# Patient Record
Sex: MALE | Race: WHITE | NOT HISPANIC OR LATINO | Employment: PART TIME | URBAN - NONMETROPOLITAN AREA
[De-identification: names, ages, dates, MRNs, and addresses within clinical notes are randomized per-mention and may not be internally consistent; named-entity substitution may affect disease eponyms.]

---

## 2017-02-06 DIAGNOSIS — E78.5 HYPERLIPIDEMIA LDL GOAL <100: Primary | ICD-10-CM

## 2017-02-06 NOTE — TELEPHONE ENCOUNTER
SIMVASTATIN     Last Written Prescription Date: 10/12/16  Last Fill Quantity: 90, # refills: 0  Last Office Visit with [facility], [facility] or [facility] prescribing provider: 10/12/16       CHOL      199   10/12/2016  HDL       40   10/12/2016  LDL      119   10/12/2016  TRIG      202   10/12/2016  CHOLHDLRATIO      4.8   10/16/2014

## 2017-02-07 RX ORDER — SIMVASTATIN 20 MG
20 TABLET ORAL AT BEDTIME
Qty: 90 TABLET | Refills: 2 | Status: SHIPPED | OUTPATIENT
Start: 2017-02-07 | End: 2017-06-18 | Stop reason: ALTCHOICE

## 2017-02-20 DIAGNOSIS — E11.65 TYPE 2 DIABETES MELLITUS WITH HYPERGLYCEMIA (H): ICD-10-CM

## 2017-02-20 NOTE — TELEPHONE ENCOUNTER
Metformin         Last Written Prescription Date: 7/14/2016  Last Fill Quantity: 90, # refills: 3  Last Office Visit with [facility], [facility] or [facility] prescribing provider:  10/12/2016        BP Readings from Last 3 Encounters:   10/12/16 144/80   09/15/16 136/84   07/28/16 156/82     Lab Results   Component Value Date    MICROL 11 07/14/2016     No results found for: MICROALBUMIN  Creatinine   Date Value Ref Range Status   07/14/2016 0.90 0.66 - 1.25 mg/dL Final   ]  GFR Estimate   Date Value Ref Range Status   07/14/2016 85 >60 mL/min/1.7m2 Final     Comment:     Non  GFR Calc   04/22/2015 40 (L) >60 mL/min/1.7m2 Final     Comment:     Non  GFR Calc   03/17/2014 77 >60 mL/min/1.7m2 Final     GFR Estimate If Black   Date Value Ref Range Status   07/14/2016 >90   GFR Calc   >60 mL/min/1.7m2 Final   04/22/2015 48 (L) >60 mL/min/1.7m2 Final     Comment:      GFR Calc   03/17/2014 >90 >60 mL/min/1.7m2 Final     Lab Results   Component Value Date    CHOL 199 10/12/2016     Lab Results   Component Value Date    HDL 40 10/12/2016     Lab Results   Component Value Date     10/12/2016     Lab Results   Component Value Date    TRIG 202 10/12/2016     Lab Results   Component Value Date    CHOLHDLRATIO 4.8 10/16/2014     Lab Results   Component Value Date    AST 42 07/14/2016     Lab Results   Component Value Date    ALT 66 07/14/2016     Lab Results   Component Value Date    A1C 6.5 10/12/2016    A1C 7.5 07/14/2016    A1C 7.3 11/04/2015    A1C 7.3 04/22/2015    A1C 6.6 07/01/2014     Potassium   Date Value Ref Range Status   07/14/2016 3.9 3.4 - 5.3 mmol/L Final

## 2017-03-20 ENCOUNTER — RADIANT APPOINTMENT (OUTPATIENT)
Dept: ULTRASOUND IMAGING | Facility: CLINIC | Age: 62
End: 2017-03-20
Attending: NURSE PRACTITIONER
Payer: COMMERCIAL

## 2017-03-20 DIAGNOSIS — I10 BENIGN ESSENTIAL HYPERTENSION: Chronic | ICD-10-CM

## 2017-03-20 DIAGNOSIS — Z87.891 PERSONAL HISTORY OF TOBACCO USE, PRESENTING HAZARDS TO HEALTH: ICD-10-CM

## 2017-03-20 DIAGNOSIS — E78.5 HYPERLIPIDEMIA LDL GOAL <100: ICD-10-CM

## 2017-03-20 PROCEDURE — 93880 EXTRACRANIAL BILAT STUDY: CPT

## 2017-03-20 NOTE — LETTER
[facility]  [address] [facility] [address] [address]  Phone: [phone]    March 21, 2017    Js ESTHER Jones  [address] [facility] [address]              Dear Mr. Jones,      Your carotid ultrasounds shows less than 50% stenosis (narrowing.) No follow-up is necessary. Continue taking a baby Aspirin daily.        Sincerely,      Kathya Iglesias, CNP/ ebp

## 2017-03-21 NOTE — PROGRESS NOTES
Carotid ultrasounds show  Less than 50% stenosis. No follow-up necessary. Continue taking a baby Aspirin daily.  Please notify him of these results and send a hard copy if not on myChart.   Thanks. ALESSANDRO Maharaj

## 2017-04-15 DIAGNOSIS — E11.65 TYPE 2 DIABETES MELLITUS WITH HYPERGLYCEMIA (H): ICD-10-CM

## 2017-04-17 NOTE — TELEPHONE ENCOUNTER
metFORMIN (GLUCOPHAGE) 1000 MG tablet         Last Written Prescription Date: 2/20/17  Last Fill Quantity: 90, # refills: 0  Last Office Visit with [facility], [facility] or [facility] prescribing provider:  10/12/17        BP Readings from Last 3 Encounters:   10/12/16 144/80   09/15/16 136/84   07/28/16 156/82     Lab Results   Component Value Date    MICROL 11 07/14/2016     Lab Results   Component Value Date    UMALCR 7.93 07/14/2016     Creatinine   Date Value Ref Range Status   07/14/2016 0.90 0.66 - 1.25 mg/dL Final   ]  GFR Estimate   Date Value Ref Range Status   07/14/2016 85 >60 mL/min/1.7m2 Final     Comment:     Non  GFR Calc   04/22/2015 40 (L) >60 mL/min/1.7m2 Final     Comment:     Non  GFR Calc   03/17/2014 77 >60 mL/min/1.7m2 Final     GFR Estimate If Black   Date Value Ref Range Status   07/14/2016 >90   GFR Calc   >60 mL/min/1.7m2 Final   04/22/2015 48 (L) >60 mL/min/1.7m2 Final     Comment:      GFR Calc   03/17/2014 >90 >60 mL/min/1.7m2 Final     Lab Results   Component Value Date    CHOL 199 10/12/2016     Lab Results   Component Value Date    HDL 40 10/12/2016     Lab Results   Component Value Date     10/12/2016     Lab Results   Component Value Date    TRIG 202 10/12/2016     Lab Results   Component Value Date    CHOLHDLRATIO 4.8 10/16/2014     Lab Results   Component Value Date    AST 42 07/14/2016     Lab Results   Component Value Date    ALT 66 07/14/2016     Lab Results   Component Value Date    A1C 6.5 10/12/2016    A1C 7.5 07/14/2016    A1C 7.3 11/04/2015    A1C 7.3 04/22/2015    A1C 6.6 07/01/2014     Potassium   Date Value Ref Range Status   07/14/2016 3.9 3.4 - 5.3 mmol/L Final

## 2017-05-09 ENCOUNTER — TELEPHONE (OUTPATIENT)
Dept: FAMILY MEDICINE | Facility: CLINIC | Age: 62
End: 2017-05-09

## 2017-05-09 NOTE — TELEPHONE ENCOUNTER
Due for follow-up OV for DM, Cholesterol, and hypertension. Please help him schedule. Come fasting.  ALESSANDRO Maharaj

## 2017-05-09 NOTE — TELEPHONE ENCOUNTER
Pt informed/ advised. States he will need to call back to schedule appt due to work schedule.  NORMAN Rivas RN

## 2017-06-16 ENCOUNTER — OFFICE VISIT (OUTPATIENT)
Dept: FAMILY MEDICINE | Facility: CLINIC | Age: 62
End: 2017-06-16
Payer: COMMERCIAL

## 2017-06-16 VITALS
WEIGHT: 152 LBS | RESPIRATION RATE: 18 BRPM | DIASTOLIC BLOOD PRESSURE: 82 MMHG | BODY MASS INDEX: 26.09 KG/M2 | OXYGEN SATURATION: 95 % | TEMPERATURE: 98.6 F | HEART RATE: 70 BPM | SYSTOLIC BLOOD PRESSURE: 142 MMHG

## 2017-06-16 DIAGNOSIS — I10 BENIGN ESSENTIAL HYPERTENSION: Chronic | ICD-10-CM

## 2017-06-16 DIAGNOSIS — E78.5 HYPERLIPIDEMIA LDL GOAL <100: Chronic | ICD-10-CM

## 2017-06-16 DIAGNOSIS — K21.9 GASTROESOPHAGEAL REFLUX DISEASE WITHOUT ESOPHAGITIS: ICD-10-CM

## 2017-06-16 DIAGNOSIS — Z11.59 NEED FOR HEPATITIS C SCREENING TEST: ICD-10-CM

## 2017-06-16 DIAGNOSIS — E11.9 TYPE 2 DIABETES MELLITUS WITHOUT COMPLICATION, WITHOUT LONG-TERM CURRENT USE OF INSULIN (H): Primary | Chronic | ICD-10-CM

## 2017-06-16 LAB
ANION GAP SERPL CALCULATED.3IONS-SCNC: 6 MMOL/L (ref 3–14)
BUN SERPL-MCNC: 19 MG/DL (ref 7–30)
CALCIUM SERPL-MCNC: 9.2 MG/DL (ref 8.5–10.1)
CHLORIDE SERPL-SCNC: 106 MMOL/L (ref 94–109)
CHOLEST SERPL-MCNC: 223 MG/DL
CO2 SERPL-SCNC: 26 MMOL/L (ref 20–32)
CREAT SERPL-MCNC: 0.89 MG/DL (ref 0.66–1.25)
CREAT UR-MCNC: 160 MG/DL
GFR SERPL CREATININE-BSD FRML MDRD: 86 ML/MIN/1.7M2
GLUCOSE SERPL-MCNC: 140 MG/DL (ref 70–99)
HBA1C MFR BLD: 7.2 % (ref 4.3–6)
HCV AB SERPL QL IA: NORMAL
HDLC SERPL-MCNC: 37 MG/DL
LDLC SERPL CALC-MCNC: 139 MG/DL
MICROALBUMIN UR-MCNC: 30 MG/L
MICROALBUMIN/CREAT UR: 18.44 MG/G CR (ref 0–17)
NONHDLC SERPL-MCNC: 186 MG/DL
POTASSIUM SERPL-SCNC: 3.9 MMOL/L (ref 3.4–5.3)
SODIUM SERPL-SCNC: 138 MMOL/L (ref 133–144)
TRIGL SERPL-MCNC: 233 MG/DL

## 2017-06-16 PROCEDURE — 82043 UR ALBUMIN QUANTITATIVE: CPT | Performed by: NURSE PRACTITIONER

## 2017-06-16 PROCEDURE — 36415 COLL VENOUS BLD VENIPUNCTURE: CPT | Performed by: NURSE PRACTITIONER

## 2017-06-16 PROCEDURE — 80061 LIPID PANEL: CPT | Performed by: NURSE PRACTITIONER

## 2017-06-16 PROCEDURE — 80048 BASIC METABOLIC PNL TOTAL CA: CPT | Performed by: NURSE PRACTITIONER

## 2017-06-16 PROCEDURE — 99214 OFFICE O/P EST MOD 30 MIN: CPT | Performed by: NURSE PRACTITIONER

## 2017-06-16 PROCEDURE — 86803 HEPATITIS C AB TEST: CPT | Performed by: NURSE PRACTITIONER

## 2017-06-16 PROCEDURE — 83036 HEMOGLOBIN GLYCOSYLATED A1C: CPT | Performed by: NURSE PRACTITIONER

## 2017-06-16 RX ORDER — GLIPIZIDE 5 MG/1
5 TABLET, FILM COATED, EXTENDED RELEASE ORAL DAILY
Qty: 90 TABLET | Refills: 1 | Status: SHIPPED | OUTPATIENT
Start: 2017-06-16 | End: 2017-06-18

## 2017-06-16 RX ORDER — PIOGLITAZONEHYDROCHLORIDE 15 MG/1
15 TABLET ORAL DAILY
Qty: 90 TABLET | Refills: 1 | Status: SHIPPED | OUTPATIENT
Start: 2017-06-16 | End: 2017-06-18

## 2017-06-16 NOTE — PROGRESS NOTES
SUBJECTIVE:                                                    Js Jones is a 62 year old male who presents to clinic today for the following health issues:    Due: Adv dir, hep c, eye, foot  Diabetes Follow-up      Patient is checking blood sugars: rarely.  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 Thinks that his blood pressure medication needs to be increase slightly.  HPI:   PCP:  Kathya Iglesias, [phone]    HPI: Patient has been doing well. Taking his medications as prescribed, although he has been out of his Metformin and Actos for a week he says. He didn't take his hypertension medication today as he came in fasting- reviewed that with him for future. He agrees to Hep C screen. He will work on Adv Directive and bring in to see [place] Rivas next week when he gets his BP  rechecked.    CURRENT PROBLEM LIST:  Patient Active Problem List   Diagnosis     Type 2 diabetes mellitus without complication, without long-term current use of insulin (H)     Benign essential hypertension, goal <140/90     Hyperlipidemia LDL goal <100     Gastroesophageal reflux disease without esophagitis       PAST MEDICAL HISTORY:  History reviewed. No pertinent past medical history.    CURRENT MEDICATIONS:  Current Outpatient Prescriptions   Medication Sig Dispense Refill     metFORMIN (GLUCOPHAGE) 1000 MG tablet 1000 mg with breakfast, 500 mg with lunch, 1000 mg with dinner. (Needs follow-up appointment for this medication) 90 tablet 0     simvastatin (ZOCOR) 20 MG tablet Take 1 tablet (20 mg) by mouth At Bedtime 90 tablet 2     Melatonin 10 MG TABS Take 10 mg by mouth nightly as needed for sleep       pioglitazone (ACTOS) 15 MG tablet Take 1 tablet (15 mg) by mouth daily Needs HgbA1c checked before refill 90 tablet 0     glipiZIDE (GLIPIZIDE XL) 5 MG 24 hr tablet Take 1 tablet (5 mg) by mouth daily Needs HgbA1c checked before refill 90 tablet 0     amLODIPine (NORVASC) 10 MG tablet Take 1 tablet (10 mg) by mouth daily 90 tablet 3     blood glucose monitoring (NO BRAND SPECIFIED) test strip Use to test blood sugar 3 times daily or as directed. Box of 100 each. 3 Box 3     omeprazole (PRILOSEC OTC) 20 MG tablet Take 1 tablet (20 mg) by mouth daily 90 tablet 1     Omega-3 Fatty Acids (OMEGA-3 FISH OIL PO) Take 1,200 mg by mouth 2 times daily (with meals)       Blood Glucose Monitoring Suppl W/DEVICE KIT 1 each daily 1 kit 0     ONE TOUCH LANCETS MISC 1 lancet 3 times daily 200 each 5     MULTIPLE VITAMIN PO Take 1 tablet by mouth       aspirin 81 MG tablet Take 1 tablet (81 mg) by mouth daily 90 tablet 3     ACE NOT PRESCRIBED, INTENTIONAL, ACE Inhibitor not prescribed due to Allergy 0 each 0       ALLERGIES:  Allergies   Allergen Reactions     Lisinopril Cough       FAMILY HISTORY:  Family History   Problem  Relation Age of Onset     DIABETES Mother      Hypertension Mother      Cardiovascular Father 81     CHF       SOCIAL HISTORY:  Social History     Social History     Marital status:      Spouse name: N/A     Number of children: N/A     Years of education: N/A     Social History Main Topics     Smoking status: Former Smoker     Smokeless tobacco: Never Used     Alcohol use No     Drug use: No     Sexual activity: Yes     Partners: Female     Other Topics Concern     Parent/Sibling W/ Cabg, Mi Or Angioplasty Before 65f 55m? No     Social History Narrative            ROS:  Constitutional, HEENT, cardiovascular, pulmonary, gi and gu systems are negative, except as otherwise noted.  CV: NEGATIVE for chest pain, palpitations or peripheral edema and Hx HTN  GI: NEGATIVE for nausea, abdominal pain, heartburn, or change in bowel habits and Hx GERD  ENDOCRINE: NEGATIVE for temperature intolerance, skin/hair changes and Hx diabetes    PHYSICAL EXAM:   /82 (BP Location: Right arm, Patient Position: Chair, Cuff Size: Adult Regular)  Pulse 70  Temp 98.6  F (37  C) (Tympanic)  Resp 18  Wt 152 lb (68.9 kg)  SpO2 95%  BMI 26.09 kg/m2  Body mass index is 26.09 kg/(m^2).  GENERAL APPEARANCE: healthy, alert and no distress  RESP: lungs clear to auscultation - no rales, rhonchi or wheezes  CV: regular rates and rhythm, normal S1 S2, no S3 or S4 and no murmur, click or rub  ABDOMEN: soft, nontender, without hepatosplenomegaly or masses and bowel sounds normal  MS: extremities normal- no gross deformities noted  SKIN: no suspicious lesions or rashes  DIABETIC FOOT EXAM: normal DP and PT pulses, no trophic changes or ulcerative lesions, normal sensory exam and normal monofilament exam  PSYCH: mentation appears normal and affect normal/bright,       ASSESSMENT & PLAN:     (E11.9) Type 2 diabetes mellitus without complication, without long-term current use of insulin (H)  (primary encounter diagnosis)  Comment: chronic,  stable  Plan: Hemoglobin A1c, Albumin Random Urine         Quantitative, glipiZIDE (GLIPIZIDE XL) 5 MG 24 hr tablet,         metFORMIN (GLUCOPHAGE) 1000 MG tablet,         pioglitazone (ACTOS) 15 MG tablet        Refill    (I10) Benign essential hypertension, goal <140/90  Comment: chronic, elevated today (he did not take his medications this morning)  Plan: Basic metabolic panel        Recheck BP in 1 week    (E78.5) Hyperlipidemia LDL goal <100  Comment: chronic, stable  Plan: Lipid panel reflex to direct LDL        We might need to switch from Simvastatin to Atorvastatin for better LDL control    (K21.9) Gastroesophageal reflux disease without esophagitis  Comment: chronic, stable   Plan: he uses Omeprazole over-the-counter as needed    (Z11.59) Need for hepatitis C screening test  Comment: screen  Plan: Hepatitis C antibody            Patient Instructions   Diabetes  Refilled medications for 6 months  Recheck HgbA1c every 6 months with a lab only appointment  Get an eye exam and have them fax us the result    Cholesterol  We'll check see how your LDL level looks, we might need to change from Simvastatin to Atorvastatin    Hypertension  Come in in 1 week to see Jamaica Rivas nurse to check BP and complete Health Care Directive     Risks, benefits, side effects and rationale for treatment plan fully discussed with the patient and understanding expressed.    Kathya Iglesias, [employer]

## 2017-06-16 NOTE — NURSING NOTE
"Chief Complaint   Patient presents with     Diabetes     Hypertension       Initial /82 (BP Location: Right arm, Patient Position: Chair, Cuff Size: Adult Regular)  Pulse 70  Temp 98.6  F (37  C) (Tympanic)  Resp 18  Wt 152 lb (68.9 kg)  SpO2 95%  BMI 26.09 kg/m2 Estimated body mass index is 26.09 kg/(m^2) as calculated from the following:    Height as of 10/12/16: 5' 4\" (1.626 m).    Weight as of this encounter: 152 lb (68.9 kg).  Medication Reconciliation: complete    Health Maintenance that is potentially due pending provider review:  Is fasting today and willing to complete labs today.     n/a    "

## 2017-06-16 NOTE — PATIENT INSTRUCTIONS
Diabetes  Refilled medications for 6 months  Recheck HgbA1c every 6 months with a lab only appointment  Get an eye exam and have them fax us the result    Cholesterol  We'll check see how your LDL level looks, we might need to change from Simvastatin to Atorvastatin    Hypertension  Come in in 1 week to see Jamaica Rivas nurse to check BP and complete Health Care Directive

## 2017-06-16 NOTE — MR AVS SNAPSHOT
After Visit Summary   6/16/2017    Js Jones    MRN: [id number]           Patient Information     Date Of Birth          1955        Visit Information        Provider Department      6/16/2017 8:40 AM Kathya Iglesias CNP [facility]        Today's Diagnoses     Type 2 diabetes mellitus without complication, without long-term current use of insulin (H)    -  1    Benign essential hypertension, goal <140/90        Hyperlipidemia LDL goal <100        Gastroesophageal reflux disease without esophagitis        Need for hepatitis C screening test        Type 2 diabetes mellitus with hyperglycemia, without long-term current use of insulin (H)          Care Instructions    Diabetes  Refilled medications for 6 months  Recheck HgbA1c every 6 months with a lab only appointment  Get an eye exam and have them fax us the result    Cholesterol  We'll check see how your LDL level looks, we might need to change from Simvastatin to Atorvastatin    Hypertension  Come in in 1 week to see Jamaica Rivas nurse to check BP and complete Health Care Directive           Follow-ups after your visit        Who to contact     If you have questions or need follow up information about today's clinic visit or your schedule please contact [facility] directly at [phone].  Normal or non-critical lab and imaging results will be communicated to you by MyChart, letter or phone within 4 business days after the clinic has received the results. If you do not hear from us within 7 days, please contact the clinic through Inporiahart or phone. If you have a critical or abnormal lab result, we will notify you by phone as soon as possible.  Submit refill requests through Schedule Savvy or call your pharmacy and they will forward the refill request to us. Please allow 3 business days for your refill to be completed.          Additional Information About Your Visit        MyChart Information     Schedule Savvy lets  "you send messages to your doctor, view your test results, renew your prescriptions, schedule appointments and more. To sign up, go to [email or web address] . Click on \"Log in\" on the left side of the screen, which will take you to the Welcome page. Then click on \"Sign up Now\" on the right side of the page.     You will be asked to enter the access code listed below, as well as some personal information. Please follow the directions to create your username and password.     Your access code is: [email or web address]  Expires: 2017  9:24 AM     Your access code will  in 90 days. If you need help or a new code, please call your [place] clinic or [phone].        Care EveryWhere ID     This is your [facility] EveryWhere ID. This could be used by other organizations to access your [place] medical records  [id number]        Your Vitals Were     Pulse Temperature Respirations Pulse Oximetry BMI (Body Mass Index)       70 98.6  F (37  C) (Tympanic) 18 95% 26.09 kg/m2        Blood Pressure from Last 3 Encounters:   17 142/82   10/12/16 144/80   09/15/16 136/84    Weight from Last 3 Encounters:   17 152 lb (68.9 kg)   10/12/16 155 lb (70.3 kg)   16 157 lb (71.2 kg)              We Performed the Following     Albumin Random Urine Quantitative     Basic metabolic panel     Hemoglobin A1c     Hepatitis C antibody     Lipid panel reflex to direct LDL          Where to get your medicines      These medications were sent to [facility] [address] [place] [address] [address]  [address] [address]     Phone:  [phone]     glipiZIDE 5 MG 24 hr tablet    metFORMIN 1000 MG tablet    pioglitazone 15 MG tablet          Primary Care Provider Office Phone # Fax #    Kathya Iglesias [phone]       [facility] [address] [address]        Thank you!     Thank you for choosing [facility]  for your care. Our goal is " always to provide you with excellent care. Hearing back from our patients is one way we can continue to improve our services. Please take a few minutes to complete the written survey that you may receive in the mail after your visit with us. Thank you!             Your Updated Medication List - Protect others around you: Learn how to safely use, store and throw away your medicines at www.disposemymeds.org.          This list is accurate as of: 6/16/17  9:24 AM.  Always use your most recent med list.                   Brand Name Dispense Instructions for use    ACE NOT PRESCRIBED (INTENTIONAL)     0 each    ACE Inhibitor not prescribed due to Allergy       amLODIPine 10 MG tablet    NORVASC    90 tablet    Take 1 tablet (10 mg) by mouth daily       aspirin 81 MG tablet     90 tablet    Take 1 tablet (81 mg) by mouth daily       Blood Glucose Monitoring Suppl W/DEVICE Kit     1 kit    1 each daily       blood glucose monitoring test strip    no brand specified    3 Box    Use to test blood sugar 3 times daily or as directed. Box of 100 each.       glipiZIDE 5 MG 24 hr tablet    glipiZIDE XL    90 tablet    Take 1 tablet (5 mg) by mouth daily Needs HgbA1c checked before refill       Melatonin 10 MG Tabs tablet      Take 10 mg by mouth nightly as needed for sleep       metFORMIN 1000 MG tablet    GLUCOPHAGE    90 tablet    1000 mg with breakfast, 500 mg with lunch, 1000 mg with dinner. (Needs follow-up appointment for this medication)       MULTIPLE VITAMIN PO      Take 1 tablet by mouth       OMEGA-3 FISH OIL PO      Take 1,200 mg by mouth 2 times daily (with meals)       omeprazole 20 MG tablet    priLOSEC OTC    90 tablet    Take 1 tablet (20 mg) by mouth daily       ONE TOUCH LANCETS Misc     200 each    1 lancet 3 times daily       pioglitazone 15 MG tablet    ACTOS    90 tablet    Take 1 tablet (15 mg) by mouth daily Needs HgbA1c checked before refill       simvastatin 20 MG tablet    ZOCOR    90 tablet    Take 1  tablet (20 mg) by mouth At Bedtime

## 2017-06-16 NOTE — LETTER
[facility]  [address] [place] [address]  Phone: [phone]  Fax: [phone]    June 19, 2017    Js ESTEHR Jones  [address] [facility] [address]          Dear Mr. Jones,    The results of your recent lab tests were: Urine Albumin has come up a little.  Hemoglobin A1c has come up a little. Increase Glipizide from 5 mg to 10 mg. Stop Actos.   Repeat HgbA1c in 3 months (before your are out of your diabetic medications). Schedule an appointment with Joana Blanton, I'd like to review other medications that might be helpful.   Schedule LAB ONLY APPOINTMENT in 3 months.  Lipids- LDL has gone up. Not at goal (<100). Stop Simvastatin. Start Atorvastatin 20 mg daily. Repeat fasting cholesterol in 3 months. Schedule LAB ONLY APPOINTMENT in 3 months-fasting.  Hepatitis C negative  BMP normal  Enclosed is a copy of these results.  If you have any further questions or problems, please contact our office.    Sincerely,      Kathya Iglesias CNP/ [facility]son

## 2017-06-18 DIAGNOSIS — E78.5 HYPERLIPIDEMIA LDL GOAL <100: Primary | Chronic | ICD-10-CM

## 2017-06-18 DIAGNOSIS — E11.9 TYPE 2 DIABETES MELLITUS WITHOUT COMPLICATION, WITHOUT LONG-TERM CURRENT USE OF INSULIN (H): Chronic | ICD-10-CM

## 2017-06-18 RX ORDER — GLIPIZIDE 10 MG/1
10 TABLET, FILM COATED, EXTENDED RELEASE ORAL DAILY
Qty: 90 TABLET | Refills: 0 | Status: SHIPPED | OUTPATIENT
Start: 2017-06-18 | End: 2017-10-31

## 2017-06-18 RX ORDER — ATORVASTATIN CALCIUM 20 MG/1
20 TABLET, FILM COATED ORAL DAILY
Qty: 90 TABLET | Refills: 3 | Status: SHIPPED | OUTPATIENT
Start: 2017-06-18 | End: 2017-08-25

## 2017-06-18 NOTE — PROGRESS NOTES
Urin Albumin has come up a little.  Hemoglobin A1c has come up a little. Increase Glipizide from 5 mg to 10 mg. Stop Actos. Repeat HgbA1c in 3 months (before your are out of your diabetic medications). Schedule an appointment with Joana Blanton, I'd like to review other medications that might be helpful. Schedule LAB ONLY APPOINTMENT in 3 months.  Lipids- LDL has gone up. Not at goal (<100). Stop Simvastatin. Start Atorvastatin 20 mg daily. Repeat fasting cholesterol in 3 months. Schedule LAB ONLY APPOINTMENT in 3 months-fasting.  Hepatitis C negative  BMP normal    Please notify him of these results and send a hard copy if not on FanSnaphart.   Thanks. ALESSANDRO Maharaj

## 2017-07-17 ENCOUNTER — TELEPHONE (OUTPATIENT)
Dept: FAMILY MEDICINE | Facility: CLINIC | Age: 62
End: 2017-07-17

## 2017-07-17 NOTE — TELEPHONE ENCOUNTER
Pt called back, scheduled nurse only BP visit 07-20-17.  Has refill remaining on amlodipine, pharm will fill.  NORMAN Rivas RN

## 2017-07-17 NOTE — TELEPHONE ENCOUNTER
Per 06-16-17 OV-       (I10) Benign essential hypertension, goal <140/90  Comment: chronic, elevated today (he did not take his medications this morning)  Plan: Basic metabolic panel        Recheck BP in 1 week    Advised pt needs nurse only BP visit.  States will call back to schedule appt this week.  NORMAN Rivas RN

## 2017-07-17 NOTE — TELEPHONE ENCOUNTER
Patient left a vm stating he said they were going to tweek his blood pressure medication Amlodipine. Please advise.    Maite Dav[place] [place]

## 2017-07-20 ENCOUNTER — OFFICE VISIT (OUTPATIENT)
Dept: FAMILY MEDICINE | Facility: CLINIC | Age: 62
End: 2017-07-20
Payer: COMMERCIAL

## 2017-07-20 VITALS
SYSTOLIC BLOOD PRESSURE: 155 MMHG | WEIGHT: 153 LBS | RESPIRATION RATE: 18 BRPM | DIASTOLIC BLOOD PRESSURE: 90 MMHG | HEART RATE: 64 BPM | BODY MASS INDEX: 26.12 KG/M2 | HEIGHT: 64 IN | TEMPERATURE: 96.8 F

## 2017-07-20 DIAGNOSIS — E11.9 TYPE 2 DIABETES MELLITUS WITHOUT COMPLICATION, WITHOUT LONG-TERM CURRENT USE OF INSULIN (H): Chronic | ICD-10-CM

## 2017-07-20 DIAGNOSIS — Z12.11 COLON CANCER SCREENING: ICD-10-CM

## 2017-07-20 DIAGNOSIS — I10 BENIGN ESSENTIAL HYPERTENSION: Chronic | ICD-10-CM

## 2017-07-20 DIAGNOSIS — Z71.89 COUNSELING REGARDING ADVANCED DIRECTIVES: ICD-10-CM

## 2017-07-20 DIAGNOSIS — H11.31 SUBCONJUNCTIVAL BLEED, RIGHT: Primary | ICD-10-CM

## 2017-07-20 PROCEDURE — 99214 OFFICE O/P EST MOD 30 MIN: CPT | Performed by: FAMILY MEDICINE

## 2017-07-20 RX ORDER — LOSARTAN POTASSIUM 25 MG/1
25 TABLET ORAL DAILY
Qty: 90 TABLET | Refills: 3 | Status: SHIPPED | OUTPATIENT
Start: 2017-07-20 | End: 2018-03-11

## 2017-07-20 RX ORDER — LISINOPRIL 10 MG/1
10 TABLET ORAL DAILY
Qty: 90 TABLET | Refills: 1 | Status: SHIPPED | OUTPATIENT
Start: 2017-07-20 | End: 2017-07-20

## 2017-07-20 NOTE — PROGRESS NOTES
SUBJECTIVE:                                                    Js Jones is a 62 year old male who presents to clinic today for the following health issues:      Eye(s) Problem      Duration: couple days     Description:  Location: right  Pain: no  Redness: Yes- will pop blood vessels in his sleep- will wake up in the morning and look in the mirror and his eye will be all blood shot       Accompanying signs and symptoms: Patient is diabetic     History (Trauma, foreign body exposure,): None    Precipitating or alleviating factors (contact use): no contacts but does wear reading glassed once in awhile     Therapies tried and outcome: None- usually goes away after a week or two         Problem list and histories reviewed & adjusted, as indicated.  Additional history: as documented    Patient Active Problem List   Diagnosis     Type 2 diabetes mellitus without complication, without long-term current use of insulin (H)     Benign essential hypertension, goal <140/90     Hyperlipidemia LDL goal <100     Gastroesophageal reflux disease without esophagitis     No past surgical history on file.    Social History   Substance Use Topics     Smoking status: Former Smoker     Smokeless tobacco: Never Used     Alcohol use No     Family History   Problem Relation Age of Onset     DIABETES Mother      Hypertension Mother      Cardiovascular Father 81     CHF         Current Outpatient Prescriptions   Medication Sig Dispense Refill     losartan (COZAAR) 25 MG tablet Take 1 tablet (25 mg) by mouth daily 90 tablet 3     glipiZIDE (GLUCOTROL XL) 10 MG 24 hr tablet Take 1 tablet (10 mg) by mouth daily Needs HgbA1c checked before refill 90 tablet 0     atorvastatin (LIPITOR) 20 MG tablet Take 1 tablet (20 mg) by mouth daily 90 tablet 3     metFORMIN (GLUCOPHAGE) 1000 MG tablet 1000 mg with breakfast, 500 mg with lunch, 1000 mg with dinner. (Needs follow-up appointment for this medication) 90 tablet 1     Melatonin 10 MG TABS Take  10 mg by mouth nightly as needed for sleep       amLODIPine (NORVASC) 10 MG tablet Take 1 tablet (10 mg) by mouth daily 90 tablet 3     blood glucose monitoring (NO BRAND SPECIFIED) test strip Use to test blood sugar 3 times daily or as directed. Box of 100 each. 3 Box 3     ACE NOT PRESCRIBED, INTENTIONAL, ACE Inhibitor not prescribed due to Allergy 0 each 0     omeprazole (PRILOSEC OTC) 20 MG tablet Take 1 tablet (20 mg) by mouth daily 90 tablet 1     Omega-3 Fatty Acids (OMEGA-3 FISH OIL PO) Take 1,200 mg by mouth 2 times daily (with meals)       Blood Glucose Monitoring Suppl W/DEVICE KIT 1 each daily 1 kit 0     ONE TOUCH LANCETS MISC 1 lancet 3 times daily 200 each 5     MULTIPLE VITAMIN PO Take 1 tablet by mouth       aspirin 81 MG tablet Take 1 tablet (81 mg) by mouth daily 90 tablet 3     Allergies   Allergen Reactions     Lisinopril Cough     Recent Labs   Lab Test  06/16/17   0920  10/12/16   0808  07/14/16   0900  11/04/15   1630  04/22/15   0845   09/05/13   1312  09/03/13   1450   A1C  7.2*  6.5*  7.5*  7.3*  7.3*   < >   --   12.2*   LDL  139*  119*  101*   --    --    < >   --    --    HDL  37*  40  33*   --    --    < >   --    --    TRIG  233*  202*  125   --    --    < >   --    --    ALT   --    --   66   --   40   --   39  37   CR  0.89   --   0.90   --   1.74*   < >  0.74  0.93   GFRESTIMATED  86   --   85   --   40*   < >  >90  83   GFRESTBLACK  >90   GFR Calc     --   >90   GFR Calc     --   48*   < >  >90  >90   POTASSIUM  3.9   --   3.9   --   4.0   < >  3.9  4.4   TSH   --    --    --   1.47   --    --    --   1.38    < > = values in this interval not displayed.      BP Readings from Last 3 Encounters:   07/20/17 155/90   06/16/17 142/82   10/12/16 144/80    Wt Readings from Last 3 Encounters:   07/20/17 153 lb (69.4 kg)   06/16/17 152 lb (68.9 kg)   10/12/16 155 lb (70.3 kg)                  Labs reviewed in EPIC          Reviewed and updated as needed this  "visit by clinical staff     Reviewed and updated as needed this visit by Provider         ROS:  Constitutional, HEENT, cardiovascular, pulmonary, gi and gu systems are negative, except as otherwise noted.      OBJECTIVE:   /90  Pulse 64  Temp 96.8  F (36  C) (Tympanic)  Resp 18  Ht 5' 4\" (1.626 m)  Wt 153 lb (69.4 kg)  BMI 26.26 kg/m2  Body mass index is 26.26 kg/(m^2).  GENERAL: healthy, alert and no distress  EYES: PERRL, EOMI and conjunctiva/corneas- right sided subconjunctival hemorrhage involving medial half  NECK: no adenopathy, no asymmetry, masses, or scars and thyroid normal to palpation  RESP: lungs clear to auscultation - no rales, rhonchi or wheezes  CV: regular rate and rhythm, normal S1 S2, no S3 or S4, no murmur, click or rub, no peripheral edema and peripheral pulses strong  ABDOMEN: soft, nontender, no hepatosplenomegaly, no masses and bowel sounds normal  MS: no gross musculoskeletal defects noted, no edema  NEURO: Normal strength and tone, mentation intact and speech normal      ASSESSMENT/PLAN:         ICD-10-CM    1. Subconjunctival bleed, right H11.31    2. Type 2 diabetes mellitus without complication, without long-term current use of insulin (H) E11.9 Basic metabolic panel  (Ca, Cl, CO2, Creat, Gluc, K, Na, BUN)   3. Benign essential hypertension, goal <140/90 I10 losartan (COZAAR) 25 MG tablet     DISCONTINUED: lisinopril (PRINIVIL/ZESTRIL) 10 MG tablet   4. Counseling regarding advanced directives Z71.89    5. Colon cancer screening Z12.11      Blood pressure noted to be elevated, history of microalbuminuria. Cozaar 25 mg prescribed and suggested to continue amlodipine 10 mg daily. Recommended to follow up with ophthalmology as well for routine exam. Follow-up in 2 weeks with RN for the blood pressure and electrolytes checked. Follow-up with PCP in 2 months or earlier if needed. Other medications reviewed and no changes made. FIT kit provided for colon cancer screening. Patient " understood and in agreement with the above plan. All questions answered.    MEDICATIONS:   Orders Placed This Encounter   Medications     DISCONTD: lisinopril (PRINIVIL/ZESTRIL) 10 MG tablet     Sig: Take 1 tablet (10 mg) by mouth daily     Dispense:  90 tablet     Refill:  1     losartan (COZAAR) 25 MG tablet     Sig: Take 1 tablet (25 mg) by mouth daily     Dispense:  90 tablet     Refill:  3          - Continue other medications without change  Patient Instructions     Schedule your visit with eye doctor.  Your blood pressure is high, take losartan as prescribed.  Follow up in 2 weeks with nurse for blood pressure check   Follow up with your doctor in 2 months for repeat exam      Subconjunctival Hemorrhage    A subconjunctival hemorrhage is a result of a broken blood vessel in the white portion of the eye. It is usually painless and may be caused by coughing, sneezing, or vomiting. An injury to the eye can cause this. It can also be a sign of hypertension (high blood pressure) or a bleeding disorder.  Although it can look frightening, the presence of the blood is not serious. The blood will be reabsorbed without treatment within 2 to 3 weeks.  Home care  You may continue your usual activities.  Follow-up care  Follow up with your healthcare provider, or as advised.  When to seek medical advice  Contact your healthcare provider right away if any of these occur:    Pain in the eye    Change in vision    The blood does not disappear within three weeks    Increasing redness or swelling of the eye    Severe headache or dizziness    Signs of bruising or bleeding from other parts of your body  Date Last Reviewed: 6/14/2015 2000-2017 [employer]. [address] [place] [address], [place] [address]. All rights reserved. This information is not intended as a substitute for professional medical care. Always follow your healthcare professional's instructions.        Losartan Potassium Oral tablet  What is this  medicine?  LOSARTAN (hilda AMY tan) is used to treat high blood pressure and to reduce the risk of stroke in certain patients. This drug also slows the progression of kidney disease in patients with diabetes.  This medicine may be used for other purposes; ask your health care provider or pharmacist if you have questions.  What should I tell my health care provider before I take this medicine?  They need to know if you have any of these conditions:    heart failure    kidney or liver disease    an unusual or allergic reaction to losartan, other medicines, foods, dyes, or preservatives    pregnant or trying to get pregnant    breast-feeding  How should I use this medicine?  Take this medicine by mouth with a glass of water. Follow the directions on the prescription label. This medicine can be taken with or without food. Take your doses at regular intervals. Do not take your medicine more often than directed.  Talk to your pediatrician regarding the use of this medicine in children. Special care may be needed.  Overdosage: If you think you have taken too much of this medicine contact a poison control center or emergency room at once.  NOTE: This medicine is only for you. Do not share this medicine with others.  What if I miss a dose?  If you miss a dose, take it as soon as you can. If it is almost time for your next dose, take only that dose. Do not take double or extra doses.  What may interact with this medicine?    blood pressure medicines    diuretics, especially triamterene, spironolactone, or amiloride    fluconazole    NSAIDs, medicines for pain and inflammation, like ibuprofen or naproxen    potassium salts or potassium supplements    rifampin  This list may not describe all possible interactions. Give your health care provider a list of all the medicines, herbs, non-prescription drugs, or dietary supplements you use. Also tell them if you smoke, drink alcohol, or use illegal drugs. Some items may interact with  your medicine.  What should I watch for while using this medicine?  Visit your doctor or health care professional for regular checks on your progress. Check your blood pressure as directed. Ask your doctor or health care professional what your blood pressure should be and when you should contact him or her. Call your doctor or health care professional if you notice an irregular or fast heart beat.  Women should inform their doctor if they wish to become pregnant or think they might be pregnant. There is a potential for serious side effects to an unborn child, particularly in the second or third trimester. Talk to your health care professional or pharmacist for more information.  You may get drowsy or dizzy. Do not drive, use machinery, or do anything that needs mental alertness until you know how this drug affects you. Do not stand or sit up quickly, especially if you are an older patient. This reduces the risk of dizzy or fainting spells. Alcohol can make you more drowsy and dizzy. Avoid alcoholic drinks.  Avoid salt substitutes unless you are told otherwise by your doctor or health care professional.  Do not treat yourself for coughs, colds, or pain while you are taking this medicine without asking your doctor or health care professional for advice. Some ingredients may increase your blood pressure.  What side effects may I notice from receiving this medicine?  Side effects that you should report to your doctor or health care professional as soon as possible:    confusion, dizziness, light headedness or fainting spells    decreased amount of urine passed    difficulty breathing or swallowing, hoarseness, or tightening of the throat    fast or irregular heart beat, palpitations, or chest pain    skin rash, itching    swelling of your face, lips, tongue, hands, or feet  Side effects that usually do not require medical attention (report to your doctor or health care professional if they continue or are  bothersome):    cough    decreased sexual function or desire    headache    nasal congestion or stuffiness    nausea or stomach pain    sore or cramping muscles  This list may not describe all possible side effects. Call your doctor for medical advice about side effects. You may report side effects to FDA at 0-623-WOF-7661.  Where should I keep my medicine?  Keep out of the reach of children.  Store at room temperature between 15 and 30 degrees C (59 and 86 degrees F). Protect from light. Keep container tightly closed. Throw away any unused medicine after the expiration date.  NOTE:This sheet is a summary. It may not cover all possible information. If you have questions about this medicine, talk to your doctor, pharmacist, or health care provider. Copyright  2016 [employer]        Understanding Advance Care Planning  Advance care planning is the process of deciding one s own future medical care. It helps to make sure that if you can t speak for yourself, your wishes can still be carried out. The plan is a series of legal documents that note a person s wishes. The documents vary by state. Advance care planning should be discussed at a regular office visit with your primary care provider before an acute illness. Advance care planning is encouraged when a person has a serious illness that is expected to get worse. It may also be done before major surgery. And it can help you and your family be prepared in case of a major illness or injury. Advance care planning helps with making decisions at these times.    A health care proxy is a person who acts as the voice of a patient when the patient can t speak for himself or herself. The name of this role varies by state. It may be called a Durable Medical Power of  or Durable Power of  for Healthcare. It may be called an agent, surrogate, or advocate. Or it may be called a representative or decision maker. It is an official duty that is identified by a legal  document. The document also varies by state.   Why is advance care planning important?  If a person communicates his or her health care wishes:    He or she will be given medical care that matches his or her values and goals.    Family members will not be forced to make decisions in a crisis with no guidance.  Creating a plan  Making an advance care plan is often done in 3 steps:    Thinking about one s wishes. To create an advance care plan, you should think about what kind of medical treatment you would want if you lose the ability to communicate. Are there any situations in which you would refuse or stop treatment? Are there therapies you would want or not want? And whom do you want to make decisions for you? There are many places to learn more about how to plan for your care. Ask your health care provider or  for resources.    Picking a health care proxy. This means choosing a trusted person to speak for you only when you can t speak for yourself. When you cannot make medical decisions, your proxy makes sure the instructions in your advance care plan are followed. A proxy does not make decisions based on his or her own opinions. They must put aside those opinions and values if needed, and carry out your wishes.    Filling out the legal documents. There are several kinds of legal documents for advance care planning. Each 1 tells health care providers your wishes. The documents may vary by state. They must be signed and may need to be witnessed or notarized. You can cancel or change them whenever you wish. Depending on your state, the documents may include a Healthcare Proxy form, Living Will, Durable Medical Power of , Advance Directive, or others.  The family s role  The best help a family can give is to support their loved one s wishes. Open and honest communication is vital. Family should express any concerns they have about the patient s choices while the patient can still make decisions  in the event that his or her illness prevents him or her from communicating those wishes at a later time.   Date Last Reviewed: 5/5/2015 2000-2017 [employer]. [address] [place] [address], [place] [address]. All rights reserved. This information is not intended as a substitute for professional medical care. Always follow your healthcare professional's instructions.            Fortino Hyman MD  [facility]

## 2017-07-20 NOTE — NURSING NOTE
"Chief Complaint   Patient presents with     Eye Problem       Initial /88 (Cuff Size: Adult Regular)  Pulse 64  Temp 96.8  F (36  C) (Tympanic)  Resp 18  Ht 5' 4\" (1.626 m)  Wt 153 lb (69.4 kg)  BMI 26.26 kg/m2 Estimated body mass index is 26.26 kg/(m^2) as calculated from the following:    Height as of this encounter: 5' 4\" (1.626 m).    Weight as of this encounter: 153 lb (69.4 kg).  Medication Reconciliation: complete    Health Maintenance that is potentially due pending provider review:  Colonoscopy/FIT    Gave pt phone number/pended order to schedule mammo and/or colonoscopy(or FIT)    "

## 2017-07-20 NOTE — MR AVS SNAPSHOT
After Visit Summary   7/20/2017    Js Jones    MRN: [id number]           Patient Information     Date Of Birth          1955        Visit Information        Provider Department      7/20/2017 9:00 AM Fortino Hyman MD [facility]        Today's Diagnoses     Subconjunctival bleed, right    -  1    Type 2 diabetes mellitus without complication, without long-term current use of insulin (H)        Benign essential hypertension, goal <140/90        Counseling regarding advanced directives          Care Instructions    Schedule your visit with eye doctor.  Your blood pressure is high, take losartan as prescribed.  Follow up in 2 weeks with nurse for blood pressure check   Follow up with your doctor in 2 months for repeat exam      Subconjunctival Hemorrhage    A subconjunctival hemorrhage is a result of a broken blood vessel in the white portion of the eye. It is usually painless and may be caused by coughing, sneezing, or vomiting. An injury to the eye can cause this. It can also be a sign of hypertension (high blood pressure) or a bleeding disorder.  Although it can look frightening, the presence of the blood is not serious. The blood will be reabsorbed without treatment within 2 to 3 weeks.  Home care  You may continue your usual activities.  Follow-up care  Follow up with your healthcare provider, or as advised.  When to seek medical advice  Contact your healthcare provider right away if any of these occur:    Pain in the eye    Change in vision    The blood does not disappear within three weeks    Increasing redness or swelling of the eye    Severe headache or dizziness    Signs of bruising or bleeding from other parts of your body  Date Last Reviewed: 6/14/2015 2000-2017 [employer]. [address] [place] [address], [place] [address]. All rights reserved. This information is not intended as a substitute for professional medical care. Always follow your healthcare  professional's instructions.        Losartan Potassium Oral tablet  What is this medicine?  LOSARTAN (hilda AMY tan) is used to treat high blood pressure and to reduce the risk of stroke in certain patients. This drug also slows the progression of kidney disease in patients with diabetes.  This medicine may be used for other purposes; ask your health care provider or pharmacist if you have questions.  What should I tell my health care provider before I take this medicine?  They need to know if you have any of these conditions:    heart failure    kidney or liver disease    an unusual or allergic reaction to losartan, other medicines, foods, dyes, or preservatives    pregnant or trying to get pregnant    breast-feeding  How should I use this medicine?  Take this medicine by mouth with a glass of water. Follow the directions on the prescription label. This medicine can be taken with or without food. Take your doses at regular intervals. Do not take your medicine more often than directed.  Talk to your pediatrician regarding the use of this medicine in children. Special care may be needed.  Overdosage: If you think you have taken too much of this medicine contact a poison control center or emergency room at once.  NOTE: This medicine is only for you. Do not share this medicine with others.  What if I miss a dose?  If you miss a dose, take it as soon as you can. If it is almost time for your next dose, take only that dose. Do not take double or extra doses.  What may interact with this medicine?    blood pressure medicines    diuretics, especially triamterene, spironolactone, or amiloride    fluconazole    NSAIDs, medicines for pain and inflammation, like ibuprofen or naproxen    potassium salts or potassium supplements    rifampin  This list may not describe all possible interactions. Give your health care provider a list of all the medicines, herbs, non-prescription drugs, or dietary supplements you use. Also tell them  if you smoke, drink alcohol, or use illegal drugs. Some items may interact with your medicine.  What should I watch for while using this medicine?  Visit your doctor or health care professional for regular checks on your progress. Check your blood pressure as directed. Ask your doctor or health care professional what your blood pressure should be and when you should contact him or her. Call your doctor or health care professional if you notice an irregular or fast heart beat.  Women should inform their doctor if they wish to become pregnant or think they might be pregnant. There is a potential for serious side effects to an unborn child, particularly in the second or third trimester. Talk to your health care professional or pharmacist for more information.  You may get drowsy or dizzy. Do not drive, use machinery, or do anything that needs mental alertness until you know how this drug affects you. Do not stand or sit up quickly, especially if you are an older patient. This reduces the risk of dizzy or fainting spells. Alcohol can make you more drowsy and dizzy. Avoid alcoholic drinks.  Avoid salt substitutes unless you are told otherwise by your doctor or health care professional.  Do not treat yourself for coughs, colds, or pain while you are taking this medicine without asking your doctor or health care professional for advice. Some ingredients may increase your blood pressure.  What side effects may I notice from receiving this medicine?  Side effects that you should report to your doctor or health care professional as soon as possible:    confusion, dizziness, light headedness or fainting spells    decreased amount of urine passed    difficulty breathing or swallowing, hoarseness, or tightening of the throat    fast or irregular heart beat, palpitations, or chest pain    skin rash, itching    swelling of your face, lips, tongue, hands, or feet  Side effects that usually do not require medical attention (report  to your doctor or health care professional if they continue or are bothersome):    cough    decreased sexual function or desire    headache    nasal congestion or stuffiness    nausea or stomach pain    sore or cramping muscles  This list may not describe all possible side effects. Call your doctor for medical advice about side effects. You may report side effects to FDA at 5-008-RSW-8007.  Where should I keep my medicine?  Keep out of the reach of children.  Store at room temperature between 15 and 30 degrees C (59 and 86 degrees F). Protect from light. Keep container tightly closed. Throw away any unused medicine after the expiration date.  NOTE:This sheet is a summary. It may not cover all possible information. If you have questions about this medicine, talk to your doctor, pharmacist, or health care provider. Copyright  2016 [employer]        Understanding Advance Care Planning  Advance care planning is the process of deciding one s own future medical care. It helps to make sure that if you can t speak for yourself, your wishes can still be carried out. The plan is a series of legal documents that note a person s wishes. The documents vary by state. Advance care planning should be discussed at a regular office visit with your primary care provider before an acute illness. Advance care planning is encouraged when a person has a serious illness that is expected to get worse. It may also be done before major surgery. And it can help you and your family be prepared in case of a major illness or injury. Advance care planning helps with making decisions at these times.    A health care proxy is a person who acts as the voice of a patient when the patient can t speak for himself or herself. The name of this role varies by state. It may be called a Durable Medical Power of  or Durable Power of  for Healthcare. It may be called an agent, surrogate, or advocate. Or it may be called a representative or  decision maker. It is an official duty that is identified by a legal document. The document also varies by state.   Why is advance care planning important?  If a person communicates his or her health care wishes:    He or she will be given medical care that matches his or her values and goals.    Family members will not be forced to make decisions in a crisis with no guidance.  Creating a plan  Making an advance care plan is often done in 3 steps:    Thinking about one s wishes. To create an advance care plan, you should think about what kind of medical treatment you would want if you lose the ability to communicate. Are there any situations in which you would refuse or stop treatment? Are there therapies you would want or not want? And whom do you want to make decisions for you? There are many places to learn more about how to plan for your care. Ask your health care provider or  for resources.    Picking a health care proxy. This means choosing a trusted person to speak for you only when you can t speak for yourself. When you cannot make medical decisions, your proxy makes sure the instructions in your advance care plan are followed. A proxy does not make decisions based on his or her own opinions. They must put aside those opinions and values if needed, and carry out your wishes.    Filling out the legal documents. There are several kinds of legal documents for advance care planning. Each 1 tells health care providers your wishes. The documents may vary by state. They must be signed and may need to be witnessed or notarized. You can cancel or change them whenever you wish. Depending on your state, the documents may include a Healthcare Proxy form, Living Will, Durable Medical Power of , Advance Directive, or others.  The family s role  The best help a family can give is to support their loved one s wishes. Open and honest communication is vital. Family should express any concerns they have  "about the patient s choices while the patient can still make decisions in the event that his or her illness prevents him or her from communicating those wishes at a later time.   Date Last Reviewed: 2015-2017 [employer]. [address] [place] [address], [place] [address]. All rights reserved. This information is not intended as a substitute for professional medical care. Always follow your healthcare professional's instructions.                Follow-ups after your visit        Who to contact     If you have questions or need follow up information about today's clinic visit or your schedule please contact [facility] directly at [phone].  Normal or non-critical lab and imaging results will be communicated to you by MyChart, letter or phone within 4 business days after the clinic has received the results. If you do not hear from us within 7 days, please contact the clinic through [employer] or phone. If you have a critical or abnormal lab result, we will notify you by phone as soon as possible.  Submit refill requests through [employer] or call your pharmacy and they will forward the refill request to us. Please allow 3 business days for your refill to be completed.          Additional Information About Your Visit        [employer] Information     [employer] lets you send messages to your doctor, view your test results, renew your prescriptions, schedule appointments and more. To sign up, go to [email or web address] . Click on \"Log in\" on the left side of the screen, which will take you to the Welcome page. Then click on \"Sign up Now\" on the right side of the page.     You will be asked to enter the access code listed below, as well as some personal information. Please follow the directions to create your username and password.     Your access code is: [email or web address]  Expires: 2017  9:24 AM     Your access code will  in 90 days. If you need help or a new code, please call your " "[facility] or [phone].        Care EveryWhere ID     This is your Care EveryWhere ID. This could be used by other organizations to access your [place] [facility] medical records  [id number]        Your Vitals Were     Pulse Temperature Respirations Height BMI (Body Mass Index)       64 96.8  F (36  C) (Tympanic) 18 5' 4\" (1.626 m) 26.26 kg/m2        Blood Pressure from Last 3 Encounters:   07/20/17 155/90   06/16/17 142/82   10/12/16 144/80    Weight from Last 3 Encounters:   07/20/17 153 lb (69.4 kg)   06/16/17 152 lb (68.9 kg)   10/12/16 155 lb (70.3 kg)              Today, you had the following     No orders found for display         Today's Medication Changes          These changes are accurate as of: 7/20/17  9:17 AM.  If you have any questions, ask your nurse or doctor.               Start taking these medicines.        Dose/Directions    losartan 25 MG tablet   Commonly known as:  COZAAR   Used for:  Benign essential hypertension   Started by:  Fortino Hyman MD        Dose:  25 mg   Take 1 tablet (25 mg) by mouth daily   Quantity:  90 tablet   Refills:  3            Where to get your medicines      These medications were sent to [facility] [address] [place] [address]  [address] [place] [address]     Phone:  [phone]     losartan 25 MG tablet                Primary Care Provider Office Phone # Fax #    Kathya Magda Iglesias, [facility] [phone]       [facility] [address] [id number]        Equal Access to Services     [facility] AMANDA AH: Hadii екатерина lockett hadasho Soomaali, waaxda luqadaha, qaybta kaalmada karlos, ranulfo beaulieu. So [facility] [phone].    ATENCIÓN: Si habla español, tiene a mario disposición servicios gratuitos de asistencia lingüística. Llame al [phone].    We comply with applicable federal civil rights laws and [place] laws. We do not discriminate on the basis of race, color, national " origin, age, disability sex, sexual orientation or gender identity.            Thank you!     Thank you for choosing [facility]  for your care. Our goal is always to provide you with excellent care. Hearing back from our patients is one way we can continue to improve our services. Please take a few minutes to complete the written survey that you may receive in the mail after your visit with us. Thank you!             Your Updated Medication List - Protect others around you: Learn how to safely use, store and throw away your medicines at www.disposemymeds.org.          This list is accurate as of: 7/20/17  9:17 AM.  Always use your most recent med list.                   Brand Name Dispense Instructions for use Diagnosis    ACE NOT PRESCRIBED (INTENTIONAL)     0 each    ACE Inhibitor not prescribed due to Allergy    Type 2 diabetes mellitus with hyperglycemia (H)       amLODIPine 10 MG tablet    NORVASC    90 tablet    Take 1 tablet (10 mg) by mouth daily    Benign essential hypertension       aspirin 81 MG tablet     90 tablet    Take 1 tablet (81 mg) by mouth daily    HTN (hypertension), Type 2 diabetes, HbA1C goal < 8% (H)       atorvastatin 20 MG tablet    LIPITOR    90 tablet    Take 1 tablet (20 mg) by mouth daily    Hyperlipidemia LDL goal <100       Blood Glucose Monitoring Suppl W/DEVICE Kit     1 kit    1 each daily    Type 2 diabetes, HbA1C goal < 8% (H)       blood glucose monitoring test strip    no brand specified    3 Box    Use to test blood sugar 3 times daily or as directed. Box of 100 each.    Type 2 diabetes mellitus with hyperglycemia, without long-term current use of insulin (H)       glipiZIDE 10 MG 24 hr tablet    glipiZIDE XL    90 tablet    Take 1 tablet (10 mg) by mouth daily Needs HgbA1c checked before refill    Type 2 diabetes mellitus without complication, without long-term current use of insulin (H)       losartan 25 MG tablet    COZAAR    90 tablet    Take 1 tablet (25  mg) by mouth daily    Benign essential hypertension       Melatonin 10 MG Tabs tablet      Take 10 mg by mouth nightly as needed for sleep        metFORMIN 1000 MG tablet    GLUCOPHAGE    90 tablet    1000 mg with breakfast, 500 mg with lunch, 1000 mg with dinner. (Needs follow-up appointment for this medication)    Type 2 diabetes mellitus without complication, without long-term current use of insulin (H)       MULTIPLE VITAMIN PO      Take 1 tablet by mouth        OMEGA-3 FISH OIL PO      Take 1,200 mg by mouth 2 times daily (with meals)        omeprazole 20 MG tablet    priLOSEC OTC    90 tablet    Take 1 tablet (20 mg) by mouth daily    Gastroesophageal reflux disease without esophagitis       ONE TOUCH LANCETS Misc     200 each    1 lancet 3 times daily    Type 2 diabetes, HbA1C goal < 8% (H)

## 2017-07-20 NOTE — PATIENT INSTRUCTIONS
Schedule your visit with eye doctor.  Your blood pressure is high, take losartan as prescribed.  Follow up in 2 weeks with nurse for blood pressure check   Follow up with your doctor in 2 months for repeat exam      Subconjunctival Hemorrhage    A subconjunctival hemorrhage is a result of a broken blood vessel in the white portion of the eye. It is usually painless and may be caused by coughing, sneezing, or vomiting. An injury to the eye can cause this. It can also be a sign of hypertension (high blood pressure) or a bleeding disorder.  Although it can look frightening, the presence of the blood is not serious. The blood will be reabsorbed without treatment within 2 to 3 weeks.  Home care  You may continue your usual activities.  Follow-up care  Follow up with your healthcare provider, or as advised.  When to seek medical advice  Contact your healthcare provider right away if any of these occur:    Pain in the eye    Change in vision    The blood does not disappear within three weeks    Increasing redness or swelling of the eye    Severe headache or dizziness    Signs of bruising or bleeding from other parts of your body  Date Last Reviewed: 6/14/2015 2000-2017 [employer]. [address] [place] [address]. All rights reserved. This information is not intended as a substitute for professional medical care. Always follow your healthcare professional's instructions.        Losartan Potassium Oral tablet  What is this medicine?  LOSARTAN (hilda AMY tan) is used to treat high blood pressure and to reduce the risk of stroke in certain patients. This drug also slows the progression of kidney disease in patients with diabetes.  This medicine may be used for other purposes; ask your health care provider or pharmacist if you have questions.  What should I tell my health care provider before I take this medicine?  They need to know if you have any of these conditions:    heart failure    kidney or  liver disease    an unusual or allergic reaction to losartan, other medicines, foods, dyes, or preservatives    pregnant or trying to get pregnant    breast-feeding  How should I use this medicine?  Take this medicine by mouth with a glass of water. Follow the directions on the prescription label. This medicine can be taken with or without food. Take your doses at regular intervals. Do not take your medicine more often than directed.  Talk to your pediatrician regarding the use of this medicine in children. Special care may be needed.  Overdosage: If you think you have taken too much of this medicine contact a poison control center or emergency room at once.  NOTE: This medicine is only for you. Do not share this medicine with others.  What if I miss a dose?  If you miss a dose, take it as soon as you can. If it is almost time for your next dose, take only that dose. Do not take double or extra doses.  What may interact with this medicine?    blood pressure medicines    diuretics, especially triamterene, spironolactone, or amiloride    fluconazole    NSAIDs, medicines for pain and inflammation, like ibuprofen or naproxen    potassium salts or potassium supplements    rifampin  This list may not describe all possible interactions. Give your health care provider a list of all the medicines, herbs, non-prescription drugs, or dietary supplements you use. Also tell them if you smoke, drink alcohol, or use illegal drugs. Some items may interact with your medicine.  What should I watch for while using this medicine?  Visit your doctor or health care professional for regular checks on your progress. Check your blood pressure as directed. Ask your doctor or health care professional what your blood pressure should be and when you should contact him or her. Call your doctor or health care professional if you notice an irregular or fast heart beat.  Women should inform their doctor if they wish to become pregnant or think they  might be pregnant. There is a potential for serious side effects to an unborn child, particularly in the second or third trimester. Talk to your health care professional or pharmacist for more information.  You may get drowsy or dizzy. Do not drive, use machinery, or do anything that needs mental alertness until you know how this drug affects you. Do not stand or sit up quickly, especially if you are an older patient. This reduces the risk of dizzy or fainting spells. Alcohol can make you more drowsy and dizzy. Avoid alcoholic drinks.  Avoid salt substitutes unless you are told otherwise by your doctor or health care professional.  Do not treat yourself for coughs, colds, or pain while you are taking this medicine without asking your doctor or health care professional for advice. Some ingredients may increase your blood pressure.  What side effects may I notice from receiving this medicine?  Side effects that you should report to your doctor or health care professional as soon as possible:    confusion, dizziness, light headedness or fainting spells    decreased amount of urine passed    difficulty breathing or swallowing, hoarseness, or tightening of the throat    fast or irregular heart beat, palpitations, or chest pain    skin rash, itching    swelling of your face, lips, tongue, hands, or feet  Side effects that usually do not require medical attention (report to your doctor or health care professional if they continue or are bothersome):    cough    decreased sexual function or desire    headache    nasal congestion or stuffiness    nausea or stomach pain    sore or cramping muscles  This list may not describe all possible side effects. Call your doctor for medical advice about side effects. You may report side effects to FDA at 1-349-XYX-6336.  Where should I keep my medicine?  Keep out of the reach of children.  Store at room temperature between 15 and 30 degrees C (59 and 86 degrees F). Protect from light.  Keep container tightly closed. Throw away any unused medicine after the expiration date.  NOTE:This sheet is a summary. It may not cover all possible information. If you have questions about this medicine, talk to your doctor, pharmacist, or health care provider. Copyright  2016 [employer]        Understanding Advance Care Planning  Advance care planning is the process of deciding one s own future medical care. It helps to make sure that if you can t speak for yourself, your wishes can still be carried out. The plan is a series of legal documents that note a person s wishes. The documents vary by state. Advance care planning should be discussed at a regular office visit with your primary care provider before an acute illness. Advance care planning is encouraged when a person has a serious illness that is expected to get worse. It may also be done before major surgery. And it can help you and your family be prepared in case of a major illness or injury. Advance care planning helps with making decisions at these times.    A health care proxy is a person who acts as the voice of a patient when the patient can t speak for himself or herself. The name of this role varies by state. It may be called a Durable Medical Power of  or Durable Power of  for Healthcare. It may be called an agent, surrogate, or advocate. Or it may be called a representative or decision maker. It is an official duty that is identified by a legal document. The document also varies by state.   Why is advance care planning important?  If a person communicates his or her health care wishes:    He or she will be given medical care that matches his or her values and goals.    Family members will not be forced to make decisions in a crisis with no guidance.  Creating a plan  Making an advance care plan is often done in 3 steps:    Thinking about one s wishes. To create an advance care plan, you should think about what kind of medical  treatment you would want if you lose the ability to communicate. Are there any situations in which you would refuse or stop treatment? Are there therapies you would want or not want? And whom do you want to make decisions for you? There are many places to learn more about how to plan for your care. Ask your health care provider or  for resources.    Picking a health care proxy. This means choosing a trusted person to speak for you only when you can t speak for yourself. When you cannot make medical decisions, your proxy makes sure the instructions in your advance care plan are followed. A proxy does not make decisions based on his or her own opinions. They must put aside those opinions and values if needed, and carry out your wishes.    Filling out the legal documents. There are several kinds of legal documents for advance care planning. Each 1 tells health care providers your wishes. The documents may vary by state. They must be signed and may need to be witnessed or notarized. You can cancel or change them whenever you wish. Depending on your state, the documents may include a Healthcare Proxy form, Living Will, Durable Medical Power of , Advance Directive, or others.  The family s role  The best help a family can give is to support their loved one s wishes. Open and honest communication is vital. Family should express any concerns they have about the patient s choices while the patient can still make decisions in the event that his or her illness prevents him or her from communicating those wishes at a later time.   Date Last Reviewed: 5/5/2015 2000-2017 [employer]. [address] [place] [address], [place] [address]. All rights reserved. This information is not intended as a substitute for professional medical care. Always follow your healthcare professional's instructions.

## 2017-08-16 PROCEDURE — 82274 ASSAY TEST FOR BLOOD FECAL: CPT | Performed by: FAMILY MEDICINE

## 2017-08-17 ENCOUNTER — APPOINTMENT (OUTPATIENT)
Dept: LAB | Facility: CLINIC | Age: 62
End: 2017-08-17
Payer: COMMERCIAL

## 2017-08-17 LAB — HEMOCCULT STL QL IA: NEGATIVE

## 2017-08-22 ENCOUNTER — TELEPHONE (OUTPATIENT)
Dept: FAMILY MEDICINE | Facility: CLINIC | Age: 62
End: 2017-08-22

## 2017-08-22 NOTE — TELEPHONE ENCOUNTER
Please have Js schedule for a RN only BP recheck.   Thanks. ALESSANDRO Maharaj      BP Readings from Last 6 Encounters:   07/20/17 155/90   06/16/17 142/82   10/12/16 144/80   09/15/16 136/84   07/28/16 156/82   07/14/16 148/90       Plan at last OV 7/20/2017   Blood pressure noted to be elevated, history of microalbuminuria. Cozaar 25 mg prescribed and suggested to continue amlodipine 10 mg daily. Recommended to follow up with ophthalmology as well for routine exam. Follow-up in 2 weeks with RN for the blood pressure and electrolytes checked. Follow-up with PCP in 2 months or earlier if needed. Other medications reviewed and no changes made

## 2017-08-24 ENCOUNTER — ALLIED HEALTH/NURSE VISIT (OUTPATIENT)
Dept: FAMILY MEDICINE | Facility: CLINIC | Age: 62
End: 2017-08-24
Payer: COMMERCIAL

## 2017-08-24 VITALS — HEART RATE: 80 BPM | SYSTOLIC BLOOD PRESSURE: 130 MMHG | DIASTOLIC BLOOD PRESSURE: 86 MMHG

## 2017-08-24 DIAGNOSIS — E78.5 HYPERLIPIDEMIA LDL GOAL <100: Chronic | ICD-10-CM

## 2017-08-24 DIAGNOSIS — E11.9 TYPE 2 DIABETES MELLITUS WITHOUT COMPLICATION, WITHOUT LONG-TERM CURRENT USE OF INSULIN (H): Chronic | ICD-10-CM

## 2017-08-24 DIAGNOSIS — I10 BENIGN ESSENTIAL HYPERTENSION: Primary | Chronic | ICD-10-CM

## 2017-08-24 LAB
ANION GAP SERPL CALCULATED.3IONS-SCNC: 6 MMOL/L (ref 3–14)
BUN SERPL-MCNC: 16 MG/DL (ref 7–30)
CALCIUM SERPL-MCNC: 9.5 MG/DL (ref 8.5–10.1)
CHLORIDE SERPL-SCNC: 106 MMOL/L (ref 94–109)
CHOLEST SERPL-MCNC: 195 MG/DL
CO2 SERPL-SCNC: 26 MMOL/L (ref 20–32)
CREAT SERPL-MCNC: 0.92 MG/DL (ref 0.66–1.25)
GFR SERPL CREATININE-BSD FRML MDRD: 83 ML/MIN/1.7M2
GLUCOSE SERPL-MCNC: 128 MG/DL (ref 70–99)
HBA1C MFR BLD: 6.7 % (ref 4.3–6)
HDLC SERPL-MCNC: 37 MG/DL
LDLC SERPL CALC-MCNC: 106 MG/DL
NONHDLC SERPL-MCNC: 158 MG/DL
POTASSIUM SERPL-SCNC: 4.1 MMOL/L (ref 3.4–5.3)
SODIUM SERPL-SCNC: 138 MMOL/L (ref 133–144)
TRIGL SERPL-MCNC: 258 MG/DL

## 2017-08-24 PROCEDURE — 36415 COLL VENOUS BLD VENIPUNCTURE: CPT | Performed by: NURSE PRACTITIONER

## 2017-08-24 PROCEDURE — 99207 ZZC NO CHARGE NURSE ONLY: CPT

## 2017-08-24 PROCEDURE — 80061 LIPID PANEL: CPT | Performed by: NURSE PRACTITIONER

## 2017-08-24 PROCEDURE — 83036 HEMOGLOBIN GLYCOSYLATED A1C: CPT | Performed by: NURSE PRACTITIONER

## 2017-08-24 PROCEDURE — 80048 BASIC METABOLIC PNL TOTAL CA: CPT | Performed by: NURSE PRACTITIONER

## 2017-08-24 NOTE — LETTER
August 25, 2017      Js ESTHER Jones  [id number]  [id number]        Dear ,    We are writing to inform you of your test results.    Lipids- total cholesterol and LDL have come down . LDL should be <100.  Triglycerides remain high, and HDL lower.  Per our phone discussion, please continue to take atorvastatin 20 mg DAILY at bedtime.  Repeat fasting cholesterol with a lab only appointment in November.  HgbA1c is stable and controlled    Resulted Orders   Lipid panel reflex to direct LDL   Result Value Ref Range    Cholesterol 195 <200 mg/dL    Triglycerides 258 (H) <150 mg/dL      Comment:      Borderline high:  150-199 mg/dl  High:             200-499 mg/dl  Very high:       >499 mg/dl      HDL Cholesterol 37 (L) >39 mg/dL    LDL Cholesterol Calculated 106 (H) <100 mg/dL      Comment:      Above desirable:  100-129 mg/dl  Borderline High:  130-159 mg/dL  High:             160-189 mg/dL  Very high:       >189 mg/dl      Non HDL Cholesterol 158 (H) <130 mg/dL      Comment:      Above Desirable:  130-159 mg/dl  Borderline high:  160-189 mg/dl  High:             190-219 mg/dl  Very high:       >219 mg/dl     Hemoglobin A1c   Result Value Ref Range    Hemoglobin A1C 6.7 (H) 4.3 - 6.0 %       If you have any questions or concerns, please call the clinic at the number listed above.       Sincerely,        Kathya Iglesias NP/ NERISSA

## 2017-08-24 NOTE — PROGRESS NOTES
S-(situation): RN visit for BP check, lab only appt for BMP in F/U to 07-20-17 OV, Dr. Hyman-    Blood pressure noted to be elevated, history of microalbuminuria. Cozaar 25 mg prescribed and suggested to continue amlodipine 10 mg daily. Recommended to follow up with ophthalmology as well for routine exam. Follow-up in 2 weeks with RN for the blood pressure and electrolytes checked. Follow-up with PCP in 2 months or earlier if needed. Other medications reviewed and no changes made. FIT kit provided for colon cancer screening. Patient understood and in agreement with the above plan. All questions answered.     B-(background): HTN meds include losartan 25 mg daily, amlodipine 10mg daily. 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 Due to hectic work day yesterday did not take meds until rita and has not taken yet today. BP at dentist appt earlier this / 80.     A-(assessment):   BP Readings from Last 6 Encounters:   08/24/17 130/86   07/20/17 155/90   06/16/17 142/82   10/12/16 144/80   09/15/16 136/84   07/28/16 156/82     HR- 80.    R-(recommendations): Advised to take meds as prescribed for optimum benefit. Await lab results.   Forwarded to Dr. Hyman for review. NORMAN Rivas RN

## 2017-08-25 DIAGNOSIS — E78.5 HYPERLIPIDEMIA LDL GOAL <100: Chronic | ICD-10-CM

## 2017-08-25 RX ORDER — ATORVASTATIN CALCIUM 40 MG/1
40 TABLET, FILM COATED ORAL DAILY
Qty: 90 TABLET | Refills: 1 | Status: SHIPPED | OUTPATIENT
Start: 2017-08-25 | End: 2017-08-25 | Stop reason: DRUGHIGH

## 2017-08-25 RX ORDER — ATORVASTATIN CALCIUM 20 MG/1
20 TABLET, FILM COATED ORAL DAILY
Qty: 1 TABLET | Refills: 0 | COMMUNITY
Start: 2017-08-25 | End: 2018-03-11

## 2017-08-25 NOTE — TELEPHONE ENCOUNTER
Pt informed, voices understanding.  Also informed of lab results- see result note.  NORMAN Rivas RN

## 2017-08-25 NOTE — PROGRESS NOTES
Lipids- total cholesterol and LDL have come down . LDL should be <100.  Triglycerides remain high, and HDL lower. Will increase Atorvastatin from 20 mg to 40 mg daily. Please take 2 of the ones you have now then call pharmacy to fill the NEW prescription for 40 mg. Repeat fasting cholesterol with a lab only appointment in November.  HgbA1c is stable and controlled    Please notify him of these results and send a hard copy if not on myChart.   Thanks. ALESSANDRO Maharaj

## 2017-09-30 DIAGNOSIS — E11.9 TYPE 2 DIABETES MELLITUS WITHOUT COMPLICATION, WITHOUT LONG-TERM CURRENT USE OF INSULIN (H): Chronic | ICD-10-CM

## 2017-09-30 NOTE — TELEPHONE ENCOUNTER
metFORMIN (GLUCOPHAGE) 1000 MG tablet         Last Written Prescription Date: 6/16/17  Last Fill Quantity: 90, # refills: 1  Last Office Visit with G, [facility] or [facility] prescribing provider:  6/16/17        BP Readings from Last 3 Encounters:   08/24/17 130/86   07/20/17 155/90   06/16/17 142/82     Lab Results   Component Value Date    MICROL 30 06/16/2017     Lab Results   Component Value Date    UMALCR 18.44 06/16/2017     Creatinine   Date Value Ref Range Status   08/24/2017 0.92 0.66 - 1.25 mg/dL Final   ]  GFR Estimate   Date Value Ref Range Status   08/24/2017 83 >60 mL/min/1.7m2 Final     Comment:     Non  GFR Calc   06/16/2017 86 >60 mL/min/1.7m2 Final     Comment:     Non  GFR Calc   07/14/2016 85 >60 mL/min/1.7m2 Final     Comment:     Non  GFR Calc     GFR Estimate If Black   Date Value Ref Range Status   08/24/2017 >90 >60 mL/min/1.7m2 Final     Comment:      GFR Calc   06/16/2017 >90   GFR Calc   >60 mL/min/1.7m2 Final   07/14/2016 >90   GFR Calc   >60 mL/min/1.7m2 Final     Lab Results   Component Value Date    CHOL 195 08/24/2017     Lab Results   Component Value Date    HDL 37 08/24/2017     Lab Results   Component Value Date     08/24/2017     Lab Results   Component Value Date    TRIG 258 08/24/2017     Lab Results   Component Value Date    CHOLHDLRATIO 4.8 10/16/2014     Lab Results   Component Value Date    AST 42 07/14/2016     Lab Results   Component Value Date    ALT 66 07/14/2016     Lab Results   Component Value Date    A1C 6.7 08/24/2017    A1C 7.2 06/16/2017    A1C 6.5 10/12/2016    A1C 7.5 07/14/2016    A1C 7.3 11/04/2015     Potassium   Date Value Ref Range Status   08/24/2017 4.1 3.4 - 5.3 mmol/L Final     Opal KOROMA)

## 2017-10-31 DIAGNOSIS — E11.9 TYPE 2 DIABETES MELLITUS WITHOUT COMPLICATION, WITHOUT LONG-TERM CURRENT USE OF INSULIN (H): Chronic | ICD-10-CM

## 2017-11-01 RX ORDER — GLIPIZIDE 10 MG/1
10 TABLET, FILM COATED, EXTENDED RELEASE ORAL DAILY
Qty: 90 TABLET | Refills: 1 | Status: SHIPPED | OUTPATIENT
Start: 2017-11-01 | End: 2018-03-11

## 2018-02-11 DIAGNOSIS — E11.9 TYPE 2 DIABETES MELLITUS WITHOUT COMPLICATION, WITHOUT LONG-TERM CURRENT USE OF INSULIN (H): Chronic | ICD-10-CM

## 2018-02-12 NOTE — TELEPHONE ENCOUNTER
"Requested Prescriptions   Pending Prescriptions Disp Refills     metFORMIN (GLUCOPHAGE) 1000 MG tablet [Pharmacy Med Name: METFORMIN 1000MG TAB] 225 tablet 0     Sig: TAKE ONE TABLET BY MOUTH WITH BREAKFAST, ONE-HALF TABLET WITH LUNCH AND ONE TABLET WITH DINNER    Biguanide Agents Failed    2/11/2018 10:55 AM       Failed - Recent (6 mos) or future visit with authorizing provider's specialty    Patient had office visit in the last 6 months or has a visit in the next 30 days with authorizing provider.  See \"Patient Info\" tab in inbasket, or \"Choose Columns\" in Meds & Orders section of the refill encounter.           Passed - Patient's BP is less than 140/90    BP Readings from Last 3 Encounters:   08/24/17 130/86   07/20/17 155/90   06/16/17 142/82                Passed - Patient has documented LDL within the past 12 mos.    Recent Labs   Lab Test  08/24/17   1005   LDL  106*            Passed - Patient has had a Microalbumin in the past 12 mos.    Recent Labs   Lab Test  06/16/17   0928   MICROL  30   UMALCR  18.44*            Passed - Patient is age 10 or older       Passed - Patient has documented A1c within the specified period of time.    Recent Labs   Lab Test  08/24/17   1005   A1C  6.7*            Passed - Patient's CR is NOT>1.4 OR Patient's EGFR is NOT<45 within past 12 mos.    Recent Labs   Lab Test  08/24/17   1005   GFRESTIMATED  83   GFRESTBLACK  >90       Recent Labs   Lab Test  08/24/17   1005   CR  0.92            Passed - Patient does NOT have a diagnosis of CHF.        metFORMIN (GLUCOPHAGE) 1000 MG tablet  Last Written Prescription Date:  10/02/2017  Last Fill Quantity: 225 tablet,  # refills: 0   Last office visit: 8/24/2017 with prescribing provider:  Dr. Hyman 07/20/2017, KIRAN Iglesias 06/16/2017   Future Office Visit:     Madison VALERIO) (M)       "

## 2018-03-05 ENCOUNTER — TELEPHONE (OUTPATIENT)
Dept: FAMILY MEDICINE | Facility: CLINIC | Age: 63
End: 2018-03-05

## 2018-03-05 DIAGNOSIS — E11.9 TYPE 2 DIABETES MELLITUS WITHOUT COMPLICATION, WITHOUT LONG-TERM CURRENT USE OF INSULIN (H): Primary | Chronic | ICD-10-CM

## 2018-03-05 NOTE — TELEPHONE ENCOUNTER
Pt informed due for F/U fasting lab, A1C, TSH. Lab orders placed.  Pt to then scheduled DM F/U with PCP.  Pt voices understanding, agreement.  NORMAN Rivas RN

## 2018-03-05 NOTE — TELEPHONE ENCOUNTER
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.  Js will be fasting and would like to have his A1C drawn BEFORE his DOT appointment.  Please place order for lab draw for A1C.  Thank you.    Ruby Grant  [facility]

## 2018-03-09 DIAGNOSIS — E78.5 HYPERLIPIDEMIA LDL GOAL <100: Chronic | ICD-10-CM

## 2018-03-09 DIAGNOSIS — I10 BENIGN ESSENTIAL HYPERTENSION: Chronic | ICD-10-CM

## 2018-03-09 DIAGNOSIS — E11.9 TYPE 2 DIABETES MELLITUS WITHOUT COMPLICATION, WITHOUT LONG-TERM CURRENT USE OF INSULIN (H): Chronic | ICD-10-CM

## 2018-03-09 LAB
CHOLEST SERPL-MCNC: 125 MG/DL
HBA1C MFR BLD: 6.9 % (ref 4.3–6)
HDLC SERPL-MCNC: 38 MG/DL
LDLC SERPL CALC-MCNC: 72 MG/DL
NONHDLC SERPL-MCNC: 87 MG/DL
TRIGL SERPL-MCNC: 73 MG/DL
TSH SERPL DL<=0.005 MIU/L-ACNC: 1.02 MU/L (ref 0.4–4)

## 2018-03-09 PROCEDURE — 36415 COLL VENOUS BLD VENIPUNCTURE: CPT | Performed by: NURSE PRACTITIONER

## 2018-03-09 PROCEDURE — 83036 HEMOGLOBIN GLYCOSYLATED A1C: CPT | Performed by: NURSE PRACTITIONER

## 2018-03-09 PROCEDURE — 84443 ASSAY THYROID STIM HORMONE: CPT | Performed by: NURSE PRACTITIONER

## 2018-03-09 PROCEDURE — 80061 LIPID PANEL: CPT | Performed by: NURSE PRACTITIONER

## 2018-03-09 NOTE — LETTER
March 13, 2018      Js ESTHER Jones  [id number] [facility] [address]        Dear ,    We are writing to inform you of your test results.    Lipids- normal. Repeat in 12 months. Atorvastatin refilled for a year.   TSH- normal. Repeat in 12 months. Cozaar and Norvasc refilled for a year.   HgbA1c- stable <7. Repeat in 6 months. Metformin and Glipizide refilled for 3 months. Schedule office visit in 3 months for Diabetic recheck. Bring glucometer with you.     Resulted Orders   Hemoglobin A1c   Result Value Ref Range    Hemoglobin A1C 6.9 (H) 4.3 - 6.0 %   Lipid panel reflex to direct LDL   Result Value Ref Range    Cholesterol 125 <200 mg/dL    Triglycerides 73 <150 mg/dL      Comment:      Fasting specimen    HDL Cholesterol 38 (L) >39 mg/dL    LDL Cholesterol Calculated 72 <100 mg/dL      Comment:      Desirable:       <100 mg/dl    Non HDL Cholesterol 87 <130 mg/dL   TSH with free T4 reflex   Result Value Ref Range    TSH 1.02 0.40 - 4.00 mU/L       If you have any questions or concerns, please call the clinic at the number listed above.       Sincerely,    Kathya Iglesias CNP

## 2018-03-11 DIAGNOSIS — I10 BENIGN ESSENTIAL HYPERTENSION: Chronic | ICD-10-CM

## 2018-03-11 DIAGNOSIS — E78.5 HYPERLIPIDEMIA LDL GOAL <100: Chronic | ICD-10-CM

## 2018-03-11 DIAGNOSIS — E11.9 TYPE 2 DIABETES MELLITUS WITHOUT COMPLICATION, WITHOUT LONG-TERM CURRENT USE OF INSULIN (H): Chronic | ICD-10-CM

## 2018-03-11 RX ORDER — LOSARTAN POTASSIUM 25 MG/1
25 TABLET ORAL DAILY
Qty: 90 TABLET | Refills: 3 | Status: SHIPPED | OUTPATIENT
Start: 2018-03-11 | End: 2019-06-03

## 2018-03-11 RX ORDER — AMLODIPINE BESYLATE 10 MG/1
10 TABLET ORAL DAILY
Qty: 90 TABLET | Refills: 3 | Status: SHIPPED | OUTPATIENT
Start: 2018-03-11 | End: 2018-10-01

## 2018-03-11 RX ORDER — ATORVASTATIN CALCIUM 20 MG/1
20 TABLET, FILM COATED ORAL DAILY
Qty: 90 TABLET | Refills: 3 | Status: ON HOLD | OUTPATIENT
Start: 2018-03-11 | End: 2018-09-18

## 2018-03-11 RX ORDER — GLIPIZIDE 10 MG/1
10 TABLET, FILM COATED, EXTENDED RELEASE ORAL DAILY
Qty: 90 TABLET | Refills: 0 | Status: SHIPPED | OUTPATIENT
Start: 2018-03-11 | End: 2018-08-26

## 2018-03-11 NOTE — PROGRESS NOTES
Lipids- normal. Repeat in 12 months. Atorvastatin refilled for a year.  TSH- normal. Repeat in 12 months. Cozaar and Norvasc refilled for a year.  HgbA1c- stable <7. Repeat in 6 months. Metformin and Glipizide refilled for 3 months. Schedule office visit in 3 months for Diabetic recheck. Bring glucometer with you.    Please notify him of these results and send a hard copy if not on myChart.   Thanks. ALESSANDRO Maharaj

## 2018-03-16 ENCOUNTER — ALLIED HEALTH/NURSE VISIT (OUTPATIENT)
Dept: FAMILY MEDICINE | Facility: CLINIC | Age: 63
End: 2018-03-16
Payer: COMMERCIAL

## 2018-03-16 VITALS — SYSTOLIC BLOOD PRESSURE: 140 MMHG | DIASTOLIC BLOOD PRESSURE: 80 MMHG | HEART RATE: 88 BPM

## 2018-03-16 DIAGNOSIS — Z01.30 BLOOD PRESSURE CHECK: Primary | ICD-10-CM

## 2018-03-16 PROCEDURE — 99207 ZZC NO CHARGE NURSE ONLY: CPT

## 2018-03-16 NOTE — NURSING NOTE
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 Says that he is asymptomatic, does not add salt to diet, exercises daily for work, and takes medications as directed. Zara gave the patient DOT card and passed the patient, copy made and placed on provider's desk.    SIMÓN Kline

## 2018-03-16 NOTE — MR AVS SNAPSHOT
"              After Visit Summary   3/16/2018    Js Jones    MRN: [id number]           Patient Information     Date Of Birth          1955        Visit Information        Provider Department      3/16/2018 3:00 PM KEILA CEBALLOS RN [facility]        Today's Diagnoses     Blood pressure check    -  1       Follow-ups after your visit        Who to contact     If you have questions or need follow up information about today's clinic visit or your schedule please contact [facility] directly at [phone].  Normal or non-critical lab and imaging results will be communicated to you by MyChart, letter or phone within 4 business days after the clinic has received the results. If you do not hear from us within 7 days, please contact the clinic through [employer]hart or phone. If you have a critical or abnormal lab result, we will notify you by phone as soon as possible.  Submit refill requests through [employer] or call your pharmacy and they will forward the refill request to us. Please allow 3 business days for your refill to be completed.          Additional Information About Your Visit        MyChart Information     [employer] lets you send messages to your doctor, view your test results, renew your prescriptions, schedule appointments and more. To sign up, go to [email or web address] . Click on \"Log in\" on the left side of the screen, which will take you to the Welcome page. Then click on \"Sign up Now\" on the right side of the page.     You will be asked to enter the access code listed below, as well as some personal information. Please follow the directions to create your username and password.     Your access code is: [email or web address]  Expires: 2018  3:57 PM     Your access code will  in 90 days. If you need help or a new code, please call your [facility] or [phone].        Care EveryWhere ID     This is your Care EveryWhere ID. This could be used by other organizations to " access your [facility] medical records  [id number]        Your Vitals Were     Pulse                   88            Blood Pressure from Last 3 Encounters:   03/16/18 140/80   08/24/17 130/86   07/20/17 155/90    Weight from Last 3 Encounters:   07/20/17 153 lb (69.4 kg)   06/16/17 152 lb (68.9 kg)   10/12/16 155 lb (70.3 kg)              Today, you had the following     No orders found for display       Primary Care Provider Office Phone # Fax #    Kathya Iglesias, [facility] [phone]       [address] [facility] [address]        Equal Access to Services     VICTORINO PATEL : Hadii екатерина lockett hadasho Sophani, waaxda luqadaha, qaybta kaalmada adeegyada, ranulfo beaulieu. So [facility] [phone].    ATENCIÓN: Si habla español, tiene a mario disposición servicios gratuitos de asistencia lingüística. Llame al [phone].    We comply with applicable federal civil rights laws and [place] laws. We do not discriminate on the basis of race, color, national origin, age, disability, sex, sexual orientation, or gender identity.            Thank you!     Thank you for choosing [facility]  for your care. Our goal is always to provide you with excellent care. Hearing back from our patients is one way we can continue to improve our services. Please take a few minutes to complete the written survey that you may receive in the mail after your visit with us. Thank you!             Your Updated Medication List - Protect others around you: Learn how to safely use, store and throw away your medicines at www.disposemymeds.org.          This list is accurate as of 3/16/18  3:57 PM.  Always use your most recent med list.                   Brand Name Dispense Instructions for use Diagnosis    ACE NOT PRESCRIBED (INTENTIONAL)     0 each    ACE Inhibitor not prescribed due to Allergy    Type 2 diabetes mellitus with hyperglycemia (H)       amLODIPine 10 MG tablet    NORVASC    90  tablet    Take 1 tablet (10 mg) by mouth daily    Benign essential hypertension       aspirin 81 MG tablet     90 tablet    Take 1 tablet (81 mg) by mouth daily    HTN (hypertension), Type 2 diabetes, HbA1C goal < 8% (H)       atorvastatin 20 MG tablet    LIPITOR    90 tablet    Take 1 tablet (20 mg) by mouth daily    Hyperlipidemia LDL goal <100       Blood Glucose Monitoring Suppl W/DEVICE Kit     1 kit    1 each daily    Type 2 diabetes, HbA1C goal < 8% (H)       blood glucose monitoring test strip    no brand specified    3 Box    Use to test blood sugar 3 times daily or as directed. Box of 100 each.    Type 2 diabetes mellitus with hyperglycemia, without long-term current use of insulin (H)       glipiZIDE 10 MG 24 hr tablet    GLUCOTROL XL    90 tablet    Take 1 tablet (10 mg) by mouth daily Schedule office visit for refill.    Type 2 diabetes mellitus without complication, without long-term current use of insulin (H)       losartan 25 MG tablet    COZAAR    90 tablet    Take 1 tablet (25 mg) by mouth daily    Benign essential hypertension       Melatonin 10 MG Tabs tablet      Take 10 mg by mouth nightly as needed for sleep        metFORMIN 1000 MG tablet    GLUCOPHAGE    315 tablet    Take 1 tablet with breakfast, 1.5 tablets at lunch, and 1 tablet at dinner. Schedule office visit for refill.    Type 2 diabetes mellitus without complication, without long-term current use of insulin (H)       MULTIPLE VITAMIN PO      Take 1 tablet by mouth        OMEGA-3 FISH OIL PO      Take 1,200 mg by mouth 2 times daily (with meals)        omeprazole 20 MG tablet    priLOSEC OTC    90 tablet    Take 1 tablet (20 mg) by mouth daily    Gastroesophageal reflux disease without esophagitis       ONETOUCH LANCETS Misc     200 each    1 lancet 3 times daily    Type 2 diabetes, HbA1C goal < 8% (H)

## 2018-06-08 DIAGNOSIS — E11.9 TYPE 2 DIABETES MELLITUS WITHOUT COMPLICATION, WITHOUT LONG-TERM CURRENT USE OF INSULIN (H): Chronic | ICD-10-CM

## 2018-06-08 NOTE — TELEPHONE ENCOUNTER
Refill given.  Advised due for 3 month DM F/U.  Will call back to schedule appt.  NORMAN Rivas RN

## 2018-06-08 NOTE — TELEPHONE ENCOUNTER
"Requested Prescriptions   Pending Prescriptions Disp Refills     metFORMIN (GLUCOPHAGE) 1000 MG tablet [Pharmacy Med Name: METFORMIN 1000MG TAB] 225 tablet 0     Sig: TAKE 1 TABLET BY MOUTH WITH BREAKFAST TAKE ONE  HALF  TABLET  WITH  LUNCH  AND TAKE  ONE  TABLET  WITH  DINNER    Biguanide Agents Failed    6/8/2018  2:06 PM       Failed - Blood pressure less than 140/90 in past 6 months    BP Readings from Last 3 Encounters:   03/16/18 140/80   08/24/17 130/86   07/20/17 155/90                Passed - Patient has documented LDL within the past 12 mos.    Recent Labs   Lab Test  03/09/18   0815   LDL  72            Passed - Patient has had a Microalbumin in the past 12 mos.    Recent Labs   Lab Test  06/16/17   0928   MICROL  30   UMALCR  18.44*            Passed - Patient is age 10 or older       Passed - Patient has documented A1c within the specified period of time.    If HgbA1C is 8 or greater, it needs to be on file within the past 3 months.  If less than 8, must be on file within the past 6 months.     Recent Labs   Lab Test  03/09/18   0815   A1C  6.9*            Passed - Patient's CR is NOT>1.4 OR Patient's EGFR is NOT<45 within past 12 mos.    Recent Labs   Lab Test  08/24/17   1005   GFRESTIMATED  83   GFRESTBLACK  >90       Recent Labs   Lab Test  08/24/17   1005   CR  0.92            Passed - Patient does NOT have a diagnosis of CHF.       Passed - Recent (6 mo) or future (30 days) visit within the authorizing provider's specialty    Patient had office visit in the last 6 months or has a visit in the next 30 days with authorizing provider or within the authorizing provider's specialty.  See \"Patient Info\" tab in inbasket, or \"Choose Columns\" in Meds & Orders section of the refill encounter.            metFORMIN (GLUCOPHAGE) 1000 MG tablet  Last Written Prescription Date:  03/11/2018  Last Fill Quantity: 315 tablet,  # refills: 0   Last office visit: 3/16/2018 with prescribing provider:  07/20/2017 Boogie, " U   Future Office Visit:      Madison Donahue RT (R) (M)

## 2018-06-15 DIAGNOSIS — E11.9 TYPE 2 DIABETES MELLITUS WITHOUT COMPLICATION, WITHOUT LONG-TERM CURRENT USE OF INSULIN (H): Chronic | ICD-10-CM

## 2018-06-15 LAB — HBA1C MFR BLD: 7.2 % (ref 0–5.6)

## 2018-06-15 PROCEDURE — 36415 COLL VENOUS BLD VENIPUNCTURE: CPT | Performed by: NURSE PRACTITIONER

## 2018-06-15 PROCEDURE — 83036 HEMOGLOBIN GLYCOSYLATED A1C: CPT | Performed by: NURSE PRACTITIONER

## 2018-06-18 NOTE — PROGRESS NOTES
HgbA1c has gone up again. Repeat in 3 months. If still elevated, we'll make medication adjustments. Increase physical activity, and follow a Diabetic diet strictly.   Please notify him of these results and send a hard copy if not on myChart.   Thanks. ALESSANDRO Maharaj

## 2018-07-03 ENCOUNTER — TELEPHONE (OUTPATIENT)
Dept: FAMILY MEDICINE | Facility: CLINIC | Age: 63
End: 2018-07-03

## 2018-07-03 NOTE — TELEPHONE ENCOUNTER
Panel Management Review      Patient has the following on his problem list:     Diabetes    ASA: Passed    Last A1C  Lab Results   Component Value Date    A1C 7.2 06/15/2018    A1C 6.9 03/09/2018    A1C 6.7 08/24/2017    A1C 7.2 06/16/2017    A1C 6.5 10/12/2016     A1C tested: FAILED    Last LDL:    Lab Results   Component Value Date    CHOL 125 03/09/2018     Lab Results   Component Value Date    HDL 38 03/09/2018     Lab Results   Component Value Date    LDL 72 03/09/2018     Lab Results   Component Value Date    TRIG 73 03/09/2018     Lab Results   Component Value Date    CHOLHDLRATIO 4.8 10/16/2014     Lab Results   Component Value Date    NHDL 87 03/09/2018       Is the patient on a Statin? YES             Is the patient on Aspirin? YES    Medications     HMG CoA Reductase Inhibitors    atorvastatin (LIPITOR) 20 MG tablet    Salicylates    aspirin 81 MG tablet          Last three blood pressure readings:  BP Readings from Last 3 Encounters:   03/16/18 140/80   08/24/17 130/86   07/20/17 155/90       Date of last diabetes office visit: 7/20/2017     Tobacco History:     History   Smoking Status     Former Smoker   Smokeless Tobacco     Never Used           Composite cancer screening  Chart review shows that this patient is due/due soon for the following None  Summary:    Patient is due/failing the following:   BP CHECK    Action needed:   Patient needs office visit for DM and BP.    Type of outreach:    left message on full name identified phone, reminder to schedule a check up with KIRAN Iglesias NP    Questions for provider review:    None                                                                                                                                    dw     Chart routed to Care Team .

## 2018-08-26 DIAGNOSIS — E11.9 TYPE 2 DIABETES MELLITUS WITHOUT COMPLICATION, WITHOUT LONG-TERM CURRENT USE OF INSULIN (H): Chronic | ICD-10-CM

## 2018-08-27 RX ORDER — GLIPIZIDE 10 MG/1
TABLET, EXTENDED RELEASE ORAL
Qty: 30 TABLET | Refills: 0 | Status: SHIPPED | OUTPATIENT
Start: 2018-08-27 | End: 2018-09-30

## 2018-08-27 NOTE — TELEPHONE ENCOUNTER
"Requested Prescriptions   Pending Prescriptions Disp Refills     GLIPIZIDE XL 10 MG 24 hr tablet [Pharmacy Med Name: GLIPIZIDE XL 10MG   TAB] 90 tablet 0     Sig: TAKE 1 TABLET BY MOUTH ONCE DAILY *  SCHEDULE  OFFICE  VISIT  FOR  REFILL  *    Sulfonylurea Agents Failed    8/26/2018  5:21 PM       Failed - Blood pressure less than 140/90 in past 6 months    BP Readings from Last 3 Encounters:   03/16/18 140/80   08/24/17 130/86   07/20/17 155/90                Failed - Patient has a recent creatinine (normal) within the past 12 mos.    Recent Labs   Lab Test  08/24/17   1005   CR  0.92            Passed - Patient has documented LDL within the past 12 mos.    Recent Labs   Lab Test  03/09/18   0815   LDL  72            Passed - Patient has had a Microalbumin in the past 12 mos.    Recent Labs   Lab Test  06/16/17   0928   MICROL  30   UMALCR  18.44*            Passed - Patient has documented A1c within the specified period of time.    If HgbA1C is 8 or greater, it needs to be on file within the past 3 months.  If less than 8, must be on file within the past 6 months.     Recent Labs   Lab Test  06/15/18   0843   A1C  7.2*            Passed - Patient is age 18 or older       Passed - Recent (6 mo) or future (30 days) visit within the authorizing provider's specialty    Patient had office visit in the last 6 months or has a visit in the next 30 days with authorizing provider or within the authorizing provider's specialty.  See \"Patient Info\" tab in inbasket, or \"Choose Columns\" in Meds & Orders section of the refill encounter.            \Last Written Prescription Date:  3/11/18  Last Fill Quantity: 90,  # refills: 0   Last office visit: 3/16/2018 with prescribing provider:     Future Office Visit:      "

## 2018-09-14 ENCOUNTER — HOSPITAL ENCOUNTER (EMERGENCY)
Facility: CLINIC | Age: 63
Discharge: ANOTHER HOSPITAL | End: 2018-09-14
Attending: EMERGENCY MEDICINE | Admitting: EMERGENCY MEDICINE
Payer: COMMERCIAL

## 2018-09-14 ENCOUNTER — APPOINTMENT (OUTPATIENT)
Dept: GENERAL RADIOLOGY | Facility: CLINIC | Age: 63
End: 2018-09-14
Attending: EMERGENCY MEDICINE
Payer: COMMERCIAL

## 2018-09-14 ENCOUNTER — HOSPITAL ENCOUNTER (INPATIENT)
Facility: CLINIC | Age: 63
LOS: 4 days | Discharge: HOME | End: 2018-09-18
Attending: HOSPITALIST | Admitting: INTERNAL MEDICINE
Payer: COMMERCIAL

## 2018-09-14 ENCOUNTER — TELEPHONE (OUTPATIENT)
Dept: FAMILY MEDICINE | Facility: CLINIC | Age: 63
End: 2018-09-14

## 2018-09-14 VITALS
TEMPERATURE: 98.2 F | RESPIRATION RATE: 23 BRPM | DIASTOLIC BLOOD PRESSURE: 108 MMHG | HEIGHT: 66 IN | OXYGEN SATURATION: 92 % | WEIGHT: 150 LBS | SYSTOLIC BLOOD PRESSURE: 162 MMHG | BODY MASS INDEX: 24.11 KG/M2

## 2018-09-14 DIAGNOSIS — I24.9 ACS (ACUTE CORONARY SYNDROME) (H): ICD-10-CM

## 2018-09-14 DIAGNOSIS — I10 BENIGN ESSENTIAL HYPERTENSION: Chronic | ICD-10-CM

## 2018-09-14 DIAGNOSIS — I44.7 LBBB (LEFT BUNDLE BRANCH BLOCK): ICD-10-CM

## 2018-09-14 DIAGNOSIS — I21.4 NSTEMI (NON-ST ELEVATED MYOCARDIAL INFARCTION) (H): ICD-10-CM

## 2018-09-14 DIAGNOSIS — I21.4 NSTEMI (NON-ST ELEVATED MYOCARDIAL INFARCTION) (H): Primary | ICD-10-CM

## 2018-09-14 PROBLEM — I20.0 UNSTABLE ANGINA (H): Status: ACTIVE | Noted: 2018-09-14

## 2018-09-14 LAB
ALBUMIN SERPL-MCNC: 3.9 G/DL (ref 3.4–5)
ALP SERPL-CCNC: 72 U/L (ref 40–150)
ALT SERPL W P-5'-P-CCNC: 39 U/L (ref 0–70)
ANION GAP SERPL CALCULATED.3IONS-SCNC: 7 MMOL/L (ref 3–14)
APTT PPP: 28 SEC (ref 22–37)
AST SERPL W P-5'-P-CCNC: 28 U/L (ref 0–45)
BASOPHILS # BLD AUTO: 0 10E9/L (ref 0–0.2)
BASOPHILS NFR BLD AUTO: 0.2 %
BILIRUB SERPL-MCNC: 1.3 MG/DL (ref 0.2–1.3)
BUN SERPL-MCNC: 17 MG/DL (ref 7–30)
CALCIUM SERPL-MCNC: 8.9 MG/DL (ref 8.5–10.1)
CHLORIDE SERPL-SCNC: 108 MMOL/L (ref 94–109)
CO2 SERPL-SCNC: 27 MMOL/L (ref 20–32)
CREAT SERPL-MCNC: 0.92 MG/DL (ref 0.66–1.25)
DIFFERENTIAL METHOD BLD: NORMAL
EOSINOPHIL # BLD AUTO: 0.2 10E9/L (ref 0–0.7)
EOSINOPHIL NFR BLD AUTO: 1.9 %
ERYTHROCYTE [DISTWIDTH] IN BLOOD BY AUTOMATED COUNT: 11.7 % (ref 10–15)
GFR SERPL CREATININE-BSD FRML MDRD: 82 ML/MIN/1.7M2
GLUCOSE SERPL-MCNC: 114 MG/DL (ref 70–99)
HCT VFR BLD AUTO: 40.6 % (ref 40–53)
HGB BLD-MCNC: 13.9 G/DL (ref 13.3–17.7)
IMM GRANULOCYTES # BLD: 0 10E9/L (ref 0–0.4)
IMM GRANULOCYTES NFR BLD: 0.3 %
INR PPP: 1.16 (ref 0.86–1.14)
LYMPHOCYTES # BLD AUTO: 3.3 10E9/L (ref 0.8–5.3)
LYMPHOCYTES NFR BLD AUTO: 34.5 %
MCH RBC QN AUTO: 30.3 PG (ref 26.5–33)
MCHC RBC AUTO-ENTMCNC: 34.2 G/DL (ref 31.5–36.5)
MCV RBC AUTO: 89 FL (ref 78–100)
MONOCYTES # BLD AUTO: 0.5 10E9/L (ref 0–1.3)
MONOCYTES NFR BLD AUTO: 5.4 %
NEUTROPHILS # BLD AUTO: 5.5 10E9/L (ref 1.6–8.3)
NEUTROPHILS NFR BLD AUTO: 57.7 %
NRBC # BLD AUTO: 0 10*3/UL
NRBC BLD AUTO-RTO: 0 /100
NT-PROBNP SERPL-MCNC: 1327 PG/ML (ref 0–900)
PLATELET # BLD AUTO: 182 10E9/L (ref 150–450)
POTASSIUM SERPL-SCNC: 3.4 MMOL/L (ref 3.4–5.3)
PROT SERPL-MCNC: 7.1 G/DL (ref 6.8–8.8)
RBC # BLD AUTO: 4.58 10E12/L (ref 4.4–5.9)
SODIUM SERPL-SCNC: 142 MMOL/L (ref 133–144)
TROPONIN I SERPL-MCNC: 0.86 UG/L (ref 0–0.04)
WBC # BLD AUTO: 9.6 10E9/L (ref 4–11)

## 2018-09-14 PROCEDURE — 96376 TX/PRO/DX INJ SAME DRUG ADON: CPT

## 2018-09-14 PROCEDURE — 85730 THROMBOPLASTIN TIME PARTIAL: CPT | Performed by: EMERGENCY MEDICINE

## 2018-09-14 PROCEDURE — 99223 1ST HOSP IP/OBS HIGH 75: CPT | Mod: AI | Performed by: INTERNAL MEDICINE

## 2018-09-14 PROCEDURE — 25000125 ZZHC RX 250: Performed by: EMERGENCY MEDICINE

## 2018-09-14 PROCEDURE — 25000128 H RX IP 250 OP 636: Performed by: EMERGENCY MEDICINE

## 2018-09-14 PROCEDURE — 84484 ASSAY OF TROPONIN QUANT: CPT | Performed by: EMERGENCY MEDICINE

## 2018-09-14 PROCEDURE — 93005 ELECTROCARDIOGRAM TRACING: CPT

## 2018-09-14 PROCEDURE — 83880 ASSAY OF NATRIURETIC PEPTIDE: CPT | Performed by: EMERGENCY MEDICINE

## 2018-09-14 PROCEDURE — 99285 EMERGENCY DEPT VISIT HI MDM: CPT

## 2018-09-14 PROCEDURE — 85610 PROTHROMBIN TIME: CPT | Performed by: EMERGENCY MEDICINE

## 2018-09-14 PROCEDURE — 93010 ELECTROCARDIOGRAM REPORT: CPT | Mod: Z6 | Performed by: EMERGENCY MEDICINE

## 2018-09-14 PROCEDURE — 99291 CRITICAL CARE FIRST HOUR: CPT | Mod: 25 | Performed by: EMERGENCY MEDICINE

## 2018-09-14 PROCEDURE — 96375 TX/PRO/DX INJ NEW DRUG ADDON: CPT

## 2018-09-14 PROCEDURE — 71045 X-RAY EXAM CHEST 1 VIEW: CPT

## 2018-09-14 PROCEDURE — 25000132 ZZH RX MED GY IP 250 OP 250 PS 637: Performed by: EMERGENCY MEDICINE

## 2018-09-14 PROCEDURE — 25000132 ZZH RX MED GY IP 250 OP 250 PS 637: Performed by: INTERNAL MEDICINE

## 2018-09-14 PROCEDURE — 21000001 ZZH R&B HEART CARE

## 2018-09-14 PROCEDURE — 85025 COMPLETE CBC W/AUTO DIFF WBC: CPT | Performed by: EMERGENCY MEDICINE

## 2018-09-14 PROCEDURE — 80053 COMPREHEN METABOLIC PANEL: CPT | Performed by: EMERGENCY MEDICINE

## 2018-09-14 PROCEDURE — 96374 THER/PROPH/DIAG INJ IV PUSH: CPT

## 2018-09-14 RX ORDER — LOSARTAN POTASSIUM 25 MG/1
25 TABLET ORAL DAILY
Status: DISCONTINUED | OUTPATIENT
Start: 2018-09-15 | End: 2018-09-18 | Stop reason: HOSPADM

## 2018-09-14 RX ORDER — ONDANSETRON 2 MG/ML
4 INJECTION INTRAMUSCULAR; INTRAVENOUS EVERY 6 HOURS PRN
Status: DISCONTINUED | OUTPATIENT
Start: 2018-09-14 | End: 2018-09-18 | Stop reason: HOSPADM

## 2018-09-14 RX ORDER — ACETAMINOPHEN 325 MG/1
650 TABLET ORAL EVERY 4 HOURS PRN
Status: DISCONTINUED | OUTPATIENT
Start: 2018-09-14 | End: 2018-09-17

## 2018-09-14 RX ORDER — NITROGLYCERIN 0.4 MG/1
0.4 TABLET SUBLINGUAL EVERY 5 MIN PRN
Status: DISCONTINUED | OUTPATIENT
Start: 2018-09-14 | End: 2018-09-14 | Stop reason: HOSPADM

## 2018-09-14 RX ORDER — ONDANSETRON 4 MG/1
4 TABLET, ORALLY DISINTEGRATING ORAL EVERY 6 HOURS PRN
Status: DISCONTINUED | OUTPATIENT
Start: 2018-09-14 | End: 2018-09-18 | Stop reason: HOSPADM

## 2018-09-14 RX ORDER — SIMVASTATIN 20 MG
20 TABLET ORAL EVERY EVENING
Status: DISCONTINUED | OUTPATIENT
Start: 2018-09-14 | End: 2018-09-14 | Stop reason: ALTCHOICE

## 2018-09-14 RX ORDER — ASPIRIN 81 MG/1
324 TABLET, CHEWABLE ORAL ONCE
Status: DISCONTINUED | OUTPATIENT
Start: 2018-09-14 | End: 2018-09-14

## 2018-09-14 RX ORDER — ATORVASTATIN CALCIUM 20 MG/1
20 TABLET, FILM COATED ORAL DAILY
Status: DISCONTINUED | OUTPATIENT
Start: 2018-09-15 | End: 2018-09-15

## 2018-09-14 RX ORDER — DEXTROSE MONOHYDRATE 25 G/50ML
25-50 INJECTION, SOLUTION INTRAVENOUS
Status: DISCONTINUED | OUTPATIENT
Start: 2018-09-14 | End: 2018-09-18 | Stop reason: HOSPADM

## 2018-09-14 RX ORDER — METOPROLOL TARTRATE 1 MG/ML
5 INJECTION, SOLUTION INTRAVENOUS EVERY 5 MIN PRN
Status: DISCONTINUED | OUTPATIENT
Start: 2018-09-14 | End: 2018-09-14 | Stop reason: HOSPADM

## 2018-09-14 RX ORDER — NICOTINE POLACRILEX 4 MG
15-30 LOZENGE BUCCAL
Status: DISCONTINUED | OUTPATIENT
Start: 2018-09-14 | End: 2018-09-18 | Stop reason: HOSPADM

## 2018-09-14 RX ORDER — NALOXONE HYDROCHLORIDE 0.4 MG/ML
.1-.4 INJECTION, SOLUTION INTRAMUSCULAR; INTRAVENOUS; SUBCUTANEOUS
Status: DISCONTINUED | OUTPATIENT
Start: 2018-09-14 | End: 2018-09-18 | Stop reason: HOSPADM

## 2018-09-14 RX ORDER — ALUMINA, MAGNESIA, AND SIMETHICONE 2400; 2400; 240 MG/30ML; MG/30ML; MG/30ML
30 SUSPENSION ORAL EVERY 4 HOURS PRN
Status: DISCONTINUED | OUTPATIENT
Start: 2018-09-14 | End: 2018-09-18 | Stop reason: HOSPADM

## 2018-09-14 RX ORDER — ACETAMINOPHEN 650 MG/1
650 SUPPOSITORY RECTAL EVERY 4 HOURS PRN
Status: DISCONTINUED | OUTPATIENT
Start: 2018-09-14 | End: 2018-09-17

## 2018-09-14 RX ORDER — LIDOCAINE 40 MG/G
CREAM TOPICAL
Status: DISCONTINUED | OUTPATIENT
Start: 2018-09-14 | End: 2018-09-17

## 2018-09-14 RX ORDER — ASPIRIN 81 MG/1
162 TABLET, CHEWABLE ORAL ONCE
Status: COMPLETED | OUTPATIENT
Start: 2018-09-14 | End: 2018-09-14

## 2018-09-14 RX ORDER — AMLODIPINE BESYLATE 10 MG/1
10 TABLET ORAL DAILY
Status: DISCONTINUED | OUTPATIENT
Start: 2018-09-15 | End: 2018-09-18 | Stop reason: HOSPADM

## 2018-09-14 RX ADMIN — Medication 12.5 MG: at 23:36

## 2018-09-14 RX ADMIN — METOPROLOL TARTRATE 5 MG: 1 INJECTION, SOLUTION INTRAVENOUS at 20:49

## 2018-09-14 RX ADMIN — TICAGRELOR 180 MG: 90 TABLET ORAL at 18:46

## 2018-09-14 RX ADMIN — Medication 4000 UNITS: at 19:01

## 2018-09-14 RX ADMIN — HEPARIN SODIUM 12 UNITS/KG/HR: 10000 INJECTION, SOLUTION INTRAVENOUS at 18:54

## 2018-09-14 RX ADMIN — ASPIRIN 81 MG 243 MG: 81 TABLET ORAL at 18:43

## 2018-09-14 RX ADMIN — NITROGLYCERIN 0.4 MG: 0.4 TABLET SUBLINGUAL at 21:12

## 2018-09-14 RX ADMIN — HEPARIN SODIUM 12 UNITS/KG/HR: 10000 INJECTION, SOLUTION INTRAVENOUS at 19:00

## 2018-09-14 ASSESSMENT — ENCOUNTER SYMPTOMS
DYSURIA: 0
BACK PAIN: 0
FEVER: 0
VOMITING: 0
SHORTNESS OF BREATH: 0
CHILLS: 0
HEADACHES: 0
NAUSEA: 0
SORE THROAT: 0
CHEST TIGHTNESS: 1

## 2018-09-14 ASSESSMENT — PAIN DESCRIPTION - DESCRIPTORS: DESCRIPTORS: DULL

## 2018-09-14 NOTE — IP AVS SNAPSHOT
MRN:[id number]                      After Visit Summary   9/14/2018    Jstea Jones    MRN: [id number]           Thank you!     Thank you for choosing [facility] for your care. Our goal is always to provide you with excellent care. Hearing back from our patients is one way we can continue to improve our services. Please take a few minutes to complete the written survey that you may receive in the mail after you visit with us. Thank you!        Patient Information     Date Of Birth          1955        Designated Caregiver       Most Recent Value    Caregiver    Will someone help with your care after discharge? yes    Name of designated caregiver Valeria    Phone number of caregiver [phone]    Caregiver address [place], [place]      About your hospital stay     You were admitted on:  September 14, 2018 You last received care in the:  [facility]    You were discharged on:  September 18, 2018        Reason for your hospital stay       HEART ATTACK                  Who to Call     For medical emergencies, please call 911.  For non-urgent questions about your medical care, please call your primary care provider or clinic, [phone]          Attending Provider     Provider Specialty    Jeffrey Kumar MD Internal Medicine    [facility], Marciano Fernández MD Internal Medicine       Primary Care Provider Office Phone # Fax #    Kathya Iglesias, LEONID [phone]      After Care Instructions     Activity       Your activity upon discharge: activity as tolerated            Diet       Follow this diet upon discharge: Orders Placed This Encounter      Combination Diet Low Saturated Fat Na <2400mg Diet                  Follow-up Appointments     Follow-up and recommended labs and tests        Follow up with primary care provider, Kathya Iglesias, within 7 days to evaluate medication change.  No follow up labs or test are needed.    Follow up with  "cardiologist as scheduled.                  Your next 10 appointments already scheduled     Sep 20, 2018 10:00 AM CDT   [facility]    [address] [facility] [id number] [phone]           Bring a current list of meds and any records pertaining to this visit. For Physicals, please bring immunization records and any forms needing to be filled out. Please arrive 10 minutes early to complete paperwork.            Oct 01, 2018  3:00 PM CDT   Return Visit with ABILIO Tadeo CNP   [facility] ([facility])    [address] [facility] [id number] [phone]            Dec 05, 2018 11:00 AM CST   Return Visit with Hortencia Aguilar MD   [facility] ([facility])    [address] [facility] [id number] [phone]              Additional Services     CARDIAC REHAB REFERRAL       Patient may choose their preference of the site for Cardiac Rehab.            Cardiac Rehab Referral       *This therapy referral will be filtered to a centralized scheduling office at [facility] and the patient will receive a call to schedule an appointment at a [facility] location most convenient for them.*     If you have not heard from the scheduling office within 2 business days, please call [phone] for all locations, with the exception of [place], please call [phone].    Please be aware that coverage of these services is subject to the terms and limitations of your health insurance plan.  Call member services at your health plan with any benefit or coverage questions.      **Note to Provider:  If you are referring outside of [facility] for the therapy appointment, please list the name of the location in the \"special instructions\" above, print the referral and give to the patient " to schedule the appointment.                   Follow-Up with Cardiac Advanced Practice Provider           Follow-Up with Cardiologist                 Future tests that were ordered for you     VERBAL INSTRUCTIONS       Please have patient hold metformin for 48 hours            Basic metabolic panel           Lipid Profile                 Further instructions from your care team       A follow-up appointment was scheduled for you [see above].  It is very important to go to it--bring these papers and your insurance card with you.  At the visit, talk about your hospital stay.  Tell your doctor how you feel.  Show your new list of medications.  Your doctor will update records, make sure you are still doing OK, and decide if any tests or medication changes are needed.      Cardiac Angiogram Discharge Instructions - Radial    After you go home:      Have an adult stay with you until tomorrow.    Drink extra fluids for 2 days.    You may resume your normal diet.    No smoking       For 24 hours - due to the sedation you received:    Relax and take it easy.    Do NOT make any important or legal decisions.    Do NOT drive or operate machines at home or at work.    Do NOT drink alcohol.    Care of Wrist Puncture Site:      For the first 24 hrs - check the puncture site every 1-2 hours while awake.    It is normal to have soreness at the puncture site and mild tingling in your hand for up to 3 days.    Remove the bandaid after 24 hours. If there is minor oozing, apply another bandaid and remove it after 12 hours.    You may shower tomorrow.  Do NOT take a bath, or use a hot tub or pool for at least 3 days. Do NOT scrub the site. Do not use lotion or powder near the puncture site.           Activity:        For 2 days:     do not use your hand or arm to support your weight (such as rising from a chair)     do not bend your wrist (such as lifting a garage door).    do not lift more than 5 pounds or exercise your arm (such as  tennis, golf or bowling).    Do NOT do any heavy activity such as exercise, lifting, or straining.     Bleeding:      If you start bleeding from the site in your wrist, sit down and press firmly on/above the site for 10 minutes.     Once bleeding stops, keep arm still for 2 hours.     Call [facility] as soon as you can.       Call 911 right away if you have heavy bleeding or bleeding that does not stop.      Medicines:      If you are taking an antiplatelet medication such as Plavix, Brilinta or Effient, do not stop taking it until you talk to your cardiologist.        If you are on Metformin (Glucophage), do not restart it until you have blood tests (within 2 to 3 days after discharge).  After you have your blood drawn, you may restart the Metformin.     Take your medications, including blood thinners, unless your provider tells you not to.  If you take Coumadin (Warfarin), have your INR checked by your provider in  3-5 days. Call your clinic to schedule this.    If you have stopped any medicines, check with your provider about when to restart them.    Follow Up Appointments:      Follow up with [facility] Nurse Practitioner at [facility] of patient preference in 7-10 days.    Call the clinic if:      You have a large or growing hard lump around the site.    The site is red, swollen, hot or tender.    Blood or fluid is draining from the site.    You have chills or a fever greater than 101 F (38 C).    Your arm feels numb, cool or changes color.    You have hives, a rash or unusual itching.    Any questions or concerns.          [facility] [place]:    [phone] [facility] (7 days a week)            Pending Results     Date and Time Order Name Status Description    9/17/2018 1840 EKG 12-lead, tracing only  Preliminary     9/17/2018 0128 EKG 12-lead, tracing only Preliminary             Statement of Approval     Ordered          09/18/18 0109  I have reviewed and agree with all the  "recommendations and orders detailed in this document.  EFFECTIVE NOW     Approved and electronically signed by:  Fady Arraiga DO             Admission Information     Date & Time Provider Department Dept. Phone    2018 Marciano Ferreira MD [facility] [phone]      Your Vitals Were     Blood Pressure Pulse Temperature Respirations Weight Pulse Oximetry    144/75 (BP Location: Right arm) 71 98.5  F (36.9  C) (Oral) 16 65 kg (143 lb 3.2 oz) 98%    BMI (Body Mass Index)                   23.11 kg/m2           MyChart Information     "[employer]" lets you send messages to your doctor, view your test results, renew your prescriptions, schedule appointments and more. To sign up, go to [email or web address]" . Click on \"Log in\" on the left side of the screen, which will take you to the Welcome page. Then click on \"Sign up Now\" on the right side of the page.     You will be asked to enter the access code listed below, as well as some personal information. Please follow the directions to create your username and password.     Your access code is: [email or web address]  Expires: 2018  8:18 AM     Your access code will  in 90 days. If you need help or a new code, please call your [facility] clinic or [phone].        Care EveryWhere ID     This is your Care EveryWhere ID. This could be used by other organizations to access your [facility] medical records  [id number]        Equal Access to Services     [facility] Hospital[facility]ERTA AH: Hadii aad ku hadasho Soomaali, waaxda luqadaha, qaybta kaalmada adeegyada, ranulfo ramirez . So [facility] [phone].    ATENCIÓN: Si habla español, tiene a mario disposición servicios gratuitos de asistencia lingüística. Llame al [phone].    We comply with applicable federal civil rights laws and [place] laws. We do not discriminate on the basis of race, color, national origin, age, disability, sex, sexual orientation, or gender " identity.               Review of your medicines      START taking        Dose / Directions    carvedilol 6.25 MG tablet   Commonly known as:  COREG        Dose:  6.25 mg   Take 1 tablet (6.25 mg) by mouth 2 times daily (with meals)   Quantity:  60 tablet   Refills:  0       nitroGLYcerin 0.4 MG sublingual tablet   Commonly known as:  NITROSTAT        For chest pain place 1 tablet under the tongue every 5 minutes for 3 doses. If symptoms persist 5 minutes after 1st dose call 911.   Quantity:  50 tablet   Refills:  0       ticagrelor 90 MG tablet   Commonly known as:  BRILINTA        Dose:  90 mg   Take 1 tablet (90 mg) by mouth 2 times daily   Quantity:  60 tablet   Refills:  0         CONTINUE these medicines which may have CHANGED, or have new prescriptions. If we are uncertain of the size of tablets/capsules you have at home, strength may be listed as something that might have changed.        Dose / Directions    atorvastatin 40 MG tablet   Commonly known as:  LIPITOR   This may have changed:    - medication strength  - how much to take        Dose:  40 mg   Start taking on:  9/19/2018   Take 1 tablet (40 mg) by mouth daily   Quantity:  30 tablet   Refills:  0         CONTINUE these medicines which have NOT CHANGED        Dose / Directions    amLODIPine 10 MG tablet   Commonly known as:  NORVASC   Used for:  Benign essential hypertension        Dose:  10 mg   Take 1 tablet (10 mg) by mouth daily   Quantity:  90 tablet   Refills:  3       aspirin 81 MG tablet   Used for:  HTN (hypertension), Type 2 diabetes, HbA1C goal < 8% (H)        Dose:  81 mg   Take 1 tablet (81 mg) by mouth daily   Quantity:  90 tablet   Refills:  3       Blood Glucose Monitoring Suppl w/Device Kit   Used for:  Type 2 diabetes, HbA1C goal < 8% (H)        Dose:  1 each   1 each daily   Quantity:  1 kit   Refills:  0       blood glucose monitoring test strip   Commonly known as:  no brand specified   Used for:  Type 2 diabetes mellitus with  hyperglycemia, without long-term current use of insulin (H)        Use to test blood sugar 3 times daily or as directed. Box of 100 each.   Quantity:  3 Box   Refills:  3       glipiZIDE XL 10 MG 24 hr tablet   Used for:  Type 2 diabetes mellitus without complication, without long-term current use of insulin (H)   Generic drug:  glipiZIDE        TAKE 1 TABLET BY MOUTH ONCE DAILY *  SCHEDULE  OFFICE  VISIT  FOR  REFILL  *   Quantity:  30 tablet   Refills:  0       losartan 25 MG tablet   Commonly known as:  COZAAR   Used for:  Benign essential hypertension        Dose:  25 mg   Take 1 tablet (25 mg) by mouth daily   Quantity:  90 tablet   Refills:  3       Melatonin 10 MG Tabs tablet        Dose:  10 mg   Take 10 mg by mouth nightly as needed for sleep   Refills:  0       metFORMIN 1000 MG tablet   Commonly known as:  GLUCOPHAGE   Used for:  Type 2 diabetes mellitus without complication, without long-term current use of insulin (H)        TAKE 1 TABLET BY MOUTH WITH BREAKFAST TAKE ONE  HALF  TABLET  WITH  LUNCH  AND TAKE  ONE  TABLET  WITH  DINNER   Quantity:  225 tablet   Refills:  0       MULTIPLE VITAMIN PO        Dose:  1 tablet   Take 1 tablet by mouth   Refills:  0       OMEGA-3 FISH OIL PO        Dose:  1200 mg   Take 1,200 mg by mouth 2 times daily (with meals)   Refills:  0       omeprazole 20 MG tablet   Commonly known as:  priLOSEC OTC   Used for:  Gastroesophageal reflux disease without esophagitis        Dose:  20 mg   Take 1 tablet (20 mg) by mouth daily   Quantity:  90 tablet   Refills:  1       ONETOUCH LANCETS Misc   Used for:  Type 2 diabetes, HbA1C goal < 8% (H)        Dose:  1 lancet   1 lancet 3 times daily   Quantity:  200 each   Refills:  5         STOP taking     ACE NOT PRESCRIBED (INTENTIONAL)                Where to get your medicines      These medications were sent to [facility] - [place], [place] - [address] [place] [address]  [address] [facility] [address]     Phone:  [phone]      atorvastatin 40 MG tablet    carvedilol 6.25 MG tablet    nitroGLYcerin 0.4 MG sublingual tablet    ticagrelor 90 MG tablet                Protect others around you: Learn how to safely use, store and throw away your medicines at www.disposemymeds.org.             Medication List: This is a list of all your medications and when to take them. Check marks below indicate your daily home schedule. Keep this list as a reference.      Medications           Morning Afternoon Evening Bedtime As Needed    amLODIPine 10 MG tablet   Commonly known as:  NORVASC   Take 1 tablet (10 mg) by mouth daily   Last time this was given:  10 mg on 9/18/2018  8:20 AM   Next Dose Due:  Tomorrow, Wednesday 9/19                                   aspirin 81 MG tablet   Take 1 tablet (81 mg) by mouth daily   Last time this was given:  81 mg on 9/18 in the morning   Next Dose Due:  9/19 in the morning                                   atorvastatin 40 MG tablet   Commonly known as:  LIPITOR   Take 1 tablet (40 mg) by mouth daily   Start taking on:  9/19/2018   Last time this was given:  40 mg on 9/18/2018  8:21 AM   Next Dose Due:  9/19 in the morning                                   Blood Glucose Monitoring Suppl w/Device Kit   1 each daily                                blood glucose monitoring test strip   Commonly known as:  no brand specified   Use to test blood sugar 3 times daily or as directed. Box of 100 each.                                carvedilol 6.25 MG tablet   Commonly known as:  COREG   Take 1 tablet (6.25 mg) by mouth 2 times daily (with meals)   Last time this was given:  6.25 mg on 9/18/2018  8:37 AM   Next Dose Due:  Tuesday 9/18 tonight                                      glipiZIDE XL 10 MG 24 hr tablet   TAKE 1 TABLET BY MOUTH ONCE DAILY *  SCHEDULE  OFFICE  VISIT  FOR  REFILL  *   Generic drug:  glipiZIDE                                losartan 25 MG tablet   Commonly known as:  COZAAR   Take 1 tablet (25 mg) by mouth  daily   Last time this was given:  25 mg on 9/18/2018  8:21 AM   Next Dose Due:  Wednesday 9/19                                   Melatonin 10 MG Tabs tablet   Take 10 mg by mouth nightly as needed for sleep   Last time this was given:  10 mg on 9/17/2018  9:44 PM   Next Dose Due:  Resume home routine                                      metFORMIN 1000 MG tablet   Commonly known as:  GLUCOPHAGE   TAKE 1 TABLET BY MOUTH WITH BREAKFAST TAKE ONE  HALF  TABLET  WITH  LUNCH  AND TAKE  ONE  TABLET  WITH  DINNER   Next Dose Due:  Thursday 9/20                                         MULTIPLE VITAMIN PO   Take 1 tablet by mouth   Next Dose Due:  9/19 in the morning                                   nitroGLYcerin 0.4 MG sublingual tablet   Commonly known as:  NITROSTAT   For chest pain place 1 tablet under the tongue every 5 minutes for 3 doses. If symptoms persist 5 minutes after 1st dose call 911.                                OMEGA-3 FISH OIL PO   Take 1,200 mg by mouth 2 times daily (with meals)   Next Dose Due:  Tonight 9/18                                       omeprazole 20 MG tablet   Commonly known as:  priLOSEC OTC   Take 1 tablet (20 mg) by mouth daily   Next Dose Due:  Tomorrow 9/19                                   ONETOUCH LANCETS Misc   1 lancet 3 times daily   Next Dose Due:  9/18 tonight                                         ticagrelor 90 MG tablet   Commonly known as:  BRILINTA   Take 1 tablet (90 mg) by mouth 2 times daily   Last time this was given:  90 mg on 9/18/2018  8:21 AM   Next Dose Due:  Tonight 9/18                                                More Information        Ticagrelor Oral tablet  What is this medicine?  TICAGRELOR (FANTASMA ka GREL or) helps to prevent blood clots. This medicine is used to prevent heart attack, stroke, or other vascular events in people who have had a recent heart attack or who have severe chest pain.  This medicine may be used for other purposes; ask your health care  provider or pharmacist if you have questions.  What should I tell my health care provider before I take this medicine?  They need to know if you have any of these conditions:    bleeding disorder    bleeding in the brain    liver disease    planned surgery    stomach or intestinal ulcers    stroke or transient ischemic attack    an unusual or allergic reaction to ticagrelor, other medicines, foods, dyes, or preservatives    pregnant or trying to get pregnant    breast-feeding  How should I use this medicine?  Take this medicine by mouth with a glass of water. Follow the directions on the prescription label. You can take it with or without food. If it upsets your stomach, take it with food. Take your medicine at regular intervals. Do not take it more often than directed. Do not stop taking except on your doctor's advice.  Talk to you pediatrician regarding the use of this medicine in children. Special care may be needed.  Overdosage: If you think you've taken too much of this medicine contact a poison control center or emergency room at once.  NOTE: This medicine is only for you. Do not share this medicine with others.  What if I miss a dose?  If you miss a dose, take it as soon as you can. If it is almost time for your next dose, take only that dose. Do not take double or extra doses.  What may interact with this medicine?    certain antibiotics like clarithromycin and telithromycin    certain medicines for fungal infections like itraconazole, ketoconazole, and voriconazole    certain medicines for HIV infection like atazanavir, indinavir, nelfinavir, ritonavir, and saquinavir    certain medicines for seizures like carbamazepine, phenobarbital, and phenytoin    certain medicines that treat or prevent blood clots like warfarin    dexamethasone    digoxin    lovastatin    nefazodone    rifampin    simvastatin  This list may not describe all possible interactions. Give your health care provider a list of all the  medicines, herbs, non-prescription drugs, or dietary supplements you use. Also tell them if you smoke, drink alcohol, or use illegal drugs. Some items may interact with your medicine.  What should I watch for while using this medicine?  Visit your doctor or health care professional for regular check ups. Do not stop taking you medicine unless your doctor tells you to.  Notify your doctor or health care professional and seek emergency treatment if you develop breathing problems; changes in vision; chest pain; severe, sudden headache; pain, swelling, warmth in the leg; trouble speaking; sudden numbness or weakness of the face, arm, or leg. These can be signs that your condition has gotten worse.  If you are going to have surgery or dental work, tell your doctor or health care professional that you are taking this medicine.  You should take aspirin every day with this medicine. Do not take more than 100 mg each day. Talk to your doctor if you have questions.  What side effects may I notice from receiving this medicine?  Side effects that you should report to your doctor or health care professional as soon as possible:    allergic reactions like skin rash, itching or hives, swelling of the face, lips, or tongue    breathing problems    fast or irregular heartbeat    feeling faint or light-headed, falls    signs and symptoms of bleeding such as bloody or black, tarry stools; red or dark-brown urine; spitting up blood or brown material that looks like coffee grounds; red spots on the skin; unusual bruising or bleeding from the eye, gums, or nose  Side effects that usually do not require medical attention (Report these to your doctor or health care professional if they continue or are bothersome.):    breast enlargement in both males and females    diarrhea    dizziness    headache    tiredness    upset stomach  This list may not describe all possible side effects. Call your doctor for medical advice about side effects.  You may report side effects to FDA at 5-794-FDA-7700.  Where should I keep my medicine?  Keep out of the reach of children.  Store at room temperature of 59 to 86 degrees F (15 to 30 degrees C). Throw away any unused medicine after the expiration date.  NOTE: This sheet is a summary. It may not cover all possible information. If you have questions about this medicine, talk to your doctor, pharmacist, or health care provider.  NOTE:This sheet is a summary. It may not cover all possible information. If you have questions about this medicine, talk to your doctor, pharmacist, or health care provider. Copyright  2016 Gold Standard                Patient Education    Carvedilol Oral capsule, extended-release    Carvedilol Oral tablet  Carvedilol Oral tablet  What is this medicine?  CARVEDILOL (ADRIANNA ve dil ol) is a beta-blocker. Beta-blockers reduce the workload on the heart and help it to beat more regularly. This medicine is used to treat high blood pressure and heart failure.  This medicine may be used for other purposes; ask your health care provider or pharmacist if you have questions.  What should I tell my health care provider before I take this medicine?  They need to know if you have any of these conditions:    circulation problems    diabetes    history of heart attack or heart disease    liver disease    lung or breathing disease, like asthma or emphysema    pheochromocytoma    slow or irregular heartbeat    thyroid disease    an unusual or allergic reaction to carvedilol, other beta-blockers, medicines, foods, dyes, or preservatives    pregnant or trying to get pregnant    breast-feeding  How should I use this medicine?  Take this medicine by mouth with a glass of water. Follow the directions on the prescription label. It is best to take the tablets with food. Take your doses at regular intervals. Do not take your medicine more often than directed. Do not stop taking except on the advice of your doctor or health  care professional.  Talk to your pediatrician regarding the use of this medicine in children. Special care may be needed.  Overdosage: If you think you have taken too much of this medicine contact a poison control center or emergency room at once.  NOTE: This medicine is only for you. Do not share this medicine with others.  What if I miss a dose?  If you miss a dose, take it as soon as you can. If it is almost time for your next dose, take only that dose. Do not take double or extra doses.  What may interact with this medicine?  This medicine may interact with the following medications:    certain medicines for blood pressure, heart disease, irregular heart beat    certain medicines for depression, like fluoxetine or paroxetine    certain medicines for diabetes, like glipizide or glyburide    cimetidine    clonidine    cyclosporine    digoxin    MAOIs like Carbex, Eldepryl, Marplan, Nardil, and Parnate    reserpine    rifampin  This list may not describe all possible interactions. Give your health care provider a list of all the medicines, herbs, non-prescription drugs, or dietary supplements you use. Also tell them if you smoke, drink alcohol, or use illegal drugs. Some items may interact with your medicine.  What should I watch for while using this medicine?  Check your heart rate and blood pressure regularly while you are taking this medicine. Ask your doctor or health care professional what your heart rate and blood pressure should be, and when you should contact him or her. Do not stop taking this medicine suddenly. This could lead to serious heart-related effects.  Contact your doctor or health care professional if you have difficulty breathing while taking this drug.  Check your weight daily. Ask your doctor or health care professional when you should notify him/her of any weight gain.  You may get drowsy or dizzy. Do not drive, use machinery, or do anything that requires mental alertness until you know how  this medicine affects you. To reduce the risk of dizzy or fainting spells, do not sit or stand up quickly. Alcohol can make you more drowsy, and increase flushing and rapid heartbeats. Avoid alcoholic drinks.  If you have diabetes, check your blood sugar as directed. Tell your doctor if you have changes in your blood sugar while you are taking this medicine.  If you are going to have surgery, tell your doctor or health care professional that you are taking this medicine.  What side effects may I notice from receiving this medicine?  Side effects that you should report to your doctor or health care professional as soon as possible:    allergic reactions like skin rash, itching or hives, swelling of the face, lips, or tongue    breathing problems    dark urine    irregular heartbeat    swollen legs or ankles    vomiting    yellowing of the eyes or skin  Side effects that usually do not require medical attention (report to your doctor or health care professional if they continue or are bothersome):    change in sex drive or performance    diarrhea    dry eyes (especially if wearing contact lenses)    dry, itching skin    headache    nausea    unusually tired  This list may not describe all possible side effects. Call your doctor for medical advice about side effects. You may report side effects to FDA at 1-215-FDA-7579.  Where should I keep my medicine?  Keep out of the reach of children.  Store at room temperature below 30 degrees C (86 degrees F). Protect from moisture. Keep container tightly closed. Throw away any unused medicine after the expiration date.  NOTE:This sheet is a summary. It may not cover all possible information. If you have questions about this medicine, talk to your doctor, pharmacist, or health care provider. Copyright  2016 Gold Standard                Atorvastatin Calcium Oral tablet  What is this medicine?  ATORVASTATIN (a TORE va sta tin) is known as a HMG-CoA reductase inhibitor or 'statin'.  It lowers the level of cholesterol and triglycerides in the blood. This drug may also reduce the risk of heart attack, stroke, or other health problems in patients with risk factors for heart disease. Diet and lifestyle changes are often used with this drug.  This medicine may be used for other purposes; ask your health care provider or pharmacist if you have questions.  What should I tell my health care provider before I take this medicine?  They need to know if you have any of these conditions:    frequently drink alcoholic beverages    history of stroke, TIA    kidney disease    liver disease    muscle aches or weakness    other medical condition    an unusual or allergic reaction to atorvastatin, other medicines, foods, dyes, or preservatives    pregnant or trying to get pregnant    breast-feeding  How should I use this medicine?  Take this medicine by mouth with a glass of water. Follow the directions on the prescription label. You can take this medicine with or without food. Take your doses at regular intervals. Do not take your medicine more often than directed.  Talk to your pediatrician regarding the use of this medicine in children. While this drug may be prescribed for children as young as 10 years old for selected conditions, precautions do apply.  Overdosage: If you think you have taken too much of this medicine contact a poison control center or emergency room at once.  NOTE: This medicine is only for you. Do not share this medicine with others.  What if I miss a dose?  If you miss a dose, take it as soon as you can. If it is almost time for your next dose, take only that dose. Do not take double or extra doses.  What may interact with this medicine?  Do not take this medicine with any of the following medications:    red yeast rice    telaprevir    telithromycin    voriconazole  This medicine may also interact with the following medications:    alcohol    antiviral medicines for HIV or  AIDS    boceprevir    certain antibiotics like clarithromycin, erythromycin, troleandomycin    certain medicines for cholesterol like fenofibrate or gemfibrozil    cimetidine    clarithromycin    colchicine    cyclosporine    digoxin    female hormones, like estrogens or progestins and birth control pills    grapefruit juice    medicines for fungal infections like fluconazole, itraconazole, ketoconazole    niacin    rifampin    spironolactone  This list may not describe all possible interactions. Give your health care provider a list of all the medicines, herbs, non-prescription drugs, or dietary supplements you use. Also tell them if you smoke, drink alcohol, or use illegal drugs. Some items may interact with your medicine.  What should I watch for while using this medicine?  Visit your doctor or health care professional for regular check-ups. You may need regular tests to make sure your liver is working properly.  Tell your doctor or health care professional right away if you get any unexplained muscle pain, tenderness, or weakness, especially if you also have a fever and tiredness. Your doctor or health care professional may tell you to stop taking this medicine if you develop muscle problems. If your muscle problems do not go away after stopping this medicine, contact your health care professional.  This drug is only part of a total heart-health program. Your doctor or a dietician can suggest a low-cholesterol and low-fat diet to help. Avoid alcohol and smoking, and keep a proper exercise schedule.  Do not use this drug if you are pregnant or breast-feeding. Serious side effects to an unborn child or to an infant are possible. Talk to your doctor or pharmacist for more information.  This medicine may affect blood sugar levels. If you have diabetes, check with your doctor or health care professional before you change your diet or the dose of your diabetic medicine.  If you are going to have surgery tell your  health care professional that you are taking this drug.  What side effects may I notice from receiving this medicine?  Side effects that you should report to your doctor or health care professional as soon as possible:    allergic reactions like skin rash, itching or hives, swelling of the face, lips, or tongue    dark urine    fever    joint pain    muscle cramps, pain    redness, blistering, peeling or loosening of the skin, including inside the mouth    trouble passing urine or change in the amount of urine    unusually weak or tired    yellowing of eyes or skin  Side effects that usually do not require medical attention (report to your doctor or health care professional if they continue or are bothersome):    constipation    heartburn    stomach gas, pain, upset  This list may not describe all possible side effects. Call your doctor for medical advice about side effects. You may report side effects to FDA at 1-398-FDA-9705.  Where should I keep my medicine?  Keep out of the reach of children.  Store at room temperature between 20 to 25 degrees C (68 to 77 degrees F). Throw away any unused medicine after the expiration date.  NOTE:This sheet is a summary. It may not cover all possible information. If you have questions about this medicine, talk to your doctor, pharmacist, or health care provider. Copyright  2016 Gold Standard

## 2018-09-14 NOTE — IP AVS SNAPSHOT
[facility]    [address] [place] [address]., [address]    [place] [address]    Phone:  [phone]                                       After Visit Summary   9/14/2018    Js E Leonidessabiha    MRN: [id number]           After Visit Summary Signature Page     I have received my discharge instructions, and my questions have been answered. I have discussed any challenges I see with this plan with the nurse or doctor.    ..........................................................................................................................................  Patient/Patient Representative Signature      ..........................................................................................................................................  Patient Representative Print Name and Relationship to Patient    ..................................................               ................................................  Date                                   Time    ..........................................................................................................................................  Reviewed by Signature/Title    ...................................................              ..............................................  Date                                               Time          22EPIC Rev 08/18

## 2018-09-14 NOTE — TELEPHONE ENCOUNTER
"Triaged for appt scheduled with Kathya Mon 09-17-18, chest pain, SOA.  Pt states sx started last winter- intermittent chest pain. Has noticed increasing sx/ frequency- exertional chest pain/ SOA to point of needed to rest.  States notices sx when handling wood, sometime sx may last up to 45 min before subsiding.  Denies diaphoresis, clamminess, weakness, lightheadedness, dizziness, palpitations, jaw/ arm pain.  Family cardiac hx.  Advised ED today for eval. States is currently in [facility], will be heading home/ north later this rita.  Highly advised need to go to ED today/ 911 if sx worsen.  Pt not committing to ED, states \"probably\" will go to ED yet today.  NORMAN Rivas RN      "

## 2018-09-14 NOTE — ED NOTES
Pt started on Monday while splitting wood, has had on and off all week and worse with activity. Was at work tonight and pain was a 9/10 and on the way here it 2/10 and now in room it's 0/10. He tells us he could feel it on and off all week and knew something wasn't quite right. Pt is a/o x 4. Family at bedside.

## 2018-09-14 NOTE — ED PROVIDER NOTES
History     Chief Complaint   Patient presents with     Chest Pain     has been going on all week, worse with activty, now 2/10 but an hour ago it was a 10/10     HPI  Js ESTHER Jones is a 63 year old male who has a history of type II diabetes mellitus, hypertension, hyperlipidemia, and gastroesophageal reflux disease without esophagitis that presents to the ED for evaluation of chest pain. The patient states that his pain occurs every time he moves for the last week. He states that his pain is a 10/10 on exertion, but resolves after rest. He states that he does fine when he's sitting, but upon exertion he begins to feel a burning sensation in the middle of his chest. 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 The patient states that while he was driving to the ED his pain began to diminish. AT this time his he is not having any chest pain or pressure.  He denies any nausea, vomiting, fever, or chills. No urinary or bowel concerns.H  Problem List:    Patient Active Problem List    Diagnosis Date Noted     Gastroesophageal reflux disease without esophagitis 11/04/2015     Priority: Medium     Hyperlipidemia LDL goal <100 10/16/2014     Priority: Medium     Benign essential hypertension, goal <140/90 03/17/2014     Priority: Medium     Type 2 diabetes mellitus without complication, without long-term current use of insulin (H) 11/29/2013     Priority: Medium     A1C      6.5   10/12/2016  A1C      7.5   7/14/2016  A1C      7.3   11/4/2015  A1C      7.3   4/22/2015  A1C      6.6   7/1/2014                Past Medical History:    No past medical history on file.    Past Surgical History:    No past surgical history on file.    Family History:    Family History   Problem Relation Age of Onset     Diabetes Mother      Hypertension Mother      Cardiovascular Father 81     CHF       Social History:  Marital Status:   [2]  Social  "History   Substance Use Topics     Smoking status: Former Smoker     Smokeless tobacco: Never Used     Alcohol use No        Medications:      ACE NOT PRESCRIBED, INTENTIONAL,   amLODIPine (NORVASC) 10 MG tablet   aspirin 81 MG tablet   atorvastatin (LIPITOR) 20 MG tablet   blood glucose monitoring (NO BRAND SPECIFIED) test strip   Blood Glucose Monitoring Suppl W/DEVICE KIT   GLIPIZIDE XL 10 MG 24 hr tablet   losartan (COZAAR) 25 MG tablet   Melatonin 10 MG TABS   metFORMIN (GLUCOPHAGE) 1000 MG tablet   metFORMIN (GLUCOPHAGE) 1000 MG tablet   MULTIPLE VITAMIN PO   Omega-3 Fatty Acids (OMEGA-3 FISH OIL PO)   omeprazole (PRILOSEC OTC) 20 MG tablet   ONE TOUCH LANCETS MISC         Review of Systems   Constitutional: Positive for activity change. Negative for appetite change, chills and fever.   HENT: Negative for congestion and sore throat.    Eyes: Negative for visual disturbance.   Respiratory: Positive for chest tightness. Negative for choking and shortness of breath.    Cardiovascular: Positive for chest pain.   Gastrointestinal: Negative for nausea and vomiting.   Genitourinary: Negative for dysuria and urgency.   Musculoskeletal: Negative for back pain.   Skin: Negative for rash.   Neurological: Negative for dizziness, weakness, light-headedness, numbness and headaches.   Hematological: Does not bruise/bleed easily.   Psychiatric/Behavioral: Negative for confusion.       Physical Exam   BP: 155/79  Heart Rate: 87  Temp: 98.2  F (36.8  C)  Resp: 17  Height: 167.6 cm (5' 6\")  Weight: 68 kg (150 lb)  SpO2: 97 %      Physical Exam   Constitutional: He is oriented to person, place, and time. He appears well-developed and well-nourished. No distress.   HENT:   Head: Normocephalic.   Mouth/Throat: Oropharynx is clear and moist. No oropharyngeal exudate.   Eyes: Conjunctivae are normal.   Neck: Normal range of motion. Neck supple. No JVD present.   Cardiovascular: Normal rate, regular rhythm, normal heart sounds and " intact distal pulses.    No murmur heard.  Pulmonary/Chest: Effort normal and breath sounds normal. He has no wheezes. He has no rales. He exhibits no tenderness.   Abdominal: Soft. Bowel sounds are normal. There is no tenderness.   Musculoskeletal: Normal range of motion. He exhibits no edema or tenderness.   Neurological: He is alert and oriented to person, place, and time. He exhibits normal muscle tone.   Skin: Skin is warm and dry. No rash noted.   Psychiatric: He has a normal mood and affect.   Nursing note and vitals reviewed.      ED Course     ED Course     Procedures               EKG Interpretation:      Interpreted by Ramiro Guerra  Rhythm: normal sinus   Rate: Normal  Axis: Normal  Ectopy: none  Conduction: left bundle branch block (complete)  ST Segments/ T Waves: Non-specific ST-T wave changes  Q Waves: none  Comparison to prior: No old EKG available    Clinical Impression: left bundle branch block                Critical Care time:  was 40  minutes for this patient excluding procedures.               Results for orders placed or performed during the hospital encounter of 09/14/18 (from the past 24 hour(s))   CBC with platelets differential   Result Value Ref Range    WBC 9.6 4.0 - 11.0 10e9/L    RBC Count 4.58 4.4 - 5.9 10e12/L    Hemoglobin 13.9 13.3 - 17.7 g/dL    Hematocrit 40.6 40.0 - 53.0 %    MCV 89 78 - 100 fl    MCH 30.3 26.5 - 33.0 pg    MCHC 34.2 31.5 - 36.5 g/dL    RDW 11.7 10.0 - 15.0 %    Platelet Count 182 150 - 450 10e9/L    Diff Method Automated Method     % Neutrophils 57.7 %    % Lymphocytes 34.5 %    % Monocytes 5.4 %    % Eosinophils 1.9 %    % Basophils 0.2 %    % Immature Granulocytes 0.3 %    Nucleated RBCs 0 0 /100    Absolute Neutrophil 5.5 1.6 - 8.3 10e9/L    Absolute Lymphocytes 3.3 0.8 - 5.3 10e9/L    Absolute Monocytes 0.5 0.0 - 1.3 10e9/L    Absolute Eosinophils 0.2 0.0 - 0.7 10e9/L    Absolute Basophils 0.0 0.0 - 0.2 10e9/L    Abs Immature Granulocytes 0.0 0 - 0.4  10e9/L    Absolute Nucleated RBC 0.0    Comprehensive metabolic panel   Result Value Ref Range    Sodium 142 133 - 144 mmol/L    Potassium 3.4 3.4 - 5.3 mmol/L    Chloride 108 94 - 109 mmol/L    Carbon Dioxide 27 20 - 32 mmol/L    Anion Gap 7 3 - 14 mmol/L    Glucose 114 (H) 70 - 99 mg/dL    Urea Nitrogen 17 7 - 30 mg/dL    Creatinine 0.92 0.66 - 1.25 mg/dL    GFR Estimate 82 >60 mL/min/1.7m2    GFR Estimate If Black >90 >60 mL/min/1.7m2    Calcium 8.9 8.5 - 10.1 mg/dL    Bilirubin Total 1.3 0.2 - 1.3 mg/dL    Albumin 3.9 3.4 - 5.0 g/dL    Protein Total 7.1 6.8 - 8.8 g/dL    Alkaline Phosphatase 72 40 - 150 U/L    ALT 39 0 - 70 U/L    AST 28 0 - 45 U/L   Troponin I   Result Value Ref Range    Troponin I ES 0.857 (HH) 0.000 - 0.045 ug/L   NT pro BNP   Result Value Ref Range    N-Terminal Pro BNP Inpatient 1327 (H) 0 - 900 pg/mL   INR   Result Value Ref Range    INR 1.16 (H) 0.86 - 1.14   PTT   Result Value Ref Range    PTT 28 22 - 37 sec   Chest  XR, 1 view portable    Narrative    CHEST ONE VIEW PORTABLE   9/14/2018 6:31 PM     HISTORY: Chest pain.    COMPARISON: None.      Impression    IMPRESSION: No radiographic evidence of acute chest abnormality.    NAT DURAN MD       Medications   aspirin chewable tablet 162 mg (243 mg Oral Given 9/14/18 1843)   ticagrelor (BRILINTA) tablet 180 mg (180 mg Oral Given 9/14/18 1846)   heparin Loading Dose bolus dose from infusion pump 4,000 Units (4,000 Units Intravenous Given 9/14/18 1901)     6:14 PM Patient assessed.  Assessments & Plan (with Medical Decision Making) records were reviewed.  Labs were obtained.  EKG revealed a left bundle branch block with apparent concordance no old EKG is available.  Patient was given 3 baby aspirin as he had taken one earlier.  He is not having any chest pain or pressure at this time.  Due to my concern of a significant chest pain with new left bundle branch block that may need immediate Cath Lab I contacted the [facility] to talk to  their cardiologist. case was discussed in detail including review of the EKG and patient's past cardiac risk factors and recent history of chest pain with Dr. Aceves with  cardiology at [facility].  He felt as patient is pain-free at this time and it is a left bundle-branch block he does not think patient needs cath lab emergently but will have the patient transferred for further evaluation and care.  Patient was heparinized and given Brilinta per protocol.  Portable chest x-ray had revealed no acute abnormality.   No evidence of dissection.Troponin came back elevated at 0.857 and bnp was 1327. I called back [facility] to inform them of the troponin level.  Findings and plans were discussed with patient and his family.  At 7:10 PM I received a call from patient placement stating they did not have a bed available at the [facility] and recommended calling [facility] I immediately placed a call to the cardiology at [facility].  It took some time to contact cardiology at [facility] and I discussed with Dr. Tolentino cardiology fellow who was glad to accept the patient but recommended talking to Dr. Colón the interventional cardiologist.  I reviewed the case in detail with him and he did not feel that emergent cardiac catheterization was warranted.  He stated he would talk with the hospitalist and they will admit the patient for further evaluation and care.  Patient was informed with his family throughout the stay in the emergency department and they are in agreement with his transfer to [facility].  Patient's blood pressure did elevate while he was in the emergency department and he was given a beta blocker and it was advised to the paramedics transporting patient if his blood pressure abhishek further they should give him nitro sublingual.  Patient was discharged in stable condition.He remained chest pain free throughout his stay in the ed.     I have reviewed the nursing notes.    I have reviewed the  findings, diagnosis, plan and need for follow up with the patient.       New Prescriptions    No medications on file       Final diagnoses:   NSTEMI (non-ST elevated myocardial infarction) (H)   LBBB (left bundle branch block)   ACS (acute coronary syndrome) (H)   Benign essential hypertension, goal <140/90     This document serves as a record of the services and decisions personally performed and made by Ramiro Guerra MD. It was created on his behalf by Ivonne Lozano, a trained medical scribe. The creation of this document is based the provider's statements to the medical scribe.  Ivonne Lozano 6:14 PM 9/14/2018    Provider:   The information in this document, created by the medical scribe for me, accurately reflects the services I personally performed and the decisions made by me. I have reviewed and approved this document for accuracy prior to leaving the patient care area.  Ramiro Guerra MD 6:14 PM 9/14/2018 9/14/2018   [facility] EMERGENCY DEPARTMENT     Ramiro Guerra MD  09/15/18 1051

## 2018-09-14 NOTE — CONSULTS
Patient to start the heparin C-V/Vascular protocol with a goal anti 10a level of 0.15-0.35. Using a HT of 66 inches and a WT of 68 kg, a dosing WT of 65.5 kg was calculated. Based on this dosing WT, give patient a heparin bolus of 4000 units from the bag and then start a drip at 800 units/hr. Check the patient's anti 10a level 6 hours after starting the drip at ~0100.   Per C-V/Vascular protocol.    Vesta RomoD

## 2018-09-15 LAB
CHOLEST SERPL-MCNC: 142 MG/DL
GLUCOSE BLDC GLUCOMTR-MCNC: 123 MG/DL (ref 70–99)
GLUCOSE BLDC GLUCOMTR-MCNC: 127 MG/DL (ref 70–99)
GLUCOSE BLDC GLUCOMTR-MCNC: 155 MG/DL (ref 70–99)
GLUCOSE BLDC GLUCOMTR-MCNC: 187 MG/DL (ref 70–99)
GLUCOSE BLDC GLUCOMTR-MCNC: 189 MG/DL (ref 70–99)
HBA1C MFR BLD: 6.8 % (ref 0–5.6)
HDLC SERPL-MCNC: 31 MG/DL
LDLC SERPL CALC-MCNC: 79 MG/DL
LMWH PPP CHRO-ACNC: 0.21 IU/ML
LMWH PPP CHRO-ACNC: 0.21 IU/ML
NONHDLC SERPL-MCNC: 111 MG/DL
TRIGL SERPL-MCNC: 161 MG/DL
TROPONIN I SERPL-MCNC: 1.53 UG/L (ref 0–0.04)
TROPONIN I SERPL-MCNC: 1.81 UG/L (ref 0–0.04)
TROPONIN I SERPL-MCNC: 2.49 UG/L (ref 0–0.04)
TROPONIN I SERPL-MCNC: 2.5 UG/L (ref 0–0.04)

## 2018-09-15 PROCEDURE — 36415 COLL VENOUS BLD VENIPUNCTURE: CPT | Performed by: INTERNAL MEDICINE

## 2018-09-15 PROCEDURE — 83036 HEMOGLOBIN GLYCOSYLATED A1C: CPT | Performed by: INTERNAL MEDICINE

## 2018-09-15 PROCEDURE — 99232 SBSQ HOSP IP/OBS MODERATE 35: CPT | Performed by: HOSPITALIST

## 2018-09-15 PROCEDURE — 99222 1ST HOSP IP/OBS MODERATE 55: CPT | Performed by: INTERNAL MEDICINE

## 2018-09-15 PROCEDURE — 00000146 ZZHCL STATISTIC GLUCOSE BY METER IP

## 2018-09-15 PROCEDURE — 21000001 ZZH R&B HEART CARE

## 2018-09-15 PROCEDURE — 25000132 ZZH RX MED GY IP 250 OP 250 PS 637: Performed by: INTERNAL MEDICINE

## 2018-09-15 PROCEDURE — 25000128 H RX IP 250 OP 636

## 2018-09-15 PROCEDURE — 36415 COLL VENOUS BLD VENIPUNCTURE: CPT | Performed by: HOSPITALIST

## 2018-09-15 PROCEDURE — 80061 LIPID PANEL: CPT | Performed by: INTERNAL MEDICINE

## 2018-09-15 PROCEDURE — 25000132 ZZH RX MED GY IP 250 OP 250 PS 637: Performed by: HOSPITALIST

## 2018-09-15 PROCEDURE — 84484 ASSAY OF TROPONIN QUANT: CPT | Performed by: HOSPITALIST

## 2018-09-15 PROCEDURE — 84484 ASSAY OF TROPONIN QUANT: CPT | Performed by: INTERNAL MEDICINE

## 2018-09-15 PROCEDURE — 85520 HEPARIN ASSAY: CPT | Performed by: INTERNAL MEDICINE

## 2018-09-15 RX ORDER — ATORVASTATIN CALCIUM 40 MG/1
40 TABLET, FILM COATED ORAL DAILY
Status: DISCONTINUED | OUTPATIENT
Start: 2018-09-15 | End: 2018-09-18 | Stop reason: HOSPADM

## 2018-09-15 RX ORDER — METOPROLOL TARTRATE 25 MG/1
25 TABLET, FILM COATED ORAL 2 TIMES DAILY
Status: DISCONTINUED | OUTPATIENT
Start: 2018-09-15 | End: 2018-09-18

## 2018-09-15 RX ORDER — ASPIRIN 81 MG/1
81 TABLET ORAL DAILY
Status: DISCONTINUED | OUTPATIENT
Start: 2018-09-15 | End: 2018-09-17 | Stop reason: DRUGHIGH

## 2018-09-15 RX ADMIN — HEPARIN SODIUM 800 UNITS/HR: 10000 INJECTION, SOLUTION INTRAVENOUS at 19:52

## 2018-09-15 RX ADMIN — Medication 12.5 MG: at 13:34

## 2018-09-15 RX ADMIN — OMEPRAZOLE 20 MG: 20 CAPSULE, DELAYED RELEASE ORAL at 08:49

## 2018-09-15 RX ADMIN — ASPIRIN 81 MG: 81 TABLET, COATED ORAL at 08:52

## 2018-09-15 RX ADMIN — LOSARTAN POTASSIUM 25 MG: 25 TABLET ORAL at 08:52

## 2018-09-15 RX ADMIN — TICAGRELOR 90 MG: 90 TABLET ORAL at 21:03

## 2018-09-15 RX ADMIN — METOPROLOL TARTRATE 25 MG: 25 TABLET ORAL at 21:03

## 2018-09-15 RX ADMIN — AMLODIPINE BESYLATE 10 MG: 10 TABLET ORAL at 08:51

## 2018-09-15 RX ADMIN — TICAGRELOR 90 MG: 90 TABLET ORAL at 08:49

## 2018-09-15 RX ADMIN — ATORVASTATIN CALCIUM 40 MG: 40 TABLET, FILM COATED ORAL at 08:51

## 2018-09-15 RX ADMIN — Medication 12.5 MG: at 08:49

## 2018-09-15 ASSESSMENT — ENCOUNTER SYMPTOMS
CONFUSION: 0
NUMBNESS: 0
DIZZINESS: 0
WEAKNESS: 0
CHOKING: 0
LIGHT-HEADEDNESS: 0
BRUISES/BLEEDS EASILY: 0
ACTIVITY CHANGE: 1
APPETITE CHANGE: 0

## 2018-09-15 ASSESSMENT — ACTIVITIES OF DAILY LIVING (ADL)
ADLS_ACUITY_SCORE: 9

## 2018-09-15 NOTE — PLAN OF CARE
Problem: Patient Care Overview  Goal: Plan of Care/Patient Progress Review  Outcome: No Change  A/ox4, forgetful at times. Denies chest pain at this time. VS stable on room air. Tele showed SR w/BBB. Hep gtt running @ 800 units/hr. Bedrest with bathroom privileges. Blood glucose 123. Cardiology consulted.

## 2018-09-15 NOTE — H&P
[facility]    History and Physical  Hospitalist       Date of Admission:  9/14/2018    Assessment & Plan   Jstea Jones is a 63 year old male with HTN, increased cholesterol, DM-2 who presents to [facility] with chest pain and elevated trop at 0.8 and EKG with LBBB, sent to [facility] on Heparin drip for further evaluation:    Summary:    Principal Problem:    NSTEMI (non-ST elevated myocardial infarction) (H)  Active Problems:    Type 2 diabetes mellitus without complication, without long-term current use of insulin (H)    Benign essential hypertension, goal <140/90    Hyperlipidemia LDL goal <100    Gastroesophageal reflux disease without esophagitis    LBBB (left bundle branch block)       Plan: Continue IV heparin, check serial trops, will add Metoprolol 12. 5 mg bid to his current BP meds, continue Lipitor and will check Lipid profile in AM.  Cardiology consult and will keep NPO after MN in case they are able to do cardiac angio in AM (Saturday).      DVT Prophylaxis: IV heparin   Code Status: Full Code    Disposition: Expected discharge in 2-4 days once has angio.    Marciano Negron MD  Patient seen and examined.  Agree with impression and plan.     Marciano Negron  Pager: [phone]  Cell Phone:  [phone]        Primary Care Physician   Kathya Iglesias    Chief Complaint   Chest pain     History is obtained from Patient    History of Present Illness    63 year old male with HTN, DM-2 and elevated cholesterol who noted chest pain in substernal area on Monday (5 days ago) while cutting wood.  It went away but since then he would noticed chest pain off and on while working, and he could make it go away by lying down and resting.  He did have one pain while driving and it stayed with him for about 45 min before resolving.  In retrospect he thinks he had a similar pain last winter when hauling wood out of the woods.   Today the pain was more severe, 9 out of 10,  went to [facility] ER, trop was elevated at 0.857, EKG with LBBB (no old EKG for comparison), and given nitroglycerine and pain resolved.  Started IV heparin and transferred here.      Past Medical History    I have reviewed this patient's medical history and updated it with pertinent information if needed.   Past Medical History:   Diagnosis Date     DM type 2 (diabetes mellitus, type 2) (H)      HTN (hypertension), benign      Hyperlipidemia        Past Surgical History   I have reviewed this patient's surgical history and updated it with pertinent information if needed.  Past Surgical History:   Procedure Laterality Date     HERNIORRHAPHY INGUINAL BILATERAL         Prior to Admission Medications   Prior to Admission Medications   Prescriptions Last Dose Informant Patient Reported? Taking?   ACE NOT PRESCRIBED, INTENTIONAL,  Self No No   Sig: ACE Inhibitor not prescribed due to Allergy   Blood Glucose Monitoring Suppl W/DEVICE KIT  Self No No   Si each daily   GLIPIZIDE XL 10 MG 24 hr tablet  Self No No   Sig: TAKE 1 TABLET BY MOUTH ONCE DAILY *  SCHEDULE  OFFICE  VISIT  FOR  REFILL  *   MULTIPLE VITAMIN PO  Self Yes No   Sig: Take 1 tablet by mouth   Melatonin 10 MG TABS  Self Yes No   Sig: Take 10 mg by mouth nightly as needed for sleep   ONE TOUCH LANCETS MISC  Self No No   Si lancet 3 times daily   Omega-3 Fatty Acids (OMEGA-3 FISH OIL PO)  Self Yes No   Sig: Take 1,200 mg by mouth 2 times daily (with meals)   amLODIPine (NORVASC) 10 MG tablet  Self No No   Sig: Take 1 tablet (10 mg) by mouth daily   aspirin 81 MG tablet  Self No No   Sig: Take 1 tablet (81 mg) by mouth daily   atorvastatin (LIPITOR) 20 MG tablet  Self No No   Sig: Take 1 tablet (20 mg) by mouth daily   blood glucose monitoring (NO BRAND SPECIFIED) test strip  Self No No   Sig: Use to test blood sugar 3 times daily or as directed. Box of 100 each.   losartan (COZAAR) 25 MG tablet  Self No No   Sig: Take 1 tablet (25 mg) by mouth daily    metFORMIN (GLUCOPHAGE) 1000 MG tablet  Self No No   Sig: Take 1 tablet with breakfast, 1.5 tablets at lunch, and 1 tablet at dinner. Schedule office visit for refill.   metFORMIN (GLUCOPHAGE) 1000 MG tablet  Self No No   Sig: TAKE 1 TABLET BY MOUTH WITH BREAKFAST TAKE ONE  HALF  TABLET  WITH  LUNCH  AND TAKE  ONE  TABLET  WITH  DINNER   omeprazole (PRILOSEC OTC) 20 MG tablet  Self No No   Sig: Take 1 tablet (20 mg) by mouth daily      Facility-Administered Medications: None     Allergies   Allergies   Allergen Reactions     Lisinopril Cough       Social History   I have reviewed this patient's social history and updated it with pertinent information if needed. Js Jones  reports that he quit smoking about 18 years ago. He has a 30.00 pack-year smoking history. He has never used smokeless tobacco. He reports that he does not drink alcohol or use illicit drugs.    Family History   I have reviewed this patient's family history and updated it with pertinent information if needed.   Family History   Problem Relation Age of Onset     Diabetes Mother      Hypertension Mother      Cardiovascular Father 81     CHF     HEART DISEASE Sister 50     HEART DISEASE Brother 50       Review of Systems   The 10 point Review of Systems is negative other than noted in the HPI or here.     # Pain Assessment:  Current Pain Score 9/14/2018   Patient currently in pain? yes   Pain score (0-10) 1   Pain location Chest   Pain descriptors Dull   Js lam pain level was assessed and he currently denies pain.        Physical Exam   Temp: 98.1  F (36.7  C) Temp src: Oral BP: 152/87   Heart Rate: 70 Resp: 18 SpO2: 98 % O2 Device: None (Room air)    Vital Signs with Ranges  Temp:  [98.1  F (36.7  C)-98.2  F (36.8  C)] 98.1  F (36.7  C)  Heart Rate:  [70-91] 70  Resp:  [15-23] 18  BP: (152-181)/() 152/87  SpO2:  [90 %-98 %] 98 %  0 lbs 0 oz    Constitutional: Awake, alert, cooperative, no apparent distress.  Eyes: Conjunctiva and pupils  examined and normal.  HEENT: Moist mucous membranes, normal dentition.  Respiratory: Clear to auscultation bilaterally, no crackles or wheezing.  Cardiovascular: Regular rate and rhythm, normal S1 and S2, and no murmur noted,   No carotid bruits.  No ankle edema.   GI: Soft, non-distended, non-tender, normal bowel sounds.  Lymph/Hematologic: No anterior cervical, supraclavicular or axillary adenopathy.  Skin: No rashes, no cyanosis.   Musculoskeletal: No joint swelling, erythema or tenderness.  Neurologic: Cranial nerves 2-12 intact, no focal weakness or numbness  Psychiatric: Alert, Ox3, no obvious anxiety or depression.    Data   Data reviewed today:  I personally reviewed the EKG tracing showing NSR with LBBB with QRS 0.144 sec.    Recent Labs  Lab 09/14/18  1818   WBC 9.6   HGB 13.9   MCV 89      INR 1.16*      POTASSIUM 3.4   CHLORIDE 108   CO2 27   BUN 17   CR 0.92   ANIONGAP 7   HOWARD 8.9   *   ALBUMIN 3.9   PROTTOTAL 7.1   BILITOTAL 1.3   ALKPHOS 72   ALT 39   AST 28   TROPI 0.857*       Imaging:  Recent Results (from the past 24 hour(s))   Chest  XR, 1 view portable    Narrative    CHEST ONE VIEW PORTABLE   9/14/2018 6:31 PM     HISTORY: Chest pain.    COMPARISON: None.      Impression    IMPRESSION: No radiographic evidence of acute chest abnormality.    NAT DURAN MD

## 2018-09-15 NOTE — PLAN OF CARE
Problem: Patient Care Overview  Goal: Plan of Care/Patient Progress Review  OT/cardiac rehab orders received, chart reviewed and noted pt admitted last night, cardiology consult still pending to determine POC. Likely need for angio. Will hold and initiate following determination of medical POC and/or post-op per MD directives.

## 2018-09-15 NOTE — PLAN OF CARE
Problem: Patient Care Overview  Goal: Plan of Care/Patient Progress Review    Patient is alert and oriented X 4. VSS /62 (BP Location: Right arm)  Pulse 74  Temp 97.9  F (36.6  C) (Oral)  Resp 18  Wt 64 kg (141 lb 1.6 oz)  SpO2 95%  BMI 22.77 kg/m2 on RA. Tele is SR with BBB. Heparin infusing. Up in room per self. Family at bedside -- supportive. Will cont to monitor.

## 2018-09-15 NOTE — PHARMACY-ADMISSION MEDICATION HISTORY
Admission medication history interview status for the 9/14/2018  admission is complete. See EPIC admission navigator for prior to admission medications     Medication history source reliability:Good    Actions taken by pharmacist (provider contacted, etc): called pharmacy ([facility] [phone]) for losartan last fill (8/27) to verify if pt is still taking. Spouse and pt were unsure if he was taking this medication. Last fill was recent, pt is likely taking this med.      Additional medication history information not noted on PTA med list :None    Medication reconciliation/reorder completed by provider prior to medication history? No    Time spent in this activity: 35 min     Prior to Admission medications    Medication Sig Last Dose Taking? Auth Provider   amLODIPine (NORVASC) 10 MG tablet Take 1 tablet (10 mg) by mouth daily 9/14/2018 at am  Yes Kathya Iglesias CNP   aspirin 81 MG tablet Take 1 tablet (81 mg) by mouth daily 9/14/2018 at am Yes Kathya Iglesias CNP   atorvastatin (LIPITOR) 20 MG tablet Take 1 tablet (20 mg) by mouth daily 9/14/2018 at hs Yes Kathya Iglesias CNP   GLIPIZIDE XL 10 MG 24 hr tablet TAKE 1 TABLET BY MOUTH ONCE DAILY *  SCHEDULE  OFFICE  VISIT  FOR  REFILL  * 9/14/2018 at am Yes Kathya Iglesias CNP   losartan (COZAAR) 25 MG tablet Take 1 tablet (25 mg) by mouth daily 9/14/2018 at am Yes Kathya Iglesias CNP   Melatonin 10 MG TABS Take 10 mg by mouth nightly as needed for sleep  at prn Yes Reported, Patient   metFORMIN (GLUCOPHAGE) 1000 MG tablet TAKE 1 TABLET BY MOUTH WITH BREAKFAST TAKE ONE  HALF  TABLET  WITH  LUNCH  AND TAKE  ONE  TABLET  WITH  DINNER 9/14/2018 at pm  Yes Kathya Iglesias CNP   MULTIPLE VITAMIN PO Take 1 tablet by mouth 9/14/2018 at am Yes Reported, Patient   Omega-3 Fatty Acids (OMEGA-3 FISH OIL PO) Take 1,200 mg by mouth 2 times daily (with meals) 9/14/2018 at pm Yes Reported, Patient   omeprazole (PRILOSEC OTC)  20 MG tablet Take 1 tablet (20 mg) by mouth daily 9/14/2018 at am Yes Kathya Iglesias CNP   ACE NOT PRESCRIBED, INTENTIONAL, ACE Inhibitor not prescribed due to Allergy   Kathya Iglesias CNP   blood glucose monitoring (NO BRAND SPECIFIED) test strip Use to test blood sugar 3 times daily or as directed. Box of 100 each.   Kathya Iglesias CNP   Blood Glucose Monitoring Suppl W/DEVICE KIT 1 each daily   Kathya Iglesias CNP   ONE TOUCH LANCETS MISC 1 lancet 3 times daily   Kathya Iglesias CNP

## 2018-09-15 NOTE — PROGRESS NOTES
[facility]    Hospitalist Progress Note    Assessment & Plan   Js Jones is a 63 year old male who was admitted on 9/14/2018 with chest pain and troponin elevation concerning for NSTEMI        NSTEMI  LBBB on EKG, no previous  5 day PTA onset of typical chest pain while cutting wood, intermittent, improved with rest  troponins continue to increase to 2.5   Transferred from [facility] to [facility] for further cardiology evaluation  Plan  - continue heparin gtt for CV protocol  - continues aspirin 81 mg, losartan 25 mg, and metoprolol 12.5 mg bid  - increase atorvastatin to 40 mg  - cardiology consult pending  - continue trending the troponin      HTN  PTA: amlo 10 mg, losartan 25 mg  Plan  - continue amlo, losartan  - BB metoprolol 12.5 mg bid has been added      DM2  hgb a1c of 6.8.   PTA: glipizide 10 mg xl every day, metofrmin 1000, 1500, 1000 mg daily  Plan  - continue sliding scale and carb controlled diet while inpatient, home on PTAs    HLD  - increased the atorvastatin to 40 mg daily       # Pain Assessment:  Current Pain Score 9/15/2018   Patient currently in pain? denies   Pain score (0-10) -   Pain location -   Pain descriptors -   Js s pain level was assessed and he currently denies pain.        DVT Prophylaxis: Heparin GTT  Code Status: Full Code    Disposition: Expected discharge in 3-4 days presuming angiogram is done on Monday given the weekend.    Fady Arriaga, DO  Text Page  (7am to 6pm)  Interval History   No chest pain  Revisited ACS diagnosis with patient and his wife and their questions answered  Will await cardiology consult    -Data reviewed today: I reviewed all new labs and imaging results over the last 24 hours. I personally reviewed no images or EKG's today.    Physical Exam   Temp: 97.1  F (36.2  C) Temp src: Oral BP: 139/73   Heart Rate: 70 Resp: 18 SpO2: 95 % O2 Device: None (Room air)    Vitals:    09/15/18 0500   Weight: 64 kg (141 lb 1.6 oz)     Vital Signs with  Ranges  Temp:  [96.4  F (35.8  C)-98.2  F (36.8  C)] 97.1  F (36.2  C)  Heart Rate:  [70-91] 70  Resp:  [15-23] 18  BP: (139-181)/() 139/73  SpO2:  [90 %-98 %] 95 %  I/O last 3 completed shifts:  In: 240 [P.O.:240]  Out: 250 [Urine:250]    Constitutional: Awake, alert, cooperative, no apparent distress  Respiratory: Clear to auscultation bilaterally, no crackles or wheezing  Cardiovascular: Regular rate and rhythm, normal S1 and S2, and no murmur noted  GI: Normal bowel sounds, soft, non-distended, non-tender  Skin/Integumen: No rashes, no cyanosis, no edema  Other:     Medications     HEParin 800 Units/hr (09/14/18 2557)     - MEDICATION INSTRUCTIONS -       - MEDICATION INSTRUCTIONS -       Reason ACE/ARB/ARNI order not selected         amLODIPine  10 mg Oral Daily     aspirin  325 mg Oral Daily     atorvastatin  20 mg Oral Daily     insulin aspart  1-7 Units Subcutaneous TID AC     insulin aspart  1-5 Units Subcutaneous At Bedtime     losartan  25 mg Oral Daily     metoprolol tartrate  12.5 mg Oral BID     omeprazole  20 mg Oral Daily     sodium chloride (PF)  3 mL Intracatheter Q8H       Data     Recent Labs  Lab 09/15/18  0546 09/15/18  0003 09/14/18  1818   WBC  --   --  9.6   HGB  --   --  13.9   MCV  --   --  89   PLT  --   --  182   INR  --   --  1.16*   NA  --   --  142   POTASSIUM  --   --  3.4   CHLORIDE  --   --  108   CO2  --   --  27   BUN  --   --  17   CR  --   --  0.92   ANIONGAP  --   --  7   HOWARD  --   --  8.9   GLC  --   --  114*   ALBUMIN  --   --  3.9   PROTTOTAL  --   --  7.1   BILITOTAL  --   --  1.3   ALKPHOS  --   --  72   ALT  --   --  39   AST  --   --  28   TROPI 2.491* 1.530* 0.857*       Imaging:   Recent Results (from the past 24 hour(s))   Chest  XR, 1 view portable    Narrative    CHEST ONE VIEW PORTABLE   9/14/2018 6:31 PM     HISTORY: Chest pain.    COMPARISON: None.      Impression    IMPRESSION: No radiographic evidence of acute chest abnormality.    NAT DURAN MD

## 2018-09-15 NOTE — CONSULTS
Consult Date:  09/15/2018      REASON FOR CONSULTATION:  Non-ST segment elevation myocardial infarction.      HISTORY OF PRESENT ILLNESS:  Mr. Jones is a 63-year-old male with a background history of hyperlipidemia, type 2 diabetes, former smoking (30 pack years) and family history of coronary artery disease (both brother and sister in their 50s).      Mr. Jones comes in following a 1-week course of stuttering chest discomfort that was brought upon with exertion.  The patient has had multiple occasions beginning last week while chopping wood.  Usually his episodes were short duration and would resolve with rest.  Coexisting complaint with these chest pains was shortness of breath.  The patient does note that he has had a similar chest pains before while carrying wood last winter.  On one occasion this week, while driving a low-grade chest pain episode lasted approximately 45 minutes in duration.  Then again while working on his car yesterday he experienced severe chest discomfort that lasted approximately 30 minutes in duration.  This prompted his further evaluation at a local emergency department.  While at the Emergency Department, he was noted to have an elevated troponin and also findings of a left bundle branch block (new recognition).  His providers there liaised with both [facility] and also [facility] Cardiologists.  It was determined that he was not a STEMI equivalent, not necessitating an emergent coronary angiogram.  He was transferred here subsequently receiving loading with ticagrelor and aspirin and initiation of heparin.  It is in this context we were consulted.      In speaking with Mr. Jones he is without complaints of chest discomfort.  He endorses the history above, but denies coexisting symptoms including syncopal episodes or heart failure syndrome.  He reports compliance with his medications, but does occasionally miss dosing.  He remains a nonsmoker.       Vital signs are within acceptable limits.  CBC is within normal limits.  Electrolyte panel is without abnormalities.  INR is 1.16.  NT-proBNP is 1372.  Troponin series reveals a leak 0.857 , 1.530 , 2.491.      A 12-lead ECG reveals a sinus rhythm with a left bundle branch block classic pattern.  There is no evidence of access of ST segment elevation and/or concordant ST segment/T waves.  There is no transthoracic echocardiogram available for my review.  The patient has not had a previous coronary angiogram.      PHYSICAL EXAMINATION:   GENERAL:  Well-appearing adult appears older than stated age, friendly, sensorium clear, significant other present during the encounter, moderately overweight, lying supine in bed comfortably, nondyspneic.   HEENT:  No major abnormalities.   VESSELS:  Non-elevated JVP.   CARDIOVASCULAR:  Distant heart tones.  No gallops, no murmurs.  Normal S1, S2.   ABDOMEN:  Soft, nontender, and nondistended.   RESPIRATORY:  Lungs clear to auscultation bilaterally acceptable air movement.   EXTREMITIES:  Normal perfusion.  No edema.      SOCIAL HISTORY:  The patient quit smoking 18 years ago.  He does not drink alcohol or use illicit drugs.      FAMILY HISTORY:  Sister and brother had heart disease at 50 years of age.  Father had congestive heart failure at 81 years of age.  Mother has hypertension and diabetes.      REVIEW OF SYSTEMS:  Otherwise negative except as what is already noted in the HPI.      IMPRESSION, REPORT, PLAN:   1.  Non-ST segment elevation myocardial infarction.   2.  Left bundle branch block.   3.  Hypertension.   4.  Type 2 diabetes.   5.  Former smoking, 30 pack years.   6.  Family history of coronary artery disease.        Mr. Jones presents with symptoms and diagnostic findings consistent with a myocardial infarction.  He has struggled with exertional and sustained angina symptoms , but fortunately is now without pain.  He is being provided appropriate antithrombotic  therapies including aspirin, ticagrelor, and IV heparinization.  His hemodynamic profile is near ideal and therefore, we will continue his already initiated losartan and amlodipine, but we will increase his metoprolol to 25 mg b.i.d.  He should also continue with his high intensity Lipitor.      I spoke with Mr. Jones regarding our intent to proceed with coronary angiogram this coming Monday.  He is amenable to this approach and he is in full understanding of our rationale with moving forward.  We will also need to obtain a transthoracic echocardiogram to assess his myocardial performance and evaluate for any other concerning findings of structural heart disease.  This can be arranged in a nonemergent setting.      Thank you for this consultation.  We will continue to follow along.  If there are any ongoing questions or concerns, feel free to contact the Cardiology Consulting Service.         GEORGIE SAN MD             D: 09/15/2018   T: 09/15/2018   MT: SIS      Name:     YVESDAVID   MRN:      [id number]        Account:       [id number]   :      1955           Consult Date:  09/15/2018      Document: [id number]

## 2018-09-16 ENCOUNTER — APPOINTMENT (OUTPATIENT)
Dept: CARDIOLOGY | Facility: CLINIC | Age: 63
End: 2018-09-16
Attending: INTERNAL MEDICINE
Payer: COMMERCIAL

## 2018-09-16 LAB
ANION GAP SERPL CALCULATED.3IONS-SCNC: 5 MMOL/L (ref 3–14)
BUN SERPL-MCNC: 15 MG/DL (ref 7–30)
CALCIUM SERPL-MCNC: 8.7 MG/DL (ref 8.5–10.1)
CHLORIDE SERPL-SCNC: 107 MMOL/L (ref 94–109)
CO2 SERPL-SCNC: 28 MMOL/L (ref 20–32)
CREAT SERPL-MCNC: 0.93 MG/DL (ref 0.66–1.25)
ERYTHROCYTE [DISTWIDTH] IN BLOOD BY AUTOMATED COUNT: 12.2 % (ref 10–15)
GFR SERPL CREATININE-BSD FRML MDRD: 82 ML/MIN/1.7M2
GLUCOSE BLDC GLUCOMTR-MCNC: 140 MG/DL (ref 70–99)
GLUCOSE BLDC GLUCOMTR-MCNC: 163 MG/DL (ref 70–99)
GLUCOSE BLDC GLUCOMTR-MCNC: 208 MG/DL (ref 70–99)
GLUCOSE SERPL-MCNC: 161 MG/DL (ref 70–99)
HCT VFR BLD AUTO: 40.9 % (ref 40–53)
HGB BLD-MCNC: 14.5 G/DL (ref 13.3–17.7)
LMWH PPP CHRO-ACNC: 0.13 IU/ML
LMWH PPP CHRO-ACNC: 0.32 IU/ML
MCH RBC QN AUTO: 30.9 PG (ref 26.5–33)
MCHC RBC AUTO-ENTMCNC: 35.5 G/DL (ref 31.5–36.5)
MCV RBC AUTO: 87 FL (ref 78–100)
PLATELET # BLD AUTO: 153 10E9/L (ref 150–450)
POTASSIUM SERPL-SCNC: 4.5 MMOL/L (ref 3.4–5.3)
RBC # BLD AUTO: 4.7 10E12/L (ref 4.4–5.9)
SODIUM SERPL-SCNC: 140 MMOL/L (ref 133–144)
TROPONIN I SERPL-MCNC: 1.52 UG/L (ref 0–0.04)
TROPONIN I SERPL-MCNC: 1.73 UG/L (ref 0–0.04)
WBC # BLD AUTO: 8.6 10E9/L (ref 4–11)

## 2018-09-16 PROCEDURE — 25000132 ZZH RX MED GY IP 250 OP 250 PS 637: Performed by: HOSPITALIST

## 2018-09-16 PROCEDURE — 84484 ASSAY OF TROPONIN QUANT: CPT | Performed by: HOSPITALIST

## 2018-09-16 PROCEDURE — 99232 SBSQ HOSP IP/OBS MODERATE 35: CPT | Performed by: HOSPITALIST

## 2018-09-16 PROCEDURE — 85520 HEPARIN ASSAY: CPT | Performed by: INTERNAL MEDICINE

## 2018-09-16 PROCEDURE — 25000131 ZZH RX MED GY IP 250 OP 636 PS 637: Performed by: INTERNAL MEDICINE

## 2018-09-16 PROCEDURE — 21000001 ZZH R&B HEART CARE

## 2018-09-16 PROCEDURE — 93306 TTE W/DOPPLER COMPLETE: CPT | Mod: 26 | Performed by: INTERNAL MEDICINE

## 2018-09-16 PROCEDURE — 84484 ASSAY OF TROPONIN QUANT: CPT | Performed by: INTERNAL MEDICINE

## 2018-09-16 PROCEDURE — 25000132 ZZH RX MED GY IP 250 OP 250 PS 637: Performed by: INTERNAL MEDICINE

## 2018-09-16 PROCEDURE — 36415 COLL VENOUS BLD VENIPUNCTURE: CPT | Performed by: HOSPITALIST

## 2018-09-16 PROCEDURE — 00000146 ZZHCL STATISTIC GLUCOSE BY METER IP

## 2018-09-16 PROCEDURE — 99232 SBSQ HOSP IP/OBS MODERATE 35: CPT | Mod: 25 | Performed by: INTERNAL MEDICINE

## 2018-09-16 PROCEDURE — 25000128 H RX IP 250 OP 636

## 2018-09-16 PROCEDURE — 40000264 ECHO COMPLETE WITH OPTISON

## 2018-09-16 PROCEDURE — 85027 COMPLETE CBC AUTOMATED: CPT | Performed by: INTERNAL MEDICINE

## 2018-09-16 PROCEDURE — 80048 BASIC METABOLIC PNL TOTAL CA: CPT | Performed by: INTERNAL MEDICINE

## 2018-09-16 PROCEDURE — 36415 COLL VENOUS BLD VENIPUNCTURE: CPT | Performed by: INTERNAL MEDICINE

## 2018-09-16 PROCEDURE — 25500064 ZZH RX 255 OP 636: Performed by: INTERNAL MEDICINE

## 2018-09-16 RX ADMIN — OMEPRAZOLE 20 MG: 20 CAPSULE, DELAYED RELEASE ORAL at 08:52

## 2018-09-16 RX ADMIN — TICAGRELOR 90 MG: 90 TABLET ORAL at 21:22

## 2018-09-16 RX ADMIN — METOPROLOL TARTRATE 25 MG: 25 TABLET ORAL at 08:52

## 2018-09-16 RX ADMIN — ATORVASTATIN CALCIUM 40 MG: 40 TABLET, FILM COATED ORAL at 08:52

## 2018-09-16 RX ADMIN — LOSARTAN POTASSIUM 25 MG: 25 TABLET ORAL at 08:52

## 2018-09-16 RX ADMIN — METOPROLOL TARTRATE 25 MG: 25 TABLET ORAL at 21:22

## 2018-09-16 RX ADMIN — INSULIN ASPART 2 UNITS: 100 INJECTION, SOLUTION INTRAVENOUS; SUBCUTANEOUS at 18:22

## 2018-09-16 RX ADMIN — INSULIN ASPART 1 UNITS: 100 INJECTION, SOLUTION INTRAVENOUS; SUBCUTANEOUS at 11:22

## 2018-09-16 RX ADMIN — AMLODIPINE BESYLATE 10 MG: 10 TABLET ORAL at 08:52

## 2018-09-16 RX ADMIN — HEPARIN SODIUM 950 UNITS/HR: 10000 INJECTION, SOLUTION INTRAVENOUS at 22:17

## 2018-09-16 RX ADMIN — TICAGRELOR 90 MG: 90 TABLET ORAL at 08:52

## 2018-09-16 RX ADMIN — INSULIN ASPART 1 UNITS: 100 INJECTION, SOLUTION INTRAVENOUS; SUBCUTANEOUS at 08:52

## 2018-09-16 RX ADMIN — Medication 2000 UNITS: at 08:51

## 2018-09-16 RX ADMIN — HUMAN ALBUMIN MICROSPHERES AND PERFLUTREN 7 ML: 10; .22 INJECTION, SOLUTION INTRAVENOUS at 08:45

## 2018-09-16 RX ADMIN — ASPIRIN 81 MG: 81 TABLET, COATED ORAL at 08:52

## 2018-09-16 ASSESSMENT — ACTIVITIES OF DAILY LIVING (ADL)
ADLS_ACUITY_SCORE: 8

## 2018-09-16 NOTE — PLAN OF CARE
Problem: Cardiac: ACS (Acute Coronary Syndrome) (Adult)  Goal: Signs and Symptoms of Listed Potential Problems Will be Absent, Minimized or Managed (Cardiac: ACS)  Signs and symptoms of listed potential problems will be absent, minimized or managed by discharge/transition of care (reference Cardiac: ACS (Acute Coronary Syndrome) (Adult) CPG).   Outcome: Improving  A&O. Pt denies chest pain. CMS intact. , 140. Bowel sounds active and present. VSS on RA. Up with SBA. Angio planned for 9/17, consent complete and on front of chart. Heparin bolus given, running at 9.5 units/hr, recheck at 1500. Pt's wife at bedside. Continue to monitor.

## 2018-09-16 NOTE — PLAN OF CARE
Problem: Patient Care Overview  Goal: Plan of Care/Patient Progress Review  OT/cardiac rehab: orders received, chart reviewed and noted cardiology note that plan is for angio tomorrow. Therefore will hold and reschedule to initiate on Tuesday, 9/18.

## 2018-09-16 NOTE — PLAN OF CARE
Problem: Patient Care Overview  Goal: Plan of Care/Patient Progress Review  Outcome: No Change  Pt A/O, forgetful at times. VS stable. Tele showed SR w/BBB. Denies chest pain at this time. Serial troponin levels trending down. Hep gtt infusing. Up with standby assist.

## 2018-09-16 NOTE — PROGRESS NOTES
[facility]    Hospitalist Progress Note    Assessment & Plan   Jstea Jones is a 63 year old male who was admitted on 9/14/2018 with chest pain and troponin elevation concerning for NSTEMI        NSTEMI  LBBB on EKG, no previous EKG for comparison  5 day PTA onset of typical chest pain while cutting wood, intermittent, improved with rest  troponins continue to increase to 2.5   Transferred from [facility] to [facility] for further cardiology evaluation  Plan  - continue heparin gtt for CV protocol  - continues aspirin 81 mg, losartan 25 mg, and metoprolol 12.5 mg bid  - increase atorvastatin to 40 mg  - cardiology consult pending  - discontinue trop labs      HTN  PTA: amlo 10 mg, losartan 25 mg  Plan  - continue amlo, losartan  - BB metoprolol 12.5 mg bid has been added      DM2  hgb a1c of 6.8.   PTA: glipizide 10 mg xl every day, metofrmin 1000, 1500, 1000 mg daily  Plan  - continue sliding scale and carb controlled diet while inpatient, home on PTAs    HLD  - increased the atorvastatin to 40 mg daily       # Pain Assessment:  Current Pain Score 9/16/2018   Patient currently in pain? denies   Pain score (0-10) -   Pain location -   Pain descriptors -   Js s pain level was assessed and he currently denies pain.        DVT Prophylaxis: Heparin GTT  Code Status: Full Code    Disposition: Expected discharge in 3-4 days presuming angiogram is done on Monday given the weekend.    Fady Arriaga, DO  Text Page  (7am to 6pm)  Interval History   No chest pain  Planned angiogram on Monday  Really doing quite well.     -Data reviewed today: I reviewed all new labs and imaging results over the last 24 hours. I personally reviewed no images or EKG's today.    Physical Exam   Temp: 98.1  F (36.7  C) Temp src: Oral BP: 132/70 Pulse: 90 Heart Rate: 71 Resp: 18 SpO2: 97 % O2 Device: None (Room air)    Vitals:    09/15/18 0500 09/16/18 0500   Weight: 64 kg (141 lb 1.6 oz) 63.3 kg (139 lb 8 oz)     Vital Signs with  Ranges  Temp:  [97.4  F (36.3  C)-98.6  F (37  C)] 98.1  F (36.7  C)  Pulse:  [90] 90  Heart Rate:  [71-79] 71  Resp:  [18-20] 18  BP: (115-146)/(62-80) 132/70  SpO2:  [93 %-97 %] 97 %  I/O last 3 completed shifts:  In: -   Out: 1025 [Urine:1025]    Constitutional: Awake, alert, cooperative, no apparent distress  Respiratory: Clear to auscultation bilaterally, no crackles or wheezing  Cardiovascular: Regular rate and rhythm, normal S1 and S2, and no murmur noted  GI: Normal bowel sounds, soft, non-distended, non-tender  Skin/Integumen: No rashes, no cyanosis, no edema  Other:     Medications     HEParin 950 Units/hr (09/16/18 0801)     - MEDICATION INSTRUCTIONS -       - MEDICATION INSTRUCTIONS -       Reason ACE/ARB/ARNI order not selected         amLODIPine  10 mg Oral Daily     aspirin  81 mg Oral Daily     atorvastatin  40 mg Oral Daily     insulin aspart  1-7 Units Subcutaneous TID AC     insulin aspart  1-5 Units Subcutaneous At Bedtime     losartan  25 mg Oral Daily     metoprolol tartrate  25 mg Oral BID     omeprazole  20 mg Oral Daily     sodium chloride (PF)  3 mL Intracatheter Q8H     ticagrelor  90 mg Oral BID       Data     Recent Labs  Lab 09/16/18  0530 09/16/18  0005 09/15/18  1832  09/14/18  1818   WBC 8.6  --   --   --  9.6   HGB 14.5  --   --   --  13.9   MCV 87  --   --   --  89     --   --   --  182   INR  --   --   --   --  1.16*     --   --   --  142   POTASSIUM 4.5  --   --   --  3.4   CHLORIDE 107  --   --   --  108   CO2 28  --   --   --  27   BUN 15  --   --   --  17   CR 0.93  --   --   --  0.92   ANIONGAP 5  --   --   --  7   HOWARD 8.7  --   --   --  8.9   *  --   --   --  114*   ALBUMIN  --   --   --   --  3.9   PROTTOTAL  --   --   --   --  7.1   BILITOTAL  --   --   --   --  1.3   ALKPHOS  --   --   --   --  72   ALT  --   --   --   --  39   AST  --   --   --   --  28   TROPI 1.523* 1.730* 1.811*  < > 0.857*   < > = values in this interval not displayed.    Imaging:    No results found for this or any previous visit (from the past 24 hour(s)).

## 2018-09-16 NOTE — PROGRESS NOTES
[facility]    Cardiology Progress Note     Assessment & Plan   Js ESTHER Jones is a 63 year old male who was admitted on 9/14/2018. Patient has a background history of hyperlipidemia, type 2 diabetes, former smoking (30 pack years) and family history of coronary artery disease (both brother and sister in their 50s).  Patient arrives with NSTEMI and new LBBB.    1.  Non-ST segment elevation myocardial infarction.   2.  Left bundle branch block.   3.  Hypertension.   4.  Type 2 diabetes.   5.  Former smoking, 30 pack years.   6.  Family history of coronary artery disease.     - will continue ASA and ticagrelor  - will continue IV heparin gtt  - will continue Lipitor  - will continue losartan and metoprolol  - will continue amlodipine  - will procedure to coronary angiogram +/- PCI tomorrow    Edson Tolentino MD    Interval History   No major overnight events.  He has now ischemic symptoms today.  TTE was reassuring.  Troponin is downtrending.    Physical Exam   Temp: 97.5  F (36.4  C) Temp src: Axillary BP: 125/78 Pulse: 90 Heart Rate: 84 Resp: 18 SpO2: 97 % O2 Device: None (Room air)    Vitals:    09/15/18 0500 09/16/18 0500   Weight: 64 kg (141 lb 1.6 oz) 63.3 kg (139 lb 8 oz)     Vital Signs with Ranges  Temp:  [97.4  F (36.3  C)-98.6  F (37  C)] 97.5  F (36.4  C)  Pulse:  [90] 90  Heart Rate:  [71-84] 84  Resp:  [18-20] 18  BP: (115-146)/(62-80) 125/78  SpO2:  [93 %-97 %] 97 %  I/O last 3 completed shifts:  In: -   Out: 1025 [Urine:1025]  Patient Active Problem List   Diagnosis     Type 2 diabetes mellitus without complication, without long-term current use of insulin (H)     Benign essential hypertension, goal <140/90     Hyperlipidemia LDL goal <100     Gastroesophageal reflux disease without esophagitis     Unstable angina (H)     NSTEMI (non-ST elevated myocardial infarction) (H)     LBBB (left bundle branch block)       PHYSICAL EXAMINATION:   GENERAL:  Well-appearing adult appears older than  stated age, friendly, sensorium clear, significant other present during the encounter, moderately overweight, lying supine in bed comfortably, nondyspneic.   HEENT:  No major abnormalities.   VESSELS:  Non-elevated JVP.   CARDIOVASCULAR:  Distant heart tones.  No gallops, no murmurs.  Normal S1, S2.   ABDOMEN:  Soft, nontender, and nondistended.   RESPIRATORY:  Lungs clear to auscultation bilaterally acceptable air movement.   EXTREMITIES:  Normal perfusion.  No edema.         Medications     HEParin 950 Units/hr (09/16/18 0899)     - MEDICATION INSTRUCTIONS -       - MEDICATION INSTRUCTIONS -       Reason ACE/ARB/ARNI order not selected         amLODIPine  10 mg Oral Daily     aspirin  81 mg Oral Daily     atorvastatin  40 mg Oral Daily     insulin aspart  1-7 Units Subcutaneous TID AC     insulin aspart  1-5 Units Subcutaneous At Bedtime     losartan  25 mg Oral Daily     metoprolol tartrate  25 mg Oral BID     omeprazole  20 mg Oral Daily     sodium chloride (PF)  3 mL Intracatheter Q8H     ticagrelor  90 mg Oral BID       Data   Results for orders placed or performed during the hospital encounter of 09/14/18 (from the past 24 hour(s))   Glucose by meter   Result Value Ref Range    Glucose 187 (H) 70 - 99 mg/dL   Troponin I   Result Value Ref Range    Troponin I ES 1.811 (HH) 0.000 - 0.045 ug/L   Glucose by meter   Result Value Ref Range    Glucose 189 (H) 70 - 99 mg/dL   Troponin I   Result Value Ref Range    Troponin I ES 1.730 (HH) 0.000 - 0.045 ug/L   Glucose by meter   Result Value Ref Range    Glucose 163 (H) 70 - 99 mg/dL   Heparin Xa (10a) Level   Result Value Ref Range    Heparin 10A Level 0.13 IU/mL   Basic metabolic panel   Result Value Ref Range    Sodium 140 133 - 144 mmol/L    Potassium 4.5 3.4 - 5.3 mmol/L    Chloride 107 94 - 109 mmol/L    Carbon Dioxide 28 20 - 32 mmol/L    Anion Gap 5 3 - 14 mmol/L    Glucose 161 (H) 70 - 99 mg/dL    Urea Nitrogen 15 7 - 30 mg/dL    Creatinine 0.93 0.66 - 1.25  mg/dL    GFR Estimate 82 >60 mL/min/1.7m2    GFR Estimate If Black >90 >60 mL/min/1.7m2    Calcium 8.7 8.5 - 10.1 mg/dL   CBC with platelets   Result Value Ref Range    WBC 8.6 4.0 - 11.0 10e9/L    RBC Count 4.70 4.4 - 5.9 10e12/L    Hemoglobin 14.5 13.3 - 17.7 g/dL    Hematocrit 40.9 40.0 - 53.0 %    MCV 87 78 - 100 fl    MCH 30.9 26.5 - 33.0 pg    MCHC 35.5 31.5 - 36.5 g/dL    RDW 12.2 10.0 - 15.0 %    Platelet Count 153 150 - 450 10e9/L   Troponin I   Result Value Ref Range    Troponin I ES 1.523 (HH) 0.000 - 0.045 ug/L   ECHO COMPLETE WITH OPTISON    Narrative    [id number]  [id number]  [id number]  [id number]^MAY^GEORGIE^[facility]  Echocardiography Laboratory  [address] [place] [id number]        Name: HUMBERTOTOMASDAVID DIXON  MRN: [id number]  : 1955  Study Date: 2018 07:53 AM  Age: 63 yrs  Gender: Male  Patient Location: [facility]  Reason For Study: MI - Acute  Ordering Physician: GEORGIE SAN  Referring Physician: Kathya Iglesias  Performed By: Alexandrea Najera, [facility]     BSA: 1.7 m2  Height: 66 in  Weight: 139 lb  HR: 70  BP: 124/73 mmHg  _____________________________________________________________________________  __        Procedure  Complete Portable Echo Adult. Contrast Optison.  _____________________________________________________________________________  __        Interpretation Summary     The left ventricle is normal in size.  The visual ejection fraction is estimated at 50-55%.  Septal motion is consistent with conduction abnormality.  This reduces sensitivity and specificity of wall motion analysis.  Normal cardiac valves.  _____________________________________________________________________________  __        Left Ventricle  The left ventricle is normal in size. There is normal left ventricular wall  thickness. The visual ejection fraction is estimated at 50-55%. Grade I or  early diastolic dysfunction. Septal motion is consistent with conduction  abnormality.      Right Ventricle  The right ventricle is normal in size and function.     Atria  Normal left atrial size. Right atrial size is normal. There is no color  Doppler evidence of an atrial shunt.     Mitral Valve  The mitral valve is normal in structure and function. There is mild (1+)  mitral regurgitation.        Tricuspid Valve  There is trace tricuspid regurgitation. Normal IVC (1.5-2.5cm) with >50%  respiratory collapse; right atrial pressure is estimated at 5-10mmHg. The  right ventricular systolic pressure is approximated at 21.2 mmHg plus the  right atrial pressure.     Aortic Valve  There is trivial trileaflet aortic sclerosis. No aortic regurgitation is  present. The mean AoV pressure gradient is 3.2 mmHg.     Pulmonic Valve  There is trace pulmonic valvular regurgitation.     Vessels  Normal size aorta. The aortic root is normal size.     Pericardium  There is no pericardial effusion.        Rhythm  Sinus rhythm was noted.  _____________________________________________________________________________  __  MMode/2D Measurements & Calculations  IVSd: 0.99 cm     LVIDd: 4.7 cm  LVIDs: 3.3 cm  LVPWd: 1.1 cm  FS: 29.3 %  LV mass(C)d: 179.6 grams  LV mass(C)dI: 104.8 grams/m2  Ao root diam: 3.0 cm  LA dimension: 3.1 cm  asc Aorta Diam: 3.0 cm  LA/Ao: 1.0  LVOT diam: 1.9 cm  LVOT area: 2.7 cm2  LA Volume (BP): 43.1 ml  LA Volume Index (BP): 25.2 ml/m2  RWT: 0.49           Doppler Measurements & Calculations  MV E max tyrone: 40.3 cm/sec  MV A max tyrone: 90.9 cm/sec  MV E/A: 0.44  MV dec time: 0.37 sec  Ao V2 max: 130.3 cm/sec  Ao max P.0 mmHg  Ao V2 mean: 80.7 cm/sec  Ao mean PG: 3.2 mmHg  Ao V2 VTI: 26.7 cm  IFEANYI(I,D): 2.0 cm2  IFEANYI(V,D): 2.3 cm2  LV V1 max P.7 mmHg  LV V1 max: 108.2 cm/sec  LV V1 VTI: 19.7 cm  SV(LVOT): 53.7 ml  SI(LVOT): 31.3 ml/m2  PA V2 max: 112.7 cm/sec  PA max P.1 mmHg  PA acc time: 0.15 sec  TR max tyrone: 230.2 cm/sec  TR max P.2 mmHg  AV Tyrone Ratio (DI): 0.83  IFEANYI Index (cm2/m2):  1.2  E/E' av.5  Lateral E/e': 8.1  Medial E/e': 6.9              _____________________________________________________________________________  __        Report approved by: Fadumo Wagner 2018 09:13 AM      Glucose by meter   Result Value Ref Range    Glucose 140 (H) 70 - 99 mg/dL       Recent Labs  Lab 18  0530 18  0005 09/15/18  1832  18  1818   WBC 8.6  --   --   --  9.6   HGB 14.5  --   --   --  13.9   MCV 87  --   --   --  89     --   --   --  182   INR  --   --   --   --  1.16*     --   --   --  142   POTASSIUM 4.5  --   --   --  3.4   CHLORIDE 107  --   --   --  108   CO2 28  --   --   --  27   BUN 15  --   --   --  17   CR 0.93  --   --   --  0.92   ANIONGAP 5  --   --   --  7   HOWARD 8.7  --   --   --  8.9   *  --   --   --  114*   ALBUMIN  --   --   --   --  3.9   PROTTOTAL  --   --   --   --  7.1   BILITOTAL  --   --   --   --  1.3   ALKPHOS  --   --   --   --  72   ALT  --   --   --   --  39   AST  --   --   --   --  28   TROPI 1.523* 1.730* 1.811*  < > 0.857*   < > = values in this interval not displayed.  No results found for this or any previous visit (from the past 24 hour(s)).

## 2018-09-17 ENCOUNTER — APPOINTMENT (OUTPATIENT)
Dept: CARDIOLOGY | Facility: CLINIC | Age: 63
End: 2018-09-17
Attending: INTERNAL MEDICINE
Payer: COMMERCIAL

## 2018-09-17 LAB
ANION GAP SERPL CALCULATED.3IONS-SCNC: 8 MMOL/L (ref 3–14)
BUN SERPL-MCNC: 19 MG/DL (ref 7–30)
CALCIUM SERPL-MCNC: 8.6 MG/DL (ref 8.5–10.1)
CHLORIDE SERPL-SCNC: 107 MMOL/L (ref 94–109)
CHOLEST SERPL-MCNC: 131 MG/DL
CO2 SERPL-SCNC: 25 MMOL/L (ref 20–32)
CREAT SERPL-MCNC: 0.9 MG/DL (ref 0.66–1.25)
ERYTHROCYTE [DISTWIDTH] IN BLOOD BY AUTOMATED COUNT: 12.2 % (ref 10–15)
GFR SERPL CREATININE-BSD FRML MDRD: 84 ML/MIN/1.7M2
GLUCOSE BLDC GLUCOMTR-MCNC: 119 MG/DL (ref 70–99)
GLUCOSE BLDC GLUCOMTR-MCNC: 135 MG/DL (ref 70–99)
GLUCOSE BLDC GLUCOMTR-MCNC: 160 MG/DL (ref 70–99)
GLUCOSE BLDC GLUCOMTR-MCNC: 172 MG/DL (ref 70–99)
GLUCOSE SERPL-MCNC: 166 MG/DL (ref 70–99)
HCT VFR BLD AUTO: 41.3 % (ref 40–53)
HDLC SERPL-MCNC: 35 MG/DL
HGB BLD-MCNC: 14.2 G/DL (ref 13.3–17.7)
LDLC SERPL CALC-MCNC: 54 MG/DL
LMWH PPP CHRO-ACNC: 0.27 IU/ML
LMWH PPP CHRO-ACNC: 0.44 IU/ML
MCH RBC QN AUTO: 30 PG (ref 26.5–33)
MCHC RBC AUTO-ENTMCNC: 34.4 G/DL (ref 31.5–36.5)
MCV RBC AUTO: 87 FL (ref 78–100)
NONHDLC SERPL-MCNC: 96 MG/DL
PLATELET # BLD AUTO: 152 10E9/L (ref 150–450)
POTASSIUM SERPL-SCNC: 3.9 MMOL/L (ref 3.4–5.3)
RBC # BLD AUTO: 4.74 10E12/L (ref 4.4–5.9)
SODIUM SERPL-SCNC: 140 MMOL/L (ref 133–144)
TRIGL SERPL-MCNC: 208 MG/DL
WBC # BLD AUTO: 7.4 10E9/L (ref 4–11)

## 2018-09-17 PROCEDURE — 4A033BC MEASUREMENT OF ARTERIAL PRESSURE, CORONARY, PERCUTANEOUS APPROACH: ICD-10-PCS | Performed by: INTERNAL MEDICINE

## 2018-09-17 PROCEDURE — 93010 ELECTROCARDIOGRAM REPORT: CPT | Mod: 76 | Performed by: INTERNAL MEDICINE

## 2018-09-17 PROCEDURE — C1725 CATH, TRANSLUMIN NON-LASER: HCPCS

## 2018-09-17 PROCEDURE — 93005 ELECTROCARDIOGRAM TRACING: CPT

## 2018-09-17 PROCEDURE — 27211089 ZZH KIT ACIST INJECTOR CR3

## 2018-09-17 PROCEDURE — C1887 CATHETER, GUIDING: HCPCS

## 2018-09-17 PROCEDURE — 27210856 ZZH ACCESS HEART CATH CR2

## 2018-09-17 PROCEDURE — C1874 STENT, COATED/COV W/DEL SYS: HCPCS

## 2018-09-17 PROCEDURE — 85520 HEPARIN ASSAY: CPT | Performed by: HOSPITALIST

## 2018-09-17 PROCEDURE — C9601 PERC DRUG-EL COR STENT BRAN: HCPCS

## 2018-09-17 PROCEDURE — 27210914 ZZH SHEATH CR8

## 2018-09-17 PROCEDURE — 99152 MOD SED SAME PHYS/QHP 5/>YRS: CPT

## 2018-09-17 PROCEDURE — 99153 MOD SED SAME PHYS/QHP EA: CPT

## 2018-09-17 PROCEDURE — 00000146 ZZHCL STATISTIC GLUCOSE BY METER IP

## 2018-09-17 PROCEDURE — 25000132 ZZH RX MED GY IP 250 OP 250 PS 637: Performed by: INTERNAL MEDICINE

## 2018-09-17 PROCEDURE — 85520 HEPARIN ASSAY: CPT | Performed by: INTERNAL MEDICINE

## 2018-09-17 PROCEDURE — 27210759 ZZH DEVICE INFLATION CR6

## 2018-09-17 PROCEDURE — C1769 GUIDE WIRE: HCPCS

## 2018-09-17 PROCEDURE — 027136Z DILATION OF CORONARY ARTERY, TWO ARTERIES WITH THREE DRUG-ELUTING INTRALUMINAL DEVICES, PERCUTANEOUS APPROACH: ICD-10-PCS | Performed by: INTERNAL MEDICINE

## 2018-09-17 PROCEDURE — 85027 COMPLETE CBC AUTOMATED: CPT | Performed by: INTERNAL MEDICINE

## 2018-09-17 PROCEDURE — 99152 MOD SED SAME PHYS/QHP 5/>YRS: CPT | Mod: GC | Performed by: INTERNAL MEDICINE

## 2018-09-17 PROCEDURE — B2151ZZ FLUOROSCOPY OF LEFT HEART USING LOW OSMOLAR CONTRAST: ICD-10-PCS | Performed by: INTERNAL MEDICINE

## 2018-09-17 PROCEDURE — 93571 IV DOP VEL&/PRESS C FLO 1ST: CPT | Mod: 26 | Performed by: INTERNAL MEDICINE

## 2018-09-17 PROCEDURE — 93458 L HRT ARTERY/VENTRICLE ANGIO: CPT

## 2018-09-17 PROCEDURE — 93571 IV DOP VEL&/PRESS C FLO 1ST: CPT

## 2018-09-17 PROCEDURE — 25000128 H RX IP 250 OP 636: Performed by: INTERNAL MEDICINE

## 2018-09-17 PROCEDURE — 99232 SBSQ HOSP IP/OBS MODERATE 35: CPT | Performed by: HOSPITALIST

## 2018-09-17 PROCEDURE — 80061 LIPID PANEL: CPT | Performed by: INTERNAL MEDICINE

## 2018-09-17 PROCEDURE — 27210787 ZZH MANIFOLD CR2

## 2018-09-17 PROCEDURE — 85347 COAGULATION TIME ACTIVATED: CPT

## 2018-09-17 PROCEDURE — 27210946 ZZH KIT HC TOTES DISP CR8

## 2018-09-17 PROCEDURE — C9600 PERC DRUG-EL COR STENT SING: HCPCS | Mod: LD

## 2018-09-17 PROCEDURE — 25000125 ZZHC RX 250: Performed by: INTERNAL MEDICINE

## 2018-09-17 PROCEDURE — 92928 PRQ TCAT PLMT NTRAC ST 1 LES: CPT | Mod: 76 | Performed by: INTERNAL MEDICINE

## 2018-09-17 PROCEDURE — 99233 SBSQ HOSP IP/OBS HIGH 50: CPT | Mod: 25 | Performed by: INTERNAL MEDICINE

## 2018-09-17 PROCEDURE — 25000132 ZZH RX MED GY IP 250 OP 250 PS 637: Performed by: HOSPITALIST

## 2018-09-17 PROCEDURE — 80048 BASIC METABOLIC PNL TOTAL CA: CPT | Performed by: INTERNAL MEDICINE

## 2018-09-17 PROCEDURE — 36415 COLL VENOUS BLD VENIPUNCTURE: CPT | Performed by: INTERNAL MEDICINE

## 2018-09-17 PROCEDURE — 27210795 ZZH PAD DEFIB QUICK CR4

## 2018-09-17 PROCEDURE — 36415 COLL VENOUS BLD VENIPUNCTURE: CPT | Performed by: HOSPITALIST

## 2018-09-17 PROCEDURE — 21000001 ZZH R&B HEART CARE

## 2018-09-17 PROCEDURE — 4A023N7 MEASUREMENT OF CARDIAC SAMPLING AND PRESSURE, LEFT HEART, PERCUTANEOUS APPROACH: ICD-10-PCS | Performed by: INTERNAL MEDICINE

## 2018-09-17 PROCEDURE — 93458 L HRT ARTERY/VENTRICLE ANGIO: CPT | Mod: 26 | Performed by: INTERNAL MEDICINE

## 2018-09-17 PROCEDURE — B2111ZZ FLUOROSCOPY OF MULTIPLE CORONARY ARTERIES USING LOW OSMOLAR CONTRAST: ICD-10-PCS | Performed by: INTERNAL MEDICINE

## 2018-09-17 RX ORDER — SODIUM CHLORIDE 9 MG/ML
INJECTION, SOLUTION INTRAVENOUS CONTINUOUS
Status: ACTIVE | OUTPATIENT
Start: 2018-09-17 | End: 2018-09-18

## 2018-09-17 RX ORDER — FENTANYL CITRATE 50 UG/ML
25-50 INJECTION, SOLUTION INTRAMUSCULAR; INTRAVENOUS
Status: DISCONTINUED | OUTPATIENT
Start: 2018-09-17 | End: 2018-09-17 | Stop reason: HOSPADM

## 2018-09-17 RX ORDER — ADENOSINE 3 MG/ML
12-12000 INJECTION, SOLUTION INTRAVENOUS
Status: DISCONTINUED | OUTPATIENT
Start: 2018-09-17 | End: 2018-09-17 | Stop reason: HOSPADM

## 2018-09-17 RX ORDER — POTASSIUM CHLORIDE 7.45 MG/ML
10 INJECTION INTRAVENOUS
Status: DISCONTINUED | OUTPATIENT
Start: 2018-09-17 | End: 2018-09-17 | Stop reason: HOSPADM

## 2018-09-17 RX ORDER — SODIUM NITROPRUSSIDE 25 MG/ML
100-200 INJECTION INTRAVENOUS
Status: DISCONTINUED | OUTPATIENT
Start: 2018-09-17 | End: 2018-09-17 | Stop reason: HOSPADM

## 2018-09-17 RX ORDER — DEXTROSE MONOHYDRATE 25 G/50ML
12.5-5 INJECTION, SOLUTION INTRAVENOUS EVERY 30 MIN PRN
Status: DISCONTINUED | OUTPATIENT
Start: 2018-09-17 | End: 2018-09-17 | Stop reason: HOSPADM

## 2018-09-17 RX ORDER — NITROGLYCERIN 5 MG/ML
100-500 VIAL (ML) INTRAVENOUS
Status: COMPLETED | OUTPATIENT
Start: 2018-09-17 | End: 2018-09-17

## 2018-09-17 RX ORDER — NITROGLYCERIN 0.4 MG/1
0.4 TABLET SUBLINGUAL EVERY 5 MIN PRN
Status: DISCONTINUED | OUTPATIENT
Start: 2018-09-17 | End: 2018-09-17 | Stop reason: HOSPADM

## 2018-09-17 RX ORDER — POTASSIUM CHLORIDE 1500 MG/1
20 TABLET, EXTENDED RELEASE ORAL
Status: COMPLETED | OUTPATIENT
Start: 2018-09-17 | End: 2018-09-17

## 2018-09-17 RX ORDER — CLOPIDOGREL 300 MG/1
300-600 TABLET, FILM COATED ORAL
Status: DISCONTINUED | OUTPATIENT
Start: 2018-09-17 | End: 2018-09-17 | Stop reason: HOSPADM

## 2018-09-17 RX ORDER — LIDOCAINE 40 MG/G
CREAM TOPICAL
Status: DISCONTINUED | OUTPATIENT
Start: 2018-09-17 | End: 2018-09-17 | Stop reason: HOSPADM

## 2018-09-17 RX ORDER — SODIUM CHLORIDE 9 MG/ML
INJECTION, SOLUTION INTRAVENOUS CONTINUOUS
Status: DISCONTINUED | OUTPATIENT
Start: 2018-09-17 | End: 2018-09-17

## 2018-09-17 RX ORDER — FUROSEMIDE 10 MG/ML
20-100 INJECTION INTRAMUSCULAR; INTRAVENOUS
Status: DISCONTINUED | OUTPATIENT
Start: 2018-09-17 | End: 2018-09-17 | Stop reason: HOSPADM

## 2018-09-17 RX ORDER — PHENYLEPHRINE HCL IN 0.9% NACL 1 MG/10 ML
20-100 SYRINGE (ML) INTRAVENOUS
Status: DISCONTINUED | OUTPATIENT
Start: 2018-09-17 | End: 2018-09-17 | Stop reason: HOSPADM

## 2018-09-17 RX ORDER — NICARDIPINE HYDROCHLORIDE 2.5 MG/ML
100 INJECTION INTRAVENOUS
Status: DISCONTINUED | OUTPATIENT
Start: 2018-09-17 | End: 2018-09-17 | Stop reason: HOSPADM

## 2018-09-17 RX ORDER — PRASUGREL 10 MG/1
10-60 TABLET, FILM COATED ORAL
Status: DISCONTINUED | OUTPATIENT
Start: 2018-09-17 | End: 2018-09-17 | Stop reason: HOSPADM

## 2018-09-17 RX ORDER — DOBUTAMINE HYDROCHLORIDE 200 MG/100ML
2-20 INJECTION INTRAVENOUS CONTINUOUS PRN
Status: DISCONTINUED | OUTPATIENT
Start: 2018-09-17 | End: 2018-09-17 | Stop reason: HOSPADM

## 2018-09-17 RX ORDER — ASPIRIN 81 MG/1
81 TABLET ORAL DAILY
Status: DISCONTINUED | OUTPATIENT
Start: 2018-09-18 | End: 2018-09-18 | Stop reason: HOSPADM

## 2018-09-17 RX ORDER — HEPARIN SODIUM 1000 [USP'U]/ML
1000-10000 INJECTION, SOLUTION INTRAVENOUS; SUBCUTANEOUS EVERY 5 MIN PRN
Status: DISCONTINUED | OUTPATIENT
Start: 2018-09-17 | End: 2018-09-17 | Stop reason: HOSPADM

## 2018-09-17 RX ORDER — ASPIRIN 325 MG
325 TABLET ORAL
Status: DISCONTINUED | OUTPATIENT
Start: 2018-09-17 | End: 2018-09-17 | Stop reason: HOSPADM

## 2018-09-17 RX ORDER — DOPAMINE HYDROCHLORIDE 160 MG/100ML
2-20 INJECTION, SOLUTION INTRAVENOUS CONTINUOUS PRN
Status: DISCONTINUED | OUTPATIENT
Start: 2018-09-17 | End: 2018-09-17 | Stop reason: HOSPADM

## 2018-09-17 RX ORDER — NIFEDIPINE 10 MG/1
10 CAPSULE ORAL
Status: DISCONTINUED | OUTPATIENT
Start: 2018-09-17 | End: 2018-09-17 | Stop reason: HOSPADM

## 2018-09-17 RX ORDER — FLUMAZENIL 0.1 MG/ML
0.2 INJECTION, SOLUTION INTRAVENOUS
Status: ACTIVE | OUTPATIENT
Start: 2018-09-17 | End: 2018-09-18

## 2018-09-17 RX ORDER — EPINEPHRINE 1 MG/ML
0.3 INJECTION, SOLUTION, CONCENTRATE INTRAVENOUS
Status: DISCONTINUED | OUTPATIENT
Start: 2018-09-17 | End: 2018-09-17 | Stop reason: HOSPADM

## 2018-09-17 RX ORDER — DIPHENHYDRAMINE HYDROCHLORIDE 50 MG/ML
25-50 INJECTION INTRAMUSCULAR; INTRAVENOUS
Status: DISCONTINUED | OUTPATIENT
Start: 2018-09-17 | End: 2018-09-17 | Stop reason: HOSPADM

## 2018-09-17 RX ORDER — LORAZEPAM 2 MG/ML
0.5 INJECTION INTRAMUSCULAR
Status: DISCONTINUED | OUTPATIENT
Start: 2018-09-17 | End: 2018-09-17 | Stop reason: HOSPADM

## 2018-09-17 RX ORDER — NITROGLYCERIN 20 MG/100ML
.07-2 INJECTION INTRAVENOUS CONTINUOUS PRN
Status: DISCONTINUED | OUTPATIENT
Start: 2018-09-17 | End: 2018-09-17 | Stop reason: HOSPADM

## 2018-09-17 RX ORDER — NALOXONE HYDROCHLORIDE 0.4 MG/ML
.1-.4 INJECTION, SOLUTION INTRAMUSCULAR; INTRAVENOUS; SUBCUTANEOUS
Status: DISCONTINUED | OUTPATIENT
Start: 2018-09-17 | End: 2018-09-17

## 2018-09-17 RX ORDER — ENALAPRILAT 1.25 MG/ML
1.25-2.5 INJECTION INTRAVENOUS
Status: DISCONTINUED | OUTPATIENT
Start: 2018-09-17 | End: 2018-09-17 | Stop reason: HOSPADM

## 2018-09-17 RX ORDER — CLOPIDOGREL BISULFATE 75 MG/1
75 TABLET ORAL
Status: DISCONTINUED | OUTPATIENT
Start: 2018-09-17 | End: 2018-09-17 | Stop reason: HOSPADM

## 2018-09-17 RX ORDER — FENTANYL CITRATE 50 UG/ML
25-50 INJECTION, SOLUTION INTRAMUSCULAR; INTRAVENOUS
Status: ACTIVE | OUTPATIENT
Start: 2018-09-17 | End: 2018-09-18

## 2018-09-17 RX ORDER — PROTAMINE SULFATE 10 MG/ML
25-100 INJECTION, SOLUTION INTRAVENOUS EVERY 5 MIN PRN
Status: DISCONTINUED | OUTPATIENT
Start: 2018-09-17 | End: 2018-09-17 | Stop reason: HOSPADM

## 2018-09-17 RX ORDER — ONDANSETRON 2 MG/ML
4 INJECTION INTRAMUSCULAR; INTRAVENOUS EVERY 4 HOURS PRN
Status: DISCONTINUED | OUTPATIENT
Start: 2018-09-17 | End: 2018-09-17 | Stop reason: HOSPADM

## 2018-09-17 RX ORDER — NITROGLYCERIN 5 MG/ML
100-200 VIAL (ML) INTRAVENOUS
Status: DISCONTINUED | OUTPATIENT
Start: 2018-09-17 | End: 2018-09-17 | Stop reason: HOSPADM

## 2018-09-17 RX ORDER — IOPAMIDOL 755 MG/ML
265 INJECTION, SOLUTION INTRAVASCULAR ONCE
Status: COMPLETED | OUTPATIENT
Start: 2018-09-17 | End: 2018-09-17

## 2018-09-17 RX ORDER — LORAZEPAM 0.5 MG/1
0.5 TABLET ORAL
Status: DISCONTINUED | OUTPATIENT
Start: 2018-09-17 | End: 2018-09-17 | Stop reason: HOSPADM

## 2018-09-17 RX ORDER — LORAZEPAM 2 MG/ML
.5-2 INJECTION INTRAMUSCULAR EVERY 4 HOURS PRN
Status: DISCONTINUED | OUTPATIENT
Start: 2018-09-17 | End: 2018-09-17 | Stop reason: HOSPADM

## 2018-09-17 RX ORDER — METHYLPREDNISOLONE SODIUM SUCCINATE 125 MG/2ML
125 INJECTION, POWDER, LYOPHILIZED, FOR SOLUTION INTRAMUSCULAR; INTRAVENOUS
Status: DISCONTINUED | OUTPATIENT
Start: 2018-09-17 | End: 2018-09-17 | Stop reason: HOSPADM

## 2018-09-17 RX ORDER — POTASSIUM CHLORIDE 29.8 MG/ML
20 INJECTION INTRAVENOUS
Status: DISCONTINUED | OUTPATIENT
Start: 2018-09-17 | End: 2018-09-17 | Stop reason: HOSPADM

## 2018-09-17 RX ORDER — METOPROLOL TARTRATE 1 MG/ML
5 INJECTION, SOLUTION INTRAVENOUS EVERY 5 MIN PRN
Status: DISCONTINUED | OUTPATIENT
Start: 2018-09-17 | End: 2018-09-17 | Stop reason: HOSPADM

## 2018-09-17 RX ORDER — LIDOCAINE HYDROCHLORIDE 10 MG/ML
30 INJECTION, SOLUTION EPIDURAL; INFILTRATION; INTRACAUDAL; PERINEURAL
Status: DISCONTINUED | OUTPATIENT
Start: 2018-09-17 | End: 2018-09-17 | Stop reason: HOSPADM

## 2018-09-17 RX ORDER — PROTAMINE SULFATE 10 MG/ML
1-5 INJECTION, SOLUTION INTRAVENOUS
Status: DISCONTINUED | OUTPATIENT
Start: 2018-09-17 | End: 2018-09-17 | Stop reason: HOSPADM

## 2018-09-17 RX ORDER — VERAPAMIL HYDROCHLORIDE 2.5 MG/ML
1-2.5 INJECTION, SOLUTION INTRAVENOUS
Status: COMPLETED | OUTPATIENT
Start: 2018-09-17 | End: 2018-09-17

## 2018-09-17 RX ORDER — ASPIRIN 81 MG/1
81-324 TABLET, CHEWABLE ORAL
Status: DISCONTINUED | OUTPATIENT
Start: 2018-09-17 | End: 2018-09-17 | Stop reason: HOSPADM

## 2018-09-17 RX ORDER — ACETAMINOPHEN 325 MG/1
325-650 TABLET ORAL EVERY 4 HOURS PRN
Status: DISCONTINUED | OUTPATIENT
Start: 2018-09-17 | End: 2018-09-18 | Stop reason: HOSPADM

## 2018-09-17 RX ORDER — ATROPINE SULFATE 0.1 MG/ML
.5-1 INJECTION INTRAVENOUS
Status: DISCONTINUED | OUTPATIENT
Start: 2018-09-17 | End: 2018-09-17 | Stop reason: HOSPADM

## 2018-09-17 RX ORDER — FLUMAZENIL 0.1 MG/ML
0.2 INJECTION, SOLUTION INTRAVENOUS
Status: DISCONTINUED | OUTPATIENT
Start: 2018-09-17 | End: 2018-09-17 | Stop reason: HOSPADM

## 2018-09-17 RX ORDER — BUPIVACAINE HYDROCHLORIDE 2.5 MG/ML
1-10 INJECTION, SOLUTION EPIDURAL; INFILTRATION; INTRACAUDAL
Status: DISCONTINUED | OUTPATIENT
Start: 2018-09-17 | End: 2018-09-17 | Stop reason: HOSPADM

## 2018-09-17 RX ORDER — MORPHINE SULFATE 2 MG/ML
1-2 INJECTION, SOLUTION INTRAMUSCULAR; INTRAVENOUS EVERY 5 MIN PRN
Status: DISCONTINUED | OUTPATIENT
Start: 2018-09-17 | End: 2018-09-17 | Stop reason: HOSPADM

## 2018-09-17 RX ORDER — ATROPINE SULFATE 0.1 MG/ML
0.5 INJECTION INTRAVENOUS EVERY 5 MIN PRN
Status: ACTIVE | OUTPATIENT
Start: 2018-09-17 | End: 2018-09-18

## 2018-09-17 RX ORDER — HYDRALAZINE HYDROCHLORIDE 20 MG/ML
10-20 INJECTION INTRAMUSCULAR; INTRAVENOUS
Status: DISCONTINUED | OUTPATIENT
Start: 2018-09-17 | End: 2018-09-17 | Stop reason: HOSPADM

## 2018-09-17 RX ORDER — NALOXONE HYDROCHLORIDE 0.4 MG/ML
.2-.4 INJECTION, SOLUTION INTRAMUSCULAR; INTRAVENOUS; SUBCUTANEOUS
Status: DISCONTINUED | OUTPATIENT
Start: 2018-09-17 | End: 2018-09-17

## 2018-09-17 RX ORDER — NITROGLYCERIN 0.4 MG/1
0.4 TABLET SUBLINGUAL EVERY 5 MIN PRN
Status: DISCONTINUED | OUTPATIENT
Start: 2018-09-17 | End: 2018-09-17

## 2018-09-17 RX ORDER — HYDROCODONE BITARTRATE AND ACETAMINOPHEN 5; 325 MG/1; MG/1
1-2 TABLET ORAL EVERY 4 HOURS PRN
Status: DISCONTINUED | OUTPATIENT
Start: 2018-09-17 | End: 2018-09-18 | Stop reason: HOSPADM

## 2018-09-17 RX ORDER — NALOXONE HYDROCHLORIDE 0.4 MG/ML
0.4 INJECTION, SOLUTION INTRAMUSCULAR; INTRAVENOUS; SUBCUTANEOUS EVERY 5 MIN PRN
Status: DISCONTINUED | OUTPATIENT
Start: 2018-09-17 | End: 2018-09-17 | Stop reason: HOSPADM

## 2018-09-17 RX ORDER — NITROGLYCERIN 0.4 MG/1
0.4 TABLET SUBLINGUAL EVERY 5 MIN PRN
Status: DISCONTINUED | OUTPATIENT
Start: 2018-09-17 | End: 2018-09-18 | Stop reason: HOSPADM

## 2018-09-17 RX ORDER — SODIUM CHLORIDE 9 MG/ML
INJECTION, SOLUTION INTRAVENOUS CONTINUOUS
Status: DISCONTINUED | OUTPATIENT
Start: 2018-09-17 | End: 2018-09-17 | Stop reason: HOSPADM

## 2018-09-17 RX ORDER — LIDOCAINE HYDROCHLORIDE 10 MG/ML
1-10 INJECTION, SOLUTION EPIDURAL; INFILTRATION; INTRACAUDAL; PERINEURAL
Status: COMPLETED | OUTPATIENT
Start: 2018-09-17 | End: 2018-09-17

## 2018-09-17 RX ADMIN — VERAPAMIL HYDROCHLORIDE 2.5 MG: 2.5 INJECTION, SOLUTION INTRAVENOUS at 16:45

## 2018-09-17 RX ADMIN — NITROGLYCERIN 200 MCG: 5 INJECTION, SOLUTION INTRAVENOUS at 17:59

## 2018-09-17 RX ADMIN — AMLODIPINE BESYLATE 10 MG: 10 TABLET ORAL at 08:17

## 2018-09-17 RX ADMIN — TICAGRELOR 90 MG: 90 TABLET ORAL at 08:17

## 2018-09-17 RX ADMIN — FENTANYL CITRATE 50 MCG: 50 INJECTION, SOLUTION INTRAMUSCULAR; INTRAVENOUS at 16:39

## 2018-09-17 RX ADMIN — POTASSIUM CHLORIDE 20 MEQ: 1500 TABLET, EXTENDED RELEASE ORAL at 08:40

## 2018-09-17 RX ADMIN — NITROGLYCERIN 200 MCG: 5 INJECTION, SOLUTION INTRAVENOUS at 16:45

## 2018-09-17 RX ADMIN — HEPARIN SODIUM 2000 UNITS: 1000 INJECTION, SOLUTION INTRAVENOUS; SUBCUTANEOUS at 17:13

## 2018-09-17 RX ADMIN — IOPAMIDOL 265 ML: 755 INJECTION, SOLUTION INTRAVASCULAR at 18:15

## 2018-09-17 RX ADMIN — MELATONIN 5 MG TABLET 10 MG: at 21:44

## 2018-09-17 RX ADMIN — NITROGLYCERIN 200 MCG: 5 INJECTION, SOLUTION INTRAVENOUS at 17:19

## 2018-09-17 RX ADMIN — ASPIRIN 325 MG: 325 TABLET, DELAYED RELEASE ORAL at 08:18

## 2018-09-17 RX ADMIN — TICAGRELOR 90 MG: 90 TABLET ORAL at 20:25

## 2018-09-17 RX ADMIN — ACETAMINOPHEN 650 MG: 325 TABLET, FILM COATED ORAL at 18:50

## 2018-09-17 RX ADMIN — HEPARIN SODIUM 5000 UNITS: 1000 INJECTION, SOLUTION INTRAVENOUS; SUBCUTANEOUS at 16:46

## 2018-09-17 RX ADMIN — METOPROLOL TARTRATE 25 MG: 25 TABLET ORAL at 20:25

## 2018-09-17 RX ADMIN — LIDOCAINE HYDROCHLORIDE 1 ML: 10 INJECTION, SOLUTION EPIDURAL; INFILTRATION; INTRACAUDAL; PERINEURAL at 16:39

## 2018-09-17 RX ADMIN — MIDAZOLAM 0.5 MG: 1 INJECTION INTRAMUSCULAR; INTRAVENOUS at 17:20

## 2018-09-17 RX ADMIN — ATORVASTATIN CALCIUM 40 MG: 40 TABLET, FILM COATED ORAL at 08:18

## 2018-09-17 RX ADMIN — LOSARTAN POTASSIUM 25 MG: 25 TABLET ORAL at 08:17

## 2018-09-17 RX ADMIN — OMEPRAZOLE 20 MG: 20 CAPSULE, DELAYED RELEASE ORAL at 08:17

## 2018-09-17 RX ADMIN — NITROGLYCERIN 200 MCG: 5 INJECTION, SOLUTION INTRAVENOUS at 17:45

## 2018-09-17 RX ADMIN — SODIUM CHLORIDE: 9 INJECTION, SOLUTION INTRAVENOUS at 07:28

## 2018-09-17 RX ADMIN — HYDROCODONE BITARTRATE AND ACETAMINOPHEN 1 TABLET: 5; 325 TABLET ORAL at 21:45

## 2018-09-17 RX ADMIN — MIDAZOLAM 1 MG: 1 INJECTION INTRAMUSCULAR; INTRAVENOUS at 16:42

## 2018-09-17 RX ADMIN — MIDAZOLAM 0.5 MG: 1 INJECTION INTRAMUSCULAR; INTRAVENOUS at 17:09

## 2018-09-17 RX ADMIN — FENTANYL CITRATE 50 MCG: 50 INJECTION, SOLUTION INTRAMUSCULAR; INTRAVENOUS at 16:52

## 2018-09-17 RX ADMIN — MIDAZOLAM 1 MG: 1 INJECTION INTRAMUSCULAR; INTRAVENOUS at 16:40

## 2018-09-17 RX ADMIN — HYDROCODONE BITARTRATE AND ACETAMINOPHEN 1 TABLET: 5; 325 TABLET ORAL at 20:26

## 2018-09-17 RX ADMIN — METOPROLOL TARTRATE 25 MG: 25 TABLET ORAL at 08:18

## 2018-09-17 RX ADMIN — MIDAZOLAM 1 MG: 1 INJECTION INTRAMUSCULAR; INTRAVENOUS at 17:05

## 2018-09-17 ASSESSMENT — ACTIVITIES OF DAILY LIVING (ADL)
ADLS_ACUITY_SCORE: 8

## 2018-09-17 ASSESSMENT — PAIN DESCRIPTION - DESCRIPTORS
DESCRIPTORS: ACHING

## 2018-09-17 NOTE — PLAN OF CARE
Problem: Patient Care Overview  Goal: Plan of Care/Patient Progress Review  Outcome: No Change  A&OX4,HR in the 50's, other VSS, on room air. Denies SOB/CP. Complains of left wrist pain,PO tylenol  administered. On low sat fat  diet, .  IV NS infusing at 75 ml/hr. TR band on the left radial. CMS intact.    0700 Pt. Complained of numbness and tingling on the left arm, 2 ml air removed, 13 ml remaining. CMS intact.

## 2018-09-17 NOTE — PROGRESS NOTES
[facility]    Cardiology Progress Note  Will follow up at [place] with Cards(lives in [place])    Date of Service: 09/17/2018     Assessment & Plan   Js ESTHER Robert is a 63 year old male with past medical history of HTN, dyslipidemia, type 2 diabetes, former smoker with 30 pack year hx,  FH cad(dad had CABG at 60,sister in her 50's)  This patient was admitted on 9/14/2018 with symptoms of chest pain with trop elevation/nonstemi    1. Nonstemi  Peak trop 2.49  Echo: Ef 50-55% ; mild MR   LBBB  Brilinta/ASA  Heparin  Metoprolol  Losartan  Statin  Normal creat 0.90 Hgb 14.2 platelets 152    PLAN:  Angiogram today  Consents are signed    2. HTN-overall controlled  Metoprolol/losartan/amlodipine  Consider coreg if needed-more metabolically neutral    3. Diabetes A1C 6.8%    4. dyslipidemia-mixed  lipitor 40mg daily--increased from 20mg daily    LDL 79  HDL  Low 31      Kelly Case, MSN, APRN, CNP  [facility]    Interval History   No substernal chest pain; no sob  Review of Systems:  The Review of Systems is negative other than noted in the HPI    Physical Exam   Temp: 98.2  F (36.8  C) Temp src: Oral BP: 147/82   Heart Rate: 70 Resp: 15 SpO2: 96 % O2 Device: None (Room air)    Vitals:    09/15/18 0500 09/16/18 0500 09/17/18 0357   Weight: 64 kg (141 lb 1.6 oz) 63.3 kg (139 lb 8 oz) 64.1 kg (141 lb 4.8 oz)     Vital Signs with Ranges  Temp:  [97.5  F (36.4  C)-98.8  F (37.1  C)] 98.2  F (36.8  C)  Heart Rate:  [69-84] 70  Resp:  [15-18] 15  BP: (123-147)/(56-82) 147/82  SpO2:  [95 %-97 %] 96 %  I/O last 3 completed shifts:  In: 360 [P.O.:360]  Out: 650 [Urine:650]    Constitutional     alert and oriented, in no acute distress.     Skin     warm and dry to touch    ENT     no pallor or cyanosis    Neck    Supple, JVP normal, no carotid bruit    Chest     no tenderness to palpation     Lungs  clear to auscultation     Cardiac  regular rhythm, S1 normal, split S2 normal, No S3, no  murmurs, no rubs    Abdomen     abdomen soft, bowel sounds normoactive, no hepatosplenomegaly    Extremities and Back     no clubbing, cyanosis. No edema observed.  No femoral bruits      Neurological     no gross motor deficits noted, affect appropriate, oriented to time, person and place.        Medications     HEParin 800 Units/hr (09/17/18 0812)     - MEDICATION INSTRUCTIONS -       - MEDICATION INSTRUCTIONS -       - MEDICATION INSTRUCTIONS -       Reason ACE/ARB/ARNI order not selected       sodium chloride 150 mL/hr at 09/17/18 0728       amLODIPine  10 mg Oral Daily     aspirin  325 mg Oral Daily     atorvastatin  40 mg Oral Daily     insulin aspart  1-7 Units Subcutaneous TID AC     insulin aspart  1-5 Units Subcutaneous At Bedtime     losartan  25 mg Oral Daily     metoprolol tartrate  25 mg Oral BID     omeprazole  20 mg Oral Daily     sodium chloride (PF)  3 mL Intracatheter Q8H     ticagrelor  90 mg Oral BID          Data:     ROUTINE IP LABS (Last four results)  BMP  Recent Labs  Lab 09/17/18  0550 09/16/18  0530 09/14/18  1818    140 142   POTASSIUM 3.9 4.5 3.4   CHLORIDE 107 107 108   HOWARD 8.6 8.7 8.9   CO2 25 28 27   BUN 19 15 17   CR 0.90 0.93 0.92   * 161* 114*     CHOLESTEROL/HEPATIC  Recent Labs  Lab 09/15/18  0546 09/14/18  1818   CHOL 142  --    TRIG 161*  --    LDL 79  --    HDL 31*  --    ALT  --  39   AST  --  28     CBC  Recent Labs  Lab 09/17/18  0550 09/16/18  0530 09/14/18  1818   WBC 7.4 8.6 9.6   RBC 4.74 4.70 4.58   HGB 14.2 14.5 13.9   HCT 41.3 40.9 40.6   MCV 87 87 89   MCH 30.0 30.9 30.3   MCHC 34.4 35.5 34.2   RDW 12.2 12.2 11.7    153 182     TROP:   Recent Labs  Lab 09/16/18  0530 09/16/18  0005 09/15/18  1832 09/15/18  1140 09/15/18  0546   TROPI 1.523* 1.730* 1.811* 2.499* 2.491*      BNP:    Recent Labs  Lab 09/14/18  1818   NTBNPI 1327*     INR  Recent Labs  Lab 09/14/18  1818   INR 1.16*     TSH   Date Value Ref Range Status   03/09/2018 1.02 0.40 - 4.00  mU/L Final       EKG results:  Reviewed if available     Imaging:  No results found for this or any previous visit (from the past 24 hour(s)).  Telemetry:      Sinus LBBB

## 2018-09-17 NOTE — PROGRESS NOTES
[facility]    Hospitalist Progress Note    Assessment & Plan   Js Jones is a 63 year old male who was admitted on 9/14/2018 with chest pain and troponin elevation concerning for NSTEMI        NSTEMI  LBBB on EKG, no previous EKG for comparison  5 day PTA onset of typical chest pain while cutting wood, intermittent, improved with rest  troponins continue to increase to 2.5   Transferred from [facility] for further cardiology evaluation  Plan  - continue heparin gtt for CV protocol  - continues aspirin 81 mg, losartan 25 mg, and metoprolol 12.5 mg bid  - increase atorvastatin to 40 mg  - cardiology consult pending  - discontinue trop labs  - cath today      HTN  PTA: amlo 10 mg, losartan 25 mg  Plan  - continue amlo, losartan  - BB metoprolol 12.5 mg bid has been added      DM2  hgb a1c of 6.8.   PTA: glipizide 10 mg xl every day, metofrmin 1000, 1500, 1000 mg daily  Plan  - continue sliding scale and carb controlled diet while inpatient, home on PTAs    HLD  - increased the atorvastatin to 40 mg daily       # Pain Assessment:  Current Pain Score 9/17/2018   Patient currently in pain? yes   Pain score (0-10) 1   Pain location Chest   Pain descriptors Aching   Js s pain level was assessed and he currently has 1/10 chest pain. Not as bad as previous. Discussed with family. Would continue to monitor.      DVT Prophylaxis: Heparin GTT  Code Status: Full Code    Disposition: Expected discharge in 1-2 days pending angiogram on 9/17    Fady Arriaga, DO  Text Page  (7am to 6pm)  Interval History   1/10 chest discomfort this morning. EKG with LBBB with TWI in the inferior leads. Advised to try nitroglycerin if there is pain    -Data reviewed today: I reviewed all new labs and imaging results over the last 24 hours. I personally reviewed no images or EKG's today.    Physical Exam   Temp: 97.9  F (36.6  C) Temp src: Oral BP: 147/82   Heart Rate: 68 Resp: 16 SpO2: 96 % O2 Device: None (Room air)     Vitals:    09/15/18 0500 09/16/18 0500 09/17/18 0357   Weight: 64 kg (141 lb 1.6 oz) 63.3 kg (139 lb 8 oz) 64.1 kg (141 lb 4.8 oz)     Vital Signs with Ranges  Temp:  [97.5  F (36.4  C)-98.8  F (37.1  C)] 97.9  F (36.6  C)  Heart Rate:  [68-84] 68  Resp:  [15-18] 16  BP: (123-147)/(56-82) 147/82  SpO2:  [95 %-97 %] 96 %  I/O last 3 completed shifts:  In: 360 [P.O.:360]  Out: 650 [Urine:650]    Constitutional: Awake, alert, cooperative, no apparent distress  Respiratory: Clear to auscultation bilaterally, no crackles or wheezing  Cardiovascular: Regular rate and rhythm, normal S1 and S2, and no murmur noted  GI: Normal bowel sounds, soft, non-distended, non-tender  Skin/Integumen: No rashes, no cyanosis, no edema  Other:     Medications     HEParin 800 Units/hr (09/17/18 0812)     - MEDICATION INSTRUCTIONS -       - MEDICATION INSTRUCTIONS -       - MEDICATION INSTRUCTIONS -       Reason ACE/ARB/ARNI order not selected       sodium chloride 150 mL/hr at 09/17/18 0728       amLODIPine  10 mg Oral Daily     aspirin  325 mg Oral Daily     atorvastatin  40 mg Oral Daily     insulin aspart  1-7 Units Subcutaneous TID AC     insulin aspart  1-5 Units Subcutaneous At Bedtime     losartan  25 mg Oral Daily     metoprolol tartrate  25 mg Oral BID     omeprazole  20 mg Oral Daily     sodium chloride (PF)  3 mL Intracatheter Q8H     ticagrelor  90 mg Oral BID       Data     Recent Labs  Lab 09/17/18  0550 09/16/18  0530 09/16/18  0005 09/15/18  1832  09/14/18  1818   WBC 7.4 8.6  --   --   --  9.6   HGB 14.2 14.5  --   --   --  13.9   MCV 87 87  --   --   --  89    153  --   --   --  182   INR  --   --   --   --   --  1.16*    140  --   --   --  142   POTASSIUM 3.9 4.5  --   --   --  3.4   CHLORIDE 107 107  --   --   --  108   CO2 25 28  --   --   --  27   BUN 19 15  --   --   --  17   CR 0.90 0.93  --   --   --  0.92   ANIONGAP 8 5  --   --   --  7   HOWARD 8.6 8.7  --   --   --  8.9   * 161*  --   --   --   114*   ALBUMIN  --   --   --   --   --  3.9   PROTTOTAL  --   --   --   --   --  7.1   BILITOTAL  --   --   --   --   --  1.3   ALKPHOS  --   --   --   --   --  72   ALT  --   --   --   --   --  39   AST  --   --   --   --   --  28   TROPI  --  1.523* 1.730* 1.811*  < > 0.857*   < > = values in this interval not displayed.    Imaging:   No results found for this or any previous visit (from the past 24 hour(s)).

## 2018-09-17 NOTE — PLAN OF CARE
Problem: Patient Care Overview  Goal: Plan of Care/Patient Progress Review  VSS. Alert & oriented. Tele: SR w/ BBB; HR 60s-80s. Room air. Heparin 950 units. SBA. Denies pain. Pt NPO since midnight. Plan: angio today . Will continue to monitor.

## 2018-09-18 ENCOUNTER — APPOINTMENT (OUTPATIENT)
Dept: OCCUPATIONAL THERAPY | Facility: CLINIC | Age: 63
End: 2018-09-18
Attending: INTERNAL MEDICINE
Payer: COMMERCIAL

## 2018-09-18 VITALS
OXYGEN SATURATION: 98 % | HEART RATE: 71 BPM | SYSTOLIC BLOOD PRESSURE: 143 MMHG | DIASTOLIC BLOOD PRESSURE: 69 MMHG | BODY MASS INDEX: 23.11 KG/M2 | TEMPERATURE: 98.5 F | WEIGHT: 143.2 LBS | RESPIRATION RATE: 16 BRPM

## 2018-09-18 LAB
ANION GAP SERPL CALCULATED.3IONS-SCNC: 6 MMOL/L (ref 3–14)
BUN SERPL-MCNC: 12 MG/DL (ref 7–30)
CALCIUM SERPL-MCNC: 8.6 MG/DL (ref 8.5–10.1)
CHLORIDE SERPL-SCNC: 107 MMOL/L (ref 94–109)
CO2 SERPL-SCNC: 27 MMOL/L (ref 20–32)
CREAT SERPL-MCNC: 0.88 MG/DL (ref 0.66–1.25)
ERYTHROCYTE [DISTWIDTH] IN BLOOD BY AUTOMATED COUNT: 12.2 % (ref 10–15)
GFR SERPL CREATININE-BSD FRML MDRD: 87 ML/MIN/1.7M2
GLUCOSE BLDC GLUCOMTR-MCNC: 154 MG/DL (ref 70–99)
GLUCOSE BLDC GLUCOMTR-MCNC: 155 MG/DL (ref 70–99)
GLUCOSE BLDC GLUCOMTR-MCNC: 159 MG/DL (ref 70–99)
GLUCOSE BLDC GLUCOMTR-MCNC: 338 MG/DL (ref 70–99)
GLUCOSE SERPL-MCNC: 161 MG/DL (ref 70–99)
HCT VFR BLD AUTO: 38.2 % (ref 40–53)
HGB BLD-MCNC: 13.3 G/DL (ref 13.3–17.7)
KCT BLD-ACNC: 270 SEC (ref 75–150)
KCT BLD-ACNC: 274 SEC (ref 75–150)
KCT BLD-ACNC: 428 SEC (ref 75–150)
MCH RBC QN AUTO: 30.2 PG (ref 26.5–33)
MCHC RBC AUTO-ENTMCNC: 34.8 G/DL (ref 31.5–36.5)
MCV RBC AUTO: 87 FL (ref 78–100)
PLATELET # BLD AUTO: 154 10E9/L (ref 150–450)
POTASSIUM SERPL-SCNC: 4.2 MMOL/L (ref 3.4–5.3)
RBC # BLD AUTO: 4.4 10E12/L (ref 4.4–5.9)
SODIUM SERPL-SCNC: 140 MMOL/L (ref 133–144)
WBC # BLD AUTO: 6.5 10E9/L (ref 4–11)

## 2018-09-18 PROCEDURE — 25000132 ZZH RX MED GY IP 250 OP 250 PS 637: Performed by: INTERNAL MEDICINE

## 2018-09-18 PROCEDURE — 97165 OT EVAL LOW COMPLEX 30 MIN: CPT | Mod: GO

## 2018-09-18 PROCEDURE — 36415 COLL VENOUS BLD VENIPUNCTURE: CPT | Performed by: HOSPITALIST

## 2018-09-18 PROCEDURE — 00000146 ZZHCL STATISTIC GLUCOSE BY METER IP

## 2018-09-18 PROCEDURE — 99239 HOSP IP/OBS DSCHRG MGMT >30: CPT | Performed by: HOSPITALIST

## 2018-09-18 PROCEDURE — 85027 COMPLETE CBC AUTOMATED: CPT | Performed by: HOSPITALIST

## 2018-09-18 PROCEDURE — 25000132 ZZH RX MED GY IP 250 OP 250 PS 637: Performed by: HOSPITALIST

## 2018-09-18 PROCEDURE — 40000133 ZZH STATISTIC OT WARD VISIT

## 2018-09-18 PROCEDURE — 25000132 ZZH RX MED GY IP 250 OP 250 PS 637: Performed by: NURSE PRACTITIONER

## 2018-09-18 PROCEDURE — 97110 THERAPEUTIC EXERCISES: CPT | Mod: GO

## 2018-09-18 PROCEDURE — 99232 SBSQ HOSP IP/OBS MODERATE 35: CPT | Performed by: INTERNAL MEDICINE

## 2018-09-18 PROCEDURE — 80048 BASIC METABOLIC PNL TOTAL CA: CPT | Performed by: HOSPITALIST

## 2018-09-18 RX ORDER — ATORVASTATIN CALCIUM 40 MG/1
40 TABLET, FILM COATED ORAL DAILY
Qty: 30 TABLET | Refills: 0 | Status: SHIPPED | OUTPATIENT
Start: 2018-09-19 | End: 2018-12-05

## 2018-09-18 RX ORDER — NITROGLYCERIN 0.4 MG/1
TABLET SUBLINGUAL
Qty: 50 TABLET | Refills: 0 | Status: SHIPPED | OUTPATIENT
Start: 2018-09-18 | End: 2018-12-05

## 2018-09-18 RX ORDER — CARVEDILOL 6.25 MG/1
6.25 TABLET ORAL 2 TIMES DAILY WITH MEALS
Qty: 60 TABLET | Refills: 0 | Status: SHIPPED | OUTPATIENT
Start: 2018-09-18 | End: 2018-10-15

## 2018-09-18 RX ORDER — CARVEDILOL 6.25 MG/1
6.25 TABLET ORAL 2 TIMES DAILY WITH MEALS
Status: DISCONTINUED | OUTPATIENT
Start: 2018-09-18 | End: 2018-09-18 | Stop reason: HOSPADM

## 2018-09-18 RX ADMIN — CARVEDILOL 6.25 MG: 6.25 TABLET, FILM COATED ORAL at 08:37

## 2018-09-18 RX ADMIN — ASPIRIN 81 MG: 81 TABLET, COATED ORAL at 08:21

## 2018-09-18 RX ADMIN — ATORVASTATIN CALCIUM 40 MG: 40 TABLET, FILM COATED ORAL at 08:21

## 2018-09-18 RX ADMIN — LOSARTAN POTASSIUM 25 MG: 25 TABLET ORAL at 08:21

## 2018-09-18 RX ADMIN — ACETAMINOPHEN 650 MG: 325 TABLET, FILM COATED ORAL at 13:00

## 2018-09-18 RX ADMIN — HYDROCODONE BITARTRATE AND ACETAMINOPHEN 2 TABLET: 5; 325 TABLET ORAL at 02:54

## 2018-09-18 RX ADMIN — TICAGRELOR 90 MG: 90 TABLET ORAL at 08:21

## 2018-09-18 RX ADMIN — INSULIN ASPART 1 UNITS: 100 INJECTION, SOLUTION INTRAVENOUS; SUBCUTANEOUS at 08:18

## 2018-09-18 RX ADMIN — OMEPRAZOLE 20 MG: 20 CAPSULE, DELAYED RELEASE ORAL at 08:20

## 2018-09-18 RX ADMIN — AMLODIPINE BESYLATE 10 MG: 10 TABLET ORAL at 08:20

## 2018-09-18 ASSESSMENT — PAIN DESCRIPTION - DESCRIPTORS
DESCRIPTORS: ACHING
DESCRIPTORS: ACHING

## 2018-09-18 ASSESSMENT — ACTIVITIES OF DAILY LIVING (ADL)
ADLS_ACUITY_SCORE: 10

## 2018-09-18 NOTE — PROGRESS NOTES
09/18/18 1147   Quick Adds   Type of Visit Initial Occupational Therapy Evaluation   Living Environment   Lives With spouse   Living Arrangements house   Home Accessibility ramps present at home   Number of Stairs to Enter Home 3   Self-Care   Regular Exercise no   Activity/Exercise/Self-Care Comment Patient works driving truck and as a .    General Information   Onset of Illness/Injury or Date of Surgery - Date 09/14/18   Referring Physician Alin   Patient/Family Goals Statement Go home.   Additional Occupational Profile Info/Pertinent History of Current Problem Admitted with chest pain. Dx with NSTEMI, s/p CAYLA to RCA x1, diag x2. Trops peaked at 2.49.   Precautions/Limitations no known precautions/limitations   General Info Comments Patient has a hematoma LRA.    Cognitive Status Examination   Cognitive Comment No cognitive deficits noted. Patient is able to state why he's here.   Visual Perception   Visual Perception Wears glasses   Pain Assessment   Patient Currently in Pain Yes, see Vital Sign flowsheet   Range of Motion (ROM)   ROM Comment Patient's L wrist is in post angio arm board due to hematoma, otherwise BUEs WNL.    Balance   Balance Comments Up I'ly in room.   Activities of Daily Living Analysis   Impairments Contributing to Impaired Activities of Daily Living (decreased exercise tolerance)   General Therapy Interventions   Planned Therapy Interventions home program guidelines;progressive activity/exercise;risk factor education   Clinical Impression   Criteria for Skilled Therapeutic Interventions Met yes, treatment indicated   OT Diagnosis Decreased exercise tolerance   Influenced by the following impairments need for cardiac education, monitored exercise   Assessment of Occupational Performance 1-3 Performance Deficits   Identified Performance Deficits exercise   Clinical Decision Making (Complexity) Low complexity   Predicted Duration of Therapy Intervention (days/wks) one visit    Anticipated Discharge Disposition Home with Outpatient Therapy   Risks and Benefits of Treatment have been explained. Yes   Patient, Family & other staff in agreement with plan of care Yes   Total Evaluation Time   Total Evaluation Time (Minutes) 10

## 2018-09-18 NOTE — PLAN OF CARE
Problem: Patient Care Overview  Goal: Plan of Care/Patient Progress Review  Discharge Planner OT   Patient plan for discharge: Home.  Current status: Eval complete and treatment initiated. Patient able to exercise for 15 minutes at 1.2 mph with stable CV response.  Barriers to return to prior living situation: None.  Recommendations for discharge: Home with OP CR.  Rationale for recommendations: Would benefit from continued monitored exercise.       Entered by: Ileana Comer 09/18/2018 12:56 PM

## 2018-09-18 NOTE — PROGRESS NOTES
12 hour RN.  Pt A/Ox4, but is forgetful.  Wife at bedside during the night.  Transfers A1.  VSS.  Tele SR with BBB.  C/o pain to left wrist/forearm near TR band.  IV site was near this area  was infiltrated.  IV removed.  CMS intact.  Hydocodone/ice was given for comfort with some relief.  Edema noted to left forearm.  TR band off 2130.  LS clear.  Denies SOB.  Bedtime BS was 172 & 338.  MD updated on  & MD stated to cover BS for HS sliding scale.  Pt voiding w/o difficulty.

## 2018-09-18 NOTE — PROGRESS NOTES
Care Coordination:    Dr. Aguilar requests appointment with cardiology within one week.  Called clinics, the earliest available is October 1.  Notified Dr. Aguilar.  Scheduled:     Scheduled RUTH:    Oct 01, 2018  3:00 PM CDT   Return Visit with ABILIO Tadeo CNP   [facility] ([facility])     Also scheduled PCP:     Sep 20, 2018 10:00 AM CDT   Office Visit with Kathya Iglesias CNP   [facility] ([facility])                 used followup order to schedule MD return:     Dec 05, 2018 11:00 AM CST   Return Visit with Hortencia Aguilar MD   [facility] ([facility])       Reviewed followup appointment plan with pt and spouse.  They are aware and agree with the scheduled appointments.       Ruby Hernandez RN, BSN  [facility] Care Coordinator   Mobile Phone: [phone]

## 2018-09-18 NOTE — PROGRESS NOTES
[facility]    Cardiology Progress Note  Will follow up at [place] with Cards(lives in [place])    Date of Service: 09/18/2018     Assessment & Plan   Js ESTHER Jones is a 63 year old male with past medical history of HTN, dyslipidemia, type 2 diabetes, former smoker with 30 pack year hx,  FH cad(dad had CABG at 60,sister in her 50's)  This patient was admitted on 9/14/2018 with symptoms of chest pain with trop elevation/nonstemi    1. 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  May restart metformin in 48 hours from now  Paperwork completed for wife to be off to assist until Sunday--then return to work    2. HTN-borderline control  Metoprolol/losartan/amlodipine  Plan:  Stop metoprolol  Start Coreg 6.25mg bid -more metabolically neutral and better antihypertensive  Titrate as OP    3. Diabetes A1C 6.8%  Hold metformin until thursday    4. dyslipidemia-mixed  lipitor 40mg daily--increased from 20mg daily    LDL 79  HDL  Low 31    Lipid panel in 8 weeks    Kelly Case, MSN, APRN, CNP  [facility]    Interval History   No substernal chest pain; no sob  Left forearm quite tender and tight/painful    Review of Systems:  The Review of Systems is negative other than noted in the HPI    Physical Exam   Temp: 97.2  F (36.2  C) Temp src: Oral BP: 135/71 Pulse: 71 Heart Rate: 64 Resp: 14 SpO2: 96 % O2 Device: None (Room air)    Vitals:    09/16/18 0500 09/17/18  0357 09/18/18 0500   Weight: 63.3 kg (139 lb 8 oz) 64.1 kg (141 lb 4.8 oz) 65 kg (143 lb 3.2 oz)     Vital Signs with Ranges  Temp:  [97.2  F (36.2  C)-98.2  F (36.8  C)] 97.2  F (36.2  C)  Pulse:  [67-75] 71  Heart Rate:  [58-79] 64  Resp:  [14-18] 14  BP: (127-149)/(71-94) 135/71  SpO2:  [91 %-98 %] 96 %  I/O last 3 completed shifts:  In: 1530 [P.O.:480; I.V.:750; IV Piggyback:300]  Out: 2290 [Urine:2290]    Constitutional     alert and oriented, in no acute distress.     Skin     warm and dry to touch    ENT     no pallor or cyanosis    Neck    Supple, JVP normal, no carotid bruit    Chest     no tenderness to palpation     Lungs  clear to auscultation     Cardiac  regular rhythm, S1 normal, split S2 normal, No S3, no murmurs, no rubs    Abdomen     abdomen soft, bowel sounds normoactive, no hepatosplenomegaly    Extremities and Back     no clubbing, cyanosis. No edema observed.  No femoral bruits; left forearm warm, tender/tight. Hand and fingers stiff--looks like deep hematoma      Neurological     no gross motor deficits noted, affect appropriate, oriented to time, person and place.        Medications     Continuing ACE inhibitor/ARB/ARNI from home medication list OR ACE inhibitor/ARB order already placed during this visit       - MEDICATION INSTRUCTIONS -       - MEDICATION INSTRUCTIONS -       - MEDICATION INSTRUCTIONS -       - MEDICATION INSTRUCTIONS -       - MEDICATION INSTRUCTIONS -       Percutaneous Coronary Intervention orders placed (this is information for BPA alerting)         amLODIPine  10 mg Oral Daily     aspirin  81 mg Oral Daily     atorvastatin  40 mg Oral Daily     insulin aspart  1-7 Units Subcutaneous TID AC     insulin aspart  1-5 Units Subcutaneous At Bedtime     losartan  25 mg Oral Daily     metoprolol tartrate  25 mg Oral BID     omeprazole  20 mg Oral Daily     ticagrelor  90 mg Oral BID          Data:     ROUTINE IP LABS (Last four results)  BMP    Recent Labs  Lab 09/18/18  8155  09/17/18  0550 09/16/18  0530 09/14/18  1818    140 140 142   POTASSIUM 4.2 3.9 4.5 3.4   CHLORIDE 107 107 107 108   HOWARD 8.6 8.6 8.7 8.9   CO2 27 25 28 27   BUN 12 19 15 17   CR 0.88 0.90 0.93 0.92   * 166* 161* 114*     CHOLESTEROL/HEPATIC    Recent Labs  Lab 09/17/18  0550 09/15/18  0546 09/14/18  1818   CHOL 131 142  --    TRIG 208* 161*  --    LDL 54 79  --    HDL 35* 31*  --    ALT  --   --  39   AST  --   --  28     CBC    Recent Labs  Lab 09/18/18  0555 09/17/18  0550 09/16/18  0530 09/14/18  1818   WBC 6.5 7.4 8.6 9.6   RBC 4.40 4.74 4.70 4.58   HGB 13.3 14.2 14.5 13.9   HCT 38.2* 41.3 40.9 40.6   MCV 87 87 87 89   MCH 30.2 30.0 30.9 30.3   MCHC 34.8 34.4 35.5 34.2   RDW 12.2 12.2 12.2 11.7    152 153 182     TROP:     Recent Labs  Lab 09/16/18  0530 09/16/18  0005 09/15/18  1832 09/15/18  1140 09/15/18  0546   TROPI 1.523* 1.730* 1.811* 2.499* 2.491*      BNP:      Recent Labs  Lab 09/14/18 1818   NTBNPI 1327*     INR    Recent Labs  Lab 09/14/18  1818   INR 1.16*     TSH   Date Value Ref Range Status   03/09/2018 1.02 0.40 - 4.00 mU/L Final       EKG results:  Reviewed if available     Imaging:  No results found for this or any previous visit (from the past 24 hour(s)).  Telemetry:      Sinus LBBB

## 2018-09-18 NOTE — DISCHARGE INSTRUCTIONS
A follow-up appointment was scheduled for you [see above].  It is very important to go to it--bring these papers and your insurance card with you.  At the visit, talk about your hospital stay.  Tell your doctor how you feel.  Show your new list of medications.  Your doctor will update records, make sure you are still doing OK, and decide if any tests or medication changes are needed.      Cardiac Angiogram Discharge Instructions - Radial    After you go home:      Have an adult stay with you until tomorrow.    Drink extra fluids for 2 days.    You may resume your normal diet.    No smoking       For 24 hours - due to the sedation you received:    Relax and take it easy.    Do NOT make any important or legal decisions.    Do NOT drive or operate machines at home or at work.    Do NOT drink alcohol.    Care of Wrist Puncture Site:      For the first 24 hrs - check the puncture site every 1-2 hours while awake.    It is normal to have soreness at the puncture site and mild tingling in your hand for up to 3 days.    Remove the bandaid after 24 hours. If there is minor oozing, apply another bandaid and remove it after 12 hours.    You may shower tomorrow.  Do NOT take a bath, or use a hot tub or pool for at least 3 days. Do NOT scrub the site. Do not use lotion or powder near the puncture site.           Activity:        For 2 days:     do not use your hand or arm to support your weight (such as rising from a chair)     do not bend your wrist (such as lifting a garage door).    do not lift more than 5 pounds or exercise your arm (such as tennis, golf or bowling).    Do NOT do any heavy activity such as exercise, lifting, or straining.     Bleeding:      If you start bleeding from the site in your wrist, sit down and press firmly on/above the site for 10 minutes.     Once bleeding stops, keep arm still for 2 hours.     Call [facility] as soon as you can.       Call 911 right away if you have heavy bleeding or bleeding  that does not stop.      Medicines:      If you are taking an antiplatelet medication such as Plavix, Brilinta or Effient, do not stop taking it until you talk to your cardiologist.        If you are on Metformin (Glucophage), do not restart it until you have blood tests (within 2 to 3 days after discharge).  After you have your blood drawn, you may restart the Metformin.     Take your medications, including blood thinners, unless your provider tells you not to.  If you take Coumadin (Warfarin), have your INR checked by your provider in  3-5 days. Call your clinic to schedule this.    If you have stopped any medicines, check with your provider about when to restart them.    Follow Up Appointments:      Follow up with [facility] Nurse Practitioner at [facility] of patient preference in 7-10 days.    Call the clinic if:      You have a large or growing hard lump around the site.    The site is red, swollen, hot or tender.    Blood or fluid is draining from the site.    You have chills or a fever greater than 101 F (38 C).    Your arm feels numb, cool or changes color.    You have hives, a rash or unusual itching.    Any questions or concerns.          [facility] [place]:    [phone] [facility] (7 days a week)

## 2018-09-18 NOTE — PROGRESS NOTES
Pt. Arrived back to until around 18:20. VSS. TR band at 15 cc to left wrist. Pt. Reported CMS Intact. Unable to feel pulse due to TR band placement. Slightly cool. Pt. Started developing numbness and tingling with 8/10 pain. Removed 2 cc of air to TR band and provided PRN Tylenol.

## 2018-09-18 NOTE — DISCHARGE SUMMARY
[facility]    Discharge Summary  Hospitalist    Date of Admission:  9/14/2018  Date of Discharge:  9/18/2018  1:30 PM  Discharging Provider: Fady Arriaga,     Discharge Diagnoses   NSTEMI s/p CAYLA of the RCA and diagonal lesions      History of Present Illness   Js Jones is an 63 year old male who presented with chest pain and troponin elevation    Hospital Course   Js Jones was admitted on 9/14/2018.  The following problems were addressed during his hospitalization:    Principal Problem:    NSTEMI (non-ST elevated myocardial infarction) (H)  Active Problems:    Type 2 diabetes mellitus without complication, without long-term current use of insulin (H)    Benign essential hypertension, goal <140/90    Hyperlipidemia LDL goal <100    Gastroesophageal reflux disease without esophagitis    LBBB (left bundle branch block)      NSTEMI s/p CAYLA of the RCA and diagonal lesions  LBBB on EKG, no previous EKG for comparison  5 day PTA onset of typical chest pain while cutting wood, intermittent, improved with rest  troponins continue to increase to 2.5   Transferred from [facility] to [facility] for further cardiology evaluation  Plan  - continue ticagrelor  - sent rx for nitroglycerin  - continues aspirin 81 mg, losartan 25 mg, and metoprolol 12.5 mg bid  - increase atorvastatin to 40 mg  - cardiology follow up planned  - I voiced my preference that the patient fill their prescriptions at the discharge pharmacy to having his antiplt in hand. However, they declined due to their drive today and wish to avoid traffic and so his rx were sent to the local retail pharmacy. I discussed with patient and his wife (he tends to be forgetful) to take his medications tonight, and the nurse verified that the ticagrelor is available at the pharmacy with acceptable copay (coupons were provided as well). I asked nursing to follow up call tonight to verify that they picked up the medication.        HTN  PTA: amlo 10  mg, losartan 25 mg  Plan  - continue amlo, losartan  - BB metoprolol 12.5 mg bid has been added        DM2  hgb a1c of 6.8.   PTA: glipizide 10 mg xl every day, metofrmin 1000, 1500, 1000 mg daily  Plan  - home on PTAs     HLD  - increased the atorvastatin to 40 mg daily    # Discharge Pain Plan:   - Patient currently has NO PAIN and is not being prescribed pain medications on discharge.      Fady Arriaga,     Significant Results and Procedures   none    Pending Results   These results will be followed up by none  Unresulted Labs Ordered in the Past 30 Days of this Admission     No orders found from 7/16/2018 to 9/15/2018.          Code Status   Full Code       Primary Care Physician   Kathya Iglesias    Physical Exam   Temp: 98.5  F (36.9  C) Temp src: Oral BP: 144/75 Pulse: 71 Heart Rate: 73 Resp: 16 SpO2: 98 % O2 Device: None (Room air)    Vitals:    09/16/18 0500 09/17/18 0357 09/18/18 0500   Weight: 63.3 kg (139 lb 8 oz) 64.1 kg (141 lb 4.8 oz) 65 kg (143 lb 3.2 oz)     Vital Signs with Ranges  Temp:  [97.2  F (36.2  C)-98.6  F (37  C)] 98.5  F (36.9  C)  Pulse:  [67-75] 71  Heart Rate:  [58-73] 73  Resp:  [14-18] 16  BP: (114-149)/(58-94) 144/75  SpO2:  [91 %-98 %] 98 %  I/O last 3 completed shifts:  In: 1530 [P.O.:480; I.V.:750; IV Piggyback:300]  Out: 2290 [Urine:2290]    Constitutional: Awake, alert, cooperative, no apparent distress.  Eyes: Conjunctiva and pupils examined and normal.  HEENT: Moist mucous membranes, normal dentition.  Respiratory: Clear to auscultation bilaterally, no crackles or wheezing.  Cardiovascular: Regular rate and rhythm, normal S1 and S2, and no murmur noted.  GI: Soft, non-distended, non-tender, normal bowel sounds.  Lymph/Hematologic: No anterior cervical or supraclavicular adenopathy.  Skin: No rashes, no cyanosis, no edema.  Musculoskeletal: No joint swelling, erythema or tenderness.  Neurologic: Cranial nerves 2-12 intact, normal strength and  sensation.  Psychiatric: Alert, oriented to person, place and time, no obvious anxiety or depression.    Discharge Disposition   Discharged to home  Condition at discharge: Stable    Consultations This Hospital Stay   CARDIAC REHAB IP CONSULT  PHARMACY TO DOSE HEPARIN  CARDIOLOGY IP CONSULT  CARDIAC REHAB IP CONSULT  NUTRITION SERVICES ADULT IP CONSULT  PHARMACY IP CONSULT  PHARMACY IP CONSULT  SMOKING CESSATION PROGRAM IP CONSULT    Time Spent on this Encounter   Fady GARCIAS, personally saw the patient today and spent greater than 30 minutes discharging this patient.    Discharge Orders     Basic metabolic panel     Lipid Profile     CARDIAC REHAB REFERRAL     Follow-Up with Cardiac Advanced Practice Provider     Follow-Up with Cardiologist     Cardiac Rehab Referral     Reason for your hospital stay   HEART ATTACK     Follow-up and recommended labs and tests    Follow up with primary care provider, Kathya Iglesias, within 7 days to evaluate medication change.  No follow up labs or test are needed.    Follow up with cardiologist as scheduled.     Activity   Your activity upon discharge: activity as tolerated     Full Code     Diet   Follow this diet upon discharge: Orders Placed This Encounter     Combination Diet Low Saturated Fat Na <2400mg Diet       Discharge Medications   Current Discharge Medication List      START taking these medications    Details   carvedilol (COREG) 6.25 MG tablet Take 1 tablet (6.25 mg) by mouth 2 times daily (with meals)  Qty: 60 tablet, Refills: 0    Associated Diagnoses: NSTEMI (non-ST elevated myocardial infarction) (H)      nitroGLYcerin (NITROSTAT) 0.4 MG sublingual tablet For chest pain place 1 tablet under the tongue every 5 minutes for 3 doses. If symptoms persist 5 minutes after 1st dose call 911.  Qty: 50 tablet, Refills: 0    Associated Diagnoses: NSTEMI (non-ST elevated myocardial infarction) (H)      ticagrelor (BRILINTA) 90 MG tablet Take 1 tablet (90 mg)  by mouth 2 times daily  Qty: 60 tablet, Refills: 0    Associated Diagnoses: NSTEMI (non-ST elevated myocardial infarction) (H)         CONTINUE these medications which have CHANGED    Details   atorvastatin (LIPITOR) 40 MG tablet Take 1 tablet (40 mg) by mouth daily  Qty: 30 tablet, Refills: 0    Associated Diagnoses: NSTEMI (non-ST elevated myocardial infarction) (H)         CONTINUE these medications which have NOT CHANGED    Details   amLODIPine (NORVASC) 10 MG tablet Take 1 tablet (10 mg) by mouth daily  Qty: 90 tablet, Refills: 3    Associated Diagnoses: Benign essential hypertension      aspirin 81 MG tablet Take 1 tablet (81 mg) by mouth daily  Qty: 90 tablet, Refills: 3    Associated Diagnoses: HTN (hypertension); Type 2 diabetes, HbA1C goal < 8% (H)      GLIPIZIDE XL 10 MG 24 hr tablet TAKE 1 TABLET BY MOUTH ONCE DAILY *  SCHEDULE  OFFICE  VISIT  FOR  REFILL  *  Qty: 30 tablet, Refills: 0    Associated Diagnoses: Type 2 diabetes mellitus without complication, without long-term current use of insulin (H)      losartan (COZAAR) 25 MG tablet Take 1 tablet (25 mg) by mouth daily  Qty: 90 tablet, Refills: 3    Associated Diagnoses: Benign essential hypertension      Melatonin 10 MG TABS Take 10 mg by mouth nightly as needed for sleep      metFORMIN (GLUCOPHAGE) 1000 MG tablet TAKE 1 TABLET BY MOUTH WITH BREAKFAST TAKE ONE  HALF  TABLET  WITH  LUNCH  AND TAKE  ONE  TABLET  WITH  DINNER  Qty: 225 tablet, Refills: 0    Associated Diagnoses: Type 2 diabetes mellitus without complication, without long-term current use of insulin (H)      MULTIPLE VITAMIN PO Take 1 tablet by mouth      Omega-3 Fatty Acids (OMEGA-3 FISH OIL PO) Take 1,200 mg by mouth 2 times daily (with meals)      omeprazole (PRILOSEC OTC) 20 MG tablet Take 1 tablet (20 mg) by mouth daily  Qty: 90 tablet, Refills: 1    Associated Diagnoses: Gastroesophageal reflux disease without esophagitis      blood glucose monitoring (NO BRAND SPECIFIED) test  strip Use to test blood sugar 3 times daily or as directed. Box of 100 each.  Qty: 3 Box, Refills: 3    Comments: Uses the one touch meter  Associated Diagnoses: Type 2 diabetes mellitus with hyperglycemia, without long-term current use of insulin (H)      Blood Glucose Monitoring Suppl W/DEVICE KIT 1 each daily  Qty: 1 kit, Refills: 0    Comments: One touch  Associated Diagnoses: Type 2 diabetes, HbA1C goal < 8% (H)      ONE TOUCH LANCETS MISC 1 lancet 3 times daily  Qty: 200 each, Refills: 5    Associated Diagnoses: Type 2 diabetes, HbA1C goal < 8% (H)         STOP taking these medications       ACE NOT PRESCRIBED, INTENTIONAL, Comments:   Reason for Stopping:             Allergies   Allergies   Allergen Reactions     Lisinopril Cough     Data   Most Recent 3 CBC's:  Recent Labs   Lab Test  09/18/18   0555  09/17/18   0550  09/16/18   0530   WBC  6.5  7.4  8.6   HGB  13.3  14.2  14.5   MCV  87  87  87   PLT  154  152  153      Most Recent 3 BMP's:  Recent Labs   Lab Test  09/18/18   0555  09/17/18   0550  09/16/18   0530   NA  140  140  140   POTASSIUM  4.2  3.9  4.5   CHLORIDE  107  107  107   CO2  27  25  28   BUN  12  19  15   CR  0.88  0.90  0.93   ANIONGAP  6  8  5   HOWARD  8.6  8.6  8.7   GLC  161*  166*  161*     Most Recent 2 LFT's:  Recent Labs   Lab Test  09/14/18   1818  07/14/16   0900   AST  28  42   ALT  39  66   ALKPHOS  72  88   BILITOTAL  1.3  0.8     Most Recent INR's and Anticoagulation Dosing History:  Anticoagulation Dose History     Recent Dosing and Labs Latest Ref Rng & Units 9/14/2018    INR 0.86 - 1.14 1.16(H)        Most Recent 3 Troponin's:  Recent Labs   Lab Test  09/16/18   0530  09/16/18   0005  09/15/18   1832   TROPI  1.523*  1.730*  1.811*     Most Recent Cholesterol Panel:  Recent Labs   Lab Test  09/17/18   0550   CHOL  131   LDL  54   HDL  35*   TRIG  208*     Most Recent 6 Bacteria Isolates From Any Culture (See EPIC Reports for Culture Details):No lab results found.  Most  Recent TSH, T4 and A1c Labs:  Recent Labs   Lab Test  09/15/18   0546   03/09/18   0815   TSH   --    --   1.02   A1C  6.8*   < >  6.9*    < > = values in this interval not displayed.     Results for orders placed or performed during the hospital encounter of 09/14/18   Chest  XR, 1 view portable    Narrative    CHEST ONE VIEW PORTABLE   9/14/2018 6:31 PM     HISTORY: Chest pain.    COMPARISON: None.      Impression    IMPRESSION: No radiographic evidence of acute chest abnormality.    NAT DURAN MD

## 2018-09-18 NOTE — PROGRESS NOTES
Progress note:    Contacted by the primary team with concern for a hematoma of the forearm after a left radial PCI yesterday. On evaluation, patient reports that he has some tenderness in the left forearm above the arteriotomy site. On palpation there is a firm hematoma palpable just below the antecubital fossa extending distally to the wrist. The wrist is warm with 2+ pulses in the radial and ulnar arteries. There are no motor or sensory deficits in the hand or fingers. There is no active extravasation and the hematoma appears stable. The patient and family report that it has substantially improved since last night. We will plan for supportive care with heat packs and over the counter pain control (Tylenol) PRN.     Devendra Ogden  Interventional cardiology fellow  [phone]

## 2018-09-18 NOTE — PLAN OF CARE
Problem: Patient Care Overview  Goal: Plan of Care/Patient Progress Review  Outcome: Adequate for Discharge Date Met: 09/18/18  A&OX4, forgetful,VSS, on room air. Denies SOB/CP.Tele-SR. On low sat fat diet,  one unit novolog administered. Up with SBA. CMS intact on the left arm.    0130 Pt. Discharged with spouse.  Belonging returned to patient.

## 2018-09-19 ENCOUNTER — TELEPHONE (OUTPATIENT)
Dept: CARDIOLOGY | Facility: CLINIC | Age: 63
End: 2018-09-19

## 2018-09-19 ENCOUNTER — TELEPHONE (OUTPATIENT)
Dept: FAMILY MEDICINE | Facility: CLINIC | Age: 63
End: 2018-09-19

## 2018-09-19 NOTE — TELEPHONE ENCOUNTER
Patient was evaluated by cardiology while inpatient for intermittent chest pain, that would improve with rest. 9/17/18 PCI with CAYLA x 2 to mid RCA and diagonal lesions. Called patient to discuss any post hospital d/c questions he may have, review medication changes, and confirm f/u appts. RN confirmed with patient that they were d/c with their antiplatelet Brilinta. Patient denied any questions regarding new medications or changes with their PTA medications. Patient denied any SOB, chest pain, or light headedness. LRA cardiac cath site is without bleeding, increased swelling, redness or tenderness. Pt did develop left forearm hematoma, but states is slowly resolving, denies any N/T to left arm, hand is warm and pink. States will have re-evaluated by PMD tomorrow during scheduled OV. Denies fever. RN confirmed with patient that he has an apt scheduled on 10/1/18 with CHENG Carolina Caldwell at our [facility]. Cardiac rehab scheduled on 9/25/18.  Patient advised to call clinic with any cardiac related questions or concerns prior to this cheng't. Patient verbalized understanding and agreed with plan. MIKAELA Navarro RN.

## 2018-09-19 NOTE — TELEPHONE ENCOUNTER
"Hospital/TCU/ED for chronic condition Discharge Protocol    \"Hi, my name is Tracey Evans, a registered nurse, and I am calling from [facility].  I am calling to follow up and see how things are going for you after your recent emergency visit/hospital/TCU stay.\"    Tell me how you are doing now that you are home?\" Feeling better      Discharge Instructions    \"Let's review your discharge instructions.  What is/are the follow-up recommendations?  Pt. Response: see PCP tomorrow    \"Has an appointment with your primary care provider been scheduled?\"   Yes. (confirm)    \"When you see the provider, I would recommend that you bring your medications with you.\"    Medications    \"Tell me what changed about your medicines when you discharged?\"    Changes to chronic meds?    2 or more - Epic MTM referral needed    \"What questions do you have about your medications?\"    None     New diagnoses of heart failure, COPD, diabetes, or MI?    Yes - Care Coordination Referral needed              Medication reconciliation completed? Yes  Was MTM referral placed (*Make sure to put transitions as reason for referral)?   No    Call Summary    \"What questions or concerns do you have about your recent visit and your follow-up care?\"     none    \"If you have questions or things don't continue to improve, we encourage you contact us through the main clinic number (give number).  Even if the clinic is not open, triage nurses are available 24/7 to help you.     We would like you to know that our clinic has extended hours (provide information).  We also have urgent care (provide details on closest location and hours/contact info)\"      \"Thank you for your time and take care!\"           "

## 2018-09-20 ENCOUNTER — OFFICE VISIT (OUTPATIENT)
Dept: FAMILY MEDICINE | Facility: CLINIC | Age: 63
End: 2018-09-20
Payer: COMMERCIAL

## 2018-09-20 ENCOUNTER — TELEPHONE (OUTPATIENT)
Dept: FAMILY MEDICINE | Facility: CLINIC | Age: 63
End: 2018-09-20

## 2018-09-20 VITALS
RESPIRATION RATE: 20 BRPM | HEIGHT: 66 IN | SYSTOLIC BLOOD PRESSURE: 128 MMHG | HEART RATE: 72 BPM | BODY MASS INDEX: 23.63 KG/M2 | DIASTOLIC BLOOD PRESSURE: 74 MMHG | OXYGEN SATURATION: 96 % | WEIGHT: 147 LBS | TEMPERATURE: 97.6 F

## 2018-09-20 DIAGNOSIS — Z12.11 COLON CANCER SCREENING: ICD-10-CM

## 2018-09-20 DIAGNOSIS — I21.4 NSTEMI (NON-ST ELEVATED MYOCARDIAL INFARCTION) (H): Primary | ICD-10-CM

## 2018-09-20 DIAGNOSIS — E11.9 TYPE 2 DIABETES MELLITUS WITHOUT COMPLICATION, WITHOUT LONG-TERM CURRENT USE OF INSULIN (H): Chronic | ICD-10-CM

## 2018-09-20 PROCEDURE — 99495 TRANSJ CARE MGMT MOD F2F 14D: CPT | Performed by: NURSE PRACTITIONER

## 2018-09-20 PROCEDURE — 99207 C FOOT EXAM  NO CHARGE: CPT | Performed by: NURSE PRACTITIONER

## 2018-09-20 NOTE — PROGRESS NOTES
SUBJECTIVE:   Js Jones is a 63 year old male who presents to clinic today for the following health issues:      Diabetes Follow-up      Patient is checking blood sugars: 2 times a week 180 average    Diabetic concerns: None     Symptoms of hypoglycemia (low blood sugar): shaky, very rare      Paresthesias (numbness or burning in feet) or sores: No     Date of last diabetic eye exam: 03/2018 [facility]    BP Readings from Last 2 Encounters:   09/20/18 128/74   09/18/18 143/69     Hemoglobin A1C (%)   Date Value   09/15/2018 6.8 (H)   06/15/2018 7.2 (H)     LDL Cholesterol Calculated (mg/dL)   Date Value   09/17/2018 54   09/15/2018 79       Hospital Follow-up Visit:    Hospital/Nursing Home/ Rehab Facility: [facility]  Date of Admission: 09/14/2018  Date of Discharge: 09/18/2018  Reason(s) for Admission: Chest pain NSTEMI            Problems taking medications regularly:  None       Medication changes since discharge: None       Problems adhering to non-medication therapy:  None    Summary of hospitalization:  [facility] discharge summary reviewed  Diagnostic Tests/Treatments reviewed.  Follow up needed: Cardiology  Other Healthcare Providers Involved in Patient s Care:         None  Update since discharge: improved.     Post Discharge Medication Reconciliation: discharge medications reconciled, continue medications without change.  Plan of care communicated with patient and family     Coding guidelines for this visit:  Type of Medical   Decision Making Face-to-Face Visit       within 7 Days of discharge Face-to-Face Visit        within 14 days of discharge   Moderate Complexity [id number]   High Complexity [id number]          HPI:   PCP:  Kathya Iglesias, [facility] [phone]    Patient Active Problem List   Diagnosis     Type 2 diabetes mellitus without complication, without long-term current use of insulin (H)     Benign essential hypertension, goal <140/90     Hyperlipidemia LDL  "goal <100     Gastroesophageal reflux disease without esophagitis     Unstable angina (H)     NSTEMI (non-ST elevated myocardial infarction) (H)     LBBB (left bundle branch block)     Current Outpatient Prescriptions   Medication     amLODIPine (NORVASC) 10 MG tablet     aspirin 81 MG tablet     atorvastatin (LIPITOR) 40 MG tablet     blood glucose monitoring (NO BRAND SPECIFIED) test strip     Blood Glucose Monitoring Suppl W/DEVICE KIT     carvedilol (COREG) 6.25 MG tablet     GLIPIZIDE XL 10 MG 24 hr tablet     losartan (COZAAR) 25 MG tablet     Melatonin 10 MG TABS     metFORMIN (GLUCOPHAGE) 1000 MG tablet     MULTIPLE VITAMIN PO     nitroGLYcerin (NITROSTAT) 0.4 MG sublingual tablet     Omega-3 Fatty Acids (OMEGA-3 FISH OIL PO)     omeprazole (PRILOSEC OTC) 20 MG tablet     ONE TOUCH LANCETS MISC     ticagrelor (BRILINTA) 90 MG tablet     No current facility-administered medications for this visit.        Health Maintenance Due   Topic Date Due     HIV SCREEN (SYSTEM ASSIGNED)  03/28/1973     EYE EXAM Q1 YEAR  06/01/2015     FALL RISK ASSESSMENT  03/28/2018     PNEUMOCOCCAL (1 of 2 - PCV13) 03/28/2018     AORTIC ANEURYSM SCREENING (SYSTEM ASSIGNED)  03/28/2018     FOOT EXAM Q1 YEAR  06/16/2018     MICROALBUMIN Q1 YEAR  06/16/2018     PHQ-2 Q1 YR  06/16/2018     INFLUENZA VACCINE (1) 09/01/2018     FIT Q1 YR  08/16/2018       Reviewed and updated:  Tobacco  Allergies  Meds  Med Hx  Surg Hx  Fam Hx  Soc Hx     ROS:  Constitutional, HEENT, cardiovascular, pulmonary, gi and gu systems are negative, except as otherwise noted.    PHYSICAL EXAM:   /74 (BP Location: Right arm, Patient Position: Sitting, Cuff Size: Adult Regular)  Pulse 72  Temp 97.6  F (36.4  C) (Tympanic)  Resp 20  Ht 5' 6\" (1.676 m)  Wt 147 lb (66.7 kg)  SpO2 96%  BMI 23.73 kg/m2  Body mass index is 23.73 kg/(m^2).  GENERAL APPEARANCE: healthy, alert and no distress  RESP: lungs clear to auscultation - no rales, rhonchi or " wheezes  CV: regular rates and rhythm, normal S1 S2, no S3 or S4 and no murmur, click or rub  MS: extremities normal- no gross deformities noted  SKIN: localized infiltration of left arm is going down per patient. Bruising stable, normal ROM of hand and normal cap refill to digits  DIABETIC FOOT EXAM: normal DP and PT pulses, no trophic changes or ulcerative lesions, normal sensory exam and normal monofilament exam  PSYCH: mentation appears normal, affect normal/bright- needs slow education    ASSESSMENT & PLAN:     [place] paperwork for wife 9/14/18- 9/28/18    1. NSTEMI (non-ST elevated myocardial infarction) (H)  Acute, improved  9/25/2018 Follow-up with Cardiology in [place]    2. Type 2 diabetes mellitus without complication, without long-term current use of insulin (H)  Chronic, stable  - FOOT EXAM  - Albumin Random Urine Quantitative with Creat Ratio: YOU CAN REQUEST THIS ON TUESDAY    3. Colon cancer screening  Screening  - Fecal colorectal cancer screen (FIT); Future: complete and mail in    Symptoms of a Heart Attack       A heart attack is also known as acute myocardial infarction, or AMI. It's an urgent message from your heart that it s starved for oxygen. When a clot blocks a blood vessel feeding the heart (coronary artery), oxygen-rich blood can t reach a part or all of your heart. Then tissues of the heart muscle start to die. This causes symptoms of a heart attack. The sooner you get to the hospital, the sooner treatment can start to help save your life and your heart.  Don t be afraid to call 911, even if you re not sure you are having a heart attack. If you don t know the cause of your symptoms, assume it s a heart attack. Play it safe and get medical help. Don't drive yourself to the emergency department.   If you think someone else is having a heart attack, call 911 instead of driving the person to the ER. The  may tell you to give the person aspirin while waiting for help to arrive.  If the dispatcher doesn t tell you to do this, don t give the person aspirin. Aspirin can be dangerous under certain circumstances.  Warning signs of a heart attack    Chest discomfort. Most heart attacks involve discomfort in the center of the chest that lasts more than a few minutes, or that goes away and comes back. It can feel like uncomfortable pressure, squeezing, burning, fullness, tightness, or pain. It is often described as something heavy sitting on your chest.    Discomfort in other areas of the upper body. Symptoms can include pain or discomfort in one or both arms, the back, neck, jaw or stomach.    Shortness of breath with or without chest discomfort.    Other signs may include breaking out in a cold sweat, nausea, or lightheadedness.  Note for women. Like men, women most commonly have chest pain or discomfort as a heart attack symptom. But women are somewhat more likely than men to have some of the other common symptoms, particularly shortness of breath, nausea, and vomiting, back pain, or jaw pain.  Older people may also have atypical symptoms. The symptoms include loss of consciousness (syncope), weakness, or confusion (delirium). These symptoms should be looked at right away. Ignoring them can lead to critical illness or death.  If you have diabetes, high blood sugar can damage nerves in your body over time. This may keep you from feeling pain caused by a heart problem, leading to a  silent  heart problem. If you don t feel symptoms, you are less able to get treatment right away. Talk to your healthcare provider about how to lower your risk for silent heart problems.  People who have had one heart attack are at risk for having another heart attack. Your provider may prescribe medicine such as nitroglycerin to take for chest pain. You may also need medicine to lower your heart rate and blood pressure to prevent angina and another heart attack. Remember to take any medicines your provider has given  as directed. Don't stop these medicines without speaking with him or her first.  Date Last Reviewed: 6/1/2016 2000-2017 [employer]. [address] [facility], [place] [address]. All rights reserved. This information is not intended as a substitute for professional medical care. Always follow your healthcare professional's instructions.        Discharge Instructions for Heart Attack  You have had a heart attack (acute myocardial infarction). A heart attack occurs when a vessel that sends blood to your heart suddenly becomes blocked. This causes your heart not to work as well as it should. Follow these guidelines for home care and lifestyle changes.  Home care    Take your medicines exactly as directed. Don t skip doses. Talk with your healthcare provider if your medicines aren't working for you. Together you can come up with another treatment plan.    Remember that recovery after a heart attack takes time. Plan to rest for at least 4 to 8 weeks while you recover. Then return to normal activity when your doctor says it s OK.    Ask your doctor about joining a heart rehabilitation program. This can help strengthen your heart and lungs and give you more energy and confidence.    Tell your doctor if you are feeling depressed. Feelings of sadness are common after a heart attack. But it is important to speak to someone or seek counseling if you are feeling overwhelmed by these feelings.    Call 911 right away if you have chest pain or pain that goes to your shoulder, neck, or back. Don't drive yourself to the hospital.    Ask your family members to learn CPR. This is an important skill that can save lives when it's needed.    Learn to take your own blood pressure and pulse. Keep a record of your results. Ask your doctor when you should seek emergency medical attention. He or she will tell you which blood pressure reading is dangerous.  Lifestyle changes  Your heart attack might have been caused by  cardiovascular disease. Your healthcare provider will work with you to make changes to your lifestyle. This will help the heart disease from getting worse. These changes will most likely be a combination of diet and exercise.  Diet  Your healthcare provider will tell you what changes you need to make to your diet. You may need to see a registered dietitian for help with these diet changes. These changes may include:    Cutting back on how much fat and cholesterol you eat    Cutting back on how much salt (sodium) you eat, especially if you have high blood pressure    Eating more fresh vegetables and fruits    Eating lean proteins such as fish, poultry, beans, and peas, and eating less red meat and processed meats    Using low-fat dairy products    Using vegetable and nut oils in limited amounts    Limiting how many sweets and processed foods such as chips, cookies, and baked goods you eat    Limiting how often you eat out. And when you do eat out, making better food choices.    Not eating fried or greasy foods, or foods high in saturated fat  Exercise  Your healthcare provider may tell you to get more exercise if you haven't been physically active. Depending on your case, your provider may recommend that you get moderate to vigorous physical activity for at least 40 minutes each day, and for at least 3 to 4 days each week. A few examples of moderate to vigorous activity include:    Walking at a brisk pace, about 3 to 4 miles per hour    Jogging or running    Swimming or water aerobics    Hiking    Dancing    Martial arts    Tennis    Riding a bicycle or stationary bike  Other changes  Your healthcare provider may also recommend that you:    Lose weight. If you are overweight or obese, your provider will work with you to lose extra pounds. Making diet changes and getting more exercise can help. A good goal is to lose your 10% of your body weight in one year.    Stop smoking. Sign up for a stop-smoking program to make  it more likely for you to quit for good. You can join a stop-smoking support group. Or ask your doctor about nicotine replacement products.    Learn to manage stress. Stress management techniques to help you deal with stress in your home and work life. This will help you feel better emotionally and ease the strain on your heart.  Follow-up  Make a follow-up appointment as directed.     Call 911  Call 911 right away if you have:    Chest pain that goes to your neck, jaw, back, or shoulder    Shortness of breath  When to call your healthcare provider  Call your healthcare provider right away if you have:    Lightheadedness, dizziness, or fainting    Feeling of irregular heartbeat or fast pulse   Date Last Reviewed: 10/1/2016    [id number] [employer]. [address] [place] [address], [place] [address]. All rights reserved. This information is not intended as a substitute for professional medical care. Always follow your healthcare professional's instructions.          Risks, benefits, side effects and rationale for treatment plan fully discussed with the patient and understanding expressed.    ALESSANDRO Bruno-BC  [facility]

## 2018-09-20 NOTE — MR AVS SNAPSHOT
After Visit Summary   9/20/2018    Js Jones    MRN: [id number]           Patient Information     Date Of Birth          1955        Visit Information        Provider Department      9/20/2018 10:00 AM Kathya Iglesias CNP [facility]        Today's Diagnoses     NSTEMI (non-ST elevated myocardial infarction) (H)    -  1    Type 2 diabetes mellitus without complication, without long-term current use of insulin (H)        Colon cancer screening          Care Instructions    1. NSTEMI (non-ST elevated myocardial infarction) (H)  Acute, improved  9/25/2018 Follow-up with Cardiology in [place]    2. Type 2 diabetes mellitus without complication, without long-term current use of insulin (H)  Chronic, stable  - FOOT EXAM  - Albumin Random Urine Quantitative with Creat Ratio: YOU CAN REQUEST THIS ON TUESDAY    3. Colon cancer screening  Screening  - Fecal colorectal cancer screen (FIT); Future: complete and mail in    Symptoms of a Heart Attack       A heart attack is also known as acute myocardial infarction, or AMI. It's an urgent message from your heart that it s starved for oxygen. When a clot blocks a blood vessel feeding the heart (coronary artery), oxygen-rich blood can t reach a part or all of your heart. Then tissues of the heart muscle start to die. This causes symptoms of a heart attack. The sooner you get to the hospital, the sooner treatment can start to help save your life and your heart.  Don t be afraid to call 911, even if you re not sure you are having a heart attack. If you don t know the cause of your symptoms, assume it s a heart attack. Play it safe and get medical help. Don't drive yourself to the emergency department.   If you think someone else is having a heart attack, call 911 instead of driving the person to the ER. The  may tell you to give the person aspirin while waiting for help to arrive. If the dispatcher doesn t tell you to do  this, don t give the person aspirin. Aspirin can be dangerous under certain circumstances.  Warning signs of a heart attack    Chest discomfort. Most heart attacks involve discomfort in the center of the chest that lasts more than a few minutes, or that goes away and comes back. It can feel like uncomfortable pressure, squeezing, burning, fullness, tightness, or pain. It is often described as something heavy sitting on your chest.    Discomfort in other areas of the upper body. Symptoms can include pain or discomfort in one or both arms, the back, neck, jaw or stomach.    Shortness of breath with or without chest discomfort.    Other signs may include breaking out in a cold sweat, nausea, or lightheadedness.  Note for women. Like men, women most commonly have chest pain or discomfort as a heart attack symptom. But women are somewhat more likely than men to have some of the other common symptoms, particularly shortness of breath, nausea, and vomiting, back pain, or jaw pain.  Older people may also have atypical symptoms. The symptoms include loss of consciousness (syncope), weakness, or confusion (delirium). These symptoms should be looked at right away. Ignoring them can lead to critical illness or death.  If you have diabetes, high blood sugar can damage nerves in your body over time. This may keep you from feeling pain caused by a heart problem, leading to a  silent  heart problem. If you don t feel symptoms, you are less able to get treatment right away. Talk to your healthcare provider about how to lower your risk for silent heart problems.  People who have had one heart attack are at risk for having another heart attack. Your provider may prescribe medicine such as nitroglycerin to take for chest pain. You may also need medicine to lower your heart rate and blood pressure to prevent angina and another heart attack. Remember to take any medicines your provider has given as directed. Don't stop these medicines  without speaking with him or her first.  Date Last Reviewed: 6/1/2016 2000-2017 [employer]. [address] [place] [address], [place] [address]. All rights reserved. This information is not intended as a substitute for professional medical care. Always follow your healthcare professional's instructions.        Discharge Instructions for Heart Attack  You have had a heart attack (acute myocardial infarction). A heart attack occurs when a vessel that sends blood to your heart suddenly becomes blocked. This causes your heart not to work as well as it should. Follow these guidelines for home care and lifestyle changes.  Home care    Take your medicines exactly as directed. Don t skip doses. Talk with your healthcare provider if your medicines aren't working for you. Together you can come up with another treatment plan.    Remember that recovery after a heart attack takes time. Plan to rest for at least 4 to 8 weeks while you recover. Then return to normal activity when your doctor says it s OK.    Ask your doctor about joining a heart rehabilitation program. This can help strengthen your heart and lungs and give you more energy and confidence.    Tell your doctor if you are feeling depressed. Feelings of sadness are common after a heart attack. But it is important to speak to someone or seek counseling if you are feeling overwhelmed by these feelings.    Call 911 right away if you have chest pain or pain that goes to your shoulder, neck, or back. Don't drive yourself to the hospital.    Ask your family members to learn CPR. This is an important skill that can save lives when it's needed.    Learn to take your own blood pressure and pulse. Keep a record of your results. Ask your doctor when you should seek emergency medical attention. He or she will tell you which blood pressure reading is dangerous.  Lifestyle changes  Your heart attack might have been caused by cardiovascular disease. Your healthcare provider  will work with you to make changes to your lifestyle. This will help the heart disease from getting worse. These changes will most likely be a combination of diet and exercise.  Diet  Your healthcare provider will tell you what changes you need to make to your diet. You may need to see a registered dietitian for help with these diet changes. These changes may include:    Cutting back on how much fat and cholesterol you eat    Cutting back on how much salt (sodium) you eat, especially if you have high blood pressure    Eating more fresh vegetables and fruits    Eating lean proteins such as fish, poultry, beans, and peas, and eating less red meat and processed meats    Using low-fat dairy products    Using vegetable and nut oils in limited amounts    Limiting how many sweets and processed foods such as chips, cookies, and baked goods you eat    Limiting how often you eat out. And when you do eat out, making better food choices.    Not eating fried or greasy foods, or foods high in saturated fat  Exercise  Your healthcare provider may tell you to get more exercise if you haven't been physically active. Depending on your case, your provider may recommend that you get moderate to vigorous physical activity for at least 40 minutes each day, and for at least 3 to 4 days each week. A few examples of moderate to vigorous activity include:    Walking at a brisk pace, about 3 to 4 miles per hour    Jogging or running    Swimming or water aerobics    Hiking    Dancing    Martial arts    Tennis    Riding a bicycle or stationary bike  Other changes  Your healthcare provider may also recommend that you:    Lose weight. If you are overweight or obese, your provider will work with you to lose extra pounds. Making diet changes and getting more exercise can help. A good goal is to lose your 10% of your body weight in one year.    Stop smoking. Sign up for a stop-smoking program to make it more likely for you to quit for good. You can  join a stop-smoking support group. Or ask your doctor about nicotine replacement products.    Learn to manage stress. Stress management techniques to help you deal with stress in your home and work life. This will help you feel better emotionally and ease the strain on your heart.  Follow-up  Make a follow-up appointment as directed.     Call 911  Call 911 right away if you have:    Chest pain that goes to your neck, jaw, back, or shoulder    Shortness of breath  When to call your healthcare provider  Call your healthcare provider right away if you have:    Lightheadedness, dizziness, or fainting    Feeling of irregular heartbeat or fast pulse   Date Last Reviewed: 10/1/2016    7127-5961 [employer]. [address] [place] [address]. All rights reserved. This information is not intended as a substitute for professional medical care. Always follow your healthcare professional's instructions.                Follow-ups after your visit        Follow-up notes from your care team     Return in about 5 days (around 9/25/2018), or Cardiology.      Your next 10 appointments already scheduled     Sep 25, 2018  2:00 PM CDT   Cardiac Evaluation with [place] [facility]   [facility] ([facility])    [address] [address]   [phone]            Oct 01, 2018  3:00 PM CDT   Return Visit with ABILIO Tadeo CNP   [facility] ([facility])    [address] [address]   [phone]            Dec 05, 2018 11:00 AM CST   Return Visit with Hortencia Aguilar MD   [facility] ([facility])    [address] [address]   [phone]              Future tests that were ordered for you today     Open Future Orders        Priority Expected Expires Ordered    Albumin Random Urine Quantitative with Creat Ratio  "Routine  10/4/2018 2018    Fecal colorectal cancer screen (FIT) Routine 10/11/2018 2018 2018            Who to contact     If you have questions or need follow up information about today's clinic visit or your schedule please contact [facility] directly at [phone].  Normal or non-critical lab and imaging results will be communicated to you by MyChart, letter or phone within 4 business days after the clinic has received the results. If you do not hear from us within 7 days, please contact the clinic through [facility] or phone. If you have a critical or abnormal lab result, we will notify you by phone as soon as possible.  Submit refill requests through [employer] or call your pharmacy and they will forward the refill request to us. Please allow 3 business days for your refill to be completed.          Additional Information About Your Visit        [facility] Information     [employer] lets you send messages to your doctor, view your test results, renew your prescriptions, schedule appointments and more. To sign up, go to [email or web address] . Click on \"Log in\" on the left side of the screen, which will take you to the Welcome page. Then click on \"Sign up Now\" on the right side of the page.     You will be asked to enter the access code listed below, as well as some personal information. Please follow the directions to create your username and password.     Your access code is: [email or web address]  Expires: 2018  8:18 AM     Your access code will  in 90 days. If you need help or a new code, please call your [facility] or [phone].        Care EveryWhere ID     This is your Care EveryWhere ID. This could be used by other organizations to access your [facility] medical records  [id number]        Your Vitals Were     Pulse Temperature Respirations Height Pulse Oximetry BMI (Body Mass Index)    72 97.6  F (36.4  C) (Tympanic) 20 5' 6\" (1.676 m) 96% 23.73 kg/m2       Blood " Pressure from Last 3 Encounters:   09/20/18 128/74   09/18/18 143/69   09/14/18 (!) 162/108    Weight from Last 3 Encounters:   09/20/18 147 lb (66.7 kg)   09/18/18 143 lb 3.2 oz (65 kg)   09/14/18 150 lb (68 kg)              We Performed the Following     FOOT EXAM        Primary Care Provider Office Phone # Fax #    Kathya Iglesias, [facility] [phone]       [address] [facility] [address]        Equal Access to Services     [facility]: Hadii екатерина ku hadasho Soomaali, waaxda luqadaha, qaybta kaalmada adeegyada, ranulfo ramirez . So [facility] [phone].    ATENCIÓN: Si habla español, tiene a mario disposición servicios gratuitos de asistencia lingüística. Llame al [phone].    We comply with applicable federal civil rights laws and [place] laws. We do not discriminate on the basis of race, color, national origin, age, disability, sex, sexual orientation, or gender identity.            Thank you!     Thank you for choosing [facility]  for your care. Our goal is always to provide you with excellent care. Hearing back from our patients is one way we can continue to improve our services. Please take a few minutes to complete the written survey that you may receive in the mail after your visit with us. Thank you!             Your Updated Medication List - Protect others around you: Learn how to safely use, store and throw away your medicines at www.disposemymeds.org.          This list is accurate as of 9/20/18 10:29 AM.  Always use your most recent med list.                   Brand Name Dispense Instructions for use Diagnosis    amLODIPine 10 MG tablet    NORVASC    90 tablet    Take 1 tablet (10 mg) by mouth daily    Benign essential hypertension       aspirin 81 MG tablet     90 tablet    Take 1 tablet (81 mg) by mouth daily    HTN (hypertension), Type 2 diabetes, HbA1C goal < 8% (H)       atorvastatin 40 MG tablet    LIPITOR    30 tablet    Take  1 tablet (40 mg) by mouth daily    NSTEMI (non-ST elevated myocardial infarction) (H)       Blood Glucose Monitoring Suppl w/Device Kit     1 kit    1 each daily    Type 2 diabetes, HbA1C goal < 8% (H)       blood glucose monitoring test strip    no brand specified    3 Box    Use to test blood sugar 3 times daily or as directed. Box of 100 each.    Type 2 diabetes mellitus with hyperglycemia, without long-term current use of insulin (H)       carvedilol 6.25 MG tablet    COREG    60 tablet    Take 1 tablet (6.25 mg) by mouth 2 times daily (with meals)    NSTEMI (non-ST elevated myocardial infarction) (H)       glipiZIDE XL 10 MG 24 hr tablet   Generic drug:  glipiZIDE     30 tablet    TAKE 1 TABLET BY MOUTH ONCE DAILY *  SCHEDULE  OFFICE  VISIT  FOR  REFILL  *    Type 2 diabetes mellitus without complication, without long-term current use of insulin (H)       losartan 25 MG tablet    COZAAR    90 tablet    Take 1 tablet (25 mg) by mouth daily    Benign essential hypertension       Melatonin 10 MG Tabs tablet      Take 10 mg by mouth nightly as needed for sleep        metFORMIN 1000 MG tablet    GLUCOPHAGE    225 tablet    TAKE 1 TABLET BY MOUTH WITH BREAKFAST TAKE ONE  HALF  TABLET  WITH  LUNCH  AND TAKE  ONE  TABLET  WITH  DINNER    Type 2 diabetes mellitus without complication, without long-term current use of insulin (H)       MULTIPLE VITAMIN PO      Take 1 tablet by mouth        nitroGLYcerin 0.4 MG sublingual tablet    NITROSTAT    50 tablet    For chest pain place 1 tablet under the tongue every 5 minutes for 3 doses. If symptoms persist 5 minutes after 1st dose call 911.    NSTEMI (non-ST elevated myocardial infarction) (H)       OMEGA-3 FISH OIL PO      Take 1,200 mg by mouth 2 times daily (with meals)        omeprazole 20 MG tablet    priLOSEC OTC    90 tablet    Take 1 tablet (20 mg) by mouth daily    Gastroesophageal reflux disease without esophagitis       ONETOUCH LANCETS Misc     200 each    1 lancet  3 times daily    Type 2 diabetes, HbA1C goal < 8% (H)       ticagrelor 90 MG tablet    BRILINTA    60 tablet    Take 1 tablet (90 mg) by mouth 2 times daily    NSTEMI (non-ST elevated myocardial infarction) (H)

## 2018-09-20 NOTE — PATIENT INSTRUCTIONS
1. NSTEMI (non-ST elevated myocardial infarction) (H)  Acute, improved  9/25/2018 Follow-up with Cardiology in [place]    2. Type 2 diabetes mellitus without complication, without long-term current use of insulin (H)  Chronic, stable  - FOOT EXAM  - Albumin Random Urine Quantitative with Creat Ratio: YOU CAN REQUEST THIS ON TUESDAY    3. Colon cancer screening  Screening  - Fecal colorectal cancer screen (FIT); Future: complete and mail in    Symptoms of a Heart Attack       A heart attack is also known as acute myocardial infarction, or AMI. It's an urgent message from your heart that it s starved for oxygen. When a clot blocks a blood vessel feeding the heart (coronary artery), oxygen-rich blood can t reach a part or all of your heart. Then tissues of the heart muscle start to die. This causes symptoms of a heart attack. The sooner you get to the hospital, the sooner treatment can start to help save your life and your heart.  Don t be afraid to call 911, even if you re not sure you are having a heart attack. If you don t know the cause of your symptoms, assume it s a heart attack. Play it safe and get medical help. Don't drive yourself to the emergency department.   If you think someone else is having a heart attack, call 911 instead of driving the person to the ER. The  may tell you to give the person aspirin while waiting for help to arrive. If the dispatcher doesn t tell you to do this, don t give the person aspirin. Aspirin can be dangerous under certain circumstances.  Warning signs of a heart attack    Chest discomfort. Most heart attacks involve discomfort in the center of the chest that lasts more than a few minutes, or that goes away and comes back. It can feel like uncomfortable pressure, squeezing, burning, fullness, tightness, or pain. It is often described as something heavy sitting on your chest.    Discomfort in other areas of the upper body. Symptoms can include pain or discomfort in  one or both arms, the back, neck, jaw or stomach.    Shortness of breath with or without chest discomfort.    Other signs may include breaking out in a cold sweat, nausea, or lightheadedness.  Note for women. Like men, women most commonly have chest pain or discomfort as a heart attack symptom. But women are somewhat more likely than men to have some of the other common symptoms, particularly shortness of breath, nausea, and vomiting, back pain, or jaw pain.  Older people may also have atypical symptoms. The symptoms include loss of consciousness (syncope), weakness, or confusion (delirium). These symptoms should be looked at right away. Ignoring them can lead to critical illness or death.  If you have diabetes, high blood sugar can damage nerves in your body over time. This may keep you from feeling pain caused by a heart problem, leading to a  silent  heart problem. If you don t feel symptoms, you are less able to get treatment right away. Talk to your healthcare provider about how to lower your risk for silent heart problems.  People who have had one heart attack are at risk for having another heart attack. Your provider may prescribe medicine such as nitroglycerin to take for chest pain. You may also need medicine to lower your heart rate and blood pressure to prevent angina and another heart attack. Remember to take any medicines your provider has given as directed. Don't stop these medicines without speaking with him or her first.  Date Last Reviewed: 6/1/2016 2000-2017 [employer]. [address] [place] [address]. All rights reserved. This information is not intended as a substitute for professional medical care. Always follow your healthcare professional's instructions.        Discharge Instructions for Heart Attack  You have had a heart attack (acute myocardial infarction). A heart attack occurs when a vessel that sends blood to your heart suddenly becomes blocked. This causes  your heart not to work as well as it should. Follow these guidelines for home care and lifestyle changes.  Home care    Take your medicines exactly as directed. Don t skip doses. Talk with your healthcare provider if your medicines aren't working for you. Together you can come up with another treatment plan.    Remember that recovery after a heart attack takes time. Plan to rest for at least 4 to 8 weeks while you recover. Then return to normal activity when your doctor says it s OK.    Ask your doctor about joining a heart rehabilitation program. This can help strengthen your heart and lungs and give you more energy and confidence.    Tell your doctor if you are feeling depressed. Feelings of sadness are common after a heart attack. But it is important to speak to someone or seek counseling if you are feeling overwhelmed by these feelings.    Call 911 right away if you have chest pain or pain that goes to your shoulder, neck, or back. Don't drive yourself to the hospital.    Ask your family members to learn CPR. This is an important skill that can save lives when it's needed.    Learn to take your own blood pressure and pulse. Keep a record of your results. Ask your doctor when you should seek emergency medical attention. He or she will tell you which blood pressure reading is dangerous.  Lifestyle changes  Your heart attack might have been caused by cardiovascular disease. Your healthcare provider will work with you to make changes to your lifestyle. This will help the heart disease from getting worse. These changes will most likely be a combination of diet and exercise.  Diet  Your healthcare provider will tell you what changes you need to make to your diet. You may need to see a registered dietitian for help with these diet changes. These changes may include:    Cutting back on how much fat and cholesterol you eat    Cutting back on how much salt (sodium) you eat, especially if you have high blood pressure     Eating more fresh vegetables and fruits    Eating lean proteins such as fish, poultry, beans, and peas, and eating less red meat and processed meats    Using low-fat dairy products    Using vegetable and nut oils in limited amounts    Limiting how many sweets and processed foods such as chips, cookies, and baked goods you eat    Limiting how often you eat out. And when you do eat out, making better food choices.    Not eating fried or greasy foods, or foods high in saturated fat  Exercise  Your healthcare provider may tell you to get more exercise if you haven't been physically active. Depending on your case, your provider may recommend that you get moderate to vigorous physical activity for at least 40 minutes each day, and for at least 3 to 4 days each week. A few examples of moderate to vigorous activity include:    Walking at a brisk pace, about 3 to 4 miles per hour    Jogging or running    Swimming or water aerobics    Hiking    Dancing    Martial arts    Tennis    Riding a bicycle or stationary bike  Other changes  Your healthcare provider may also recommend that you:    Lose weight. If you are overweight or obese, your provider will work with you to lose extra pounds. Making diet changes and getting more exercise can help. A good goal is to lose your 10% of your body weight in one year.    Stop smoking. Sign up for a stop-smoking program to make it more likely for you to quit for good. You can join a stop-smoking support group. Or ask your doctor about nicotine replacement products.    Learn to manage stress. Stress management techniques to help you deal with stress in your home and work life. This will help you feel better emotionally and ease the strain on your heart.  Follow-up  Make a follow-up appointment as directed.     Call 911  Call 911 right away if you have:    Chest pain that goes to your neck, jaw, back, or shoulder    Shortness of breath  When to call your healthcare provider  Call your  healthcare provider right away if you have:    Lightheadedness, dizziness, or fainting    Feeling of irregular heartbeat or fast pulse   Date Last Reviewed: 10/1/2016    2461-9705 [employer]. [address] [place] [address], [place] [address]. All rights reserved. This information is not intended as a substitute for professional medical care. Always follow your healthcare professional's instructions.

## 2018-09-20 NOTE — NURSING NOTE
"Chief Complaint   Patient presents with     Hospital F/U     Diabetes       Initial /74 (BP Location: Right arm, Patient Position: Sitting, Cuff Size: Adult Regular)  Pulse 72  Temp 97.6  F (36.4  C) (Tympanic)  Resp 20  Ht 5' 6\" (1.676 m)  Wt 147 lb (66.7 kg)  SpO2 96%  BMI 23.73 kg/m2 Estimated body mass index is 23.73 kg/(m^2) as calculated from the following:    Height as of this encounter: 5' 6\" (1.676 m).    Weight as of this encounter: 147 lb (66.7 kg).    Patient presents to the clinic using No DME    Health Maintenance that is potentially due pending provider review:  Colonoscopy/FIT    FIT sent home today .    Is there anyone who you would like to be able to receive your results? No  If yes have patient fill out SHAKIRA    "

## 2018-09-23 LAB
INTERPRETATION ECG - MUSE: NORMAL
INTERPRETATION ECG - MUSE: NORMAL

## 2018-09-25 ENCOUNTER — HOSPITAL ENCOUNTER (OUTPATIENT)
Dept: CARDIAC REHAB | Facility: CLINIC | Age: 63
End: 2018-09-25
Attending: INTERNAL MEDICINE
Payer: COMMERCIAL

## 2018-09-25 DIAGNOSIS — I21.4 NSTEMI (NON-ST ELEVATED MYOCARDIAL INFARCTION) (H): ICD-10-CM

## 2018-09-25 PROCEDURE — 93798 PHYS/QHP OP CAR RHAB W/ECG: CPT

## 2018-09-25 PROCEDURE — 93797 PHYS/QHP OP CAR RHAB WO ECG: CPT

## 2018-09-25 PROCEDURE — 40000116 ZZH STATISTIC OP CR VISIT

## 2018-09-25 PROCEDURE — 40000575 ZZH STATISTIC OP CARDIAC VISIT #2

## 2018-09-26 ENCOUNTER — HOSPITAL ENCOUNTER (OUTPATIENT)
Dept: CARDIAC REHAB | Facility: CLINIC | Age: 63
End: 2018-09-26
Attending: INTERNAL MEDICINE
Payer: COMMERCIAL

## 2018-09-26 PROCEDURE — 93798 PHYS/QHP OP CAR RHAB W/ECG: CPT

## 2018-09-26 PROCEDURE — 40000575 ZZH STATISTIC OP CARDIAC VISIT #2

## 2018-09-26 PROCEDURE — 40000116 ZZH STATISTIC OP CR VISIT

## 2018-09-26 PROCEDURE — 93797 PHYS/QHP OP CAR RHAB WO ECG: CPT

## 2018-09-28 ENCOUNTER — HOSPITAL ENCOUNTER (OUTPATIENT)
Dept: CARDIAC REHAB | Facility: CLINIC | Age: 63
End: 2018-09-28
Attending: INTERNAL MEDICINE
Payer: COMMERCIAL

## 2018-09-28 PROCEDURE — 40000116 ZZH STATISTIC OP CR VISIT

## 2018-09-28 PROCEDURE — 93798 PHYS/QHP OP CAR RHAB W/ECG: CPT

## 2018-09-30 DIAGNOSIS — E11.9 TYPE 2 DIABETES MELLITUS WITHOUT COMPLICATION, WITHOUT LONG-TERM CURRENT USE OF INSULIN (H): Chronic | ICD-10-CM

## 2018-10-01 ENCOUNTER — HOSPITAL ENCOUNTER (OUTPATIENT)
Dept: CARDIAC REHAB | Facility: CLINIC | Age: 63
End: 2018-10-01
Attending: INTERNAL MEDICINE
Payer: COMMERCIAL

## 2018-10-01 ENCOUNTER — OFFICE VISIT (OUTPATIENT)
Dept: CARDIOLOGY | Facility: CLINIC | Age: 63
End: 2018-10-01
Attending: NURSE PRACTITIONER
Payer: COMMERCIAL

## 2018-10-01 VITALS
HEART RATE: 80 BPM | BODY MASS INDEX: 23.73 KG/M2 | OXYGEN SATURATION: 96 % | WEIGHT: 147 LBS | DIASTOLIC BLOOD PRESSURE: 67 MMHG | SYSTOLIC BLOOD PRESSURE: 117 MMHG

## 2018-10-01 DIAGNOSIS — I10 BENIGN ESSENTIAL HYPERTENSION: ICD-10-CM

## 2018-10-01 DIAGNOSIS — E78.5 HYPERLIPIDEMIA LDL GOAL <70: ICD-10-CM

## 2018-10-01 DIAGNOSIS — I25.10 CORONARY ARTERY DISEASE INVOLVING NATIVE CORONARY ARTERY OF NATIVE HEART WITHOUT ANGINA PECTORIS: Primary | ICD-10-CM

## 2018-10-01 PROCEDURE — 93798 PHYS/QHP OP CAR RHAB W/ECG: CPT

## 2018-10-01 PROCEDURE — 93797 PHYS/QHP OP CAR RHAB WO ECG: CPT

## 2018-10-01 PROCEDURE — 40000575 ZZH STATISTIC OP CARDIAC VISIT #2

## 2018-10-01 PROCEDURE — 40000116 ZZH STATISTIC OP CR VISIT

## 2018-10-01 PROCEDURE — 99214 OFFICE O/P EST MOD 30 MIN: CPT | Performed by: NURSE PRACTITIONER

## 2018-10-01 RX ORDER — GLIPIZIDE 10 MG/1
TABLET, EXTENDED RELEASE ORAL
Qty: 90 TABLET | Refills: 0 | Status: SHIPPED | OUTPATIENT
Start: 2018-10-01 | End: 2019-01-01

## 2018-10-01 NOTE — LETTER
10/1/2018    Kathya Iglesias, CNP  [address] [facility] [address]    RE: Js SANTANA Nickolasjosesabiha       Dear Colleague,    I had the pleasure of seeing Js Jones in the [facility].    Cardiology Clinic Progress Note  Js Jones MRN# [id number]   YOB: 1955 Age: 63 year old     Reason For Visit:  Hospital follow-up   Primary Cardiologist:   Dr. Aguilar          History of Presenting Illness:    Js Jones is a pleasant 63 year old patient with a past cardiac history significant for CAD with CAYLA x1 to the mid RCA and CAYLA x2 to the D1, hypertension, and hyperlipidemia.  Past medical history significant for diabetes type 2 and former smoker 30 pack year history.    Jayme was hospitalized at [facility] from 9/14/2018 until 9/17/2018 with chest pain and found to have non-STEMI.  Peak troponin of 2.49.  Echocardiogram 9/16/2018 showed LVEF 50-55%, grade 1 diastolic dysfunction, RV normal size and function, and mild MR.  Coronary angiogram 9/17/2018 with CAYLA to the mid RCA and CAYLA x2 to the D1.  He was started on DAPT with aspirin and Brilinta.  He was also started on carvedilol 6.25 mg twice daily and his atorvastatin was increased to 40 mg daily.  He did develop a hematoma at the angiogram site which was resolving upon discharge.  Recommendation to be off of work for 2 weeks.  Lipid profile 9/17/2018 showed total cholesterol 131 HDL 35 LDL 54 triglycerides of 208.    Pt presents today for hospital follow-up.  He denies any side effects after starting the new medications and denies any bleeding after starting DAPT.  He has not had any further chest pain since discharge.  He has started cardiac rehab without any difficulty.  He did start back at work today without any difficulty.  His left forearm hematoma has resolved and the pain has been subsiding.  He does continue with a significant amount of ecchymosis on the left forearm but has not had any further  hematoma or bleeding and no bruit was noted.  He has plans to start routine exercise at a health club after he has completed cardiac rehab.  We did discuss the WEL program also.  He is requesting a note for short-term disability which I have given him today and he will fax to them.  His blood pressure is well controlled.  He is unsure if he is taking amlodipine, as it was not on his medication list today.  However, it is on his discharge medications and he was taking this prior to his hospitalization.  He will let us know if he is still taking this. Patient reports no chest pain, shortness of breath, PND, orthopnea, presyncope, syncope, edema, heart racing, or palpitations.      Current Cardiac Medications   Amlodipine 10 mg daily- Pt unsure if he is taking this   Aspirin 81 mg daily  Atorvastatin 40 mg daily  Carvedilol 6.25 mg twice daily  Losartan 25 mg daily  Nitro as needed  Omega-3 fish oil 1200 mg twice daily  Brilinta 90 mg twice daily                   Assessment and Plan:     Plan  1.  Continue current medications  2.  Follow-up with Dr. Aguilar 12/5/2018, as scheduled      1. CAD    CAYLA x1 to the mid RCA and CAYLA x2 to the D1 9/2018    Residual disease 30% OM1 and minimal disease in the mid to distal LAD    No angina (prior angina chest pain)    Continue statin, aspirin, ARB, beta-blocker, CCB    Continue DAPT, uninterrupted, for at least one year (9/2019)      2. Hypertension    Controlled    Continue amlodipine, carvedilol, losartan      3. Hyperlipidemia    LDL 54 on 9/2018    Continue atorvastatin 40 mg daily         Thank you for allowing me to participate in this delightful patient's care.      This note was completed in part using Dragon voice recognition software. Although reviewed after completion, some word and grammatical errors may occur.    Carolina Weller, APRN, CNP           Data:   All laboratory data reviewed      HPI and Plan:   See dictation    No orders of the defined types  were placed in this encounter.      No orders of the defined types were placed in this encounter.      Medications Discontinued During This Encounter   Medication Reason     amLODIPine (NORVASC) 10 MG tablet          Encounter Diagnoses   Name Primary?     Coronary artery disease involving native coronary artery of native heart without angina pectoris Yes     Benign essential hypertension      Hyperlipidemia LDL goal <70        CURRENT MEDICATIONS:  Current Outpatient Prescriptions   Medication Sig Dispense Refill     aspirin 81 MG tablet Take 1 tablet (81 mg) by mouth daily 90 tablet 3     atorvastatin (LIPITOR) 40 MG tablet Take 1 tablet (40 mg) by mouth daily 30 tablet 0     blood glucose monitoring (NO BRAND SPECIFIED) test strip Use to test blood sugar 3 times daily or as directed. Box of 100 each. 3 Box 3     Blood Glucose Monitoring Suppl W/DEVICE KIT 1 each daily 1 kit 0     carvedilol (COREG) 6.25 MG tablet Take 1 tablet (6.25 mg) by mouth 2 times daily (with meals) 60 tablet 0     GLIPIZIDE XL 10 MG 24 hr tablet TAKE 1 TABLET BY MOUTH ONCE DAILY SCHEDULE  OFFICE  VISIT  FOR  REFILL 90 tablet 0     losartan (COZAAR) 25 MG tablet Take 1 tablet (25 mg) by mouth daily 90 tablet 3     Melatonin 10 MG TABS Take 10 mg by mouth nightly as needed for sleep       metFORMIN (GLUCOPHAGE) 1000 MG tablet TAKE 1 TABLET BY MOUTH WITH BREAKFAST TAKE ONE  HALF  TABLET  WITH  LUNCH  AND TAKE  ONE  TABLET  WITH  DINNER 225 tablet 0     MULTIPLE VITAMIN PO Take 1 tablet by mouth       nitroGLYcerin (NITROSTAT) 0.4 MG sublingual tablet For chest pain place 1 tablet under the tongue every 5 minutes for 3 doses. If symptoms persist 5 minutes after 1st dose call 911. 50 tablet 0     Omega-3 Fatty Acids (OMEGA-3 FISH OIL PO) Take 1,200 mg by mouth 2 times daily (with meals)       omeprazole (PRILOSEC OTC) 20 MG tablet Take 1 tablet (20 mg) by mouth daily 90 tablet 1     ONE TOUCH LANCETS MISC 1 lancet 3 times daily 200 each 5      ticagrelor (BRILINTA) 90 MG tablet Take 1 tablet (90 mg) by mouth 2 times daily 60 tablet 0       ALLERGIES     Allergies   Allergen Reactions     Lisinopril Cough       PAST MEDICAL HISTORY:  Past Medical History:   Diagnosis Date     DM type 2 (diabetes mellitus, type 2) (H)      HTN (hypertension), benign      Hyperlipidemia        PAST SURGICAL HISTORY:  Past Surgical History:   Procedure Laterality Date     HERNIORRHAPHY INGUINAL BILATERAL         FAMILY HISTORY:  Family History   Problem Relation Age of Onset     Diabetes Mother      Hypertension Mother      Cardiovascular Father 81     CHF     HEART DISEASE Sister 50     HEART DISEASE Brother 50       SOCIAL HISTORY:  Social History     Social History     Marital status:      Spouse name: N/A     Number of children: 3     Years of education: N/A     Occupational History           Social History Main Topics     Smoking status: Former Smoker     Packs/day: 1.00     Years: 30.00     Quit date: 1/1/2000     Smokeless tobacco: Never Used     Alcohol use No     Drug use: No     Sexual activity: Yes     Partners: Female     Other Topics Concern     Parent/Sibling W/ Cabg, Mi Or Angioplasty Before 65f 55m? No     Social History Narrative    , 3 children, works as a small .  (last updated 9/14/2018)        Review of Systems:  Skin:  Negative       Eyes:  Positive for glasses    ENT:  Negative      Respiratory:  Negative for shortness of breath;cough     Cardiovascular:  Negative for;palpitations;chest pain;edema;syncope or near-syncope;fatigue;lightheadedness;dizziness      Gastroenterology: Negative      Genitourinary:  Negative      Musculoskeletal:  Negative      Neurologic:  Positive for memory problems    Psychiatric:  Negative for anxiety;depression    Heme/Lymph/Imm:  Negative for bleeding disorder    Endocrine:  Positive for diabetes      Physical Exam:  Vitals: /67  Pulse 80  Wt 66.7 kg (147 lb)  SpO2 96%   BMI 23.73 kg/m2    Constitutional:  cooperative, alert and oriented, well developed, well nourished, in no acute distress thin      Skin:  warm and dry to the touch          Head:  normocephalic        Eyes:  sclera white        Lymph:      ENT:  no pallor or cyanosis        Neck:  no stiffness        Respiratory:  normal breath sounds, clear to auscultation, normal A-P diameter, normal symmetry, normal respiratory excursion, no use of accessory muscles         Cardiac: regular rhythm, normal S1/S2, no S3 or S4, apical impulse not displaced, no murmurs, gallops or rubs                pulses full and equal                                 left radial bruit (-)      GI:  abdomen soft        Extremities and Muscular Skeletal:  no edema              Neurological:  affect appropriate        Psych:  Alert and Oriented x 3        Thank you for allowing me to participate in the care of your patient.    Sincerely,     ABILIO Jones [facility]

## 2018-10-01 NOTE — TELEPHONE ENCOUNTER
"Requested Prescriptions   Pending Prescriptions Disp Refills     metFORMIN (GLUCOPHAGE) 1000 MG tablet [Pharmacy Med Name: METFORMIN 1000MG TAB] 225 tablet 0     Sig: TAKE 1 TABLET BY MOUTH WITH BREAKFAST TAKE ONE  HALF  TABLET  WITH  LUNCH  AND TAKE  ONE  TABLET  WITH  DINNER    Biguanide Agents Failed    9/30/2018  6:56 PM       Failed - Patient has had a Microalbumin in the past 15 mos.    Recent Labs   Lab Test  06/16/17   0928   MICROL  30   UMALCR  18.44*            Passed - Blood pressure less than 140/90 in past 6 months    BP Readings from Last 3 Encounters:   09/20/18 128/74   09/18/18 143/69   09/14/18 (!) 162/108                Passed - Patient has documented LDL within the past 12 mos.    Recent Labs   Lab Test  09/17/18   0550   LDL  54            Passed - Patient is age 10 or older       Passed - Patient has documented A1c within the specified period of time.    If HgbA1C is 8 or greater, it needs to be on file within the past 3 months.  If less than 8, must be on file within the past 6 months.     Recent Labs   Lab Test  09/15/18   0546   A1C  6.8*            Passed - Patient's CR is NOT>1.4 OR Patient's EGFR is NOT<45 within past 12 mos.    Recent Labs   Lab Test  09/18/18   0555   GFRESTIMATED  87   GFRESTBLACK  >90       Recent Labs   Lab Test  09/18/18   0555   CR  0.88            Passed - Patient does NOT have a diagnosis of CHF.       Passed - Recent (6 mo) or future (30 days) visit within the authorizing provider's specialty    Patient had office visit in the last 6 months or has a visit in the next 30 days with authorizing provider or within the authorizing provider's specialty.  See \"Patient Info\" tab in inbasket, or \"Choose Columns\" in Meds & Orders section of the refill encounter.      Last Written Prescription Date:  6/8/18  Last Fill Quantity: 225,  # refills: 0   Last office visit: 9/20/2018 with prescribing provider:     Future Office Visit:   Next 5 appointments (look out 90 days)     " "Oct 01, 2018  3:00 PM CDT   Return Visit with ABILIO Tadeo CNP   [facility] ([facility])    [address] [facility] [address] [facility] [address]   [phone]            Dec 05, 2018 11:00 AM CST   Return Visit with Hortencia Aguilar MD   [facility] ([facility])    [address] [facility] [address]   [phone]                           GLIPIZIDE XL 10 MG 24 hr tablet [Pharmacy Med Name: GLIPIZIDE XL 10MG   TAB] 30 tablet 0     Sig: TAKE 1 TABLET BY MOUTH ONCE DAILY SCHEDULE  OFFICE  VISIT  FOR  REFILL    Sulfonylurea Agents Failed    9/30/2018  6:56 PM       Failed - Patient has had a Microalbumin in the past 12 mos.    Recent Labs   Lab Test  06/16/17   0928   MICROL  30   UMALCR  18.44*            Passed - Blood pressure less than 140/90 in past 6 months    BP Readings from Last 3 Encounters:   09/20/18 128/74   09/18/18 143/69   09/14/18 (!) 162/108                Passed - Patient has documented LDL within the past 12 mos.    Recent Labs   Lab Test  09/17/18   0550   LDL  54            Passed - Patient has documented A1c within the specified period of time.    If HgbA1C is 8 or greater, it needs to be on file within the past 3 months.  If less than 8, must be on file within the past 6 months.     Recent Labs   Lab Test  09/15/18   0546   A1C  6.8*            Passed - Patient is age 18 or older       Passed - Patient has a recent creatinine (normal) within the past 12 mos.    Recent Labs   Lab Test  09/18/18   0555   CR  0.88            Passed - Recent (6 mo) or future (30 days) visit within the authorizing provider's specialty    Patient had office visit in the last 6 months or has a visit in the next 30 days with authorizing provider or within the authorizing provider's specialty.  See \"Patient Info\" tab in inbasket, or \"Choose Columns\" in Meds & Orders section of the refill encounter.        "     Last Written Prescription Date:  8/27/18  Last Fill Quantity: 30,  # refills: 0   Last office visit: 9/20/2018 with prescribing provider:     Future Office Visit:   Next 5 appointments (look out 90 days)     Oct 01, 2018  3:00 PM CDT   Return Visit with ABILIO Tadeo CNP   [facility] ([facility])    [address] [address]   [phone]            Dec 05, 2018 11:00 AM CST   Return Visit with Hortencia Aguilar MD   [facility] ([facility])    [address] [address]   [phone]

## 2018-10-01 NOTE — LETTER
October 1, 2018    RE:  Js Jones                              [address] [facility] [address]            To whom it may concern:    Js Jones is under my professional care for NSTEMI (non-ST elevated myocardial infarction) (H). He was hospitalized from 9/14/18 until 9/17/18. He was recovering from his heart attack through 9/28/18.  He may return to work 10/1/18.       Sincerely,        ABILIO Jones, CNP

## 2018-10-01 NOTE — PATIENT INSTRUCTIONS
Thank you for your [facility] visit today. Your provider has recommended the following:  Medication Changes:  No changes   Recommendations:  1. Your carvedilol is to help reduce stress on the heart.   2. Brillinta and aspirin are the medications that prevent clot in the stents (makes platelets slippery)  Follow-up:   See Dr. Aguilar for cardiology follow up in December.  Call [phone] two months prior to request date to schedule any future appointments.  Reminder:  1. Please bring in all current medications, over the counter supplements and vitamin bottles to your next appointment.               [facility]  [facility]~[address]~[place], [place]~[address]  Questions about your visit today?   Call your Cardiology Clinic RN's-Bibi Shore and/or Elba Maxwell at [phone].

## 2018-10-01 NOTE — PROGRESS NOTES
Cardiology Clinic Progress Note  Js Jones MRN# [id number]   YOB: 1955 Age: 63 year old     Reason For Visit:  Hospital follow-up   Primary Cardiologist:   Dr. Aguilar          History of Presenting Illness:    Js Jones is a pleasant 63 year old patient with a past cardiac history significant for CAD with CAYLA x1 to the mid RCA and CAYLA x2 to the D1, hypertension, and hyperlipidemia.  Past medical history significant for diabetes type 2 and former smoker 30 pack year history.    Pt was hospitalized at [facility] from 9/14/2018 until 9/17/2018 with chest pain and found to have non-STEMI.  Peak troponin of 2.49.  Echocardiogram 9/16/2018 showed LVEF 50-55%, grade 1 diastolic dysfunction, RV normal size and function, and mild MR.  Coronary angiogram 9/17/2018 with CAYLA to the mid RCA and CAYLA x2 to the D1.  He was started on DAPT with aspirin and Brilinta.  He was also started on carvedilol 6.25 mg twice daily and his atorvastatin was increased to 40 mg daily.  He did develop a hematoma at the angiogram site which was resolving upon discharge.  Recommendation to be off of work for 2 weeks.  Lipid profile 9/17/2018 showed total cholesterol 131 HDL 35 LDL 54 triglycerides of 208.    Pt presents today for hospital follow-up.  He denies any side effects after starting the new medications and denies any bleeding after starting DAPT.  He has not had any further chest pain since discharge.  He has started cardiac rehab without any difficulty.  He did start back at work today without any difficulty.  His left forearm hematoma has resolved and the pain has been subsiding.  He does continue with a significant amount of ecchymosis on the left forearm but has not had any further hematoma or bleeding and no bruit was noted.  He has plans to start routine exercise at a health club after he has completed cardiac rehab.  We did discuss the WEL program also.  He is requesting a note for short-term disability  which I have given him today and he will fax to them.  His blood pressure is well controlled.  He is unsure if he is taking amlodipine, as it was not on his medication list today.  However, it is on his discharge medications and he was taking this prior to his hospitalization.  He will let us know if he is still taking this. Patient reports no chest pain, shortness of breath, PND, orthopnea, presyncope, syncope, edema, heart racing, or palpitations.      Current Cardiac Medications   Amlodipine 10 mg daily- Pt unsure if he is taking this   Aspirin 81 mg daily  Atorvastatin 40 mg daily  Carvedilol 6.25 mg twice daily  Losartan 25 mg daily  Nitro as needed  Omega-3 fish oil 1200 mg twice daily  Brilinta 90 mg twice daily                   Assessment and Plan:     Plan  1.  Continue current medications  2.  Follow-up with Dr. Aguilar 12/5/2018, as scheduled      1. CAD    CAYLA x1 to the mid RCA and CAYLA x2 to the D1 9/2018    Residual disease 30% OM1 and minimal disease in the mid to distal LAD    No angina (prior angina chest pain)    Continue statin, aspirin, ARB, beta-blocker, CCB    Continue DAPT, uninterrupted, for at least one year (9/2019)      2. Hypertension    Controlled    Continue amlodipine, carvedilol, losartan      3. Hyperlipidemia    LDL 54 on 9/2018    Continue atorvastatin 40 mg daily         Thank you for allowing me to participate in this delightful patient's care.      This note was completed in part using Dragon voice recognition software. Although reviewed after completion, some word and grammatical errors may occur.    Carolina Weller, APRN, CNP           Data:   All laboratory data reviewed

## 2018-10-01 NOTE — LETTER
10/1/2018    Kathya Iglesias, CNP  [address] [facility] [address]    RE: Js SANTANA Nickolasjosesabiha       Dear Colleague,    I had the pleasure of seeing Js Jones in the [facility].    Cardiology Clinic Progress Note  Js Jones MRN# [id number]   YOB: 1955 Age: 63 year old     Reason For Visit:  Hospital follow-up   Primary Cardiologist:   Dr. Aguilar          History of Presenting Illness:    Js Jones is a pleasant 63 year old patient with a past cardiac history significant for CAD with CAYLA x1 to the mid RCA and CAYLA x2 to the D1, hypertension, and hyperlipidemia.  Past medical history significant for diabetes type 2 and former smoker 30 pack year history.    Jayme was hospitalized at [facility] from 9/14/2018 until 9/17/2018 with chest pain and found to have non-STEMI.  Peak troponin of 2.49.  Echocardiogram 9/16/2018 showed LVEF 50-55%, grade 1 diastolic dysfunction, RV normal size and function, and mild MR.  Coronary angiogram 9/17/2018 with CAYLA to the mid RCA and CAYLA x2 to the D1.  He was started on DAPT with aspirin and Brilinta.  He was also started on carvedilol 6.25 mg twice daily and his atorvastatin was increased to 40 mg daily.  He did develop a hematoma at the angiogram site which was resolving upon discharge.  Recommendation to be off of work for 2 weeks.  Lipid profile 9/17/2018 showed total cholesterol 131 HDL 35 LDL 54 triglycerides of 208.    Pt presents today for hospital follow-up.  He denies any side effects after starting the new medications and denies any bleeding after starting DAPT.  He has not had any further chest pain since discharge.  He has started cardiac rehab without any difficulty.  He did start back at work today without any difficulty.  His left forearm hematoma has resolved and the pain has been subsiding.  He does continue with a significant amount of ecchymosis on the left forearm but has not had any further  hematoma or bleeding and no bruit was noted.  He has plans to start routine exercise at a health club after he has completed cardiac rehab.  We did discuss the WEL program also.  He is requesting a note for short-term disability which I have given him today and he will fax to them.  His blood pressure is well controlled.  He is unsure if he is taking amlodipine, as it was not on his medication list today.  However, it is on his discharge medications and he was taking this prior to his hospitalization.  He will let us know if he is still taking this. Patient reports no chest pain, shortness of breath, PND, orthopnea, presyncope, syncope, edema, heart racing, or palpitations.      Current Cardiac Medications   Amlodipine 10 mg daily- Pt unsure if he is taking this   Aspirin 81 mg daily  Atorvastatin 40 mg daily  Carvedilol 6.25 mg twice daily  Losartan 25 mg daily  Nitro as needed  Omega-3 fish oil 1200 mg twice daily  Brilinta 90 mg twice daily                   Assessment and Plan:     Plan  1.  Continue current medications  2.  Follow-up with Dr. Aguilar 12/5/2018, as scheduled      1. CAD    CAYLA x1 to the mid RCA and CAYLA x2 to the D1 9/2018    Residual disease 30% OM1 and minimal disease in the mid to distal LAD    No angina (prior angina chest pain)    Continue statin, aspirin, ARB, beta-blocker, CCB    Continue DAPT, uninterrupted, for at least one year (9/2019)      2. Hypertension    Controlled    Continue amlodipine, carvedilol, losartan      3. Hyperlipidemia    LDL 54 on 9/2018    Continue atorvastatin 40 mg daily         Thank you for allowing me to participate in this delightful patient's care.      This note was completed in part using Dragon voice recognition software. Although reviewed after completion, some word and grammatical errors may occur.    Carolina Weller, APRN, CNP           Data:   All laboratory data reviewed      HPI and Plan:   See dictation    No orders of the defined types  were placed in this encounter.      No orders of the defined types were placed in this encounter.      Medications Discontinued During This Encounter   Medication Reason     amLODIPine (NORVASC) 10 MG tablet          Encounter Diagnoses   Name Primary?     Coronary artery disease involving native coronary artery of native heart without angina pectoris Yes     Benign essential hypertension      Hyperlipidemia LDL goal <70        CURRENT MEDICATIONS:  Current Outpatient Prescriptions   Medication Sig Dispense Refill     aspirin 81 MG tablet Take 1 tablet (81 mg) by mouth daily 90 tablet 3     atorvastatin (LIPITOR) 40 MG tablet Take 1 tablet (40 mg) by mouth daily 30 tablet 0     blood glucose monitoring (NO BRAND SPECIFIED) test strip Use to test blood sugar 3 times daily or as directed. Box of 100 each. 3 Box 3     Blood Glucose Monitoring Suppl W/DEVICE KIT 1 each daily 1 kit 0     carvedilol (COREG) 6.25 MG tablet Take 1 tablet (6.25 mg) by mouth 2 times daily (with meals) 60 tablet 0     GLIPIZIDE XL 10 MG 24 hr tablet TAKE 1 TABLET BY MOUTH ONCE DAILY SCHEDULE  OFFICE  VISIT  FOR  REFILL 90 tablet 0     losartan (COZAAR) 25 MG tablet Take 1 tablet (25 mg) by mouth daily 90 tablet 3     Melatonin 10 MG TABS Take 10 mg by mouth nightly as needed for sleep       metFORMIN (GLUCOPHAGE) 1000 MG tablet TAKE 1 TABLET BY MOUTH WITH BREAKFAST TAKE ONE  HALF  TABLET  WITH  LUNCH  AND TAKE  ONE  TABLET  WITH  DINNER 225 tablet 0     MULTIPLE VITAMIN PO Take 1 tablet by mouth       nitroGLYcerin (NITROSTAT) 0.4 MG sublingual tablet For chest pain place 1 tablet under the tongue every 5 minutes for 3 doses. If symptoms persist 5 minutes after 1st dose call 911. 50 tablet 0     Omega-3 Fatty Acids (OMEGA-3 FISH OIL PO) Take 1,200 mg by mouth 2 times daily (with meals)       omeprazole (PRILOSEC OTC) 20 MG tablet Take 1 tablet (20 mg) by mouth daily 90 tablet 1     ONE TOUCH LANCETS MISC 1 lancet 3 times daily 200 each 5      ticagrelor (BRILINTA) 90 MG tablet Take 1 tablet (90 mg) by mouth 2 times daily 60 tablet 0       ALLERGIES     Allergies   Allergen Reactions     Lisinopril Cough       PAST MEDICAL HISTORY:  Past Medical History:   Diagnosis Date     DM type 2 (diabetes mellitus, type 2) (H)      HTN (hypertension), benign      Hyperlipidemia        PAST SURGICAL HISTORY:  Past Surgical History:   Procedure Laterality Date     HERNIORRHAPHY INGUINAL BILATERAL         FAMILY HISTORY:  Family History   Problem Relation Age of Onset     Diabetes Mother      Hypertension Mother      Cardiovascular Father 81     CHF     HEART DISEASE Sister 50     HEART DISEASE Brother 50       SOCIAL HISTORY:  Social History     Social History     Marital status:      Spouse name: N/A     Number of children: 3     Years of education: N/A     Occupational History           Social History Main Topics     Smoking status: Former Smoker     Packs/day: 1.00     Years: 30.00     Quit date: 1/1/2000     Smokeless tobacco: Never Used     Alcohol use No     Drug use: No     Sexual activity: Yes     Partners: Female     Other Topics Concern     Parent/Sibling W/ Cabg, Mi Or Angioplasty Before 65f 55m? No     Social History Narrative    , 3 children, works as a small .  (last updated 9/14/2018)        Review of Systems:  Skin:  Negative       Eyes:  Positive for glasses    ENT:  Negative      Respiratory:  Negative for shortness of breath;cough     Cardiovascular:  Negative for;palpitations;chest pain;edema;syncope or near-syncope;fatigue;lightheadedness;dizziness      Gastroenterology: Negative      Genitourinary:  Negative      Musculoskeletal:  Negative      Neurologic:  Positive for memory problems    Psychiatric:  Negative for anxiety;depression    Heme/Lymph/Imm:  Negative for bleeding disorder    Endocrine:  Positive for diabetes      Physical Exam:  Vitals: /67  Pulse 80  Wt 66.7 kg (147 lb)  SpO2 96%   BMI 23.73 kg/m2    Constitutional:  cooperative, alert and oriented, well developed, well nourished, in no acute distress thin      Skin:  warm and dry to the touch          Head:  normocephalic        Eyes:  sclera white        Lymph:      ENT:  no pallor or cyanosis        Neck:  no stiffness        Respiratory:  normal breath sounds, clear to auscultation, normal A-P diameter, normal symmetry, normal respiratory excursion, no use of accessory muscles         Cardiac: regular rhythm, normal S1/S2, no S3 or S4, apical impulse not displaced, no murmurs, gallops or rubs                pulses full and equal                                 left radial bruit (-)      GI:  abdomen soft        Extremities and Muscular Skeletal:  no edema              Neurological:  affect appropriate        Psych:  Alert and Oriented x 3        CC  ABILIO Wilson CNP  [address]  [place] [address]                Thank you for allowing me to participate in the care of your patient.      Sincerely,     Carolina Weller, ABILIO CNP     [facility]    cc:   ABILIO Wilson CNP  [address]  [place] [address]

## 2018-10-01 NOTE — PROGRESS NOTES
HPI and Plan:   See dictation    No orders of the defined types were placed in this encounter.      No orders of the defined types were placed in this encounter.      Medications Discontinued During This Encounter   Medication Reason     amLODIPine (NORVASC) 10 MG tablet          Encounter Diagnoses   Name Primary?     Coronary artery disease involving native coronary artery of native heart without angina pectoris Yes     Benign essential hypertension      Hyperlipidemia LDL goal <70        CURRENT MEDICATIONS:  Current Outpatient Prescriptions   Medication Sig Dispense Refill     aspirin 81 MG tablet Take 1 tablet (81 mg) by mouth daily 90 tablet 3     atorvastatin (LIPITOR) 40 MG tablet Take 1 tablet (40 mg) by mouth daily 30 tablet 0     blood glucose monitoring (NO BRAND SPECIFIED) test strip Use to test blood sugar 3 times daily or as directed. Box of 100 each. 3 Box 3     Blood Glucose Monitoring Suppl W/DEVICE KIT 1 each daily 1 kit 0     carvedilol (COREG) 6.25 MG tablet Take 1 tablet (6.25 mg) by mouth 2 times daily (with meals) 60 tablet 0     GLIPIZIDE XL 10 MG 24 hr tablet TAKE 1 TABLET BY MOUTH ONCE DAILY SCHEDULE  OFFICE  VISIT  FOR  REFILL 90 tablet 0     losartan (COZAAR) 25 MG tablet Take 1 tablet (25 mg) by mouth daily 90 tablet 3     Melatonin 10 MG TABS Take 10 mg by mouth nightly as needed for sleep       metFORMIN (GLUCOPHAGE) 1000 MG tablet TAKE 1 TABLET BY MOUTH WITH BREAKFAST TAKE ONE  HALF  TABLET  WITH  LUNCH  AND TAKE  ONE  TABLET  WITH  DINNER 225 tablet 0     MULTIPLE VITAMIN PO Take 1 tablet by mouth       nitroGLYcerin (NITROSTAT) 0.4 MG sublingual tablet For chest pain place 1 tablet under the tongue every 5 minutes for 3 doses. If symptoms persist 5 minutes after 1st dose call 911. 50 tablet 0     Omega-3 Fatty Acids (OMEGA-3 FISH OIL PO) Take 1,200 mg by mouth 2 times daily (with meals)       omeprazole (PRILOSEC OTC) 20 MG tablet Take 1 tablet (20 mg) by mouth daily 90 tablet 1      ONE TOUCH LANCETS MISC 1 lancet 3 times daily 200 each 5     ticagrelor (BRILINTA) 90 MG tablet Take 1 tablet (90 mg) by mouth 2 times daily 60 tablet 0       ALLERGIES     Allergies   Allergen Reactions     Lisinopril Cough       PAST MEDICAL HISTORY:  Past Medical History:   Diagnosis Date     DM type 2 (diabetes mellitus, type 2) (H)      HTN (hypertension), benign      Hyperlipidemia        PAST SURGICAL HISTORY:  Past Surgical History:   Procedure Laterality Date     HERNIORRHAPHY INGUINAL BILATERAL         FAMILY HISTORY:  Family History   Problem Relation Age of Onset     Diabetes Mother      Hypertension Mother      Cardiovascular Father 81     CHF     HEART DISEASE Sister 50     HEART DISEASE Brother 50       SOCIAL HISTORY:  Social History     Social History     Marital status:      Spouse name: N/A     Number of children: 3     Years of education: N/A     Occupational History           Social History Main Topics     Smoking status: Former Smoker     Packs/day: 1.00     Years: 30.00     Quit date: 1/1/2000     Smokeless tobacco: Never Used     Alcohol use No     Drug use: No     Sexual activity: Yes     Partners: Female     Other Topics Concern     Parent/Sibling W/ Cabg, Mi Or Angioplasty Before 65f 55m? No     Social History Narrative    , 3 children, works as a small .  (last updated 9/14/2018)        Review of Systems:  Skin:  Negative       Eyes:  Positive for glasses    ENT:  Negative      Respiratory:  Negative for shortness of breath;cough     Cardiovascular:  Negative for;palpitations;chest pain;edema;syncope or near-syncope;fatigue;lightheadedness;dizziness      Gastroenterology: Negative      Genitourinary:  Negative      Musculoskeletal:  Negative      Neurologic:  Positive for memory problems    Psychiatric:  Negative for anxiety;depression    Heme/Lymph/Imm:  Negative for bleeding disorder    Endocrine:  Positive for diabetes      Physical  Exam:  Vitals: /67  Pulse 80  Wt 66.7 kg (147 lb)  SpO2 96%  BMI 23.73 kg/m2    Constitutional:  cooperative, alert and oriented, well developed, well nourished, in no acute distress thin      Skin:  warm and dry to the touch          Head:  normocephalic        Eyes:  sclera white        Lymph:      ENT:  no pallor or cyanosis        Neck:  no stiffness        Respiratory:  normal breath sounds, clear to auscultation, normal A-P diameter, normal symmetry, normal respiratory excursion, no use of accessory muscles         Cardiac: regular rhythm, normal S1/S2, no S3 or S4, apical impulse not displaced, no murmurs, gallops or rubs                pulses full and equal                                 left radial bruit (-)      GI:  abdomen soft        Extremities and Muscular Skeletal:  no edema              Neurological:  affect appropriate        Psych:  Alert and Oriented x 3        CC  Kelly Case, APRN CNP  [address]  [place] [address]

## 2018-10-10 PROCEDURE — 82274 ASSAY TEST FOR BLOOD FECAL: CPT | Performed by: NURSE PRACTITIONER

## 2018-10-12 DIAGNOSIS — Z12.11 COLON CANCER SCREENING: ICD-10-CM

## 2018-10-12 NOTE — LETTER
October 15, 2018      Js ESTHER Jones  [address] [facility] [address]        Dear ,    We are writing to inform you of your test results.    Negative FIT TEST. Repeat yearly in lieu of colonoscopy for colon cancer screening.    Resulted Orders   Fecal colorectal cancer screen (FIT)   Result Value Ref Range    Occult Blood Scn FIT Negative NEG^Negative       If you have any questions or concerns, please call the clinic at the number listed above.       Sincerely,        KIRAN Iglesias CNP/fawad

## 2018-10-15 DIAGNOSIS — I21.4 NSTEMI (NON-ST ELEVATED MYOCARDIAL INFARCTION) (H): ICD-10-CM

## 2018-10-15 LAB — HEMOCCULT STL QL IA: NEGATIVE

## 2018-10-15 RX ORDER — CARVEDILOL 6.25 MG/1
6.25 TABLET ORAL 2 TIMES DAILY WITH MEALS
Qty: 180 TABLET | Refills: 0 | Status: SHIPPED | OUTPATIENT
Start: 2018-10-15 | End: 2018-12-05

## 2018-10-15 NOTE — PROGRESS NOTES
Negative FIT TEST. Repeat yearly in lieu of colonoscopy for colon cancer screening.  Please notify him of these results and send a hard copy if not on myChart.   Thanks. ALESSANDRO Maharaj

## 2018-10-15 NOTE — TELEPHONE ENCOUNTER
BP Readings from Last 6 Encounters:   10/01/18 117/67   09/20/18 128/74   09/18/18 143/69   09/14/18 (!) 162/108   03/16/18 140/80   08/24/17 130/86     Refilled.  Has card F/U 12-05-18.  NORMAN Rivas RN

## 2018-10-15 NOTE — TELEPHONE ENCOUNTER
Requested Prescriptions   Pending Prescriptions Disp Refills     carvedilol (COREG) 6.25 MG tablet 60 tablet      Sig: Take 1 tablet (6.25 mg) by mouth 2 times daily (with meals)    There is no refill protocol information for this order        ticagrelor (BRILINTA) 90 MG tablet 60 tablet      Sig: Take 1 tablet (90 mg) by mouth 2 times daily    There is no refill protocol information for this order        Last Written Prescription Date:  9/18/18  Last Fill Quantity: 60,  # refills: 0   Last office visit: 9/20/2018 with prescribing provider:     Future Office Visit:   Next 5 appointments (look out 90 days)     Dec 05, 2018 11:00 AM CST   Return Visit with Hortencia Aguilar MD   [facility]    [address] [place] [id number] [phone]                 Patient is calling for these refills. He requested them a while back but the request must have been sent to original prescriber.

## 2018-10-16 NOTE — PROGRESS NOTES
Cardiac Rehab Discharge Summary    Reason for discharge:    Patient/family request discontinuation of services.    Progress towards goals:  Goals partially met.  Barriers to achieving goals:   Due to work issues patient was unable to continue exercise sessions.    Recommendation(s):    Continued therapy is recommended.  Rationale/Recommendations:  pt was unable to complete  recommended education classes or exercise sessions..

## 2018-10-16 NOTE — ADDENDUM NOTE
Encounter addended by: Melissa Oliveira EP on: 10/16/2018  8:05 AM<BR>     Actions taken: Episode resolved, Sign clinical note

## 2018-12-05 ENCOUNTER — OFFICE VISIT (OUTPATIENT)
Dept: CARDIOLOGY | Facility: CLINIC | Age: 63
End: 2018-12-05
Attending: NURSE PRACTITIONER
Payer: COMMERCIAL

## 2018-12-05 VITALS
WEIGHT: 142.6 LBS | HEART RATE: 75 BPM | DIASTOLIC BLOOD PRESSURE: 84 MMHG | SYSTOLIC BLOOD PRESSURE: 108 MMHG | OXYGEN SATURATION: 97 % | BODY MASS INDEX: 23.02 KG/M2

## 2018-12-05 DIAGNOSIS — I21.4 NSTEMI (NON-ST ELEVATED MYOCARDIAL INFARCTION) (H): ICD-10-CM

## 2018-12-05 PROCEDURE — 99214 OFFICE O/P EST MOD 30 MIN: CPT | Performed by: INTERNAL MEDICINE

## 2018-12-05 RX ORDER — CARVEDILOL 6.25 MG/1
6.25 TABLET ORAL 2 TIMES DAILY WITH MEALS
Qty: 180 TABLET | Refills: 3 | Status: SHIPPED | OUTPATIENT
Start: 2018-12-05 | End: 2020-01-06

## 2018-12-05 RX ORDER — ATORVASTATIN CALCIUM 40 MG/1
40 TABLET, FILM COATED ORAL DAILY
Qty: 90 TABLET | Refills: 3 | Status: SHIPPED | OUTPATIENT
Start: 2018-12-05 | End: 2020-01-06

## 2018-12-05 RX ORDER — NITROGLYCERIN 0.4 MG/1
TABLET SUBLINGUAL
Qty: 50 TABLET | Refills: 11 | Status: SHIPPED | OUTPATIENT
Start: 2018-12-05

## 2018-12-05 NOTE — PROGRESS NOTES
HPI and Plan:     Mr. Js Jones is  63 years old and is seen in follow-up at [facility] in [place].  He is seen for his recent history of coronary artery disease and multivessel intervention. This is the first time I have met him.    His past medical history is notable for hypertension, dyslipidemia, type 2 diabetes and a tobacco use history of 30 pack years.  He has a family history of coronary artery disease (father had CABG at 59 yo,sister in her 50's yo) He was hospitalized at [facility] on 9/14/2018 with one week  of exertional chest pain.  He did an internet search for reasons to have chest pain and decided to seek evaluation for a possible cardiac etiology.       He underwent a coronary angiography via the left radial artery with placement of two stents to the diagonal branch and one stent to the mid RCA.  There was mild LAD disease with a measured IFR of 0.92 indicating no physiologic obstruction.  An echocardiogram demonstrated a normal ejection fraction of 50-55% and mild MR.  His EKG had demonstrated a LBBB.  He continues on Brilinta, low dose aspirin, metoprolol, losartan and a statin    He denies any angina or dyspnea or exertional intolerance.  He works lifting up to 40 lbs. But notes most of his time is spent as a .  He laments the fact his wife still smokes and wishes she would be able to stop.    Exam:  This is a slender man in no distress.  Blood pressure 108/84, pulse 75, weight 64.7 kg (142 lb 9.6 oz), SpO2 97 %.  The chest was clear.    On cardiac exam, there was a normal S1, S2 and no murmur; rjythm was regular.    Assessment/Plan:  Mr. Jones has coronary artery disease, has undergone revascularization, is asymptomatic and doing well.  He is on appropriate risk factor intervention.  He will return in 9 months and will continue dual antiplatelet therapy (DAPT) until that time.    I have recommended a stress echocardiogram at 9 months. If there is no evidence  of ischemia, Brilinta can then be stopped.  In the interim, he will call if he develops any new symptoms.    Orders Placed This Encounter   Procedures     Follow-Up with Cardiologist       Orders Placed This Encounter   Medications     nitroGLYcerin (NITROSTAT) 0.4 MG sublingual tablet     Sig: For chest pain place 1 tablet under the tongue every 5 minutes for 3 doses. If symptoms persist 5 minutes after 1st dose call 911.     Dispense:  50 tablet     Refill:  11     carvedilol (COREG) 6.25 MG tablet     Sig: Take 1 tablet (6.25 mg) by mouth 2 times daily (with meals)     Dispense:  180 tablet     Refill:  3     atorvastatin (LIPITOR) 40 MG tablet     Sig: Take 1 tablet (40 mg) by mouth daily     Dispense:  90 tablet     Refill:  3     ticagrelor (BRILINTA) 90 MG tablet     Sig: Take 1 tablet (90 mg) by mouth 2 times daily     Dispense:  180 tablet     Refill:  3       Medications Discontinued During This Encounter   Medication Reason     nitroGLYcerin (NITROSTAT) 0.4 MG sublingual tablet Reorder     carvedilol (COREG) 6.25 MG tablet Reorder     atorvastatin (LIPITOR) 40 MG tablet Reorder     ticagrelor (BRILINTA) 90 MG tablet Reorder         Encounter Diagnosis   Name Primary?     NSTEMI (non-ST elevated myocardial infarction) (H)        CURRENT MEDICATIONS:  Current Outpatient Prescriptions   Medication Sig Dispense Refill     aspirin 81 MG tablet Take 1 tablet (81 mg) by mouth daily 90 tablet 3     atorvastatin (LIPITOR) 40 MG tablet Take 1 tablet (40 mg) by mouth daily 90 tablet 3     carvedilol (COREG) 6.25 MG tablet Take 1 tablet (6.25 mg) by mouth 2 times daily (with meals) 180 tablet 3     GLIPIZIDE XL 10 MG 24 hr tablet TAKE 1 TABLET BY MOUTH ONCE DAILY SCHEDULE  OFFICE  VISIT  FOR  REFILL 90 tablet 0     losartan (COZAAR) 25 MG tablet Take 1 tablet (25 mg) by mouth daily 90 tablet 3     Melatonin 10 MG TABS Take 10 mg by mouth nightly as needed for sleep       metFORMIN (GLUCOPHAGE) 1000 MG tablet TAKE 1  TABLET BY MOUTH WITH BREAKFAST TAKE ONE  HALF  TABLET  WITH  LUNCH  AND TAKE  ONE  TABLET  WITH  DINNER 225 tablet 0     MULTIPLE VITAMIN PO Take 1 tablet by mouth       nitroGLYcerin (NITROSTAT) 0.4 MG sublingual tablet For chest pain place 1 tablet under the tongue every 5 minutes for 3 doses. If symptoms persist 5 minutes after 1st dose call 911. 50 tablet 11     Omega-3 Fatty Acids (OMEGA-3 FISH OIL PO) Take 1,200 mg by mouth 2 times daily (with meals)       omeprazole (PRILOSEC OTC) 20 MG tablet Take 1 tablet (20 mg) by mouth daily 90 tablet 1     ticagrelor (BRILINTA) 90 MG tablet Take 1 tablet (90 mg) by mouth 2 times daily 180 tablet 3     blood glucose monitoring (NO BRAND SPECIFIED) test strip Use to test blood sugar 3 times daily or as directed. Box of 100 each. (Patient not taking: Reported on 12/5/2018) 3 Box 3     Blood Glucose Monitoring Suppl W/DEVICE KIT 1 each daily (Patient not taking: Reported on 12/5/2018) 1 kit 0     ONE TOUCH LANCETS MISC 1 lancet 3 times daily (Patient not taking: Reported on 12/5/2018) 200 each 5     [DISCONTINUED] atorvastatin (LIPITOR) 40 MG tablet Take 1 tablet (40 mg) by mouth daily 30 tablet 0     [DISCONTINUED] carvedilol (COREG) 6.25 MG tablet Take 1 tablet (6.25 mg) by mouth 2 times daily (with meals) 180 tablet 0     [DISCONTINUED] nitroGLYcerin (NITROSTAT) 0.4 MG sublingual tablet For chest pain place 1 tablet under the tongue every 5 minutes for 3 doses. If symptoms persist 5 minutes after 1st dose call 911. 50 tablet 0     [DISCONTINUED] ticagrelor (BRILINTA) 90 MG tablet Take 1 tablet (90 mg) by mouth 2 times daily 180 tablet 0       ALLERGIES     Allergies   Allergen Reactions     Lisinopril Cough       PAST MEDICAL HISTORY:  Past Medical History:   Diagnosis Date     DM type 2 (diabetes mellitus, type 2) (H)      HTN (hypertension), benign      Hyperlipidemia        PAST SURGICAL HISTORY:  Past Surgical History:   Procedure Laterality Date     HERNIORRHAPHY  INGUINAL BILATERAL         FAMILY HISTORY:  Family History   Problem Relation Age of Onset     Diabetes Mother      Hypertension Mother      Cardiovascular Father 81     CHF     Heart Disease Sister 50     Heart Disease Brother 50       SOCIAL HISTORY:  Social History     Social History     Marital status:      Spouse name: N/A     Number of children: 3     Years of education: N/A     Occupational History           Social History Main Topics     Smoking status: Former Smoker     Packs/day: 1.00     Years: 30.00     Quit date: 1/1/2000     Smokeless tobacco: Never Used     Alcohol use No     Drug use: No     Sexual activity: Yes     Partners: Female     Other Topics Concern     Parent/Sibling W/ Cabg, Mi Or Angioplasty Before 65f 55m? No     Social History Narrative    , 3 children, works as a small .  (last updated 9/14/2018)        Review of Systems:  Skin:  Negative       Eyes:  Positive for glasses    ENT:  Negative      Respiratory:  Negative       Cardiovascular:  Negative      Gastroenterology: Negative      Genitourinary:  Negative      Musculoskeletal:  Negative      Neurologic:  Positive for memory problems    Psychiatric:  Negative      Heme/Lymph/Imm:  Negative      Endocrine:  Positive for diabetes      Physical Exam:  Vitals: /76 (BP Location: Right arm, Patient Position: Sitting, Cuff Size: Adult Regular)  Pulse 75  Wt 64.7 kg (142 lb 9.6 oz)  SpO2 97%  BMI 23.02 kg/m2    Constitutional:  cooperative, alert and oriented, well developed, well nourished, in no acute distress        Skin:  warm and dry to the touch          Head:  normocephalic        Eyes:  sclera white        Lymph:      ENT:           Neck:           Respiratory:  normal breath sounds, clear to auscultation, normal A-P diameter, normal symmetry, normal respiratory excursion, no use of accessory muscles         Cardiac: regular rhythm, normal S1/S2, no S3 or S4, apical impulse not  displaced, no murmurs, gallops or rubs                                                         GI:           Extremities and Muscular Skeletal:  no deformities, clubbing, cyanosis, erythema observed;no edema              Neurological:  no gross motor deficits;affect appropriate        Psych:  Alert and Oriented x 3;affect appropriate, oriented to time, person and place

## 2018-12-05 NOTE — LETTER
12/5/2018    Kathya Iglesias, CNP  [address] [address]    RE: Js Jones       Dear Colleague,    I had the pleasure of seeing Js Jones in the [facility].    HPI and Plan:     Mr. Js Jones is  63 years old and is seen in follow-up at [facility] in [place].  He is seen for his recent history of coronary artery disease and multivessel intervention. This is the first time I have met him.    His past medical history is notable for hypertension, dyslipidemia, type 2 diabetes and a tobacco use history of 30 pack years.  He has a family history of coronary artery disease (father had CABG at 61 yo,sister in her 50's yo) He was hospitalized at [facility] on 9/14/2018 with one week  of exertional chest pain.  He did an internet search for reasons to have chest pain and decided to seek evaluation for a possible cardiac etiology.       He underwent a coronary angiography via the left radial artery with placement of two stents to the diagonal branch and one stent to the mid RCA.  There was mild LAD disease with a measured IFR of 0.92 indicating no physiologic obstruction.  An echocardiogram demonstrated a normal ejection fraction of 50-55% and mild MR.  His EKG had demonstrated a LBBB.  He continues on Brilinta, low dose aspirin, metoprolol, losartan and a statin    He denies any angina or dyspnea or exertional intolerance.  He works lifting up to 40 lbs. But notes most of his time is spent as a .  He laments the fact his wife still smokes and wishes she would be able to stop.    Exam:  This is a slender man in no distress.  Blood pressure 108/84, pulse 75, weight 64.7 kg (142 lb 9.6 oz), SpO2 97 %.  The chest was clear.    On cardiac exam, there was a normal S1, S2 and no murmur; rjythm was regular.    Assessment/Plan:  Mr. Jones has coronary artery disease, has undergone revascularization, is asymptomatic and doing well.   He is on appropriate risk factor intervention.  He will return in 9 months and will continue dual antiplatelet therapy (DAPT) until that time.    I have recommended a stress echocardiogram at 9 months. If there is no evidence of ischemia, Brilinta can then be stopped.  In the interim, he will call if he develops any new symptoms.    Orders Placed This Encounter   Procedures     Follow-Up with Cardiologist       Orders Placed This Encounter   Medications     nitroGLYcerin (NITROSTAT) 0.4 MG sublingual tablet     Sig: For chest pain place 1 tablet under the tongue every 5 minutes for 3 doses. If symptoms persist 5 minutes after 1st dose call 911.     Dispense:  50 tablet     Refill:  11     carvedilol (COREG) 6.25 MG tablet     Sig: Take 1 tablet (6.25 mg) by mouth 2 times daily (with meals)     Dispense:  180 tablet     Refill:  3     atorvastatin (LIPITOR) 40 MG tablet     Sig: Take 1 tablet (40 mg) by mouth daily     Dispense:  90 tablet     Refill:  3     ticagrelor (BRILINTA) 90 MG tablet     Sig: Take 1 tablet (90 mg) by mouth 2 times daily     Dispense:  180 tablet     Refill:  3       Medications Discontinued During This Encounter   Medication Reason     nitroGLYcerin (NITROSTAT) 0.4 MG sublingual tablet Reorder     carvedilol (COREG) 6.25 MG tablet Reorder     atorvastatin (LIPITOR) 40 MG tablet Reorder     ticagrelor (BRILINTA) 90 MG tablet Reorder         Encounter Diagnosis   Name Primary?     NSTEMI (non-ST elevated myocardial infarction) (H)        CURRENT MEDICATIONS:  Current Outpatient Prescriptions   Medication Sig Dispense Refill     aspirin 81 MG tablet Take 1 tablet (81 mg) by mouth daily 90 tablet 3     atorvastatin (LIPITOR) 40 MG tablet Take 1 tablet (40 mg) by mouth daily 90 tablet 3     carvedilol (COREG) 6.25 MG tablet Take 1 tablet (6.25 mg) by mouth 2 times daily (with meals) 180 tablet 3     GLIPIZIDE XL 10 MG 24 hr tablet TAKE 1 TABLET BY MOUTH ONCE DAILY SCHEDULE  OFFICE  VISIT  FOR   REFILL 90 tablet 0     losartan (COZAAR) 25 MG tablet Take 1 tablet (25 mg) by mouth daily 90 tablet 3     Melatonin 10 MG TABS Take 10 mg by mouth nightly as needed for sleep       metFORMIN (GLUCOPHAGE) 1000 MG tablet TAKE 1 TABLET BY MOUTH WITH BREAKFAST TAKE ONE  HALF  TABLET  WITH  LUNCH  AND TAKE  ONE  TABLET  WITH  DINNER 225 tablet 0     MULTIPLE VITAMIN PO Take 1 tablet by mouth       nitroGLYcerin (NITROSTAT) 0.4 MG sublingual tablet For chest pain place 1 tablet under the tongue every 5 minutes for 3 doses. If symptoms persist 5 minutes after 1st dose call 911. 50 tablet 11     Omega-3 Fatty Acids (OMEGA-3 FISH OIL PO) Take 1,200 mg by mouth 2 times daily (with meals)       omeprazole (PRILOSEC OTC) 20 MG tablet Take 1 tablet (20 mg) by mouth daily 90 tablet 1     ticagrelor (BRILINTA) 90 MG tablet Take 1 tablet (90 mg) by mouth 2 times daily 180 tablet 3     blood glucose monitoring (NO BRAND SPECIFIED) test strip Use to test blood sugar 3 times daily or as directed. Box of 100 each. (Patient not taking: Reported on 12/5/2018) 3 Box 3     Blood Glucose Monitoring Suppl W/DEVICE KIT 1 each daily (Patient not taking: Reported on 12/5/2018) 1 kit 0     ONE TOUCH LANCETS MISC 1 lancet 3 times daily (Patient not taking: Reported on 12/5/2018) 200 each 5     [DISCONTINUED] atorvastatin (LIPITOR) 40 MG tablet Take 1 tablet (40 mg) by mouth daily 30 tablet 0     [DISCONTINUED] carvedilol (COREG) 6.25 MG tablet Take 1 tablet (6.25 mg) by mouth 2 times daily (with meals) 180 tablet 0     [DISCONTINUED] nitroGLYcerin (NITROSTAT) 0.4 MG sublingual tablet For chest pain place 1 tablet under the tongue every 5 minutes for 3 doses. If symptoms persist 5 minutes after 1st dose call 911. 50 tablet 0     [DISCONTINUED] ticagrelor (BRILINTA) 90 MG tablet Take 1 tablet (90 mg) by mouth 2 times daily 180 tablet 0       ALLERGIES     Allergies   Allergen Reactions     Lisinopril Cough       PAST MEDICAL HISTORY:  Past Medical  History:   Diagnosis Date     DM type 2 (diabetes mellitus, type 2) (H)      HTN (hypertension), benign      Hyperlipidemia        PAST SURGICAL HISTORY:  Past Surgical History:   Procedure Laterality Date     HERNIORRHAPHY INGUINAL BILATERAL         FAMILY HISTORY:  Family History   Problem Relation Age of Onset     Diabetes Mother      Hypertension Mother      Cardiovascular Father 81     CHF     Heart Disease Sister 50     Heart Disease Brother 50       SOCIAL HISTORY:  Social History     Social History     Marital status:      Spouse name: N/A     Number of children: 3     Years of education: N/A     Occupational History           Social History Main Topics     Smoking status: Former Smoker     Packs/day: 1.00     Years: 30.00     Quit date: 1/1/2000     Smokeless tobacco: Never Used     Alcohol use No     Drug use: No     Sexual activity: Yes     Partners: Female     Other Topics Concern     Parent/Sibling W/ Cabg, Mi Or Angioplasty Before 65f 55m? No     Social History Narrative    , 3 children, works as a small .  (last updated 9/14/2018)        Review of Systems:  Skin:  Negative       Eyes:  Positive for glasses    ENT:  Negative      Respiratory:  Negative       Cardiovascular:  Negative      Gastroenterology: Negative      Genitourinary:  Negative      Musculoskeletal:  Negative      Neurologic:  Positive for memory problems    Psychiatric:  Negative      Heme/Lymph/Imm:  Negative      Endocrine:  Positive for diabetes      Physical Exam:  Vitals: /76 (BP Location: Right arm, Patient Position: Sitting, Cuff Size: Adult Regular)  Pulse 75  Wt 64.7 kg (142 lb 9.6 oz)  SpO2 97%  BMI 23.02 kg/m2    Constitutional:  cooperative, alert and oriented, well developed, well nourished, in no acute distress        Skin:  warm and dry to the touch          Head:  normocephalic        Eyes:  sclera white        Lymph:      ENT:           Neck:            Respiratory:  normal breath sounds, clear to auscultation, normal A-P diameter, normal symmetry, normal respiratory excursion, no use of accessory muscles         Cardiac: regular rhythm, normal S1/S2, no S3 or S4, apical impulse not displaced, no murmurs, gallops or rubs                                                         GI:           Extremities and Muscular Skeletal:  no deformities, clubbing, cyanosis, erythema observed;no edema              Neurological:  no gross motor deficits;affect appropriate        Psych:  Alert and Oriented x 3;affect appropriate, oriented to time, person and place        Thank you for allowing me to participate in the care of your patient.    Sincerely,     Hortencia Aguilar MD     [facility]

## 2018-12-05 NOTE — PATIENT INSTRUCTIONS
"[facility]  [facility]~[address]~[place], [place]~[address]  Thank you for your [facility] visit today. If you have questions regarding your visit, please contact your cardiology RN's, Bibi Shore or Elba Maxwell, at [phone]. Your provider has recommended the following:  Medication Changes:  Continue all your medications for now.  Recommendations:  Call if you have any problems.  Follow-up:  See Dr. Aguilar for cardiology follow up in 9 months.    To schedule a future appointment, we kindly ask that you call cardiology scheduling at [phone] three months prior to requested revisit date.  [facility] cardiology clinic is staffed with \"Advance Practice Providers\". These are our cardiology Physician Assistants and Nurse Practitioners. Please call cardiology scheduling if you feel you need clinical evaluation with them at any time for any cardiac reason.                 Reminder:  For your safety, we ask that you bring in your current medication(s) or an updated list of your medications with you to EACH office visit. Include the medication name, dose of pill on bottle and how you are taking it. Include over-the-counter medications or supplements. Your provider will review this at each visit and plan your care based on your current information.     "

## 2018-12-05 NOTE — MR AVS SNAPSHOT
"              After Visit Summary   12/5/2018    Jstea Jones    MRN: [id number]           Patient Information     Date Of Birth          1955        Visit Information        Provider Department      12/5/2018 11:00 AM Hortencia Aguilar MD [facility]        Today's Diagnoses     NSTEMI (non-ST elevated myocardial infarction) (H)          Care Instructions    [facility]~[address]~[place], [place]~[address]  Thank you for your  Heart Care visit today. If you have questions regarding your visit, please contact your cardiology RN's, Bibi Shore or Elba Maxwell, at [phone]. Your provider has recommended the following:  Medication Changes:  Continue all your medications for now.  Recommendations:  Call if you have any problems.  Follow-up:  See Dr. Aguilar for cardiology follow up in 9 months.    To schedule a future appointment, we kindly ask that you call cardiology scheduling at [phone] three months prior to requested revisit date.  [facility] cardiology clinic is staffed with \"Advance Practice Providers\". These are our cardiology Physician Assistants and Nurse Practitioners. Please call cardiology scheduling if you feel you need clinical evaluation with them at any time for any cardiac reason.                 Reminder:  For your safety, we ask that you bring in your current medication(s) or an updated list of your medications with you to EACH office visit. Include the medication name, dose of pill on bottle and how you are taking it. Include over-the-counter medications or supplements. Your provider will review this at each visit and plan your care based on your current information.             Follow-ups after your visit        Additional Services     Follow-Up with Cardiologist                 Future tests that were ordered for you today     Open Future Orders        Priority Expected Expires " Ordered    Follow-Up with Cardiologist Routine 9/1/2019 12/5/2019 12/5/2018            Who to contact     If you have questions or need follow up information about today's clinic visit or your schedule please contact [facility] directly at [phone].  Normal or non-critical lab and imaging results will be communicated to you by MyChart, letter or phone within 4 business days after the clinic has received the results. If you do not hear from us within 7 days, please contact the clinic through MyChart or phone. If you have a critical or abnormal lab result, we will notify you by phone as soon as possible.  Submit refill requests through [employer] or call your pharmacy and they will forward the refill request to us. Please allow 3 business days for your refill to be completed.          Additional Information About Your Visit        Care EveryWhere ID     This is your Care EveryWhere ID. This could be used by other organizations to access your [facility] medical records  [id number]        Your Vitals Were     Pulse Pulse Oximetry BMI (Body Mass Index)             75 97% 23.02 kg/m2          Blood Pressure from Last 3 Encounters:   12/05/18 139/76   10/01/18 117/67   09/20/18 128/74    Weight from Last 3 Encounters:   12/05/18 64.7 kg (142 lb 9.6 oz)   10/01/18 66.7 kg (147 lb)   09/20/18 66.7 kg (147 lb)              We Performed the Following     Follow-Up with Cardiologist          Where to get your medicines      These medications were sent to [facility] [address] [place] [address]  [address] [facility] [address]     Phone:  [phone]     atorvastatin 40 MG tablet    carvedilol 6.25 MG tablet    nitroGLYcerin 0.4 MG sublingual tablet    ticagrelor 90 MG tablet          Primary Care Provider Office Phone # Fax #    Kathya Iglesias [phone]       [address] [address]        Equal Access to Services      VICTORINO PATEL : Hadii aad ku thao Sophani, waaxda luqadaha, qaybta kaalmada adedwight, ranulfo chandan haycarlos pazreannaalison ramirez . So [facility] [phone].    ATENCIÓN: Si habla español, tiene a mario disposición servicios gratuitos de asistencia lingüística. Llame al [phone].    We comply with applicable federal civil rights laws and [place] laws. We do not discriminate on the basis of race, color, national origin, age, disability, sex, sexual orientation, or gender identity.            Thank you!     Thank you for choosing [facility]  for your care. Our goal is always to provide you with excellent care. Hearing back from our patients is one way we can continue to improve our services. Please take a few minutes to complete the written survey that you may receive in the mail after your visit with us. Thank you!             Your Updated Medication List - Protect others around you: Learn how to safely use, store and throw away your medicines at www.disposemymeds.org.          This list is accurate as of 12/5/18 11:45 AM.  Always use your most recent med list.                   Brand Name Dispense Instructions for use Diagnosis    aspirin 81 MG tablet    ASA    90 tablet    Take 1 tablet (81 mg) by mouth daily    HTN (hypertension), Type 2 diabetes, HbA1C goal < 8% (H)       atorvastatin 40 MG tablet    LIPITOR    90 tablet    Take 1 tablet (40 mg) by mouth daily    NSTEMI (non-ST elevated myocardial infarction) (H)       Blood Glucose Monitoring Suppl w/Device Kit     1 kit    1 each daily    Type 2 diabetes, HbA1C goal < 8% (H)       blood glucose monitoring test strip    NO BRAND SPECIFIED    3 Box    Use to test blood sugar 3 times daily or as directed. Box of 100 each.    Type 2 diabetes mellitus with hyperglycemia, without long-term current use of insulin (H)       carvedilol 6.25 MG tablet    COREG    180 tablet    Take 1 tablet (6.25 mg) by mouth 2 times daily (with  meals)    NSTEMI (non-ST elevated myocardial infarction) (H)       glipiZIDE XL 10 MG 24 hr tablet   Generic drug:  glipiZIDE     90 tablet    TAKE 1 TABLET BY MOUTH ONCE DAILY SCHEDULE  OFFICE  VISIT  FOR  REFILL    Type 2 diabetes mellitus without complication, without long-term current use of insulin (H)       losartan 25 MG tablet    COZAAR    90 tablet    Take 1 tablet (25 mg) by mouth daily    Benign essential hypertension       Melatonin 10 MG Tabs tablet      Take 10 mg by mouth nightly as needed for sleep        metFORMIN 1000 MG tablet    GLUCOPHAGE    225 tablet    TAKE 1 TABLET BY MOUTH WITH BREAKFAST TAKE ONE  HALF  TABLET  WITH  LUNCH  AND TAKE  ONE  TABLET  WITH  DINNER    Type 2 diabetes mellitus without complication, without long-term current use of insulin (H)       MULTIPLE VITAMIN PO      Take 1 tablet by mouth        nitroGLYcerin 0.4 MG sublingual tablet    NITROSTAT    50 tablet    For chest pain place 1 tablet under the tongue every 5 minutes for 3 doses. If symptoms persist 5 minutes after 1st dose call 911.    NSTEMI (non-ST elevated myocardial infarction) (H)       OMEGA-3 FISH OIL PO      Take 1,200 mg by mouth 2 times daily (with meals)        omeprazole 20 MG EC tablet    priLOSEC OTC    90 tablet    Take 1 tablet (20 mg) by mouth daily    Gastroesophageal reflux disease without esophagitis       ONETOUCH LANCETS Misc     200 each    1 lancet 3 times daily    Type 2 diabetes, HbA1C goal < 8% (H)       ticagrelor 90 MG tablet    BRILINTA    180 tablet    Take 1 tablet (90 mg) by mouth 2 times daily    NSTEMI (non-ST elevated myocardial infarction) (H)

## 2019-01-01 DIAGNOSIS — E11.9 TYPE 2 DIABETES MELLITUS WITHOUT COMPLICATION, WITHOUT LONG-TERM CURRENT USE OF INSULIN (H): Chronic | ICD-10-CM

## 2019-01-01 NOTE — MR AVS SNAPSHOT
After Visit Summary   10/1/2018    Jstea Jones    MRN: [id number]           Patient Information     Date Of Birth          1955        Visit Information        Provider Department      10/1/2018 3:00 PM Carolina Caldwell APRN CNP [facility]        Today's Diagnoses     NSTEMI (non-ST elevated myocardial infarction) (H)          Care Instructions    Thank you for your [facility] visit today. Your provider has recommended the following:  Medication Changes:  No changes   Recommendations:  1. Your carvedilol is to help reduce stress on the heart.   2. Brillinta and aspirin are the medications that prevent clot in the stents (makes platelets slippery)  Follow-up:   See Dr. Aguilar for cardiology follow up in December.  Call [phone] two months prior to request date to schedule any future appointments.  Reminder:  1. Please bring in all current medications, over the counter supplements and vitamin bottles to your next appointment.               [facility]~[address]~[place], [place]~[address]  Questions about your visit today?   Call your Cardiology Clinic RN's-Bibi Shore and/or Elba Maxwell at [phone].            Follow-ups after your visit        Your next 10 appointments already scheduled     Oct 03, 2018  3:00 PM CDT   [facility]iac [facility] with [facility], [facility] ([facility])    [address] [facility] [address]   [phone]            Oct 05, 2018  3:00 PM CDT   [facility], [facility] ([facility])    [address] [facility] [address] [facility] [address]   [phone]            Oct 08, 2018  3:00 PM CDT   [facility], [facility] ([facility])    [address] [facility] [address] [facility]  [address]   [phone]            Oct 10, 2018  3:00 PM CDT   [facility], [facility]   [facility] ([facility])    [address] [facility] [address]   [phone]            Oct 12, 2018  3:00 PM CDT   [facility], [facility]   [facility] ([facility])    [address] [address]   [phone]            Oct 15, 2018  3:00 PM CDT   [facility], [facility]   [facility] ([facility])    [address] [address]   [phone]            Oct 17, 2018  3:00 PM CDT   [facility], [facility]   [facility] ([facility])    [address] [facility] [address]   [phone]            Oct 19, 2018  3:00 PM CDT   [facility], [facility]   [facility] ([facility])    [address] [facility] [address]   [phone]            Oct 22, 2018  3:00 PM CDT   [facility], [facility]   [facility] ([facility])    [address] [address]   [phone]            Oct 24, 2018  3:00 PM CDT   [facility], [facility]   [facility] ([facility])    [address] [facility] [facility] [address]   [phone]              Who to contact     If you have questions or need follow up information about today's clinic visit or your schedule please contact [facility] directly at [phone].  Normal or non-critical lab and imaging results will be communicated to you by MyChart, letter or phone within 4 business days after the clinic has received the results. If you do not hear from us within 7 days, please contact the clinic  "through [employer] or phone. If you have a critical or abnormal lab result, we will notify you by phone as soon as possible.  Submit refill requests through [employer] or call your pharmacy and they will forward the refill request to us. Please allow 3 business days for your refill to be completed.          Additional Information About Your Visit        [employer] Medical Information     [employer] lets you send messages to your doctor, view your test results, renew your prescriptions, schedule appointments and more. To sign up, go to [email or web address] . Click on \"Log in\" on the left side of the screen, which will take you to the Welcome page. Then click on \"Sign up Now\" on the right side of the page.     You will be asked to enter the access code listed below, as well as some personal information. Please follow the directions to create your username and password.     Your access code is: [email or web address]  Expires: 2018  8:18 AM     Your access code will  in 90 days. If you need help or a new code, please call your [facility] clinic or [phone].        Care EveryWhere ID     This is your Care EveryWhere ID. This could be used by other organizations to access your [facility] medical records  [id number]        Your Vitals Were     Pulse Pulse Oximetry BMI (Body Mass Index)             80 96% 23.73 kg/m2          Blood Pressure from Last 3 Encounters:   10/01/18 117/67   18 128/74   18 143/69    Weight from Last 3 Encounters:   10/01/18 66.7 kg (147 lb)   18 66.7 kg (147 lb)   18 65 kg (143 lb 3.2 oz)              We Performed the Following     Follow-Up with Cardiac Advanced Practice Provider          Today's Medication Changes          These changes are accurate as of 10/1/18  3:38 PM.  If you have any questions, ask your nurse or doctor.               Stop taking these medicines if you haven't already. Please contact your care team if you have questions.     amLODIPine 10 MG tablet   Commonly " known as:  NORVASC   Stopped by:  Carolina Caldwell APRN CNP                    Primary Care Provider Office Phone # Fax #    Kathya Iglesias, LEONID [phone]       [address] [place] [address]        Equal Access to Services     VICTORINO PATEL : Hadii екатерина ku hadasho Soomaali, waaxda luqadaha, qaybta kaalmada adeegyada, waxay curtisin hayrosan fe pazreannaalison beaulieu. So [place] [phone].    ATENCIÓN: Si habla español, tiene a mario disposición servicios gratuitos de asistencia lingüística. Tristan al [phone].    We comply with applicable federal civil rights laws and [place] laws. We do not discriminate on the basis of race, color, national origin, age, disability, sex, sexual orientation, or gender identity.            Thank you!     Thank you for choosing [facility]  for your care. Our goal is always to provide you with excellent care. Hearing back from our patients is one way we can continue to improve our services. Please take a few minutes to complete the written survey that you may receive in the mail after your visit with us. Thank you!             Your Updated Medication List - Protect others around you: Learn how to safely use, store and throw away your medicines at www.disposemymeds.org.          This list is accurate as of 10/1/18  3:38 PM.  Always use your most recent med list.                   Brand Name Dispense Instructions for use Diagnosis    aspirin 81 MG tablet     90 tablet    Take 1 tablet (81 mg) by mouth daily    HTN (hypertension), Type 2 diabetes, HbA1C goal < 8% (H)       atorvastatin 40 MG tablet    LIPITOR    30 tablet    Take 1 tablet (40 mg) by mouth daily    NSTEMI (non-ST elevated myocardial infarction) (H)       Blood Glucose Monitoring Suppl w/Device Kit     1 kit    1 each daily    Type 2 diabetes, HbA1C goal < 8% (H)       blood glucose monitoring test strip    no brand specified    3 Box    Use to  test blood sugar 3 times daily or as directed. Box of 100 each.    Type 2 diabetes mellitus with hyperglycemia, without long-term current use of insulin (H)       carvedilol 6.25 MG tablet    COREG    60 tablet    Take 1 tablet (6.25 mg) by mouth 2 times daily (with meals)    NSTEMI (non-ST elevated myocardial infarction) (H)       glipiZIDE XL 10 MG 24 hr tablet   Generic drug:  glipiZIDE     90 tablet    TAKE 1 TABLET BY MOUTH ONCE DAILY SCHEDULE  OFFICE  VISIT  FOR  REFILL    Type 2 diabetes mellitus without complication, without long-term current use of insulin (H)       losartan 25 MG tablet    COZAAR    90 tablet    Take 1 tablet (25 mg) by mouth daily    Benign essential hypertension       Melatonin 10 MG Tabs tablet      Take 10 mg by mouth nightly as needed for sleep        metFORMIN 1000 MG tablet    GLUCOPHAGE    225 tablet    TAKE 1 TABLET BY MOUTH WITH BREAKFAST TAKE ONE  HALF  TABLET  WITH  LUNCH  AND TAKE  ONE  TABLET  WITH  DINNER    Type 2 diabetes mellitus without complication, without long-term current use of insulin (H)       MULTIPLE VITAMIN PO      Take 1 tablet by mouth        nitroGLYcerin 0.4 MG sublingual tablet    NITROSTAT    50 tablet    For chest pain place 1 tablet under the tongue every 5 minutes for 3 doses. If symptoms persist 5 minutes after 1st dose call 911.    NSTEMI (non-ST elevated myocardial infarction) (H)       OMEGA-3 FISH OIL PO      Take 1,200 mg by mouth 2 times daily (with meals)        omeprazole 20 MG tablet    priLOSEC OTC    90 tablet    Take 1 tablet (20 mg) by mouth daily    Gastroesophageal reflux disease without esophagitis       ONETOUCH LANCETS Misc     200 each    1 lancet 3 times daily    Type 2 diabetes, HbA1C goal < 8% (H)       ticagrelor 90 MG tablet    BRILINTA    60 tablet    Take 1 tablet (90 mg) by mouth 2 times daily    NSTEMI (non-ST elevated myocardial infarction) (H)          0.2

## 2019-01-02 RX ORDER — GLIPIZIDE 10 MG/1
10 TABLET, FILM COATED, EXTENDED RELEASE ORAL DAILY
Qty: 90 TABLET | Refills: 0 | Status: SHIPPED | OUTPATIENT
Start: 2019-01-02 | End: 2019-04-07

## 2019-01-02 NOTE — TELEPHONE ENCOUNTER
"Requested Prescriptions   Pending Prescriptions Disp Refills     GLIPIZIDE XL 10 MG 24 hr tablet [Pharmacy Med Name: GLIPIZIDE XL 10MG   TAB] 90 tablet 0     Sig: TAKE 1 TABLET BY MOUTH ONCE DAILY . APPOINTMENT REQUIRED FOR FUTURE REFILLS    Sulfonylurea Agents Failed - 1/1/2019  6:06 PM       Failed - Patient has had a Microalbumin in the past 12 mos.    Recent Labs   Lab Test 06/16/17  0928   MICROL 30   UMALCR 18.44*            Passed - Blood pressure less than 140/90 in past 6 months    BP Readings from Last 3 Encounters:   12/05/18 108/84   10/01/18 117/67   09/20/18 128/74                Passed - Patient has documented LDL within the past 12 mos.    Recent Labs   Lab Test 09/17/18  0550   LDL 54            Passed - Patient has documented A1c within the specified period of time.    If HgbA1C is 8 or greater, it needs to be on file within the past 3 months.  If less than 8, must be on file within the past 6 months.     Recent Labs   Lab Test 09/15/18  0546   A1C 6.8*            Passed - Patient is age 18 or older       Passed - Patient has a recent creatinine (normal) within the past 12 mos.    Recent Labs   Lab Test 09/18/18  0555   CR 0.88            Passed - Recent (6 mo) or future (30 days) visit within the authorizing provider's specialty    Patient had office visit in the last 6 months or has a visit in the next 30 days with authorizing provider or within the authorizing provider's specialty.  See \"Patient Info\" tab in inbasket, or \"Choose Columns\" in Meds & Orders section of the refill encounter.              "

## 2019-01-10 ENCOUNTER — TELEPHONE (OUTPATIENT)
Dept: FAMILY MEDICINE | Facility: CLINIC | Age: 64
End: 2019-01-10

## 2019-01-10 NOTE — TELEPHONE ENCOUNTER
Patient was told to return for fasting blood and urine labs, reminder letter was sent to patient on 12/05/2018, patient has still not scheduled an appointment.

## 2019-01-13 DIAGNOSIS — E11.9 TYPE 2 DIABETES MELLITUS WITHOUT COMPLICATION, WITHOUT LONG-TERM CURRENT USE OF INSULIN (H): Chronic | ICD-10-CM

## 2019-01-14 NOTE — TELEPHONE ENCOUNTER
"Requested Prescriptions   Pending Prescriptions Disp Refills     metFORMIN (GLUCOPHAGE) 1000 MG tablet [Pharmacy Med Name: METFORMIN 1000MG TAB] 225 tablet 0     Sig: TAKE 1 TABLET BY MOUTH ONCE DAILY WITH BREAKFAST AND 1/2 (ONE-HALF) TAB AT LUNCH AND 1 TAB IN THE EVENING WITH DINNER    Biguanide Agents Failed - 1/13/2019  6:13 PM       Failed - Patient has had a Microalbumin in the past 15 mos.    Recent Labs   Lab Test 06/16/17  0928   MICROL 30   UMALCR 18.44*            Passed - Blood pressure less than 140/90 in past 6 months    BP Readings from Last 3 Encounters:   12/05/18 108/84   10/01/18 117/67   09/20/18 128/74                Passed - Patient has documented LDL within the past 12 mos.    Recent Labs   Lab Test 09/17/18  0550   LDL 54            Passed - Patient is age 10 or older       Passed - Patient has documented A1c within the specified period of time.    If HgbA1C is 8 or greater, it needs to be on file within the past 3 months.  If less than 8, must be on file within the past 6 months.     Recent Labs   Lab Test 09/15/18  0546   A1C 6.8*            Passed - Patient's CR is NOT>1.4 OR Patient's EGFR is NOT<45 within past 12 mos.    Recent Labs   Lab Test 09/18/18  0555   GFRESTIMATED 87   GFRESTBLACK >90       Recent Labs   Lab Test 09/18/18  0555   CR 0.88            Passed - Patient does NOT have a diagnosis of CHF.       Passed - Medication is active on med list       Passed - Recent (6 mo) or future (30 days) visit within the authorizing provider's specialty    Patient had office visit in the last 6 months or has a visit in the next 30 days with authorizing provider or within the authorizing provider's specialty.  See \"Patient Info\" tab in inbasket, or \"Choose Columns\" in Meds & Orders section of the refill encounter.          metFORMIN (GLUCOPHAGE) 1000 MG tablet  Last Written Prescription Date:  10/01/2018  Last Fill Quantity: 225 tablet,  # refills: 0   Last office visit: 9/20/2018 with " prescribing provider:  JAYSON Iglesias Office Visit:      [place] GALICIA (R) (M)

## 2019-01-30 DIAGNOSIS — E11.9 TYPE 2 DIABETES MELLITUS WITHOUT COMPLICATION, WITHOUT LONG-TERM CURRENT USE OF INSULIN (H): Chronic | ICD-10-CM

## 2019-01-30 LAB
CREAT UR-MCNC: 83 MG/DL
MICROALBUMIN UR-MCNC: 6 MG/L
MICROALBUMIN/CREAT UR: 7.17 MG/G CR (ref 0–17)

## 2019-01-30 PROCEDURE — 82043 UR ALBUMIN QUANTITATIVE: CPT | Performed by: NURSE PRACTITIONER

## 2019-01-30 NOTE — LETTER
January 31, 2019      Js ESTHER Jones  [address] [facility] [address]        Dear ,    We are writing to inform you of your test results.    Microalbumin normal. Repeat yearly    Resulted Orders   Albumin Random Urine Quantitative with Creat Ratio   Result Value Ref Range    Creatinine Urine 83 mg/dL    Albumin Urine mg/L 6 mg/L    Albumin Urine mg/g Cr 7.17 0 - 17 mg/g Cr       If you have any questions or concerns, please call the clinic at the number listed above.       Sincerely,        KIRAN Iglesias CNP/fawad

## 2019-01-31 NOTE — RESULT ENCOUNTER NOTE
Microalbumin normal. Repeat yearly  Please notify him of these results and send a hard copy if not on myChart.   Thanks. ALESSANDRO Maharaj

## 2019-02-08 ENCOUNTER — OFFICE VISIT (OUTPATIENT)
Dept: FAMILY MEDICINE | Facility: CLINIC | Age: 64
End: 2019-02-08
Payer: COMMERCIAL

## 2019-02-08 VITALS
DIASTOLIC BLOOD PRESSURE: 78 MMHG | WEIGHT: 142 LBS | RESPIRATION RATE: 18 BRPM | HEIGHT: 66 IN | SYSTOLIC BLOOD PRESSURE: 130 MMHG | HEART RATE: 64 BPM | BODY MASS INDEX: 22.82 KG/M2 | TEMPERATURE: 97.5 F

## 2019-02-08 DIAGNOSIS — L98.9 SKIN LESION: Primary | ICD-10-CM

## 2019-02-08 DIAGNOSIS — L85.3 DRY SKIN: ICD-10-CM

## 2019-02-08 LAB
MRSA DNA SPEC QL NAA+PROBE: NEGATIVE
SPECIMEN SOURCE: NORMAL

## 2019-02-08 PROCEDURE — 87640 STAPH A DNA AMP PROBE: CPT | Performed by: FAMILY MEDICINE

## 2019-02-08 PROCEDURE — 87641 MR-STAPH DNA AMP PROBE: CPT | Performed by: FAMILY MEDICINE

## 2019-02-08 PROCEDURE — 87070 CULTURE OTHR SPECIMN AEROBIC: CPT | Performed by: FAMILY MEDICINE

## 2019-02-08 PROCEDURE — 87081 CULTURE SCREEN ONLY: CPT | Performed by: FAMILY MEDICINE

## 2019-02-08 PROCEDURE — 99213 OFFICE O/P EST LOW 20 MIN: CPT | Performed by: FAMILY MEDICINE

## 2019-02-08 RX ORDER — TRIAMCINOLONE ACETONIDE 1 MG/G
OINTMENT TOPICAL 2 TIMES DAILY
Qty: 30 G | Refills: 1 | Status: SHIPPED | OUTPATIENT
Start: 2019-02-08 | End: 2020-02-08

## 2019-02-08 ASSESSMENT — MIFFLIN-ST. JEOR: SCORE: 1371.86

## 2019-02-08 NOTE — NURSING NOTE
"Chief Complaint   Patient presents with     Wound Check       Initial /78 (Cuff Size: Adult Regular)   Pulse 64   Temp 97.5  F (36.4  C) (Tympanic)   Resp 18   Ht 1.676 m (5' 6\")   Wt 64.4 kg (142 lb)   BMI 22.92 kg/m   Estimated body mass index is 22.92 kg/m  as calculated from the following:    Height as of this encounter: 1.676 m (5' 6\").    Weight as of this encounter: 64.4 kg (142 lb).    Patient presents to the clinic using No DME    Health Maintenance that is potentially due pending provider review:  NONE    n/a    Is there anyone who you would like to be able to receive your results? Not Applicable  If yes have patient fill out SHAKIRA    "

## 2019-02-08 NOTE — PROGRESS NOTES
SUBJECTIVE:   Js Jones is a 63 year old male who presents to clinic today for the following health issues:      Sore      Duration: about a month    Description (location/character/radiation): left leg- Inside of Calf- very itchy and somewhat painful.     Intensity:  moderate    Accompanying signs and symptoms: about the size of a quarter.     History (similar episodes/previous evaluation): None    Precipitating or alleviating factors: Found out that someone at work has been diagnosed with MRSA and is wondering if he could of gotten something from him?  Everyone in the shop all uses the same supplies.     Therapies tried and outcome: None         Problem list and histories reviewed & adjusted, as indicated.  Additional history: as documented    Patient Active Problem List   Diagnosis     Type 2 diabetes mellitus without complication, without long-term current use of insulin (H)     Benign essential hypertension, goal <140/90     Hyperlipidemia LDL goal <100     Gastroesophageal reflux disease without esophagitis     Unstable angina (H)     NSTEMI (non-ST elevated myocardial infarction) (H)     LBBB (left bundle branch block)     Past Surgical History:   Procedure Laterality Date     HERNIORRHAPHY INGUINAL BILATERAL         Social History     Tobacco Use     Smoking status: Former Smoker     Packs/day: 1.00     Years: 30.00     Pack years: 30.00     Last attempt to quit: 2000     Years since quittin.1     Smokeless tobacco: Never Used   Substance Use Topics     Alcohol use: No     Family History   Problem Relation Age of Onset     Diabetes Mother      Hypertension Mother      Cardiovascular Father 81        CHF     Heart Disease Sister 50     Heart Disease Brother 50         Current Outpatient Medications   Medication Sig Dispense Refill     aspirin 81 MG tablet Take 1 tablet (81 mg) by mouth daily 90 tablet 3     atorvastatin (LIPITOR) 40 MG tablet Take 1 tablet (40 mg) by mouth daily 90 tablet 3      blood glucose monitoring (NO BRAND SPECIFIED) test strip Use to test blood sugar 3 times daily or as directed. Box of 100 each. 3 Box 3     Blood Glucose Monitoring Suppl W/DEVICE KIT 1 each daily 1 kit 0     carvedilol (COREG) 6.25 MG tablet Take 1 tablet (6.25 mg) by mouth 2 times daily (with meals) 180 tablet 3     glipiZIDE (GLIPIZIDE XL) 10 MG 24 hr tablet Take 1 tablet (10 mg) by mouth daily 90 tablet 0     losartan (COZAAR) 25 MG tablet Take 1 tablet (25 mg) by mouth daily 90 tablet 3     Melatonin 10 MG TABS Take 10 mg by mouth nightly as needed for sleep       metFORMIN (GLUCOPHAGE) 1000 MG tablet TAKE 1 TABLET BY MOUTH ONCE DAILY WITH BREAKFAST AND 1/2 (ONE-HALF) TAB AT LUNCH AND 1 TAB IN THE EVENING WITH DINNER 225 tablet 0     MULTIPLE VITAMIN PO Take 1 tablet by mouth       nitroGLYcerin (NITROSTAT) 0.4 MG sublingual tablet For chest pain place 1 tablet under the tongue every 5 minutes for 3 doses. If symptoms persist 5 minutes after 1st dose call 911. 50 tablet 11     Omega-3 Fatty Acids (OMEGA-3 FISH OIL PO) Take 1,200 mg by mouth 2 times daily (with meals)       omeprazole (PRILOSEC OTC) 20 MG tablet Take 1 tablet (20 mg) by mouth daily 90 tablet 1     ONE TOUCH LANCETS MISC 1 lancet 3 times daily 200 each 5     ticagrelor (BRILINTA) 90 MG tablet Take 1 tablet (90 mg) by mouth 2 times daily 180 tablet 3     Allergies   Allergen Reactions     Lisinopril Cough     Recent Labs   Lab Test 09/18/18  0555 09/17/18  0550  09/15/18  0546 09/14/18  1818 06/15/18  0843 03/09/18  0815  07/14/16  0900 11/04/15  1630 04/22/15  0845   A1C  --   --   --  6.8*  --  7.2* 6.9*   < > 7.5* 7.3* 7.3*   LDL  --  54  --  79  --   --  72   < > 101*  --   --    HDL  --  35*  --  31*  --   --  38*   < > 33*  --   --    TRIG  --  208*  --  161*  --   --  73   < > 125  --   --    ALT  --   --   --   --  39  --   --   --  66  --  40   CR 0.88 0.90   < >  --  0.92  --   --    < > 0.90  --  1.74*   GFRESTIMATED 87 84   < >  --  " 82  --   --    < > 85  --  40*   GFRESTBLACK >90 >90   < >  --  >90  --   --    < > >90   GFR Calc    --  48*   POTASSIUM 4.2 3.9   < >  --  3.4  --   --    < > 3.9  --  4.0   TSH  --   --   --   --   --   --  1.02  --   --  1.47  --     < > = values in this interval not displayed.      BP Readings from Last 3 Encounters:   02/08/19 130/78   12/05/18 108/84   10/01/18 117/67    Wt Readings from Last 3 Encounters:   02/08/19 64.4 kg (142 lb)   12/05/18 64.7 kg (142 lb 9.6 oz)   10/01/18 66.7 kg (147 lb)                  Labs reviewed in EPIC    Reviewed and updated as needed this visit by clinical staff       Reviewed and updated as needed this visit by Provider         ROS:  Constitutional, HEENT, cardiovascular, pulmonary, gi and gu systems are negative, except as otherwise noted.    OBJECTIVE:     /78 (Cuff Size: Adult Regular)   Pulse 64   Temp 97.5  F (36.4  C) (Tympanic)   Resp 18   Ht 1.676 m (5' 6\")   Wt 64.4 kg (142 lb)   BMI 22.92 kg/m    Body mass index is 22.92 kg/m .  GENERAL: alert and no distress  NECK: no adenopathy, no asymmetry, masses, or scars and thyroid normal to palpation  RESP: lungs clear to auscultation - no rales, rhonchi or wheezes  CV: regular rate and rhythm, normal S1 S2, no S3 or S4, no murmur, click or rub, no peripheral edema and peripheral pulses strong  ABDOMEN: soft, nontender, no hepatosplenomegaly, no masses and bowel sounds normal  MS: no gross musculoskeletal defects noted, no edema  SKIN: dry appearing lower leg skin with some skin excoriations as shown below, no discharge/bleeding noted                ASSESSMENT/PLAN:         ICD-10-CM    1. Skin lesion L98.9 triamcinolone (KENALOG) 0.1 % external ointment     Methicillin Resist/Sens S. aureus PCR     Wound Culture Aerobic Bacterial   2. Dry skin L85.3        Suspect symptoms secondary to dry skin/itching, colleague at work was diagnosed with MRSA recently.  MRSA swab and wound culture obtained.  " Kenalog prescribed, suggested to use moisturizers regularly and continue well hydration, balanced diet.  Follow-up if symptoms persist or worsen.  All questions answered.      Fortino Hyman MD  [facility]

## 2019-02-09 LAB
BACTERIA SPEC CULT: NORMAL
Lab: NORMAL
SPECIMEN SOURCE: NORMAL

## 2019-02-11 LAB
BACTERIA SPEC CULT: NORMAL
SPECIMEN SOURCE: NORMAL

## 2019-04-07 DIAGNOSIS — E11.9 TYPE 2 DIABETES MELLITUS WITHOUT COMPLICATION, WITHOUT LONG-TERM CURRENT USE OF INSULIN (H): Chronic | ICD-10-CM

## 2019-04-07 NOTE — TELEPHONE ENCOUNTER
"Requested Prescriptions   Pending Prescriptions Disp Refills     GLIPIZIDE XL 10 MG 24 hr tablet   Last Written Prescription Date:  1/2/19  Last Fill Quantity: 90,  # refills: 0   Last office visit: 9/20/2018 with prescribing provider:  LIGIA Iglesias   Future Office Visit:     90 tablet 0     Sig: TAKE 1 TABLET BY MOUTH ONCE DAILY       Sulfonylurea Agents Failed - 4/7/2019  6:29 AM        Failed - Patient has documented A1c within the specified period of time.     If HgbA1C is 8 or greater, it needs to be on file within the past 3 months.  If less than 8, must be on file within the past 6 months.     Recent Labs   Lab Test 09/15/18  0546   A1C 6.8*             Passed - Blood pressure less than 140/90 in past 6 months     BP Readings from Last 3 Encounters:   02/08/19 130/78   12/05/18 108/84   10/01/18 117/67                 Passed - Patient has documented LDL within the past 12 mos.     Recent Labs   Lab Test 09/17/18  0550   LDL 54             Passed - Patient has had a Microalbumin in the past 12 mos.     Recent Labs   Lab Test 01/30/19  1325   MICROL 6   UMALCR 7.17             Passed - Medication is active on med list        Passed - Patient is age 18 or older        Passed - Patient has a recent creatinine (normal) within the past 12 mos.     Recent Labs   Lab Test 09/18/18  0555   CR 0.88             Passed - Recent (6 mo) or future (30 days) visit within the authorizing provider's specialty     Patient had office visit in the last 6 months or has a visit in the next 30 days with authorizing provider or within the authorizing provider's specialty.  See \"Patient Info\" tab in inbasket, or \"Choose Columns\" in Meds & Orders section of the refill encounter.              "

## 2019-04-08 RX ORDER — GLIPIZIDE 10 MG/1
10 TABLET, FILM COATED, EXTENDED RELEASE ORAL DAILY
Qty: 30 TABLET | Refills: 0 | Status: SHIPPED | OUTPATIENT
Start: 2019-04-08 | End: 2019-05-12

## 2019-04-22 DIAGNOSIS — E11.9 TYPE 2 DIABETES MELLITUS WITHOUT COMPLICATION, WITHOUT LONG-TERM CURRENT USE OF INSULIN (H): Chronic | ICD-10-CM

## 2019-04-22 NOTE — TELEPHONE ENCOUNTER
"Requested Prescriptions   Pending Prescriptions Disp Refills     metFORMIN (GLUCOPHAGE) 1000 MG tablet [Pharmacy Med Name: METFORMIN 1000MG TAB] 225 tablet 0     Sig: TAKE 1 TABLET BY MOUTH ONCE DAILY WITH BREAKFAST AND 1/2 TABLET BY MOUTH AT LUNCH AND 1 TABLET BY MOUTH IN THE EVENING WITH DINNER       Biguanide Agents Failed - 4/22/2019  5:30 AM        Failed - Patient has documented A1c within the specified period of time.     If HgbA1C is 8 or greater, it needs to be on file within the past 3 months.  If less than 8, must be on file within the past 6 months.     Recent Labs   Lab Test 09/15/18  0546   A1C 6.8*             Passed - Blood pressure less than 140/90 in past 6 months     BP Readings from Last 3 Encounters:   02/08/19 130/78   12/05/18 108/84   10/01/18 117/67                 Passed - Patient has documented LDL within the past 12 mos.     Recent Labs   Lab Test 09/17/18  0550   LDL 54             Passed - Patient has had a Microalbumin in the past 15 mos.     Recent Labs   Lab Test 01/30/19  1325   MICROL 6   UMALCR 7.17             Passed - Patient is age 10 or older        Passed - Patient's CR is NOT>1.4 OR Patient's EGFR is NOT<45 within past 12 mos.     Recent Labs   Lab Test 09/18/18  0555   GFRESTIMATED 87   GFRESTBLACK >90       Recent Labs   Lab Test 09/18/18  0555   CR 0.88             Passed - Patient does NOT have a diagnosis of CHF.        Passed - Medication is active on med list        Passed - Recent (6 mo) or future (30 days) visit within the authorizing provider's specialty     Patient had office visit in the last 6 months or has a visit in the next 30 days with authorizing provider or within the authorizing provider's specialty.  See \"Patient Info\" tab in inbasket, or \"Choose Columns\" in Meds & Orders section of the refill encounter.            Last Written Prescription Date:  1/14/19  Last Fill Quantity: 225,  # refills: 0   Last office visit: 2/8/2019 with prescribing provider:   "   Future Office Visit:

## 2019-05-12 DIAGNOSIS — E11.9 TYPE 2 DIABETES MELLITUS WITHOUT COMPLICATION, WITHOUT LONG-TERM CURRENT USE OF INSULIN (H): Chronic | ICD-10-CM

## 2019-05-13 RX ORDER — GLIPIZIDE 10 MG/1
10 TABLET, FILM COATED, EXTENDED RELEASE ORAL DAILY
Qty: 30 TABLET | Refills: 0 | Status: SHIPPED | OUTPATIENT
Start: 2019-05-13 | End: 2019-06-03

## 2019-05-13 NOTE — TELEPHONE ENCOUNTER
"Requested Prescriptions   Pending Prescriptions Disp Refills     GLIPIZIDE XL 10 MG 24 hr tablet [Pharmacy Med Name: GLIPIZIDE XL 10MG   TAB] 30 tablet 0     Sig: TAKE 1 TABLET BY MOUTH ONCE DAILY . APPOINTMENT REQUIRED FOR FUTURE REFILLS       Sulfonylurea Agents Failed - 5/12/2019  6:29 AM        Failed - Patient has documented A1c within the specified period of time.     If HgbA1C is 8 or greater, it needs to be on file within the past 3 months.  If less than 8, must be on file within the past 6 months.     Recent Labs   Lab Test 09/15/18  0546   A1C 6.8*             Passed - Blood pressure less than 140/90 in past 6 months     BP Readings from Last 3 Encounters:   02/08/19 130/78   12/05/18 108/84   10/01/18 117/67                 Passed - Patient has documented LDL within the past 12 mos.     Recent Labs   Lab Test 09/17/18  0550   LDL 54             Passed - Patient has had a Microalbumin in the past 15 mos.     Recent Labs   Lab Test 01/30/19  1325   MICROL 6   UMALCR 7.17             Passed - Medication is active on med list        Passed - Patient is age 18 or older        Passed - Patient has a recent creatinine (normal) within the past 12 mos.     Recent Labs   Lab Test 09/18/18  0555   CR 0.88             Passed - Recent (6 mo) or future (30 days) visit within the authorizing provider's specialty     Patient had office visit in the last 6 months or has a visit in the next 30 days with authorizing provider or within the authorizing provider's specialty.  See \"Patient Info\" tab in inbasket, or \"Choose Columns\" in Meds & Orders section of the refill encounter.            Last Written Prescription Date:  4/8/19  Last Fill Quantity: 30,  # refills: 0   Last office visit: 2/8/2019 with prescribing provider:     Future Office Visit:      " none

## 2019-06-03 DIAGNOSIS — I10 BENIGN ESSENTIAL HYPERTENSION: Chronic | ICD-10-CM

## 2019-06-03 DIAGNOSIS — E11.9 TYPE 2 DIABETES MELLITUS WITHOUT COMPLICATION, WITHOUT LONG-TERM CURRENT USE OF INSULIN (H): Chronic | ICD-10-CM

## 2019-06-03 RX ORDER — GLIPIZIDE 10 MG/1
10 TABLET, FILM COATED, EXTENDED RELEASE ORAL DAILY
Qty: 30 TABLET | Refills: 0 | Status: SHIPPED | OUTPATIENT
Start: 2019-06-03 | End: 2019-06-07

## 2019-06-03 RX ORDER — LOSARTAN POTASSIUM 25 MG/1
TABLET ORAL
Qty: 90 TABLET | Refills: 0 | Status: SHIPPED | OUTPATIENT
Start: 2019-06-03 | End: 2019-09-03

## 2019-06-03 NOTE — TELEPHONE ENCOUNTER
"Requested Prescriptions   Pending Prescriptions Disp Refills     glipiZIDE (GLUCOTROL XL) 10 MG 24 hr tablet [Pharmacy Med Name: GLIPIZIDE ER 10MG   TAB] 30 tablet 0     Sig: TAKE 1 TABLET BY MOUTH ONCE DAILY NEEDS  FOLLOW  UP  APPOINTMENT  FOR  THIS  MEDICATION       Sulfonylurea Agents Failed - 6/3/2019  5:30 AM        Failed - Patient has documented A1c within the specified period of time.     If HgbA1C is 8 or greater, it needs to be on file within the past 3 months.  If less than 8, must be on file within the past 6 months.     Recent Labs   Lab Test 09/15/18  0546   A1C 6.8*             Passed - Blood pressure less than 140/90 in past 6 months     BP Readings from Last 3 Encounters:   02/08/19 130/78   12/05/18 108/84   10/01/18 117/67                 Passed - Patient has documented LDL within the past 12 mos.     Recent Labs   Lab Test 09/17/18  0550   LDL 54             Passed - Patient has had a Microalbumin in the past 15 mos.     Recent Labs   Lab Test 01/30/19  1325   MICROL 6   UMALCR 7.17             Passed - Medication is active on med list        Passed - Patient is age 18 or older        Passed - Patient has a recent creatinine (normal) within the past 12 mos.     Recent Labs   Lab Test 09/18/18  0555   CR 0.88             Passed - Recent (6 mo) or future (30 days) visit within the authorizing provider's specialty     Patient had office visit in the last 6 months or has a visit in the next 30 days with authorizing provider or within the authorizing provider's specialty.  See \"Patient Info\" tab in inbasket, or \"Choose Columns\" in Meds & Orders section of the refill encounter.      Last Written Prescription Date:  5/13/19  Last Fill Quantity: 30,  # refills: 0   Last office visit: 2/8/2019 with prescribing provider:     Future Office Visit:              losartan (COZAAR) 25 MG tablet [Pharmacy Med Name: LOSARTAN 25MG   TAB] 90 tablet 3     Sig: TAKE 1 TABLET BY MOUTH ONCE DAILY       Angiotensin-II " "Receptors Passed - 6/3/2019  5:30 AM        Passed - Blood pressure under 140/90 in past 12 months     BP Readings from Last 3 Encounters:   02/08/19 130/78   12/05/18 108/84   10/01/18 117/67                 Passed - Recent (12 mo) or future (30 days) visit within the authorizing provider's specialty     Patient had office visit in the last 12 months or has a visit in the next 30 days with authorizing provider or within the authorizing provider's specialty.  See \"Patient Info\" tab in inbasket, or \"Choose Columns\" in Meds & Orders section of the refill encounter.      Last Written Prescription Date:  3/11/18  Last Fill Quantity: 90,  # refills: 3   Last office visit: 2/8/2019 with prescribing provider:     Future Office Visit:              Passed - Medication is active on med list        Passed - Patient is age 18 or older        Passed - Normal serum creatinine on file in past 12 months     Recent Labs   Lab Test 09/18/18  0555   CR 0.88             Passed - Normal serum potassium on file in past 12 months     Recent Labs   Lab Test 09/18/18  0555   POTASSIUM 4.2                      "

## 2019-06-05 NOTE — PROGRESS NOTES
SUBJECTIVE   Js Jones is a  male who presents to clinic today for the following health issue(s):       Diabetes Follow-up    How often are you checking your blood sugar? A few times a month    What time of day are you checking your blood sugars (select all that apply)?  Before meals    Have you had any blood sugars above 200?  No    Have you had any blood sugars below 70?  No    What symptoms do you notice when your blood sugar is low?  Shaky    What concerns do you have today about your diabetes? None     Do you have any of these symptoms? (Select all that apply)  No numbness or tingling in feet.  No redness, sores or blisters on feet.  No complaints of excessive thirst.  No reports of blurry vision.  No significant changes to weight.     Have you had a diabetic eye exam in the last 12 months? Yes- Date of last eye exam: 4/13/2019    Diabetes Management Resources    Hyperlipidemia Follow-Up    Are you having any of the following symptoms? (Select all that apply)  No complaints of shortness of breath, chest pain or pressure.  No increased sweating or nausea with activity.  No left-sided neck or arm pain.  No complaints of pain in calves when walking 1-2 blocks.    Are you regularly taking any medication or supplement to lower your cholesterol?   Yes- Atorvastatin    Are you having muscle aches or other side effects that you think could be caused by your cholesterol lowering medication?  No    Hypertension Follow-up    Do you check your blood pressure regularly outside of the clinic? Yes     Are you following a low salt diet? Yes    Are your blood pressures ever more than 140 on the top number (systolic) OR more   than 90 on the bottom number (diastolic), for example 140/90? Yes    BP Readings from Last 2 Encounters:   06/07/19 126/70   02/08/19 130/78     Hemoglobin A1C (%)   Date Value   06/07/2019 6.9 (H)   09/15/2018 6.8 (H)     LDL Cholesterol Calculated (mg/dL)   Date Value   09/17/2018 54    09/15/2018 79       Amount of exercise or physical activity: None    Problems taking medications regularly: Forgets the mid day metformin    Medication side effects: chronic cough    Diet: low salt        PCP   Kathya Iglesias, LEONID [phone]    Health Maintenance        Health Maintenance Due   Topic Date Due     ZOSTER IMMUNIZATION (2 of 3) 11/01/2016     MEDICARE ANNUAL WELLNESS VISIT  03/28/2018     PNEUMOCOCCAL IMMUNIZATION 65+ LOW/MEDIUM RISK (1 of 2 - PCV13) 03/28/2018     AORTIC ANEURYSM SCREENING (SYSTEM ASSIGNED)  03/28/2018     EYE EXAM  03/12/2019       HPI        Patient Active Problem List   Diagnosis     Type 2 diabetes mellitus without complication, without long-term current use of insulin (H)     Benign essential hypertension, goal <140/90     Hyperlipidemia LDL goal <100     Gastroesophageal reflux disease without esophagitis     Unstable angina (H)     NSTEMI (non-ST elevated myocardial infarction) (H)     LBBB (left bundle branch block)     Current Outpatient Medications   Medication     aspirin 81 MG tablet     atorvastatin (LIPITOR) 40 MG tablet     blood glucose monitoring (NO BRAND SPECIFIED) test strip     Blood Glucose Monitoring Suppl W/DEVICE KIT     carvedilol (COREG) 6.25 MG tablet     glipiZIDE (GLUCOTROL XL) 10 MG 24 hr tablet     losartan (COZAAR) 25 MG tablet     Melatonin 10 MG TABS     metFORMIN (GLUCOPHAGE) 1000 MG tablet     MULTIPLE VITAMIN PO     nitroGLYcerin (NITROSTAT) 0.4 MG sublingual tablet     Omega-3 Fatty Acids (OMEGA-3 FISH OIL PO)     omeprazole (PRILOSEC OTC) 20 MG tablet     ONE TOUCH LANCETS MISC     ticagrelor (BRILINTA) 90 MG tablet     triamcinolone (KENALOG) 0.1 % external ointment     No current facility-administered medications for this visit.        Reviewed and updated as needed this visit by Provider:  Tobacco  Allergies  Meds  Med Hx  Surg Hx  Fam Hx  Soc Hx     ROS:  Constitutional, HEENT, cardiovascular, pulmonary, gi and gu systems  "are negative, except as otherwise noted.    PHYSICAL EXAM   /70 (BP Location: Right arm, Patient Position: Chair, Cuff Size: Adult Regular)   Pulse 65   Temp 97  F (36.1  C) (Tympanic)   Resp 15   Ht 1.676 m (5' 6\")   Wt 64 kg (141 lb 3.2 oz)   SpO2 97%   BMI 22.79 kg/m    Body mass index is 22.79 kg/m .  GENERAL APPEARANCE: healthy, alert and no distress  RESP: lungs clear to auscultation - no rales, rhonchi or wheezes  CV: regular rates and rhythm, normal S1 S2, no S3 or S4 and no murmur, click or rub  MS: extremities normal- no gross deformities noted  PSYCH: mentation appears normal, affect normal/bright and slower with instructions    ASSESSMENT & PLAN     1. Type 2 diabetes mellitus without complication, without long-term current use of insulin (H)  Chronic, stable  - Hemoglobin A1c  - EYE EXAM (SIMPLE-NONBILLABLE)  - glipiZIDE (GLUCOTROL XL) 10 MG 24 hr tablet; Take 1 tablet (10 mg) by mouth daily  Dispense: 90 tablet; Refill: 1  - metFORMIN (GLUCOPHAGE) 1000 MG tablet; Take 1,000 mg (1 tablet) daily with breakfast, 500 mg( 1/2 tablet) with lunch, and 1,000 mg (1 tablet) with dinner  Dispense: 225 tablet; Refill: 0    2. Benign essential hypertension, goal <140/90  Chronic, stable    3. Hyperlipidemia LDL goal <100  Chronic, stable    Ask your insurance if you can have an aortic aneurysm ultrasound. If so, call me and I can get it scheduled Ava    Recheck in September for fasting cholesterol, and your other annual labs. We'll also do a Medicare Wellness Check    Follow-up with Cardiology in September, 2019. Looks like they want a exercise stress test in [place] and lab work. Call [place] to schedule the stress test.    Patient Education     Understanding Abdominal Aortic Aneurysm  You may have been told that you have an aneurysm. This is when a weakened part of a blood vessel expands like a balloon. An aneurysm in the main blood vessel in your stomach area is called an abdominal aortic " aneurysm (AAA).  What is AAA?     An aneurysm happens when a weakened part of the aorta wall stretches and expands.     The aorta is the large artery that carries blood from the heart to the rest of the body. With AAA, part of the aorta weakens and expands. If an aneurysm gets large enough, it may burst. This is very serious, and usually fatal.  How is an aneurysm found?  AAA usually causes no symptoms. It is often found when tests (such as an X-ray, MRI, or CT scan) are done for an unrelated problem. Or your healthcare provider may find it while feeling your stomach during a routine exam.  Who develops AAA?  These things increase your chances of having AAA:    AAA runs in your family    Your age--AAA is more likely as you get older    Men are more likely than women to have AAA    Smoking    High blood pressure    High cholesterol level (a buildup of fat and other materials in the blood)    Injury (such as a car accident  What can be done?  Surgery can be done to remove an aneurysm. Your healthcare provider will weigh the chances that the aneurysm will burst against the risks of treatment. Because a small aneurysm is not likely to burst, it may be watched for a while. When it reaches a certain size, you may have surgery to replace that section of your aorta.  Date Last Reviewed: 4/26/2016 2000-2018 [employer]. [address] [place] [address], [place] [address]. All rights reserved. This information is not intended as a substitute for professional medical care. Always follow your healthcare professional's instructions.             Risks, benefits, side effects and rationale for treatment plan fully discussed with the patient and understanding expressed.    Kathya Iglesias, ALESSANDRO-BC  [facility]

## 2019-06-07 ENCOUNTER — OFFICE VISIT (OUTPATIENT)
Dept: FAMILY MEDICINE | Facility: CLINIC | Age: 64
End: 2019-06-07
Payer: COMMERCIAL

## 2019-06-07 VITALS
OXYGEN SATURATION: 97 % | RESPIRATION RATE: 15 BRPM | HEIGHT: 66 IN | TEMPERATURE: 97 F | SYSTOLIC BLOOD PRESSURE: 126 MMHG | WEIGHT: 141.2 LBS | DIASTOLIC BLOOD PRESSURE: 70 MMHG | BODY MASS INDEX: 22.69 KG/M2 | HEART RATE: 65 BPM

## 2019-06-07 DIAGNOSIS — E78.5 HYPERLIPIDEMIA LDL GOAL <100: Chronic | ICD-10-CM

## 2019-06-07 DIAGNOSIS — E11.9 TYPE 2 DIABETES MELLITUS WITHOUT COMPLICATION, WITHOUT LONG-TERM CURRENT USE OF INSULIN (H): Primary | Chronic | ICD-10-CM

## 2019-06-07 DIAGNOSIS — I10 BENIGN ESSENTIAL HYPERTENSION: Chronic | ICD-10-CM

## 2019-06-07 LAB — HBA1C MFR BLD: 6.9 % (ref 0–5.6)

## 2019-06-07 PROCEDURE — 99213 OFFICE O/P EST LOW 20 MIN: CPT | Performed by: NURSE PRACTITIONER

## 2019-06-07 PROCEDURE — 83036 HEMOGLOBIN GLYCOSYLATED A1C: CPT | Performed by: NURSE PRACTITIONER

## 2019-06-07 PROCEDURE — 36415 COLL VENOUS BLD VENIPUNCTURE: CPT | Performed by: NURSE PRACTITIONER

## 2019-06-07 RX ORDER — GLIPIZIDE 10 MG/1
10 TABLET, FILM COATED, EXTENDED RELEASE ORAL DAILY
Qty: 90 TABLET | Refills: 1 | Status: SHIPPED | OUTPATIENT
Start: 2019-06-07 | End: 2020-01-06

## 2019-06-07 ASSESSMENT — MIFFLIN-ST. JEOR: SCORE: 1363.23

## 2019-06-07 NOTE — NURSING NOTE
"Chief Complaint   Patient presents with     Diabetes       Initial /70 (BP Location: Right arm, Patient Position: Chair, Cuff Size: Adult Regular)   Pulse 65   Temp 97  F (36.1  C) (Tympanic)   Resp 15   Ht 1.676 m (5' 6\")   Wt 64 kg (141 lb 3.2 oz)   SpO2 97%   BMI 22.79 kg/m   Estimated body mass index is 22.79 kg/m  as calculated from the following:    Height as of this encounter: 1.676 m (5' 6\").    Weight as of this encounter: 64 kg (141 lb 3.2 oz).    Patient presents to the clinic using No DME    Health Maintenance that is potentially due pending provider review:  NONE    n/a    Is there anyone who you would like to be able to receive your results? Yes  If yes have patient fill out SHAKIRA    "

## 2019-06-07 NOTE — PATIENT INSTRUCTIONS
1. Type 2 diabetes mellitus without complication, without long-term current use of insulin (H)  Chronic, stable  - Hemoglobin A1c  - EYE EXAM (SIMPLE-NONBILLABLE)  - glipiZIDE (GLUCOTROL XL) 10 MG 24 hr tablet; Take 1 tablet (10 mg) by mouth daily  Dispense: 90 tablet; Refill: 1  - metFORMIN (GLUCOPHAGE) 1000 MG tablet; Take 1,000 mg (1 tablet) daily with breakfast, 500 mg( 1/2 tablet) with lunch, and 1,000 mg (1 tablet) with dinner  Dispense: 225 tablet; Refill: 0    2. Benign essential hypertension, goal <140/90  Chronic, stable    3. Hyperlipidemia LDL goal <100  Chronic, stable    Ask your insurance if you can have an aortic aneurysm ultrasound. If so, call me and I can get it scheduled [place]    Recheck in September for fasting cholesterol, and your other annual labs. We'll also do a Medicare Wellness Check    Follow-up with Cardiology in September, 2019. Looks like they want a exercise stress test in [place] and lab work. Call [place] to schedule the stress test.    Patient Education     Understanding Abdominal Aortic Aneurysm  You may have been told that you have an aneurysm. This is when a weakened part of a blood vessel expands like a balloon. An aneurysm in the main blood vessel in your stomach area is called an abdominal aortic aneurysm (AAA).  What is AAA?     An aneurysm happens when a weakened part of the aorta wall stretches and expands.     The aorta is the large artery that carries blood from the heart to the rest of the body. With AAA, part of the aorta weakens and expands. If an aneurysm gets large enough, it may burst. This is very serious, and usually fatal.  How is an aneurysm found?  AAA usually causes no symptoms. It is often found when tests (such as an X-ray, MRI, or CT scan) are done for an unrelated problem. Or your healthcare provider may find it while feeling your stomach during a routine exam.  Who develops AAA?  These things increase your chances of having AAA:    AAA runs in your  family    Your age--AAA is more likely as you get older    Men are more likely than women to have AAA    Smoking    High blood pressure    High cholesterol level (a buildup of fat and other materials in the blood)    Injury (such as a car accident  What can be done?  Surgery can be done to remove an aneurysm. Your healthcare provider will weigh the chances that the aneurysm will burst against the risks of treatment. Because a small aneurysm is not likely to burst, it may be watched for a while. When it reaches a certain size, you may have surgery to replace that section of your aorta.  Date Last Reviewed: 4/26/2016 2000-2018 [employer] [address] [place] [address], [place] [address]. All rights reserved. This information is not intended as a substitute for professional medical care. Always follow your healthcare professional's instructions.

## 2019-07-11 NOTE — PROGRESS NOTES
"    SUBJECTIVE   Jstea Jones is a  male who presents to clinic today for the following health issue(s):       Joint Pain    Onset: three weeks    Description:   Location: right elbow  Character: Sharp    Intensity: moderate    Progression of Symptoms: same    Accompanying Signs & Symptoms:  Other symptoms: swelling    History:   Previous similar pain: no       Precipitating factors:   Trauma or overuse: YES- fell on his elbow and right side of chest only. He didn't hit his head. No LOSS OF CONSCIOUSNESS. He he tripped over a can in the garage.    Alleviating factors:  Improved by: nothing  Therapies Tried and outcome: nothing    Annual Wellness Visit  Are you in the first 12 months of your Medicare Part B coverage?  No    Physical Health:    In general, how would you rate your overall physical health? good    If you drink alcohol do you typically have >3 drinks per day or >7 drinks per week? No    Do you usually eat at least 4 servings of fruit and vegetables a day, include whole grains & fiber and avoid regularly eating high fat or \"junk\" foods? NO    Needs assistance for the following daily activities: no assistance needed    Which of the following safety concerns are present in your home?  throw rugs in the hallway     Hearing impairment: Yes,     In the past 6 months, have you been bothered by leaking of urine? yes    Mental Health:    In general, how would you rate your overall mental or emotional health? good  PHQ-2 Score:      Do you feel safe in your environment? Yes    Do you have a Health Care Directive? No: Advance care planning was reviewed with patient; patient declined at this time.    Fall risk:  Fallen 2 or more times in the past year?: No  Any fall with injury in the past year?: No    Cognitive Screenin) Repeat 3 items (Leader, Season, Table)    2) Clock draw: NORMAL  3) 3 item recall: Recalls 1 object   Results: NORMAL clock, 1-2 items recalled: COGNITIVE IMPAIRMENT LESS " LIKELY    Mini-CogTM Copyright S Gabriela. Licensed by the author for use in [facility]; reprinted with permission (claudio@.[facility]). All rights reserved.      Current providers sharing in care for this patient include:   Patient Care Team:  Kathya Iglesias CNP as PCP - General (Family Practice)  Kathya Iglesias CNP as Assigned PCP      Do you have sleep apnea, excessive snoring or daytime drowsiness?: no       ADDRESS ALL HEALTH MAINTENANCE NEEDS- Place orders as needed  Health Maintenance Due   Topic Date Due     ZOSTER IMMUNIZATION (2 of 3) 11/01/2016     MEDICARE ANNUAL WELLNESS VISIT  03/28/2018     PNEUMOCOCCAL IMMUNIZATION 65+ LOW/MEDIUM RISK (1 of 2 - PCV13) 03/28/2018     AORTIC ANEURYSM SCREENING (SYSTEM ASSIGNED)  03/28/2018     EYE EXAM  03/12/2019       PCP   Kathya Iglesias -[phone]    PROBLEM LIST        Patient Active Problem List   Diagnosis     Type 2 diabetes mellitus without complication, without long-term current use of insulin (H)     Benign essential hypertension, goal <140/90     Hyperlipidemia LDL goal <100     Gastroesophageal reflux disease without esophagitis     Unstable angina (H)     NSTEMI (non-ST elevated myocardial infarction) (H)     LBBB (left bundle branch block)       MEDICATIONS        Current Outpatient Medications   Medication     aspirin 81 MG tablet     atorvastatin (LIPITOR) 40 MG tablet     blood glucose monitoring (NO BRAND SPECIFIED) test strip     Blood Glucose Monitoring Suppl W/DEVICE KIT     carvedilol (COREG) 6.25 MG tablet     glipiZIDE (GLUCOTROL XL) 10 MG 24 hr tablet     losartan (COZAAR) 25 MG tablet     Melatonin 10 MG TABS     metFORMIN (GLUCOPHAGE) 1000 MG tablet     MULTIPLE VITAMIN PO     nitroGLYcerin (NITROSTAT) 0.4 MG sublingual tablet     Omega-3 Fatty Acids (OMEGA-3 FISH OIL PO)     omeprazole (PRILOSEC OTC) 20 MG tablet     ONE TOUCH LANCETS MISC     ticagrelor (BRILINTA) 90 MG tablet     triamcinolone  "(KENALOG) 0.1 % external ointment     No current facility-administered medications for this visit.        Reviewed and updated as needed this visit by Provider:  Tobacco  Allergies  Meds  Med Hx  Surg Hx  Fam Hx  Soc Hx     ROS      Constitutional, HEENT, cardiovascular, pulmonary, gi and gu systems are negative, except as otherwise noted.    PHYSICAL EXAM   /72 (BP Location: Right arm, Cuff Size: Adult Regular)   Pulse 69   Temp 97.9  F (36.6  C) (Tympanic)   Resp 18   Ht 1.676 m (5' 6\")   Wt 64 kg (141 lb)   SpO2 96%   BMI 22.76 kg/m    Body mass index is 22.76 kg/m .  GENERAL APPEARANCE: healthy, alert and no distress  RESP: lungs clear to auscultation - no rales, rhonchi or wheezes  CV: regular rates and rhythm, normal S1 S2, no S3 or S4 and no murmur, click or rub  MS: extremities normal- no gross deformities noted  ORTHO: Elbow Exam: Inspection: olecranon bursa swelling, no calor, no tenderness  Tender: olecranon bursa  Non-tender: remainder  Range of Motion: all normal  Strength: elbow strength full  Special tests: normal stability, no pain with resisted wrist extension, no pain with resisted middle finger extension, no pain with resisted wrist flexion, no pain with resisted supination:     SKIN: no suspicious lesions or rashes  PSYCH: mentation appears normal, affect normal/bright and slow    Xray- Reviewed by myself. No acute fracture, soft tissue swelling noted, also ?bone spur to olecranon at triceps insertion. Will await Radiology confirmation.        ASSESSMENT & PLAN     1. Right elbow pain  Acute, stable  - XR Elbow Right G/E 3 Views; Future    2. Olecranon bursitis of right elbow  Acute, stable  - XR Elbow Right G/E 3 Views; Future    3. Medicare annual wellness visit, subsequent  Screening      Preventive Health Recommendations  See your health care provider every year to    Review health changes.     Discuss preventive care.      Review your medicines if your doctor has " prescribed any.    Talk with your health care provider about whether you should have a test to screen for prostate cancer (PSA).    Every 3 years, have a diabetes test (fasting glucose). If you are at risk for diabetes, you should have this test more often.    Every 5 years, have a cholesterol test. Have this test more often if you are at risk for high cholesterol or heart disease.     Every 10 years, have a colonoscopy. Or, have a yearly FIT test (stool test). These exams will check for colon cancer.    Talk to with your health care provider about screening for Abdominal Aortic Aneurysm if you have a family history of AAA or have a history of smoking.    Shots:     Get a flu shot each year.     Get a tetanus shot every 10 years.     Talk to your doctor about your pneumonia vaccines. There are now two you should receive - Pneumovax (PPSV 23) and Prevnar (PCV 13).    Talk to your pharmacist about a shingles vaccine.     Talk to your doctor about the hepatitis B vaccine.    Nutrition:     Eat at least 5 servings of fruits and vegetables each day.     Eat whole-grain bread, whole-wheat pasta and brown rice instead of white grains and rice.     Get adequate Calcium and Vitamin D.     Lifestyle    Exercise for at least 150 minutes a week (30 minutes a day, 5 days a week). This will help you control your weight and prevent disease.     Limit alcohol to one drink per day.     No smoking.     Wear sunscreen to prevent skin cancer.     See your dentist every six months for an exam and cleaning.     See your eye doctor every 1 to 2 years to screen for conditions such as glaucoma, macular degeneration and cataracts.    Personalized Prevention Plan  You are due for the preventive services outlined below.  Your care team is available to assist you in scheduling these services.  If you have already completed any of these items, please share that information with your care team to update in your medical record.  Health Maintenance  Due   Topic Date Due     Zoster (Shingles) Vaccine (2 of 3) 11/01/2016     Annual Wellness Visit  03/28/2018     Pneumococcal Vaccine (1 of 2 - PCV13) 03/28/2018     AORTIC ANEURYSM SCREENING (SYSTEM ASSIGNED)  03/28/2018     Eye Exam  03/12/2019       Patient Education     Bursitis of the Elbow (Olecranon)  Your elbow joint contains a small fluid-filled sac called a bursa. The bursa helps the muscles and tendons move smoothly over the bone. It also cushions and protects your elbow. Bursitis is when the bursa is inflamed or swollen. This is most often due to overuse of or injury to the elbow. Symptoms include swelling and pain. If the elbow is red and feels warm to the touch, the bursa itself may be infected.  In most cases, elbow bursitis resolves with medicine and self-care at home. It may take several weeks for the bursa to heal and the swelling to go away. In some cases, your healthcare provider may drain excess fluid from the bursa. Or, he or she may inject medicine directly into the bursa to help relieve symptoms. In severe cases, you may need surgery to remove the bursa may. If there is concern that the bursa is infected, your healthcare provider may prescribe antibiotics to treat the infection.    Home care  Your healthcare provider may prescribe medicine to help relieve pain and swelling. This may be an over-the-counter pain reliever or prescription pain medicine. Take all medicines as directed. To help treat or prevent infection, your provider may prescribe antibiotics. If these are prescribed, take them as directed until they are gone.  The following are general care guidelines:    Apply an ice pack or bag of frozen peas wrapped in a thin towel to your elbow for 15 to 20 minutes at a time. Do this 3 to 4 times a day until pain and swelling improve.    Keep your elbow raised above the level of your heart whenever possible. This helps reduce swelling. When sitting or lying down, place your arm on a pillow  that rests on your chest or on a pillow at your side.    Use an elastic wrap around the elbow joint to compress the area while it is healing. Make the wrap snug but not tight to the point of causing pain.    Rest your elbow to give it time to heal. You may need to wear an elbow pad to help protect and limit the movement of your elbow. During and after healing, avoid leaning on your elbows.  Follow-up care  Follow up with your healthcare provider, or as advised. If you have been referred to a specialist, make that appointment promptly.  When to seek medical advice  Call your healthcare provider right away if any of these occur:    Fever of 100.4 F (38 C) or higher, or as advised    Chills    Increased pain, swelling, warmth, redness, or drainage from the joint    Trouble moving the elbow joint    Numbness or tingling in the hand    Severe pain or swelling in forearm or hand    Loss of pink color and slow return of color after squeezing fingertip or hand  Date Last Reviewed: 6/1/2016 2000-2018 [employer]. [address] [place] [address], [place] [id number]. All rights reserved. This information is not intended as a substitute for professional medical care. Always follow your healthcare professional's instructions.             Risks, benefits, side effects and rationale for treatment plan fully discussed with the patient and understanding expressed.    ALESSANDRO Bruno-[facility]

## 2019-07-12 ENCOUNTER — OFFICE VISIT (OUTPATIENT)
Dept: FAMILY MEDICINE | Facility: CLINIC | Age: 64
End: 2019-07-12
Payer: COMMERCIAL

## 2019-07-12 ENCOUNTER — ANCILLARY PROCEDURE (OUTPATIENT)
Dept: GENERAL RADIOLOGY | Facility: CLINIC | Age: 64
End: 2019-07-12
Attending: NURSE PRACTITIONER
Payer: COMMERCIAL

## 2019-07-12 VITALS
DIASTOLIC BLOOD PRESSURE: 72 MMHG | HEART RATE: 69 BPM | HEIGHT: 66 IN | SYSTOLIC BLOOD PRESSURE: 134 MMHG | TEMPERATURE: 97.9 F | OXYGEN SATURATION: 96 % | RESPIRATION RATE: 18 BRPM | WEIGHT: 141 LBS | BODY MASS INDEX: 22.66 KG/M2

## 2019-07-12 DIAGNOSIS — M70.21 OLECRANON BURSITIS OF RIGHT ELBOW: ICD-10-CM

## 2019-07-12 DIAGNOSIS — Z00.00 MEDICARE ANNUAL WELLNESS VISIT, SUBSEQUENT: ICD-10-CM

## 2019-07-12 DIAGNOSIS — M25.521 RIGHT ELBOW PAIN: Primary | ICD-10-CM

## 2019-07-12 DIAGNOSIS — M25.521 RIGHT ELBOW PAIN: ICD-10-CM

## 2019-07-12 PROCEDURE — 73080 X-RAY EXAM OF ELBOW: CPT | Mod: RT

## 2019-07-12 PROCEDURE — 99213 OFFICE O/P EST LOW 20 MIN: CPT | Mod: 25 | Performed by: NURSE PRACTITIONER

## 2019-07-12 PROCEDURE — 99397 PER PM REEVAL EST PAT 65+ YR: CPT | Performed by: NURSE PRACTITIONER

## 2019-07-12 ASSESSMENT — MIFFLIN-ST. JEOR: SCORE: 1362.32

## 2019-07-12 NOTE — RESULT ENCOUNTER NOTE
Dear Js,  No fracture noted. Just the bone spur we talked about and olecranon bursitis. No change to plan.   Please contact our clinic via phone or My Chart if you have any questions or concerns.  Thanks,  ALESSANDRO Maharaj

## 2019-07-12 NOTE — PATIENT INSTRUCTIONS
1. Right elbow pain  Acute, stable  - XR Elbow Right G/E 3 Views; Future    2. Olecranon bursitis of right elbow  Acute, stable  - XR Elbow Right G/E 3 Views; Future    3. Medicare annual wellness visit, subsequent  Screening      Preventive Health Recommendations  See your health care provider every year to    Review health changes.     Discuss preventive care.      Review your medicines if your doctor has prescribed any.    Talk with your health care provider about whether you should have a test to screen for prostate cancer (PSA).    Every 3 years, have a diabetes test (fasting glucose). If you are at risk for diabetes, you should have this test more often.    Every 5 years, have a cholesterol test. Have this test more often if you are at risk for high cholesterol or heart disease.     Every 10 years, have a colonoscopy. Or, have a yearly FIT test (stool test). These exams will check for colon cancer.    Talk to with your health care provider about screening for Abdominal Aortic Aneurysm if you have a family history of AAA or have a history of smoking.    Shots:     Get a flu shot each year.     Get a tetanus shot every 10 years.     Talk to your doctor about your pneumonia vaccines. There are now two you should receive - Pneumovax (PPSV 23) and Prevnar (PCV 13).    Talk to your pharmacist about a shingles vaccine.     Talk to your doctor about the hepatitis B vaccine.    Nutrition:     Eat at least 5 servings of fruits and vegetables each day.     Eat whole-grain bread, whole-wheat pasta and brown rice instead of white grains and rice.     Get adequate Calcium and Vitamin D.     Lifestyle    Exercise for at least 150 minutes a week (30 minutes a day, 5 days a week). This will help you control your weight and prevent disease.     Limit alcohol to one drink per day.     No smoking.     Wear sunscreen to prevent skin cancer.     See your dentist every six months for an exam and cleaning.     See your eye doctor  every 1 to 2 years to screen for conditions such as glaucoma, macular degeneration and cataracts.    Personalized Prevention Plan  You are due for the preventive services outlined below.  Your care team is available to assist you in scheduling these services.  If you have already completed any of these items, please share that information with your care team to update in your medical record.  Health Maintenance Due   Topic Date Due     Zoster (Shingles) Vaccine (2 of 3) 11/01/2016     Annual Wellness Visit  03/28/2018     Pneumococcal Vaccine (1 of 2 - PCV13) 03/28/2018     AORTIC ANEURYSM SCREENING (SYSTEM ASSIGNED)  03/28/2018     Eye Exam  03/12/2019       Patient Education     Bursitis of the Elbow (Olecranon)  Your elbow joint contains a small fluid-filled sac called a bursa. The bursa helps the muscles and tendons move smoothly over the bone. It also cushions and protects your elbow. Bursitis is when the bursa is inflamed or swollen. This is most often due to overuse of or injury to the elbow. Symptoms include swelling and pain. If the elbow is red and feels warm to the touch, the bursa itself may be infected.  In most cases, elbow bursitis resolves with medicine and self-care at home. It may take several weeks for the bursa to heal and the swelling to go away. In some cases, your healthcare provider may drain excess fluid from the bursa. Or, he or she may inject medicine directly into the bursa to help relieve symptoms. In severe cases, you may need surgery to remove the bursa may. If there is concern that the bursa is infected, your healthcare provider may prescribe antibiotics to treat the infection.    Home care  Your healthcare provider may prescribe medicine to help relieve pain and swelling. This may be an over-the-counter pain reliever or prescription pain medicine. Take all medicines as directed. To help treat or prevent infection, your provider may prescribe antibiotics. If these are prescribed, take  them as directed until they are gone.  The following are general care guidelines:    Apply an ice pack or bag of frozen peas wrapped in a thin towel to your elbow for 15 to 20 minutes at a time. Do this 3 to 4 times a day until pain and swelling improve.    Keep your elbow raised above the level of your heart whenever possible. This helps reduce swelling. When sitting or lying down, place your arm on a pillow that rests on your chest or on a pillow at your side.    Use an elastic wrap around the elbow joint to compress the area while it is healing. Make the wrap snug but not tight to the point of causing pain.    Rest your elbow to give it time to heal. You may need to wear an elbow pad to help protect and limit the movement of your elbow. During and after healing, avoid leaning on your elbows.  Follow-up care  Follow up with your healthcare provider, or as advised. If you have been referred to a specialist, make that appointment promptly.  When to seek medical advice  Call your healthcare provider right away if any of these occur:    Fever of 100.4 F (38 C) or higher, or as advised    Chills    Increased pain, swelling, warmth, redness, or drainage from the joint    Trouble moving the elbow joint    Numbness or tingling in the hand    Severe pain or swelling in forearm or hand    Loss of pink color and slow return of color after squeezing fingertip or hand  Date Last Reviewed: 6/1/2016 2000-2018 [employer]. [address] [place] [address], [place] [address]. All rights reserved. This information is not intended as a substitute for professional medical care. Always follow your healthcare professional's instructions.

## 2019-08-19 DIAGNOSIS — E11.9 TYPE 2 DIABETES MELLITUS WITHOUT COMPLICATION, WITHOUT LONG-TERM CURRENT USE OF INSULIN (H): Chronic | ICD-10-CM

## 2019-08-19 NOTE — TELEPHONE ENCOUNTER
"Requested Prescriptions   Pending Prescriptions Disp Refills     metFORMIN (GLUCOPHAGE) 1000 MG tablet [Pharmacy Med Name: METFORMIN 1000MG TAB] 225 tablet 0     Sig: TAKE 1 TABLET BY MOUTH ONCE DAILY WITH  BREAKFAST  AND  ONE-HALF  TAB  AT  LUNCH  AND  ONE  TAB  IN  THE  EVENING  WITH  DINNER       Biguanide Agents Passed - 8/19/2019  5:31 AM        Passed - Blood pressure less than 140/90 in past 6 months     BP Readings from Last 3 Encounters:   07/12/19 134/72   06/07/19 126/70   02/08/19 130/78                 Passed - Patient has documented LDL within the past 12 mos.     Recent Labs   Lab Test 09/17/18  0550   LDL 54             Passed - Patient has had a Microalbumin in the past 15 mos.     Recent Labs   Lab Test 01/30/19  1325   MICROL 6   UMALCR 7.17             Passed - Patient is age 10 or older        Passed - Patient has documented A1c within the specified period of time.     If HgbA1C is 8 or greater, it needs to be on file within the past 3 months.  If less than 8, must be on file within the past 6 months.     Recent Labs   Lab Test 06/07/19  0829   A1C 6.9*             Passed - Patient's CR is NOT>1.4 OR Patient's EGFR is NOT<45 within past 12 mos.     Recent Labs   Lab Test 09/18/18  0555   GFRESTIMATED 87   GFRESTBLACK >90       Recent Labs   Lab Test 09/18/18  0555   CR 0.88             Passed - Patient does NOT have a diagnosis of CHF.        Passed - Medication is active on med list        Passed - Recent (6 mo) or future (30 days) visit within the authorizing provider's specialty     Patient had office visit in the last 6 months or has a visit in the next 30 days with authorizing provider or within the authorizing provider's specialty.  See \"Patient Info\" tab in inbasket, or \"Choose Columns\" in Meds & Orders section of the refill encounter.            Last Written Prescription Date:  6/7/19  Last Fill Quantity: 90,  # refills: 0   Last office visit: 7/12/2019 with prescribing provider:   "   Future Office Visit:       UNK

## 2019-10-18 ENCOUNTER — HOSPITAL ENCOUNTER (OUTPATIENT)
Dept: CARDIOLOGY | Facility: CLINIC | Age: 64
Discharge: HOME | End: 2019-10-18
Attending: INTERNAL MEDICINE | Admitting: INTERNAL MEDICINE
Payer: COMMERCIAL

## 2019-10-18 DIAGNOSIS — I21.4 NSTEMI (NON-ST ELEVATED MYOCARDIAL INFARCTION) (H): ICD-10-CM

## 2019-10-18 PROCEDURE — 25500064 ZZH RX 255 OP 636: Performed by: INTERNAL MEDICINE

## 2019-10-18 PROCEDURE — 93321 DOPPLER ECHO F-UP/LMTD STD: CPT | Mod: 26 | Performed by: INTERNAL MEDICINE

## 2019-10-18 PROCEDURE — 93016 CV STRESS TEST SUPVJ ONLY: CPT | Performed by: INTERNAL MEDICINE

## 2019-10-18 PROCEDURE — 93325 DOPPLER ECHO COLOR FLOW MAPG: CPT | Mod: 26 | Performed by: INTERNAL MEDICINE

## 2019-10-18 PROCEDURE — 93350 STRESS TTE ONLY: CPT | Mod: 26 | Performed by: INTERNAL MEDICINE

## 2019-10-18 PROCEDURE — 40000264 ECHO STRESS ECHOCARDIOGRAM

## 2019-10-18 PROCEDURE — 93018 CV STRESS TEST I&R ONLY: CPT | Performed by: INTERNAL MEDICINE

## 2019-10-18 RX ADMIN — HUMAN ALBUMIN MICROSPHERES AND PERFLUTREN 2 ML: 10; .22 INJECTION, SOLUTION INTRAVENOUS at 08:17

## 2019-10-24 ENCOUNTER — OFFICE VISIT (OUTPATIENT)
Dept: CARDIOLOGY | Facility: CLINIC | Age: 64
End: 2019-10-24
Attending: INTERNAL MEDICINE
Payer: COMMERCIAL

## 2019-10-24 DIAGNOSIS — I21.4 NSTEMI (NON-ST ELEVATED MYOCARDIAL INFARCTION) (H): ICD-10-CM

## 2019-10-24 PROCEDURE — 99214 OFFICE O/P EST MOD 30 MIN: CPT | Performed by: INTERNAL MEDICINE

## 2019-10-24 NOTE — PATIENT INSTRUCTIONS
"[facility]~[address]~[place], [place]~[address]  Thank you for your  Heart Care visit today. If you have questions regarding your visit, please contact your cardiology RN, Elba Maxwell & Yulia Mayen, at [phone].    Please arrive 15 minutes early to all cardiology appointments.    To schedule a future appointment, we kindly ask that you call cardiology scheduling at [phone] three months prior to requested revisit date.  [facility] cardiology clinic is staffed with \"Advance Practice Providers\". These are our cardiology Physician Assistants and Nurse Practitioners.   Please call cardiology scheduling if you feel you need clinical evaluation with them at any time for any cardiac reason.     Reminder:  For your safety, we ask that you bring in your current medication(s) or an updated list of your medications with you to EACH office visit. Include the medication name, dose of pill on bottle and how you are taking it. Include over-the-counter medications or supplements. Your provider will review this at each visit and plan your care based on your current information.   ~~~~~~~~~~~~~~~~~~~~~~~~~~~~~~~~~~~~~~~  \"[facility]\" [place] telephone numbers for reference:  Cardiology Scheduling~[phone]  Diagnostic Imaging Scheduling~[phone]  Lab Scheduling~[phone]  Anticoagulation Clinic~[phone]  Cardiac Rehabilitation~[phone]  [facility] RN's~[phone] (at [facility])  Congential Team [phone] (at [facility])  Cardiology Clinic RN's~[phone] (Elba Mayen, RN)  ~~~~~~~~~~~~~~~~~~~~~~~~~~~~~~~~~~~~~~~~  1. Stop Brilinta when current prescription finished.    2. Pick any 2 days and take 3 to 4 blood pressure readings at different imes and write them down and call us - [phone].  "

## 2019-10-24 NOTE — PROGRESS NOTES
HPI and Plan:     Mr. Js Jones is  64 years old and is seen in follow-up at [facility] in [place].  He is followed for his history of coronary artery disease and multivessel intervention in 2018.    His past medical history is notable for hypertension, dyslipidemia, type 2 diabetes and a tobacco use history of 30 pack years.  He has a family history of coronary artery disease (father had CABG at 60 years of age, his sister was in her 50's years of age).   He was hospitalized at [facility] on 9/14/2018 with one week  of exertional chest pain.  He did an internet search for reasons to have chest pain and decided to seek evaluation for a possible cardiac cause of his symptoms.       He underwent a coronary angiography via the left radial artery with placement of two stents to the diagonal branch and one stent to the mid RCA.  There was mild LAD disease with a measured IFR of 0.92 indicating no physiologic obstruction.  An echocardiogram demonstrated a normal ejection fraction of 50-55% and mild MR.  His EKG had demonstrated a LBBB.  He continues on Brilinta, low dose aspirin, carvedilol, losartan and atorvastatin.  Mr. Jones has stopped smoking.  He completed a stress echocardiogram this month exercising just over 7 minutes without symptoms or evidence of ischemia.    He denies any angina or dyspnea or exertional intolerance.  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  He drives from [place] to [facility] for work.  He leaves his home just prior to 4 AM for the 90 minute drive and arrives home after 7 PM.  He is uncertain when he will retire.  He likes the work and for now, his workplace needs him as only jow is he training a possible replacement .    Exam:  This is a slender man in no distress.  Blood pressure 140/92 mmHg, pulse 67 bpm.  The chest was clear.    On cardiac exam, there was a normal S1, S2 and no murmur; the rhythm was  regular. There was no S3 or S4.    Assessment/Plan:  Mr. Jones has a history of coronary artery disease and he has undergone revascularization, is asymptomatic and doing well.  He is on appropriate risk factor intervention.  He will now finish his Brilinta and continue antiplatelet therapy with low dose aspirin alone.  He will return in one year.    With regard to his blood pressure, the initial pressures here were high.  He will take several blood pressures each of 2 days and contact this clinic with those results.  If the blood pressures on average are > 130/80 mmHg, an increase in the carvedilol dose and an RUTH follow-up will be recommended.    Lipids were previously ordered to be rechecked but have not been done.  They were reordered (along with a CBC and BMP) and Mr. Jones will plan to have them drawn in [place].        Encounter Diagnosis   Name Primary?     NSTEMI (non-ST elevated myocardial infarction) (H)        CURRENT MEDICATIONS:  Current Outpatient Medications   Medication Sig Dispense Refill     aspirin 81 MG tablet Take 1 tablet (81 mg) by mouth daily 90 tablet 3     atorvastatin (LIPITOR) 40 MG tablet Take 1 tablet (40 mg) by mouth daily 90 tablet 3     blood glucose monitoring (NO BRAND SPECIFIED) test strip Use to test blood sugar 3 times daily or as directed. Box of 100 each. 3 Box 3     Blood Glucose Monitoring Suppl W/DEVICE KIT 1 each daily 1 kit 0     carvedilol (COREG) 6.25 MG tablet Take 1 tablet (6.25 mg) by mouth 2 times daily (with meals) 180 tablet 3     glipiZIDE (GLUCOTROL XL) 10 MG 24 hr tablet Take 1 tablet (10 mg) by mouth daily 90 tablet 1     losartan (COZAAR) 25 MG tablet TAKE 1 TABLET BY MOUTH ONCE DAILY 90 tablet 0     Melatonin 10 MG TABS Take 10 mg by mouth nightly as needed for sleep       metFORMIN (GLUCOPHAGE) 1000 MG tablet TAKE 1 TABLET BY MOUTH ONCE DAILY WITH  BREAKFAST  AND  ONE-HALF  TAB  AT  LUNCH  AND  ONE  TAB  IN  THE  EVENING  WITH  DINNER 225 tablet 1      metFORMIN (GLUCOPHAGE) 1000 MG tablet Take 1,000 mg (1 tablet) daily with breakfast, 500 mg( 1/2 tablet) with lunch, and 1,000 mg (1 tablet) with dinner 225 tablet 0     MULTIPLE VITAMIN PO Take 1 tablet by mouth       nitroGLYcerin (NITROSTAT) 0.4 MG sublingual tablet For chest pain place 1 tablet under the tongue every 5 minutes for 3 doses. If symptoms persist 5 minutes after 1st dose call 911. 50 tablet 11     Omega-3 Fatty Acids (OMEGA-3 FISH OIL PO) Take 1,200 mg by mouth 2 times daily (with meals)       omeprazole (PRILOSEC OTC) 20 MG tablet Take 1 tablet (20 mg) by mouth daily 90 tablet 1     ONE TOUCH LANCETS MISC 1 lancet 3 times daily 200 each 5     ticagrelor (BRILINTA) 90 MG tablet Take 1 tablet (90 mg) by mouth 2 times daily 180 tablet 3     triamcinolone (KENALOG) 0.1 % external ointment Apply topically 2 times daily 30 g 1       ALLERGIES     Allergies   Allergen Reactions     Lisinopril Cough       PAST MEDICAL HISTORY:  Past Medical History:   Diagnosis Date     DM type 2 (diabetes mellitus, type 2) (H)      HTN (hypertension), benign      Hyperlipidemia        PAST SURGICAL HISTORY:  Past Surgical History:   Procedure Laterality Date     HERNIORRHAPHY INGUINAL BILATERAL         FAMILY HISTORY:  Family History   Problem Relation Age of Onset     Diabetes Mother      Hypertension Mother      Cardiovascular Father 81        CHF     Heart Disease Sister 50     Heart Disease Brother 50       SOCIAL HISTORY:  Social History     Socioeconomic History     Marital status:      Spouse name: None     Number of children: 3     Years of education: None     Highest education level: None   Occupational History     Occupation:    Social Needs     Financial resource strain: None     Food insecurity:     Worry: None     Inability: None     Transportation needs:     Medical: None     Non-medical: None   Tobacco Use     Smoking status: Former Smoker     Packs/day: 1.00     Years: 30.00      Pack years: 30.00     Last attempt to quit: 2000     Years since quittin.8     Smokeless tobacco: Never Used   Substance and Sexual Activity     Alcohol use: No     Drug use: No     Sexual activity: Yes     Partners: Female   Lifestyle     Physical activity:     Days per week: None     Minutes per session: None     Stress: None   Relationships     Social connections:     Talks on phone: None     Gets together: None     Attends [employer] service: None     Active member of club or organization: None     Attends meetings of clubs or organizations: None     Relationship status: None     Intimate partner violence:     Fear of current or ex partner: None     Emotionally abused: None     Physically abused: None     Forced sexual activity: None   Other Topics Concern     Parent/sibling w/ CABG, MI or angioplasty before 65F 55M? No   Social History Narrative    , 3 children, works as a small .  (last updated 2018)        Review of Systems:  Skin:  Negative       Eyes:  Positive for glasses    ENT:  Negative      Respiratory:  Negative       Cardiovascular:  Negative      Gastroenterology: Negative      Genitourinary:  Negative      Musculoskeletal:  Negative      Neurologic:  Positive for memory problems    Psychiatric:  Negative anxiety;depression    Heme/Lymph/Imm:  Negative bleeding disorder    Endocrine:  Positive for diabetes      Physical Exam:  Vitals: BP (!) 160/87   Pulse 67   Wt 63.5 kg (140 lb)   SpO2 98%   BMI 22.60 kg/m      Constitutional:  cooperative, alert and oriented, well developed, well nourished, in no acute distress        Skin:  warm and dry to the touch          Head:  normocephalic        Eyes:  sclera white        Lymph:      ENT:           Neck:           Respiratory:  normal breath sounds, clear to auscultation, normal A-P diameter, normal symmetry, normal respiratory excursion, no use of accessory muscles         Cardiac: regular rhythm, normal S1/S2, no S3  or S4, apical impulse not displaced, no murmurs, gallops or rubs                                                         GI:           Extremities and Muscular Skeletal:  no deformities, clubbing, cyanosis, erythema observed              Neurological:  no gross motor deficits;affect appropriate        Psych:  Alert and Oriented x 3;affect appropriate, oriented to time, person and place          CC  Hortencia Aguilar MD  [address]  [place] [id number]

## 2019-10-24 NOTE — LETTER
10/24/2019    Kathya Iglesias, CNP  [address] [facility] [address]    RE: Js Jones       Dear Colleague,    I had the pleasure of seeing Js Jones in the [facility].    HPI and Plan:     Mr. Js Jones is  64 years old and is seen in follow-up at [facility] in [place].  He is followed for his history of coronary artery disease and multivessel intervention in 2018.    His past medical history is notable for hypertension, dyslipidemia, type 2 diabetes and a tobacco use history of 30 pack years.  He has a family history of coronary artery disease (father had CABG at 60 years of age, his sister was in her 50's years of age).   He was hospitalized at [facility] on 9/14/2018 with one week  of exertional chest pain.  He did an internet search for reasons to have chest pain and decided to seek evaluation for a possible cardiac cause of his symptoms.       He underwent a coronary angiography via the left radial artery with placement of two stents to the diagonal branch and one stent to the mid RCA.  There was mild LAD disease with a measured IFR of 0.92 indicating no physiologic obstruction.  An echocardiogram demonstrated a normal ejection fraction of 50-55% and mild MR.  His EKG had demonstrated a LBBB.  He continues on Brilinta, low dose aspirin, carvedilol, losartan and atorvastatin.  Mr. Jones has stopped smoking.  He completed a stress echocardiogram this month exercising just over 7 minutes without symptoms or evidence of ischemia.    He denies any angina or dyspnea or exertional intolerance.  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  He drives from [place] to [facility] for work.  He leaves his home just prior to 4 AM for the 90 minute drive and arrives home after 7 PM.  He is uncertain when he will retire.  He likes the work and for now, his workplace needs him as only jow is he training a  possible replacement .    Exam:  This is a slender man in no distress.  Blood pressure 140/92 mmHg, pulse 67 bpm.  The chest was clear.    On cardiac exam, there was a normal S1, S2 and no murmur; the rhythm was regular. There was no S3 or S4.    Assessment/Plan:  Mr. Jones has a history of coronary artery disease and he has undergone revascularization, is asymptomatic and doing well.  He is on appropriate risk factor intervention.  He will now finish his Brilinta and continue antiplatelet therapy with low dose aspirin alone.  He will return in one year.    With regard to his blood pressure, the initial pressures here were high.  He will take several blood pressures each of 2 days and contact this clinic with those results.  If the blood pressures on average are > 130/80 mmHg, an increase in the carvedilol dose and an RUTH follow-up will be recommended.    Lipids were previously ordered to be rechecked but have not been done.  They were reordered (along with a CBC and BMP) and Mr. Jones will plan to have them drawn in [place].        Encounter Diagnosis   Name Primary?     NSTEMI (non-ST elevated myocardial infarction) (H)        CURRENT MEDICATIONS:  Current Outpatient Medications   Medication Sig Dispense Refill     aspirin 81 MG tablet Take 1 tablet (81 mg) by mouth daily 90 tablet 3     atorvastatin (LIPITOR) 40 MG tablet Take 1 tablet (40 mg) by mouth daily 90 tablet 3     blood glucose monitoring (NO BRAND SPECIFIED) test strip Use to test blood sugar 3 times daily or as directed. Box of 100 each. 3 Box 3     Blood Glucose Monitoring Suppl W/DEVICE KIT 1 each daily 1 kit 0     carvedilol (COREG) 6.25 MG tablet Take 1 tablet (6.25 mg) by mouth 2 times daily (with meals) 180 tablet 3     glipiZIDE (GLUCOTROL XL) 10 MG 24 hr tablet Take 1 tablet (10 mg) by mouth daily 90 tablet 1     losartan (COZAAR) 25 MG tablet TAKE 1 TABLET BY MOUTH ONCE DAILY 90 tablet 0     Melatonin 10 MG TABS Take 10 mg by  mouth nightly as needed for sleep       metFORMIN (GLUCOPHAGE) 1000 MG tablet TAKE 1 TABLET BY MOUTH ONCE DAILY WITH  BREAKFAST  AND  ONE-HALF  TAB  AT  LUNCH  AND  ONE  TAB  IN  THE  EVENING  WITH  DINNER 225 tablet 1     metFORMIN (GLUCOPHAGE) 1000 MG tablet Take 1,000 mg (1 tablet) daily with breakfast, 500 mg( 1/2 tablet) with lunch, and 1,000 mg (1 tablet) with dinner 225 tablet 0     MULTIPLE VITAMIN PO Take 1 tablet by mouth       nitroGLYcerin (NITROSTAT) 0.4 MG sublingual tablet For chest pain place 1 tablet under the tongue every 5 minutes for 3 doses. If symptoms persist 5 minutes after 1st dose call 911. 50 tablet 11     Omega-3 Fatty Acids (OMEGA-3 FISH OIL PO) Take 1,200 mg by mouth 2 times daily (with meals)       omeprazole (PRILOSEC OTC) 20 MG tablet Take 1 tablet (20 mg) by mouth daily 90 tablet 1     ONE TOUCH LANCETS MISC 1 lancet 3 times daily 200 each 5     ticagrelor (BRILINTA) 90 MG tablet Take 1 tablet (90 mg) by mouth 2 times daily 180 tablet 3     triamcinolone (KENALOG) 0.1 % external ointment Apply topically 2 times daily 30 g 1       ALLERGIES     Allergies   Allergen Reactions     Lisinopril Cough       PAST MEDICAL HISTORY:  Past Medical History:   Diagnosis Date     DM type 2 (diabetes mellitus, type 2) (H)      HTN (hypertension), benign      Hyperlipidemia        PAST SURGICAL HISTORY:  Past Surgical History:   Procedure Laterality Date     HERNIORRHAPHY INGUINAL BILATERAL         FAMILY HISTORY:  Family History   Problem Relation Age of Onset     Diabetes Mother      Hypertension Mother      Cardiovascular Father 81        CHF     Heart Disease Sister 50     Heart Disease Brother 50       SOCIAL HISTORY:  Social History     Socioeconomic History     Marital status:      Spouse name: None     Number of children: 3     Years of education: None     Highest education level: None   Occupational History     Occupation:    Social Needs     Financial resource  strain: None     Food insecurity:     Worry: None     Inability: None     Transportation needs:     Medical: None     Non-medical: None   Tobacco Use     Smoking status: Former Smoker     Packs/day: 1.00     Years: 30.00     Pack years: 30.00     Last attempt to quit: 2000     Years since quittin.8     Smokeless tobacco: Never Used   Substance and Sexual Activity     Alcohol use: No     Drug use: No     Sexual activity: Yes     Partners: Female   Lifestyle     Physical activity:     Days per week: None     Minutes per session: None     Stress: None   Relationships     Social connections:     Talks on phone: None     Gets together: None     Attends Synagogue service: None     Active member of club or organization: None     Attends meetings of clubs or organizations: None     Relationship status: None     Intimate partner violence:     Fear of current or ex partner: None     Emotionally abused: None     Physically abused: None     Forced sexual activity: None   Other Topics Concern     Parent/sibling w/ CABG, MI or angioplasty before 65F 55M? No   Social History Narrative    , 3 children, works as a small .  (last updated 2018)        Review of Systems:  Skin:  Negative       Eyes:  Positive for glasses    ENT:  Negative      Respiratory:  Negative       Cardiovascular:  Negative      Gastroenterology: Negative      Genitourinary:  Negative      Musculoskeletal:  Negative      Neurologic:  Positive for memory problems    Psychiatric:  Negative anxiety;depression    Heme/Lymph/Imm:  Negative bleeding disorder    Endocrine:  Positive for diabetes      Physical Exam:  Vitals: BP (!) 160/87   Pulse 67   Wt 63.5 kg (140 lb)   SpO2 98%   BMI 22.60 kg/m       Constitutional:  cooperative, alert and oriented, well developed, well nourished, in no acute distress        Skin:  warm and dry to the touch          Head:  normocephalic        Eyes:  sclera white        Lymph:      ENT:            Neck:           Respiratory:  normal breath sounds, clear to auscultation, normal A-P diameter, normal symmetry, normal respiratory excursion, no use of accessory muscles         Cardiac: regular rhythm, normal S1/S2, no S3 or S4, apical impulse not displaced, no murmurs, gallops or rubs                                                         GI:           Extremities and Muscular Skeletal:  no deformities, clubbing, cyanosis, erythema observed              Neurological:  no gross motor deficits;affect appropriate        Psych:  Alert and Oriented x 3;affect appropriate, oriented to time, person and place          Thank you for allowing me to participate in the care of your patient.    Sincerely,     Hortencia Aguilar MD     [facility]

## 2019-10-27 VITALS
WEIGHT: 140 LBS | HEART RATE: 67 BPM | SYSTOLIC BLOOD PRESSURE: 140 MMHG | OXYGEN SATURATION: 98 % | BODY MASS INDEX: 22.6 KG/M2 | DIASTOLIC BLOOD PRESSURE: 92 MMHG

## 2019-12-08 DIAGNOSIS — I10 BENIGN ESSENTIAL HYPERTENSION: Chronic | ICD-10-CM

## 2019-12-09 RX ORDER — LOSARTAN POTASSIUM 25 MG/1
TABLET ORAL
Qty: 30 TABLET | Refills: 0 | Status: SHIPPED | OUTPATIENT
Start: 2019-12-09 | End: 2020-01-06

## 2019-12-09 NOTE — TELEPHONE ENCOUNTER
"Requested Prescriptions   Pending Prescriptions Disp Refills     losartan (COZAAR) 25 MG tablet [Pharmacy Med Name: LOSARTAN 25MG   TAB] 90 tablet 0     Sig: TAKE 1 TABLET BY MOUTH ONCE DAILY       Angiotensin-II Receptors Failed - 12/8/2019  4:46 PM        Failed - Last blood pressure under 140/90 in past 12 months     BP Readings from Last 3 Encounters:   10/24/19 (!) 140/92   07/12/19 134/72   06/07/19 126/70                 Failed - Normal serum creatinine on file in past 12 months     Recent Labs   Lab Test 09/18/18  0555   CR 0.88             Failed - Normal serum potassium on file in past 12 months     Recent Labs   Lab Test 09/18/18  0555   POTASSIUM 4.2                    Passed - Recent (12 mo) or future (30 days) visit within the authorizing provider's specialty     Patient has had an office visit with the authorizing provider or a provider within the authorizing providers department within the previous 12 mos or has a future within next 30 days. See \"Patient Info\" tab in inbasket, or \"Choose Columns\" in Meds & Orders section of the refill encounter.              Passed - Medication is active on med list        Passed - Patient is age 18 or older        Last Written Prescription Date:  9/3/19  Last Fill Quantity: 90,  # refills: 0   Last office visit: 7/12/2019 with prescribing provider:     Future Office Visit:      "

## 2019-12-11 ENCOUNTER — TELEPHONE (OUTPATIENT)
Dept: FAMILY MEDICINE | Facility: CLINIC | Age: 64
End: 2019-12-11

## 2019-12-11 NOTE — TELEPHONE ENCOUNTER
Panel Management Review      Patient has the following on his problem list:     Hypertension   Last three blood pressure readings:  BP Readings from Last 3 Encounters:   10/24/19 (!) 140/92   07/12/19 134/72   06/07/19 126/70     Blood pressure: FAILED    HTN Guidelines:  Less than 140/90      Composite cancer screening  Chart review shows that this patient is due/due soon for the following Fecal Colorectal (FIT)  Summary:    Patient is due/failing the following:   BP CHECK and FIT    Action needed:   Patient needs office visit for HTN.    Type of outreach:    Phone, spoke to patient.  Spoke to pt appointment scheduled 12/26/2019.    Questions for provider review:    None                                                                                                                                    Claudette Luciano LPN

## 2020-01-06 ENCOUNTER — OFFICE VISIT (OUTPATIENT)
Dept: FAMILY MEDICINE | Facility: CLINIC | Age: 65
End: 2020-01-06
Payer: COMMERCIAL

## 2020-01-06 VITALS
WEIGHT: 144 LBS | HEART RATE: 68 BPM | SYSTOLIC BLOOD PRESSURE: 136 MMHG | DIASTOLIC BLOOD PRESSURE: 82 MMHG | BODY MASS INDEX: 23.14 KG/M2 | HEIGHT: 66 IN | TEMPERATURE: 98.2 F | RESPIRATION RATE: 18 BRPM

## 2020-01-06 DIAGNOSIS — Z12.11 SCREEN FOR COLON CANCER: ICD-10-CM

## 2020-01-06 DIAGNOSIS — Z23 NEED FOR SHINGLES VACCINE: ICD-10-CM

## 2020-01-06 DIAGNOSIS — I10 BENIGN ESSENTIAL HYPERTENSION: Chronic | ICD-10-CM

## 2020-01-06 DIAGNOSIS — I21.4 NSTEMI (NON-ST ELEVATED MYOCARDIAL INFARCTION) (H): ICD-10-CM

## 2020-01-06 DIAGNOSIS — E11.9 TYPE 2 DIABETES MELLITUS WITHOUT COMPLICATION, WITHOUT LONG-TERM CURRENT USE OF INSULIN (H): Primary | Chronic | ICD-10-CM

## 2020-01-06 LAB
ALBUMIN SERPL-MCNC: 3.8 G/DL (ref 3.4–5)
ALP SERPL-CCNC: 81 U/L (ref 40–150)
ALT SERPL W P-5'-P-CCNC: 44 U/L (ref 0–70)
ANION GAP SERPL CALCULATED.3IONS-SCNC: 6 MMOL/L (ref 3–14)
AST SERPL W P-5'-P-CCNC: 24 U/L (ref 0–45)
BILIRUB SERPL-MCNC: 1 MG/DL (ref 0.2–1.3)
BUN SERPL-MCNC: 16 MG/DL (ref 7–30)
CALCIUM SERPL-MCNC: 9.4 MG/DL (ref 8.5–10.1)
CHLORIDE SERPL-SCNC: 107 MMOL/L (ref 94–109)
CHOLEST SERPL-MCNC: 147 MG/DL
CO2 SERPL-SCNC: 26 MMOL/L (ref 20–32)
CREAT SERPL-MCNC: 0.82 MG/DL (ref 0.66–1.25)
ERYTHROCYTE [DISTWIDTH] IN BLOOD BY AUTOMATED COUNT: 12.3 % (ref 10–15)
GFR SERPL CREATININE-BSD FRML MDRD: >90 ML/MIN/{1.73_M2}
GLUCOSE SERPL-MCNC: 218 MG/DL (ref 70–99)
HBA1C MFR BLD: 8.1 % (ref 0–5.6)
HCT VFR BLD AUTO: 42.1 % (ref 40–53)
HDLC SERPL-MCNC: 43 MG/DL
HGB BLD-MCNC: 14.4 G/DL (ref 13.3–17.7)
LDLC SERPL CALC-MCNC: 70 MG/DL
MCH RBC QN AUTO: 30.9 PG (ref 26.5–33)
MCHC RBC AUTO-ENTMCNC: 34.2 G/DL (ref 31.5–36.5)
MCV RBC AUTO: 90 FL (ref 78–100)
NONHDLC SERPL-MCNC: 104 MG/DL
PLATELET # BLD AUTO: 161 10E9/L (ref 150–450)
POTASSIUM SERPL-SCNC: 4.1 MMOL/L (ref 3.4–5.3)
PROT SERPL-MCNC: 7.4 G/DL (ref 6.8–8.8)
RBC # BLD AUTO: 4.66 10E12/L (ref 4.4–5.9)
SODIUM SERPL-SCNC: 139 MMOL/L (ref 133–144)
TRIGL SERPL-MCNC: 170 MG/DL
WBC # BLD AUTO: 7.9 10E9/L (ref 4–11)

## 2020-01-06 PROCEDURE — 85027 COMPLETE CBC AUTOMATED: CPT | Performed by: FAMILY MEDICINE

## 2020-01-06 PROCEDURE — 36415 COLL VENOUS BLD VENIPUNCTURE: CPT | Performed by: FAMILY MEDICINE

## 2020-01-06 PROCEDURE — 80053 COMPREHEN METABOLIC PANEL: CPT | Performed by: FAMILY MEDICINE

## 2020-01-06 PROCEDURE — 99214 OFFICE O/P EST MOD 30 MIN: CPT | Performed by: FAMILY MEDICINE

## 2020-01-06 PROCEDURE — 83036 HEMOGLOBIN GLYCOSYLATED A1C: CPT | Performed by: FAMILY MEDICINE

## 2020-01-06 PROCEDURE — 80061 LIPID PANEL: CPT | Performed by: FAMILY MEDICINE

## 2020-01-06 PROCEDURE — 99207 C FOOT EXAM  NO CHARGE: CPT | Performed by: FAMILY MEDICINE

## 2020-01-06 RX ORDER — LOSARTAN POTASSIUM 25 MG/1
25 TABLET ORAL DAILY
Qty: 90 TABLET | Refills: 1 | Status: SHIPPED | OUTPATIENT
Start: 2020-01-06 | End: 2020-02-12

## 2020-01-06 RX ORDER — CARVEDILOL 6.25 MG/1
6.25 TABLET ORAL 2 TIMES DAILY WITH MEALS
Qty: 180 TABLET | Refills: 3 | Status: SHIPPED | OUTPATIENT
Start: 2020-01-06 | End: 2021-02-22

## 2020-01-06 RX ORDER — GLIPIZIDE 10 MG/1
10 TABLET, FILM COATED, EXTENDED RELEASE ORAL DAILY
Qty: 90 TABLET | Refills: 1 | Status: SHIPPED | OUTPATIENT
Start: 2020-01-06 | End: 2020-07-20

## 2020-01-06 RX ORDER — ATORVASTATIN CALCIUM 40 MG/1
40 TABLET, FILM COATED ORAL DAILY
Qty: 90 TABLET | Refills: 3 | Status: SHIPPED | OUTPATIENT
Start: 2020-01-06 | End: 2021-02-22

## 2020-01-06 ASSESSMENT — MIFFLIN-ST. JEOR: SCORE: 1375.93

## 2020-01-06 NOTE — NURSING NOTE
"Chief Complaint   Patient presents with     Hypertension     Diabetes     /82 (Cuff Size: Adult Regular)   Pulse 68   Temp 98.2  F (36.8  C) (Tympanic)   Resp 18   Ht 1.676 m (5' 6\")   Wt 65.3 kg (144 lb)   BMI 23.24 kg/m   Estimated body mass index is 23.24 kg/m  as calculated from the following:    Height as of this encounter: 1.676 m (5' 6\").    Weight as of this encounter: 65.3 kg (144 lb).  Patient presents to the clinic using No DME      Health Maintenance that is potentially due pending provider review:    Health Maintenance Due   Topic Date Due     ZOSTER IMMUNIZATION (2 of 3) 11/01/2016     AORTIC ANEURYSM SCREENING (SYSTEM ASSIGNED)  03/28/2018     EYE EXAM  03/12/2019     LIPID  09/17/2019     BMP  09/18/2019     DIABETIC FOOT EXAM  09/20/2019     FIT  10/10/2019     A1C  12/07/2019     PHQ-2  01/01/2020     MICROALBUMIN  01/30/2020        Possibly completing today per provider review.        "

## 2020-01-06 NOTE — PROGRESS NOTES
SUBJECTIVE   Js ESTHER Jones is a 64 year old male who presents with     Diabetes Follow-up      How often are you checking your blood sugar? Once a week     What concerns do you have today about your diabetes? Other: hard to remember to take is afternoon pill     Do you have any of these symptoms? (Select all that apply)  No numbness or tingling in feet.  No redness, sores or blisters on feet.  No complaints of excessive thirst.  No reports of blurry vision.  No significant changes to weight.     Have you had a diabetic eye exam in the last 12 months? Yes- Date of last eye exam: unsure? thinks it was at [facility]    BP Readings from Last 2 Encounters:   01/06/20 136/82   10/24/19 (!) 140/92     Hemoglobin A1C (%)   Date Value   06/07/2019 6.9 (H)   09/15/2018 6.8 (H)     LDL Cholesterol Calculated (mg/dL)   Date Value   09/17/2018 54   09/15/2018 79       Diabetes Management Resources    Hypertension Follow-up      Do you check your blood pressure regularly outside of the clinic? Yes but its been quite awhile    Are you following a low salt diet? Yes    Are your blood pressures ever more than 140 on the top number (systolic) OR more   than 90 on the bottom number (diastolic), for example 140/90? No        How many days per week do you miss taking your medication? Seems to forget his afternoon pills      PCP   Kathya Iglesias, [facility] [phone]    Health Maintenance        Health Maintenance Due   Topic Date Due     ZOSTER IMMUNIZATION (2 of 3) 11/01/2016     AORTIC ANEURYSM SCREENING (SYSTEM ASSIGNED)  03/28/2018     EYE EXAM  03/12/2019     LIPID  09/17/2019     BMP  09/18/2019     DIABETIC FOOT EXAM  09/20/2019     FIT  10/10/2019     A1C  12/07/2019     PHQ-2  01/01/2020     MICROALBUMIN  01/30/2020       HPI        Patient Active Problem List   Diagnosis     Type 2 diabetes mellitus without complication, without long-term current use of insulin (H)     Benign essential hypertension, goal <140/90      Hyperlipidemia LDL goal <100     Gastroesophageal reflux disease without esophagitis     Unstable angina (H)     NSTEMI (non-ST elevated myocardial infarction) (H)     LBBB (left bundle branch block)     Current Outpatient Medications   Medication     aspirin 81 MG tablet     atorvastatin (LIPITOR) 40 MG tablet     blood glucose monitoring (NO BRAND SPECIFIED) test strip     Blood Glucose Monitoring Suppl W/DEVICE KIT     carvedilol (COREG) 6.25 MG tablet     glipiZIDE (GLUCOTROL XL) 10 MG 24 hr tablet     losartan (COZAAR) 25 MG tablet     Melatonin 10 MG TABS     metFORMIN (GLUCOPHAGE) 1000 MG tablet     MULTIPLE VITAMIN PO     nitroGLYcerin (NITROSTAT) 0.4 MG sublingual tablet     Omega-3 Fatty Acids (OMEGA-3 FISH OIL PO)     omeprazole (PRILOSEC OTC) 20 MG tablet     ONE TOUCH LANCETS MISC     triamcinolone (KENALOG) 0.1 % external ointment     No current facility-administered medications for this visit.        Patient Active Problem List   Diagnosis     Type 2 diabetes mellitus without complication, without long-term current use of insulin (H)     Benign essential hypertension, goal <140/90     Hyperlipidemia LDL goal <100     Gastroesophageal reflux disease without esophagitis     Unstable angina (H)     NSTEMI (non-ST elevated myocardial infarction) (H)     LBBB (left bundle branch block)     Past Surgical History:   Procedure Laterality Date     HERNIORRHAPHY INGUINAL BILATERAL         Social History     Tobacco Use     Smoking status: Former Smoker     Packs/day: 1.00     Years: 30.00     Pack years: 30.00     Last attempt to quit: 2000     Years since quittin.0     Smokeless tobacco: Never Used   Substance Use Topics     Alcohol use: No     Family History   Problem Relation Age of Onset     Diabetes Mother      Hypertension Mother      Cardiovascular Father 81        CHF     Heart Disease Sister 50     Heart Disease Brother 50         Current Outpatient Medications   Medication Sig Dispense Refill      aspirin 81 MG tablet Take 1 tablet (81 mg) by mouth daily 90 tablet 3     atorvastatin (LIPITOR) 40 MG tablet Take 1 tablet (40 mg) by mouth daily 90 tablet 3     blood glucose monitoring (NO BRAND SPECIFIED) test strip Use to test blood sugar 3 times daily or as directed. Box of 100 each. 3 Box 3     Blood Glucose Monitoring Suppl W/DEVICE KIT 1 each daily 1 kit 0     carvedilol (COREG) 6.25 MG tablet Take 1 tablet (6.25 mg) by mouth 2 times daily (with meals) 180 tablet 3     glipiZIDE (GLUCOTROL XL) 10 MG 24 hr tablet Take 1 tablet (10 mg) by mouth daily 90 tablet 1     losartan (COZAAR) 25 MG tablet Take 1 tablet (25 mg) by mouth daily 90 tablet 1     Melatonin 10 MG TABS Take 10 mg by mouth nightly as needed for sleep       metFORMIN (GLUCOPHAGE) 1000 MG tablet Take 1,000 mg (1 tablet) daily with breakfast, 500 mg( 1/2 tablet) with lunch, and 1,000 mg (1 tablet) with dinner 225 tablet 1     MULTIPLE VITAMIN PO Take 1 tablet by mouth       nitroGLYcerin (NITROSTAT) 0.4 MG sublingual tablet For chest pain place 1 tablet under the tongue every 5 minutes for 3 doses. If symptoms persist 5 minutes after 1st dose call 911. 50 tablet 11     Omega-3 Fatty Acids (OMEGA-3 FISH OIL PO) Take 1,200 mg by mouth 2 times daily (with meals)       omeprazole (PRILOSEC OTC) 20 MG tablet Take 1 tablet (20 mg) by mouth daily 90 tablet 1     ONE TOUCH LANCETS MISC 1 lancet 3 times daily 200 each 5     triamcinolone (KENALOG) 0.1 % external ointment Apply topically 2 times daily 30 g 1     Allergies   Allergen Reactions     Lisinopril Cough     Recent Labs   Lab Test 06/07/19  0829 09/18/18  0555 09/17/18  0550  09/15/18  0546 09/14/18  1818 06/15/18  0843 03/09/18  0815  07/14/16  0900 11/04/15  1630 04/22/15  0845   A1C 6.9*  --   --   --  6.8*  --  7.2* 6.9*   < > 7.5* 7.3* 7.3*   LDL  --   --  54  --  79  --   --  72   < > 101*  --   --    HDL  --   --  35*  --  31*  --   --  38*   < > 33*  --   --    TRIG  --   --  208*  --   "161*  --   --  73   < > 125  --   --    ALT  --   --   --   --   --  39  --   --   --  66  --  40   CR  --  0.88 0.90   < >  --  0.92  --   --    < > 0.90  --  1.74*   GFRESTIMATED  --  87 84   < >  --  82  --   --    < > 85  --  40*   GFRESTBLACK  --  >90 >90   < >  --  >90  --   --    < > >90   GFR Calc    --  48*   POTASSIUM  --  4.2 3.9   < >  --  3.4  --   --    < > 3.9  --  4.0   TSH  --   --   --   --   --   --   --  1.02  --   --  1.47  --     < > = values in this interval not displayed.      BP Readings from Last 3 Encounters:   01/06/20 136/82   10/24/19 (!) 140/92   07/12/19 134/72    Wt Readings from Last 3 Encounters:   01/06/20 65.3 kg (144 lb)   10/24/19 63.5 kg (140 lb)   07/12/19 64 kg (141 lb)                    Reviewed and updated:  Tobacco  Allergies  Meds  Med Hx  Surg Hx  Fam Hx  Soc Hx     ROS:  Constitutional, neuro, ENT, endocrine, pulmonary, cardiac, gastrointestinal, genitourinary, musculoskeletal, integument and psychiatric systems are negative, except as otherwise noted.    PHYSICAL EXAM   /82 (Cuff Size: Adult Regular)   Pulse 68   Temp 98.2  F (36.8  C) (Tympanic)   Resp 18   Ht 1.676 m (5' 6\")   Wt 65.3 kg (144 lb)   BMI 23.24 kg/m    Body mass index is 23.24 kg/m .  GENERAL: alert and no distress  EYES: Eyes grossly normal to inspection, PERRL and conjunctivae and sclerae normal  HENT: normal cephalic/atraumatic, nose and mouth without ulcers or lesions, oropharynx clear and oral mucous membranes moist  NECK: no adenopathy, no asymmetry, masses, or scars and thyroid normal to palpation  RESP: lungs clear to auscultation - no rales, rhonchi or wheezes  CV: regular rate and rhythm, normal S1 S2, no S3 or S4, no murmur, click or rub, no peripheral edema and peripheral pulses strong  ABDOMEN: soft, nontender, no hepatosplenomegaly, no masses and bowel sounds normal  MS: no gross musculoskeletal defects noted, no edema  SKIN: no suspicious lesions or " rashes  NEURO: Normal strength and tone, mentation intact and speech normal  PSYCH: mentation appears normal, affect normal/bright  Diabetic foot exam: Normal monofilament testing, slightly reduced vibration sensation, pedal pulses 3+ bilaterally, some callus noted involving bilateral plantar foot surface, no pedal edema      Lab Results   Component Value Date    A1C 6.9 06/07/2019    A1C 6.8 09/15/2018    A1C 7.2 06/15/2018    A1C 6.9 03/09/2018    A1C 6.7 08/24/2017       Assessment & Plan       (E11.9) Type 2 diabetes mellitus without complication, without long-term current use of insulin (H)  (primary encounter diagnosis)  Comment: Last hemoglobin A1c 6.9, suggested to continue metformin, glipizide.  Recommended to monitor blood glucose at least once or twice a day.  CBC, CMP, hemoglobin A1c ordered.  Metformin and glipizide refilled  Plan: Hemoglobin A1c, Comprehensive metabolic panel         (BMP + Alb, Alk Phos, ALT, AST, Total. Bili,         TP), Lipid panel reflex to direct LDL Fasting,         FOOT EXAM, glipiZIDE (GLUCOTROL XL) 10 MG 24 hr        tablet, metFORMIN (GLUCOPHAGE) 1000 MG tablet            (I21.4) NSTEMI (non-ST elevated myocardial infarction) (H)  Comment: Continue aspirin, Lipitor, carvedilol, losartan  Plan: atorvastatin (LIPITOR) 40 MG tablet, carvedilol        (COREG) 6.25 MG tablet            (I10) Benign essential hypertension, goal <140/90  Comment: Blood pressure within target goal.  Continue carvedilol, losartan.  Stressed on regular exercise, balanced diet  Plan: losartan (COZAAR) 25 MG tablet            (Z12.11) Screen for colon cancer  Comment:   Plan: Fecal colorectal cancer screen (FIT)            (Z23) Need for shingles vaccine  Comment: Suggested to discuss with insurance company about Shingrix vaccine coverage           Patient Instructions     Patient Education     Diet: Diabetes  Food is an important tool that you can use to control diabetes and stay healthy. Eating  well-balanced meals in the correct amounts will help you control your blood glucose levels and prevent low blood sugar reactions. It will also help you reduce the health risks of diabetes. There is no one specific diet that is right for everyone with diabetes. But there are general guidelines to follow. A registered dietitian (ZABRINA) will create a tailored diet approach that s just right for you. He or she will also help you plan healthy meals and snacks. If you have any questions, call your dietitian for advice.     Guidelines for success  Talk with your healthcare provider before starting a diabetes diet or weight loss program. If you haven't talked with a dietitian yet, ask your provider for a referral. The following guidelines can help you succeed:    Select foods from the 6 food groups below. Your dietitian will help you find food choices within each group. He or she will also show you serving sizes and how many servings you can have at each meal.  ? Grains, beans, and starchy vegetables  ? Vegetables  ? Fruit  ? Milk or yogurt  ? Meat, poultry, fish, or tofu  ? Healthy fats    Check your blood sugar levels as directed by your provider. Take any medicine as prescribed by your provider.    Learn to read food labels and pick the right portion sizes.    Eat only the amount of food in your meal plan. Eat about the same amount of food at regular times each day. Don t skip meals. Eat meals 4 to 5 hours apart, with snacks in between.    Limit alcohol. It raises blood sugar levels. Drink water or calorie-free diet drinks that use safe sweeteners.    Eat less fat to help lower your risk of heart disease. Use nonfat or low-fat dairy products and lean meats. Avoid fried foods. Use cooking oils that are unsaturated, such as olive, canola, or peanut oil.    Talk with your dietitian about safe sugar substitutes.    Avoid added salt. It can contribute to high blood pressure, which can cause heart disease. People with diabetes  already have a risk of high blood pressure and heart disease.    Stay at a healthy weight. If you need to lose weight, cut down on your portion sizes. But don t skip meals. Exercise is an important part of any weight management program. Talk with your provider about an exercise program that s right for you.    For more information about the best diet plan for you, talk with a registered dietitian (RD). To find an RD in your area, contact:  ? Academy of Nutrition and Dietetics www.eatright.org  ? The American Diabetes Association [phone] www.diabetes.org  Date Last Reviewed: 8/1/2016 2000-2019 [employer]. [address] [place] [address], [place] [address]. All rights reserved. This information is not intended as a substitute for professional medical care. Always follow your healthcare professional's instructions.               Fortino Hyman MD  [facility]

## 2020-01-06 NOTE — LETTER
January 7, 2020      Js ESTHER Jones  [address] [facility] [place] [address]        Dear ,    We are writing to inform you of your test results.    Fasting blood glucose 218, consistent with poorly controlled diabetes.  Other lab results came back unremarkable.  Continue recommendations as discussed. Let us know if there are any questions.     Resulted Orders   Hemoglobin A1c   Result Value Ref Range    Hemoglobin A1C 8.1 (H) 0 - 5.6 %      Comment:      Normal <5.7% Prediabetes 5.7-6.4%  Diabetes 6.5% or higher - adopted from ADA   consensus guidelines.     Comprehensive metabolic panel (BMP + Alb, Alk Phos, ALT, AST, Total. Bili, TP)   Result Value Ref Range    Sodium 139 133 - 144 mmol/L    Potassium 4.1 3.4 - 5.3 mmol/L    Chloride 107 94 - 109 mmol/L    Carbon Dioxide 26 20 - 32 mmol/L    Anion Gap 6 3 - 14 mmol/L    Glucose 218 (H) 70 - 99 mg/dL      Comment:      Fasting specimen    Urea Nitrogen 16 7 - 30 mg/dL    Creatinine 0.82 0.66 - 1.25 mg/dL    GFR Estimate >90 >60 mL/min/[1.73_m2]      Comment:      Non  GFR Calc  Starting 12/18/2018, serum creatinine based estimated GFR (eGFR) will be   calculated using the Chronic Kidney Disease Epidemiology Collaboration   (CKD-EPI) equation.      GFR Estimate If Black >90 >60 mL/min/[1.73_m2]      Comment:       GFR Calc  Starting 12/18/2018, serum creatinine based estimated GFR (eGFR) will be   calculated using the Chronic Kidney Disease Epidemiology Collaboration   (CKD-EPI) equation.      Calcium 9.4 8.5 - 10.1 mg/dL    Bilirubin Total 1.0 0.2 - 1.3 mg/dL    Albumin 3.8 3.4 - 5.0 g/dL    Protein Total 7.4 6.8 - 8.8 g/dL    Alkaline Phosphatase 81 40 - 150 U/L    ALT 44 0 - 70 U/L    AST 24 0 - 45 U/L   Lipid panel reflex to direct LDL Fasting   Result Value Ref Range    Cholesterol 147 <200 mg/dL    Triglycerides 170 (H) <150 mg/dL      Comment:      Borderline high:  150-199 mg/dl  High:             200-499  mg/dl  Very high:       >499 mg/dl  Fasting specimen      HDL Cholesterol 43 >39 mg/dL    LDL Cholesterol Calculated 70 <100 mg/dL      Comment:      Desirable:       <100 mg/dl    Non HDL Cholesterol 104 <130 mg/dL   CBC with platelets   Result Value Ref Range    WBC 7.9 4.0 - 11.0 10e9/L    RBC Count 4.66 4.4 - 5.9 10e12/L    Hemoglobin 14.4 13.3 - 17.7 g/dL    Hematocrit 42.1 40.0 - 53.0 %    MCV 90 78 - 100 fl    MCH 30.9 26.5 - 33.0 pg    MCHC 34.2 31.5 - 36.5 g/dL    RDW 12.3 10.0 - 15.0 %    Platelet Count 161 150 - 450 10e9/L       If you have any questions or concerns, please call the clinic at the number listed above.       Sincerely,        Fortino Hyman MD/bay

## 2020-01-06 NOTE — LETTER
January 7, 2020      Js ESTHER Jones  [address] [facility] [address]        Dear ,    We are writing to inform you of your test results.    Hemoglobin A1c 8.1, consistent with poorly controlled diabetes, likely secondary to poor dietary choices which you mentioned.  Would recommend to adhere to strict diabetic diet, regular walks.   Cell counts and hemoglobin came back normal. Let us know if there are any questions.     Resulted Orders   Hemoglobin A1c   Result Value Ref Range    Hemoglobin A1C 8.1 (H) 0 - 5.6 %      Comment:      Normal <5.7% Prediabetes 5.7-6.4%  Diabetes 6.5% or higher - adopted from ADA   consensus guidelines.     CBC with platelets   Result Value Ref Range    WBC 7.9 4.0 - 11.0 10e9/L    RBC Count 4.66 4.4 - 5.9 10e12/L    Hemoglobin 14.4 13.3 - 17.7 g/dL    Hematocrit 42.1 40.0 - 53.0 %    MCV 90 78 - 100 fl    MCH 30.9 26.5 - 33.0 pg    MCHC 34.2 31.5 - 36.5 g/dL    RDW 12.3 10.0 - 15.0 %    Platelet Count 161 150 - 450 10e9/L       If you have any questions or concerns, please call the clinic at the number listed above.       Sincerely,        Fortino Hyman MD/aby

## 2020-01-06 NOTE — PATIENT INSTRUCTIONS
Patient Education     Diet: Diabetes  Food is an important tool that you can use to control diabetes and stay healthy. Eating well-balanced meals in the correct amounts will help you control your blood glucose levels and prevent low blood sugar reactions. It will also help you reduce the health risks of diabetes. There is no one specific diet that is right for everyone with diabetes. But there are general guidelines to follow. A registered dietitian (RD) will create a tailored diet approach that s just right for you. He or she will also help you plan healthy meals and snacks. If you have any questions, call your dietitian for advice.     Guidelines for success  Talk with your healthcare provider before starting a diabetes diet or weight loss program. If you haven't talked with a dietitian yet, ask your provider for a referral. The following guidelines can help you succeed:    Select foods from the 6 food groups below. Your dietitian will help you find food choices within each group. He or she will also show you serving sizes and how many servings you can have at each meal.  ? Grains, beans, and starchy vegetables  ? Vegetables  ? Fruit  ? Milk or yogurt  ? Meat, poultry, fish, or tofu  ? Healthy fats    Check your blood sugar levels as directed by your provider. Take any medicine as prescribed by your provider.    Learn to read food labels and pick the right portion sizes.    Eat only the amount of food in your meal plan. Eat about the same amount of food at regular times each day. Don t skip meals. Eat meals 4 to 5 hours apart, with snacks in between.    Limit alcohol. It raises blood sugar levels. Drink water or calorie-free diet drinks that use safe sweeteners.    Eat less fat to help lower your risk of heart disease. Use nonfat or low-fat dairy products and lean meats. Avoid fried foods. Use cooking oils that are unsaturated, such as olive, canola, or peanut oil.    Talk with your dietitian about safe sugar  substitutes.    Avoid added salt. It can contribute to high blood pressure, which can cause heart disease. People with diabetes already have a risk of high blood pressure and heart disease.    Stay at a healthy weight. If you need to lose weight, cut down on your portion sizes. But don t skip meals. Exercise is an important part of any weight management program. Talk with your provider about an exercise program that s right for you.    For more information about the best diet plan for you, talk with a registered dietitian (RD). To find an RD in your area, contact:  ? Academy of Nutrition and Dietetics www.eatright.org  ? The American Diabetes Association [phone] www.diabetes.org  Date Last Reviewed: 8/1/2016 2000-2019 [employer]. [address] [place] [address], [place] [address]. All rights reserved. This information is not intended as a substitute for professional medical care. Always follow your healthcare professional's instructions.

## 2020-01-16 PROCEDURE — 82274 ASSAY TEST FOR BLOOD FECAL: CPT | Performed by: FAMILY MEDICINE

## 2020-01-17 DIAGNOSIS — Z12.11 SCREEN FOR COLON CANCER: ICD-10-CM

## 2020-01-18 LAB — HEMOCCULT STL QL IA: NEGATIVE

## 2020-02-12 DIAGNOSIS — I10 BENIGN ESSENTIAL HYPERTENSION: Chronic | ICD-10-CM

## 2020-02-12 RX ORDER — LOSARTAN POTASSIUM 25 MG/1
25 TABLET ORAL DAILY
Qty: 90 TABLET | Refills: 1 | Status: SHIPPED | OUTPATIENT
Start: 2020-02-12 | End: 2020-12-07

## 2020-02-12 NOTE — TELEPHONE ENCOUNTER
Js says he can't find the bottle of his Losartan. He called the pharmacy but they wouldn't fill it. He is asking if we can send to [facility] and tell them he lost his bottle so needs it refilled.

## 2020-02-12 NOTE — TELEPHONE ENCOUNTER
"Requested Prescriptions   Pending Prescriptions Disp Refills     losartan (COZAAR) 25 MG tablet 90 tablet 1     Sig: Take 1 tablet (25 mg) by mouth daily       Angiotensin-II Receptors Passed - 2/12/2020  1:26 PM        Passed - Last blood pressure under 140/90 in past 12 months     BP Readings from Last 3 Encounters:   01/06/20 136/82   10/24/19 (!) 140/92   07/12/19 134/72                 Passed - Recent (12 mo) or future (30 days) visit within the authorizing provider's specialty     Patient has had an office visit with the authorizing provider or a provider within the authorizing providers department within the previous 12 mos or has a future within next 30 days. See \"Patient Info\" tab in inbasket, or \"Choose Columns\" in Meds & Orders section of the refill encounter.              Passed - Medication is active on med list        Passed - Patient is age 18 or older        Passed - Normal serum creatinine on file in past 12 months     Recent Labs   Lab Test 01/06/20  0828   CR 0.82             Passed - Normal serum potassium on file in past 12 months     Recent Labs   Lab Test 01/06/20  0828   POTASSIUM 4.1                    Last Written Prescription Date:  1/6/20  Last Fill Quantity: 90,  # refills: 1   Last office visit: 1/6/2020 with prescribing provider:  Boogie   Future Office Visit:      "

## 2020-03-24 ENCOUNTER — COMMUNICATION - HEALTHEAST (OUTPATIENT)
Dept: SCHEDULING | Facility: CLINIC | Age: 65
End: 2020-03-24

## 2020-03-26 ENCOUNTER — TELEPHONE (OUTPATIENT)
Dept: FAMILY MEDICINE | Facility: CLINIC | Age: 65
End: 2020-03-26

## 2020-03-26 NOTE — TELEPHONE ENCOUNTER
"  ED for acute condition Discharge Protocol    \"Hi, my name is Jamaica Rivas RN, a registered nurse, and I am calling from [facility].  I am calling to follow up and see how things are going for you after your recent emergency visit.\"  [facility] urgency center 03-25-20  Tell me how you are doing now that you are home?\" Temp 100.9 11 PM yesterday. States fever broke overnight, sweats early AM. Afebrile today. Feeling better, only slight cough. Slight sinus headache. Denies chest congestion, SOA, weakness, body aches.     yes after drinking a lot of cranberry inder. Has stopped drinking juices now, BG stabilized.    Discharge Instructions    \"Let's review your discharge instructions.  What is/are the follow-up recommendations?  Pt. Response: F/U as needed  \"Has an appointment with your primary care provider been scheduled?\"  No (not needed)    Medications    \"Tell me what changed about your medicines when you discharged?\"  N/A    \"What questions do you have about your medications?\"   None Advised maintain strict infection control, social distancing.       Call Summary    \"What questions or concerns do you have about your recent visit and your follow-up care?\"     none    \"If you have questions or things don't continue to improve, we encourage you contact us through the main clinic number (give number).  Even if the clinic is not open, triage nurses are available 24/7 to help you.     We would like you to know that our clinic has extended hours (provide information).  We also have urgent care (provide details on closest location and hours/contact info)\"    \"Thank you for your time and take care!\"                "

## 2020-04-07 ENCOUNTER — APPOINTMENT (OUTPATIENT)
Dept: GENERAL RADIOLOGY | Facility: CLINIC | Age: 65
End: 2020-04-07
Attending: EMERGENCY MEDICINE
Payer: COMMERCIAL

## 2020-04-07 ENCOUNTER — HOSPITAL ENCOUNTER (EMERGENCY)
Facility: CLINIC | Age: 65
Discharge: HOME | End: 2020-04-07
Attending: EMERGENCY MEDICINE | Admitting: EMERGENCY MEDICINE
Payer: COMMERCIAL

## 2020-04-07 VITALS
OXYGEN SATURATION: 98 % | TEMPERATURE: 100.3 F | BODY MASS INDEX: 23.24 KG/M2 | SYSTOLIC BLOOD PRESSURE: 106 MMHG | RESPIRATION RATE: 10 BRPM | WEIGHT: 144 LBS | DIASTOLIC BLOOD PRESSURE: 51 MMHG | HEART RATE: 75 BPM

## 2020-04-07 DIAGNOSIS — N17.9 PRERENAL ACUTE RENAL FAILURE (H): ICD-10-CM

## 2020-04-07 DIAGNOSIS — E86.0 DEHYDRATION: ICD-10-CM

## 2020-04-07 DIAGNOSIS — J11.1 INFLUENZA-LIKE ILLNESS: ICD-10-CM

## 2020-04-07 DIAGNOSIS — E83.42 HYPOMAGNESEMIA: ICD-10-CM

## 2020-04-07 LAB
ALBUMIN SERPL-MCNC: 3.1 G/DL (ref 3.4–5)
ALBUMIN UR-MCNC: NEGATIVE MG/DL
ALP SERPL-CCNC: 66 U/L (ref 40–150)
ALT SERPL W P-5'-P-CCNC: 40 U/L (ref 0–70)
ANION GAP SERPL CALCULATED.3IONS-SCNC: 5 MMOL/L (ref 3–14)
ANION GAP SERPL CALCULATED.3IONS-SCNC: 7 MMOL/L (ref 3–14)
APPEARANCE UR: ABNORMAL
AST SERPL W P-5'-P-CCNC: 26 U/L (ref 0–45)
BASE EXCESS BLDV CALC-SCNC: 1 MMOL/L
BASOPHILS # BLD AUTO: 0 10E9/L (ref 0–0.2)
BASOPHILS NFR BLD AUTO: 0.1 %
BILIRUB SERPL-MCNC: 1.3 MG/DL (ref 0.2–1.3)
BILIRUB UR QL STRIP: NEGATIVE
BUN SERPL-MCNC: 31 MG/DL (ref 7–30)
BUN SERPL-MCNC: 32 MG/DL (ref 7–30)
CALCIUM SERPL-MCNC: 8 MG/DL (ref 8.5–10.1)
CALCIUM SERPL-MCNC: 9 MG/DL (ref 8.5–10.1)
CHLORIDE SERPL-SCNC: 103 MMOL/L (ref 94–109)
CHLORIDE SERPL-SCNC: 108 MMOL/L (ref 94–109)
CO2 SERPL-SCNC: 22 MMOL/L (ref 20–32)
CO2 SERPL-SCNC: 27 MMOL/L (ref 20–32)
COLOR UR AUTO: YELLOW
CREAT SERPL-MCNC: 2.18 MG/DL (ref 0.66–1.25)
CREAT SERPL-MCNC: 2.36 MG/DL (ref 0.66–1.25)
DIFFERENTIAL METHOD BLD: ABNORMAL
EOSINOPHIL # BLD AUTO: 0.4 10E9/L (ref 0–0.7)
EOSINOPHIL NFR BLD AUTO: 4.4 %
ERYTHROCYTE [DISTWIDTH] IN BLOOD BY AUTOMATED COUNT: 12.3 % (ref 10–15)
GFR SERPL CREATININE-BSD FRML MDRD: 27 ML/MIN/{1.73_M2}
GFR SERPL CREATININE-BSD FRML MDRD: 30 ML/MIN/{1.73_M2}
GLUCOSE SERPL-MCNC: 111 MG/DL (ref 70–99)
GLUCOSE SERPL-MCNC: 98 MG/DL (ref 70–99)
GLUCOSE UR STRIP-MCNC: NEGATIVE MG/DL
HCO3 BLDV-SCNC: 26 MMOL/L (ref 21–28)
HCT VFR BLD AUTO: 36.4 % (ref 40–53)
HGB BLD-MCNC: 12.4 G/DL (ref 13.3–17.7)
HGB UR QL STRIP: NEGATIVE
IMM GRANULOCYTES # BLD: 0 10E9/L (ref 0–0.4)
IMM GRANULOCYTES NFR BLD: 0.2 %
KETONES UR STRIP-MCNC: NEGATIVE MG/DL
LACTATE BLD-SCNC: 1.6 MMOL/L (ref 0.7–2)
LEUKOCYTE ESTERASE UR QL STRIP: NEGATIVE
LYMPHOCYTES # BLD AUTO: 0.8 10E9/L (ref 0.8–5.3)
LYMPHOCYTES NFR BLD AUTO: 9.1 %
MAGNESIUM SERPL-MCNC: 1.1 MG/DL (ref 1.6–2.3)
MAGNESIUM SERPL-MCNC: 1.6 MG/DL (ref 1.6–2.3)
MCH RBC QN AUTO: 30.2 PG (ref 26.5–33)
MCHC RBC AUTO-ENTMCNC: 34.1 G/DL (ref 31.5–36.5)
MCV RBC AUTO: 89 FL (ref 78–100)
MONOCYTES # BLD AUTO: 0.5 10E9/L (ref 0–1.3)
MONOCYTES NFR BLD AUTO: 5.7 %
MUCOUS THREADS #/AREA URNS LPF: PRESENT /LPF
NEUTROPHILS # BLD AUTO: 7.2 10E9/L (ref 1.6–8.3)
NEUTROPHILS NFR BLD AUTO: 80.5 %
NITRATE UR QL: NEGATIVE
NRBC # BLD AUTO: 0 10*3/UL
NRBC BLD AUTO-RTO: 0 /100
O2/TOTAL GAS SETTING VFR VENT: ABNORMAL %
PCO2 BLDV: 41 MM HG (ref 40–50)
PH BLDV: 7.41 PH (ref 7.32–7.43)
PH UR STRIP: 5 PH (ref 5–7)
PLATELET # BLD AUTO: 124 10E9/L (ref 150–450)
PO2 BLDV: 20 MM HG (ref 25–47)
POTASSIUM SERPL-SCNC: 3.6 MMOL/L (ref 3.4–5.3)
POTASSIUM SERPL-SCNC: 3.9 MMOL/L (ref 3.4–5.3)
PROT SERPL-MCNC: 6.7 G/DL (ref 6.8–8.8)
RBC # BLD AUTO: 4.1 10E12/L (ref 4.4–5.9)
RBC #/AREA URNS AUTO: 1 /HPF (ref 0–2)
SODIUM SERPL-SCNC: 135 MMOL/L (ref 133–144)
SODIUM SERPL-SCNC: 137 MMOL/L (ref 133–144)
SOURCE: ABNORMAL
SP GR UR STRIP: 1.01 (ref 1–1.03)
TROPONIN I SERPL-MCNC: <0.015 UG/L (ref 0–0.04)
UROBILINOGEN UR STRIP-MCNC: 0 MG/DL (ref 0–2)
WBC # BLD AUTO: 8.9 10E9/L (ref 4–11)
WBC #/AREA URNS AUTO: 5 /HPF (ref 0–5)

## 2020-04-07 PROCEDURE — 80048 BASIC METABOLIC PNL TOTAL CA: CPT | Performed by: EMERGENCY MEDICINE

## 2020-04-07 PROCEDURE — 99284 EMERGENCY DEPT VISIT MOD MDM: CPT | Mod: 25

## 2020-04-07 PROCEDURE — 71045 X-RAY EXAM CHEST 1 VIEW: CPT

## 2020-04-07 PROCEDURE — 83605 ASSAY OF LACTIC ACID: CPT | Performed by: EMERGENCY MEDICINE

## 2020-04-07 PROCEDURE — 84484 ASSAY OF TROPONIN QUANT: CPT | Performed by: EMERGENCY MEDICINE

## 2020-04-07 PROCEDURE — 25000132 ZZH RX MED GY IP 250 OP 250 PS 637: Performed by: EMERGENCY MEDICINE

## 2020-04-07 PROCEDURE — 80053 COMPREHEN METABOLIC PANEL: CPT | Performed by: EMERGENCY MEDICINE

## 2020-04-07 PROCEDURE — 25800030 ZZH RX IP 258 OP 636: Performed by: EMERGENCY MEDICINE

## 2020-04-07 PROCEDURE — 96365 THER/PROPH/DIAG IV INF INIT: CPT

## 2020-04-07 PROCEDURE — 83735 ASSAY OF MAGNESIUM: CPT | Mod: 91 | Performed by: EMERGENCY MEDICINE

## 2020-04-07 PROCEDURE — 81001 URINALYSIS AUTO W/SCOPE: CPT | Performed by: EMERGENCY MEDICINE

## 2020-04-07 PROCEDURE — 96361 HYDRATE IV INFUSION ADD-ON: CPT

## 2020-04-07 PROCEDURE — 87040 BLOOD CULTURE FOR BACTERIA: CPT | Performed by: EMERGENCY MEDICINE

## 2020-04-07 PROCEDURE — 96366 THER/PROPH/DIAG IV INF ADDON: CPT

## 2020-04-07 PROCEDURE — 25000128 H RX IP 250 OP 636: Performed by: EMERGENCY MEDICINE

## 2020-04-07 PROCEDURE — 99284 EMERGENCY DEPT VISIT MOD MDM: CPT | Mod: Z6 | Performed by: EMERGENCY MEDICINE

## 2020-04-07 PROCEDURE — 82803 BLOOD GASES ANY COMBINATION: CPT | Performed by: EMERGENCY MEDICINE

## 2020-04-07 PROCEDURE — 85025 COMPLETE CBC W/AUTO DIFF WBC: CPT | Performed by: EMERGENCY MEDICINE

## 2020-04-07 RX ORDER — ACETAMINOPHEN 500 MG
1000 TABLET ORAL ONCE
Status: COMPLETED | OUTPATIENT
Start: 2020-04-07 | End: 2020-04-07

## 2020-04-07 RX ORDER — SODIUM CHLORIDE, SODIUM LACTATE, POTASSIUM CHLORIDE, CALCIUM CHLORIDE 600; 310; 30; 20 MG/100ML; MG/100ML; MG/100ML; MG/100ML
1000 INJECTION, SOLUTION INTRAVENOUS CONTINUOUS
Status: DISCONTINUED | OUTPATIENT
Start: 2020-04-07 | End: 2020-04-07 | Stop reason: HOSPADM

## 2020-04-07 RX ORDER — MAGNESIUM SULFATE HEPTAHYDRATE 40 MG/ML
2 INJECTION, SOLUTION INTRAVENOUS ONCE
Status: COMPLETED | OUTPATIENT
Start: 2020-04-07 | End: 2020-04-07

## 2020-04-07 RX ADMIN — SODIUM CHLORIDE 1000 ML: 9 INJECTION, SOLUTION INTRAVENOUS at 17:48

## 2020-04-07 RX ADMIN — SODIUM CHLORIDE, POTASSIUM CHLORIDE, SODIUM LACTATE AND CALCIUM CHLORIDE 1000 ML: 600; 310; 30; 20 INJECTION, SOLUTION INTRAVENOUS at 19:01

## 2020-04-07 RX ADMIN — ACETAMINOPHEN 1000 MG: 500 TABLET, FILM COATED ORAL at 17:46

## 2020-04-07 RX ADMIN — MAGNESIUM SULFATE 2 G: 2 INJECTION INTRAVENOUS at 19:01

## 2020-04-07 NOTE — ED NOTES
Pt c/o intermittent fevers, fatigue. Dx with UTI on 3/15 , finished antibiotics, felt better than developed fever again, was seen March 25 with fever, congestion, sent home with instructions for self care. The past 1-2 days fever came back. Pt states has mild cough, non-productive, unsure if it is new in the past 14 days. No APAP today. Has been missing work due to illness.

## 2020-04-07 NOTE — ED PROVIDER NOTES
History     Chief Complaint   Patient presents with     Cough     Fever     HPI  Js Jones is a 65 year old male who presents to the emergency department for evaluation of flulike symptoms with fever, productive cough, generalized weakness, fatigue and malaise.  He denies any other URI symptoms, shortness of breath, chest pain, hemoptysis or leg pain or leg swelling.  Fever of 101+ (temporal) today.  No recent travel or known infectious exposures.  He reports onset of symptoms approximately 3/15/2020, 3 weeks ago, with intermittent fevers and malaise since that time.  He was seen in an outside medical facility ([facility]), was found to have a UTI secondary to Klebsiella aerogenes which was treated with Bactrim DS X 10 days.  Review of his EMR shows that culture and sensitivity show that it was susceptible to Bactrim DS.  He denies having any UTI signs or symptoms or urinary abnormality, or prior history of urologic difficulty or disease.  A follow-up UA and urine culture 3/25/2020 revealed that his urinalysis and urine culture were negative.  He had a very thorough evaluation 3/15/2020 when he was initially evaluated for fever of unknown etiology which turned out to be secondary to UTI.  He had a negative chest x-ray, negative influenza study, CBC remarkable for minimally elevated WBC of 11.3, and normal lactate.  He is a vague historian and is believed that he improved with antibiotic therapy of his UTI, but he initially reported that his fevers have been ongoing since onset approximately 3 weeks ago.  He has no other infectious signs or symptoms and no UTI signs or symptoms or urinary difficulty or abnormality.  He has no abdominal pain, back or flank pain.  He lives with his wife.  He has been working but came to the ED today because he felt too ill to continue to work.    Allergies:  Allergies   Allergen Reactions     Lisinopril Cough       Problem List:    Patient Active Problem List    Diagnosis  Date Noted     Unstable angina (H) 2018     Priority: Medium     NSTEMI (non-ST elevated myocardial infarction) (H) 2018     Priority: Medium     LBBB (left bundle branch block) 2018     Priority: Medium     Gastroesophageal reflux disease without esophagitis 2015     Priority: Medium     Hyperlipidemia LDL goal <100 10/16/2014     Priority: Medium     Benign essential hypertension, goal <140/90 2014     Priority: Medium     Type 2 diabetes mellitus without complication, without long-term current use of insulin (H) 2013     Priority: Medium     A1C      6.5   10/12/2016  A1C      7.5   2016  A1C      7.3   2015  A1C      7.3   2015  A1C      6.6   2014                Past Medical History:    Past Medical History:   Diagnosis Date     DM type 2 (diabetes mellitus, type 2) (H)      HTN (hypertension), benign      Hyperlipidemia        Past Surgical History:    Past Surgical History:   Procedure Laterality Date     HERNIORRHAPHY INGUINAL BILATERAL         Family History:    Family History   Problem Relation Age of Onset     Diabetes Mother      Hypertension Mother      Cardiovascular Father 81        CHF     Heart Disease Sister 50     Heart Disease Brother 50       Social History:  Marital Status:   [2]  Social History     Tobacco Use     Smoking status: Former Smoker     Packs/day: 1.00     Years: 30.00     Pack years: 30.00     Last attempt to quit: 2000     Years since quittin.2     Smokeless tobacco: Never Used   Substance Use Topics     Alcohol use: No     Drug use: No        Medications:    aspirin 81 MG tablet  atorvastatin (LIPITOR) 40 MG tablet  blood glucose monitoring (NO BRAND SPECIFIED) test strip  Blood Glucose Monitoring Suppl W/DEVICE KIT  carvedilol (COREG) 6.25 MG tablet  glipiZIDE (GLUCOTROL XL) 10 MG 24 hr tablet  losartan (COZAAR) 25 MG tablet  Melatonin 10 MG TABS  metFORMIN (GLUCOPHAGE) 1000 MG tablet  MULTIPLE VITAMIN  PO  nitroGLYcerin (NITROSTAT) 0.4 MG sublingual tablet  Omega-3 Fatty Acids (OMEGA-3 FISH OIL PO)  omeprazole (PRILOSEC OTC) 20 MG tablet  ONE TOUCH LANCETS MISC        Review of Systems  As mentioned above in the history present illness.  All other systems were reviewed and are negative.      Physical Exam   BP: 103/55  Pulse: 89  Heart Rate: 101  Temp: 101.4  F (38.6  C)  Resp: 22  Weight: 65.3 kg (144 lb)  SpO2: 95 %      Physical Exam  Vitals signs and nursing note reviewed.   Constitutional:       General: He is not in acute distress.     Appearance: Normal appearance. He is well-developed. He is not ill-appearing or diaphoretic.   HENT:      Head: Normocephalic and atraumatic.      Right Ear: External ear normal.      Left Ear: External ear normal.      Nose: Nose normal.      Mouth/Throat:      Mouth: Mucous membranes are dry.   Eyes:      General: No scleral icterus.     Extraocular Movements: Extraocular movements intact.      Comments: Conjunctivea injected.   Neck:      Musculoskeletal: Normal range of motion and neck supple.      Trachea: No tracheal deviation.   Cardiovascular:      Rate and Rhythm: Normal rate and regular rhythm.      Heart sounds: Normal heart sounds. No murmur. No friction rub. No gallop.    Pulmonary:      Effort: Pulmonary effort is normal. Tachypnea present. No accessory muscle usage, prolonged expiration, respiratory distress or retractions.      Breath sounds: No stridor or decreased air movement. Rales ( Faint crackles in the left base.) present. No decreased breath sounds, wheezing or rhonchi.   Abdominal:      General: There is no distension.      Palpations: Abdomen is soft.      Tenderness: There is no abdominal tenderness. There is no right CVA tenderness or left CVA tenderness.   Musculoskeletal: Normal range of motion.         General: No swelling or tenderness.      Right lower leg: No edema.      Left lower leg: No edema.   Skin:     General: Skin is warm and dry.       Coloration: Skin is not jaundiced or pale.      Findings: No bruising, erythema or rash.   Neurological:      General: No focal deficit present.      Mental Status: He is alert and oriented to person, place, and time.      Coordination: Coordination normal.   Psychiatric:         Mood and Affect: Mood normal.         Behavior: Behavior normal.         ED Course        Procedures                 Results for orders placed or performed during the hospital encounter of 04/07/20 (from the past 24 hour(s))   CBC with platelets differential   Result Value Ref Range    WBC 8.9 4.0 - 11.0 10e9/L    RBC Count 4.10 (L) 4.4 - 5.9 10e12/L    Hemoglobin 12.4 (L) 13.3 - 17.7 g/dL    Hematocrit 36.4 (L) 40.0 - 53.0 %    MCV 89 78 - 100 fl    MCH 30.2 26.5 - 33.0 pg    MCHC 34.1 31.5 - 36.5 g/dL    RDW 12.3 10.0 - 15.0 %    Platelet Count 124 (L) 150 - 450 10e9/L    Diff Method Automated Method     % Neutrophils 80.5 %    % Lymphocytes 9.1 %    % Monocytes 5.7 %    % Eosinophils 4.4 %    % Basophils 0.1 %    % Immature Granulocytes 0.2 %    Nucleated RBCs 0 0 /100    Absolute Neutrophil 7.2 1.6 - 8.3 10e9/L    Absolute Lymphocytes 0.8 0.8 - 5.3 10e9/L    Absolute Monocytes 0.5 0.0 - 1.3 10e9/L    Absolute Eosinophils 0.4 0.0 - 0.7 10e9/L    Absolute Basophils 0.0 0.0 - 0.2 10e9/L    Abs Immature Granulocytes 0.0 0 - 0.4 10e9/L    Absolute Nucleated RBC 0.0    Comprehensive metabolic panel   Result Value Ref Range    Sodium 135 133 - 144 mmol/L    Potassium 3.9 3.4 - 5.3 mmol/L    Chloride 103 94 - 109 mmol/L    Carbon Dioxide 27 20 - 32 mmol/L    Anion Gap 5 3 - 14 mmol/L    Glucose 111 (H) 70 - 99 mg/dL    Urea Nitrogen 32 (H) 7 - 30 mg/dL    Creatinine 2.36 (H) 0.66 - 1.25 mg/dL    GFR Estimate 27 (L) >60 mL/min/[1.73_m2]    GFR Estimate If Black 32 (L) >60 mL/min/[1.73_m2]    Calcium 9.0 8.5 - 10.1 mg/dL    Bilirubin Total 1.3 0.2 - 1.3 mg/dL    Albumin 3.1 (L) 3.4 - 5.0 g/dL    Protein Total 6.7 (L) 6.8 - 8.8 g/dL    Alkaline  Phosphatase 66 40 - 150 U/L    ALT 40 0 - 70 U/L    AST 26 0 - 45 U/L   Magnesium   Result Value Ref Range    Magnesium 1.1 (L) 1.6 - 2.3 mg/dL   Lactic acid whole blood   Result Value Ref Range    Lactic Acid 1.6 0.7 - 2.0 mmol/L   Blood gas venous   Result Value Ref Range    Ph Venous 7.41 7.32 - 7.43 pH    PCO2 Venous 41 40 - 50 mm Hg    PO2 Venous 20 (L) 25 - 47 mm Hg    Bicarbonate Venous 26 21 - 28 mmol/L    Base Excess Venous 1.0 mmol/L    FIO2 room air    Troponin I   Result Value Ref Range    Troponin I ES <0.015 0.000 - 0.045 ug/L   Blood culture    Specimen: Blood   Result Value Ref Range    Specimen Description Blood     Special Requests Right Arm     Culture Micro No growth after 1 hour    XR Chest Port 1 View    Narrative    CHEST PORTABLE ONE VIEW   4/7/2020 5:34 PM     HISTORY: Dyspnea/SOB.    COMPARISON: 9/14/2018.      Impression    IMPRESSION: No acute cardiopulmonary disease.    CHARI RIOS MD   Blood culture    Specimen: Blood   Result Value Ref Range    Specimen Description Blood     Special Requests Right Arm     Culture Micro PENDING    UA reflex to Microscopic and Culture    Specimen: Unspecified Urine   Result Value Ref Range    Color Urine Yellow     Appearance Urine Slightly Cloudy     Glucose Urine Negative NEG^Negative mg/dL    Bilirubin Urine Negative NEG^Negative    Ketones Urine Negative NEG^Negative mg/dL    Specific Gravity Urine 1.013 1.003 - 1.035    Blood Urine Negative NEG^Negative    pH Urine 5.0 5.0 - 7.0 pH    Protein Albumin Urine Negative NEG^Negative mg/dL    Urobilinogen mg/dL 0.0 0.0 - 2.0 mg/dL    Nitrite Urine Negative NEG^Negative    Leukocyte Esterase Urine Negative NEG^Negative    Source Unspecified Urine     RBC Urine 1 0 - 2 /HPF    WBC Urine 5 0 - 5 /HPF    Mucous Urine Present (A) NEG^Negative /LPF   Magnesium   Result Value Ref Range    Magnesium 1.6 1.6 - 2.3 mg/dL   Basic metabolic panel   Result Value Ref Range    Sodium 137 133 - 144 mmol/L    Potassium  3.6 3.4 - 5.3 mmol/L    Chloride 108 94 - 109 mmol/L    Carbon Dioxide 22 20 - 32 mmol/L    Anion Gap 7 3 - 14 mmol/L    Glucose 98 70 - 99 mg/dL    Urea Nitrogen 31 (H) 7 - 30 mg/dL    Creatinine 2.18 (H) 0.66 - 1.25 mg/dL    GFR Estimate 30 (L) >60 mL/min/[1.73_m2]    GFR Estimate If Black 35 (L) >60 mL/min/[1.73_m2]    Calcium 8.0 (L) 8.5 - 10.1 mg/dL     I independently reviewed the X-rays: Agree with the Radiologist's interpretation.    Previous Records Reviewed:  Creatinine   Date Value Ref Range Status   04/07/2020 2.18 (H) 0.66 - 1.25 mg/dL Final   04/07/2020 2.36 (H) 0.66 - 1.25 mg/dL Final   01/06/2020 0.82 0.66 - 1.25 mg/dL Final         Medications   sodium chloride (PF) 0.9% PF flush 3 mL (has no administration in time range)   sodium chloride (PF) 0.9% PF flush 3 mL (has no administration in time range)   lactated ringers infusion (has no administration in time range)   0.9% sodium chloride BOLUS (0 mLs Intravenous Stopped 4/7/20 1853)   acetaminophen (TYLENOL) tablet 1,000 mg (1,000 mg Oral Given 4/7/20 1746)   lactated ringers BOLUS 1,000 mL (0 mLs Intravenous Stopped 4/7/20 2116)   magnesium sulfate 2 g in water intermittent infusion (0 g Intravenous Stopped 4/7/20 2117)     Feeling much improved with IV fluid bolus. Vital signs stable, normal, O2 saturation in the upper 90s on room air.    I reviewed the case and consulted with hospitalist on-call, Dr. Buenrostro.  We discussed his renal insufficiency and its treatment in light of clinical dehydration and probable Covid-19 infection need to avoid overhydration.  He is able to take p.o. fluids and will continue to push fluids at home.  He feels markedly improved and is comfortable discharge home. Repeat creatinine 2.18, improved. Postvoid residual was only 10 mL. Urine appears normal to slightly dilute with good volume of urine output with IV fluid bolus.    Assessments & Plan (with Medical Decision Making)   65-year-old male with diabetes mellitus type  2, heart disease/CAD/MI and hypertension with approximately 3 weeks of intermittent fevers with development of productive cough yesterday and fatigue and malaise today.  He presented appearing ill with fever and mild tachypnea. Suspect Covid-19. He appeared clinically dehydrated, confirmed by elevated BUN and creatinine.  Last creatinine prior to today was normal, 0.95 on 3/15/2020.  Today creatinine was 2.36, but decreased to 2.18 after a 30 mL/KG fluid bolus.  Do not feel that continued aggressive fluid administration is warranted in the setting of Covid-19 illness and he will continue to push fluids at home.  Good urine output in the ED.  Tachypnea resolved and he remained without hypoxia or respiratory distress after IV fluid bolus.  O2 saturations in the mid to high 90s.  Single view chest x-ray was negative.  Normal WBC and lactate.  Hypomagnesemia treated with magnesium sulfate 2 g IV with normalization of magnesium level.  History of recent UTI (3/15/2020, treated effectively with 10-day course of Bactrim DS) as documented in HPI, with negative UA today.  Urine culture and blood cultures pending.  No other apparent infectious symptoms or infectious source.  Suspect Covid-19, but currently does not meet clinically accepted criteria for admission.  He felt much improved and is comfortable with discharge home with home quarantine, precautions for his wife who will assist and monitor him. He was provided instructions for supportive care and will return as needed for any worsened condition or worsening symptoms, or new problems or concerns.  I made a referral for the Covid-19 [facility] program and he expressed understanding of this program and he will have his wife assist him with enrollment in the program tomorrow morning.  I informed him of his acute renal injury and need to continue to push fluids and monitor his urine output.  He expressed understanding of this and understanding of my recommendation of  holding Metformin/Glucophage and recheck of his creatinine/lab work and follow-up within 2-3 days in primary care clinic or here later this week.  He was provided a work note for up to 2-week work absence.      I have reviewed the nursing notes.    I have reviewed the findings, diagnosis, plan and need for follow up with the patient.    Discharge Medication List as of 4/7/2020  9:23 PM   Tylenol 650 mg - 1000 mg p.o. every 4-6 hours as needed fever.  Not to exceed 4 doses per day.       Final diagnoses:   Influenza-like illness - Fever and cough, possible Covid-19   Dehydration   Prerenal acute renal failure (H) - Due to dehydration   Hypomagnesemia       4/7/2020   [facility] EMERGENCY DEPARTMENT     Merlin Gaston MD  04/08/20 0000

## 2020-04-07 NOTE — ED AVS SNAPSHOT
[facility] Emergency Department  [address] [facility] [address]  Phone:  [phone]  Fax:  [phone]                                    Js Jones   MRN: [id number]    Department:  [facility] Emergency Department   Date of Visit:  4/7/2020           After Visit Summary Signature Page    I have received my discharge instructions, and my questions have been answered. I have discussed any challenges I see with this plan with the nurse or doctor.    ..........................................................................................................................................  Patient/Patient Representative Signature      ..........................................................................................................................................  Patient Representative Print Name and Relationship to Patient    ..................................................               ................................................  Date                                   Time    ..........................................................................................................................................  Reviewed by Signature/Title    ...................................................              ..............................................  Date                                               Time          22EPIC Rev 08/18

## 2020-04-07 NOTE — LETTER
April 7, 2020      To Whom It May Concern:      Js ESTHER Robert was seen in our Emergency Department today, Tuesday 04/07/20.  I expect his condition to improve over the next several days to 2 weeks.  He will may need to miss work for an extended period of time, up to 2 weeks.  He may return to work/school when improved.    Sincerely,        KIRAN Gaston MD

## 2020-04-08 NOTE — DISCHARGE INSTRUCTIONS
Hold metformin and have laboratory evaluation repeated in urgent care or the emergency department in 2 days.  Monitor your blood sugars carefully and report these when he follow-up.    Return immediately if you are worsening in any way.    Please use the information at the end of this document to sign up for  [employer] ([employer]) where you can get your results and a message about those results sent to you through the SOAK ([employer] tool[employer]) application. If you do not have [employer] we will call you with your results but it may take longer.    Regardless of if you have been tested or not:  Patient who have symptoms (cough, fever, or shortness of breath), need to isolate for 7 days from when symptoms started AND 72 hours after fever resolves (without fever reducing medications) AND improvement of respiratory symptoms (whichever is longer).    Isolate yourself at home (in own room/own bathroom if possible)  Do Not allow any visitors  Do Not go to work or school  Do Not go to [employer],  centers, shopping, or other public places.  Do Not shake hands.  Avoid close and intimate contact with others (hugging, kissing).  Follow CDC recommendations for household cleaning of frequently touched services.     After the initial 7 days, continue to isolate yourself from household members as much as possible. To continue decrease the risk of community spread and exposure, you and any members of your household should limit activities in public for 14 days after starting home isolation.     You can reference the following CDC link for helpful home isolation/care tips:  https://www.cdc.gov/coronavirus/2019-ncov/downloads/10Things.pdf    Protect Others:  Cover Your Mouth and Nose with a mask, disposable tissue or wash cloth to avoid spreading germs to others.  Wash your hands and face frequently with soap and water    Call Back If: Breathing difficulty develops or you become worse.    For more information about COVID19 and options for  caring for yourself at home, please visit the CDC website at https://www.cdc.gov/coronavirus/2019-ncov/about/steps-when-sick.html  For more options for care at [facility], please visit our website at [email or web address]

## 2020-04-08 NOTE — ED PROVIDER NOTES
4:34 PM - I contacted him by telephone.  He sounds much improved and reports that he feels much improved, significantly improved after intervention in the emergency department yesterday and continued since then.  He is pushing fluids, has good urinary output and denies shortness of breath or respiratory difficulty.  He is holding metformin as directed.  Reiterated my recommendation that he recheck here or with his primary care clinic tomorrow afternoon or the following morning for repeat laboratory evaluation, particularly his creatinine.  At that time we can reassess his blood sugars and determine if he can restart metformin.  His questions were answered and he was instructed to return immediately should he develop any respiratory difficulty or worsening condition, or he can call here at any time.  He has not yet followed up with the [facility] referral that I placed for him yesterday, and I recommended he do so.      Merlin Gaston MD  04/08/20 2141

## 2020-04-09 ENCOUNTER — TELEPHONE (OUTPATIENT)
Dept: FAMILY MEDICINE | Facility: CLINIC | Age: 65
End: 2020-04-09

## 2020-04-09 NOTE — TELEPHONE ENCOUNTER
"  ED for acute condition Discharge Protocol    \"Hi, my name is Jamaica Rivas RN, a registered nurse, and I am calling from [facility].  I am calling to follow up and see how things are going for you after your recent emergency visit.\"    Tell me how you are doing now that you are home?\" States feeling better today, fever free for 24 hrs. Pushing fluids, comparable urine output. Increasing energy, strength. Reports dizziness yesterday, none today. Holding metformin as instructed. BG stable. Has not checked home BP.     Discharge Instructions    \"Let's review your discharge instructions.  What is/are the follow-up recommendations?  Pt. Response: RN discussed with dr. Hyman (in PCP absence). Will schedule phone visit tomorrow AM with Dr. Hyman who will determine further lab F/U.   \"Has an appointment with your primary care provider been scheduled?\"  Scheduled phone visit F/U with Dr. Hyman tomorrow AM    Medications    \"Tell me what changed about your medicines when you discharged?\"    Holding metformin    \"What questions do you have about your medications?\"   None        Call Summary    \"What questions or concerns do you have about your recent visit and your follow-up care?\"     none   Advised strict quarantine, not to allow visitors. Follow strict handwashing, diligent surface disinfection, mask if cough. Isolate self from spouse in home. Further recommendations per MD F/U tomorrow.    \"If you have questions or things don't continue to improve, we encourage you contact us through the main clinic number (give number).  Even if the clinic is not open, triage nurses are available 24/7 to help you.     We would like you to know that our clinic has extended hours (provide information).  We also have urgent care (provide details on closest location and hours/contact info)\"    \"Thank you for your time and take care!\"  NORMAN Rivas RN                "

## 2020-04-10 ENCOUNTER — TELEPHONE (OUTPATIENT)
Dept: FAMILY MEDICINE | Facility: CLINIC | Age: 65
End: 2020-04-10

## 2020-04-10 ENCOUNTER — VIRTUAL VISIT (OUTPATIENT)
Dept: FAMILY MEDICINE | Facility: CLINIC | Age: 65
End: 2020-04-10
Payer: COMMERCIAL

## 2020-04-10 DIAGNOSIS — N28.9 ACUTE RENAL IMPAIRMENT: Primary | ICD-10-CM

## 2020-04-10 DIAGNOSIS — J11.1 INFLUENZA-LIKE ILLNESS: ICD-10-CM

## 2020-04-10 PROCEDURE — 99213 OFFICE O/P EST LOW 20 MIN: CPT | Mod: 95 | Performed by: FAMILY MEDICINE

## 2020-04-10 NOTE — Clinical Note
Cong Berumen,    We can do nurse only appointment next week for repeat blood pressure check and labs.    Dr Hyman

## 2020-04-10 NOTE — PROGRESS NOTES
"Subjective     Js Jones is a 65 year old male who is being evaluated via a billable telephone visit.      The patient has been notified of following:     \"This telephone visit will be conducted via a call between you and your physician/provider. We have found that certain health care needs can be provided without the need for a physical exam.  This service lets us provide the care you need with a short phone conversation.  If a prescription is necessary we can send it directly to your pharmacy.  If lab work is needed we can place an order for that and you can then stop by our lab to have the test done at a later time.    Telephone visits are billed at different rates depending on your insurance coverage. During this emergency period, for some insurers they may be billed the same as an in-person visit.  Please reach out to your insurance provider with any questions.    If during the course of the call the physician/provider feels a telephone visit is not appropriate, you will not be charged for this service.\"    Patient has given verbal consent for Telephone visit?  Yes    How would you like to obtain your AVS? Mail a copy    Js Jones complains of   Chief Complaint   Patient presents with     ER f/u       ALLERGIES  Bee venom and Lisinopril    ED/UC Followup:    Facility:  [facility]   Date of visit: 04/07/20  Reason for visit: Fever, Cough   Current Status: Patient states that he is feeling better. Says he does not feel as sick as he was. Has not had a fever for 2 days(has not checked it, but says he does not feel like he has one) Patient also states that he has regained some of his energy            Patient Active Problem List   Diagnosis     Type 2 diabetes mellitus without complication, without long-term current use of insulin (H)     Benign essential hypertension, goal <140/90     Hyperlipidemia LDL goal <100     Gastroesophageal reflux disease without esophagitis     Unstable angina (H) "     NSTEMI (non-ST elevated myocardial infarction) (H)     LBBB (left bundle branch block)     Past Surgical History:   Procedure Laterality Date     HERNIORRHAPHY INGUINAL BILATERAL         Social History     Tobacco Use     Smoking status: Former Smoker     Packs/day: 1.00     Years: 30.00     Pack years: 30.00     Last attempt to quit: 2000     Years since quittin.2     Smokeless tobacco: Never Used   Substance Use Topics     Alcohol use: No     Family History   Problem Relation Age of Onset     Diabetes Mother      Hypertension Mother      Cardiovascular Father 81        CHF     Heart Disease Sister 50     Heart Disease Brother 50         Current Outpatient Medications   Medication Sig Dispense Refill     aspirin 81 MG tablet Take 1 tablet (81 mg) by mouth daily 90 tablet 3     atorvastatin (LIPITOR) 40 MG tablet Take 1 tablet (40 mg) by mouth daily 90 tablet 3     blood glucose monitoring (NO BRAND SPECIFIED) test strip Use to test blood sugar 3 times daily or as directed. Box of 100 each. 3 Box 3     Blood Glucose Monitoring Suppl W/DEVICE KIT 1 each daily 1 kit 0     carvedilol (COREG) 6.25 MG tablet Take 1 tablet (6.25 mg) by mouth 2 times daily (with meals) 180 tablet 3     glipiZIDE (GLUCOTROL XL) 10 MG 24 hr tablet Take 1 tablet (10 mg) by mouth daily 90 tablet 1     losartan (COZAAR) 25 MG tablet Take 1 tablet (25 mg) by mouth daily 90 tablet 1     Melatonin 10 MG TABS Take 10 mg by mouth nightly as needed for sleep       MULTIPLE VITAMIN PO Take 1 tablet by mouth       nitroGLYcerin (NITROSTAT) 0.4 MG sublingual tablet For chest pain place 1 tablet under the tongue every 5 minutes for 3 doses. If symptoms persist 5 minutes after 1st dose call 911. 50 tablet 11     Omega-3 Fatty Acids (OMEGA-3 FISH OIL PO) Take 1,200 mg by mouth 2 times daily (with meals)       omeprazole (PRILOSEC OTC) 20 MG tablet Take 1 tablet (20 mg) by mouth daily 90 tablet 1     ONE TOUCH LANCETS MISC 1 lancet 3 times  daily 200 each 5     metFORMIN (GLUCOPHAGE) 1000 MG tablet Take 1,000 mg (1 tablet) daily with breakfast, 500 mg( 1/2 tablet) with lunch, and 1,000 mg (1 tablet) with dinner (Patient not taking: Reported on 4/10/2020) 225 tablet 1     Allergies   Allergen Reactions     Bee Venom Unknown     Lisinopril Cough     Recent Labs   Lab Test 04/07/20  1958 04/07/20  1709 01/06/20  0828 06/07/19  0829  09/17/18  0550  09/15/18  0546 09/14/18  1818  03/09/18  0815  11/04/15  1630   A1C  --   --  8.1* 6.9*  --   --   --  6.8*  --    < > 6.9*   < > 7.3*   LDL  --   --  70  --   --  54  --  79  --   --  72   < >  --    HDL  --   --  43  --   --  35*  --  31*  --   --  38*   < >  --    TRIG  --   --  170*  --   --  208*  --  161*  --   --  73   < >  --    ALT  --  40 44  --   --   --   --   --  39  --   --    < >  --    CR 2.18* 2.36* 0.82  --    < > 0.90   < >  --  0.92  --   --    < >  --    GFRESTIMATED 30* 27* >90  --    < > 84   < >  --  82  --   --    < >  --    GFRESTBLACK 35* 32* >90  --    < > >90   < >  --  >90  --   --    < >  --    POTASSIUM 3.6 3.9 4.1  --    < > 3.9   < >  --  3.4  --   --    < >  --    TSH  --   --   --   --   --   --   --   --   --   --  1.02  --  1.47    < > = values in this interval not displayed.      BP Readings from Last 3 Encounters:   04/07/20 106/51   01/06/20 136/82   10/24/19 (!) 140/92    Wt Readings from Last 3 Encounters:   04/07/20 65.3 kg (144 lb)   01/06/20 65.3 kg (144 lb)   10/24/19 63.5 kg (140 lb)                    Reviewed and updated as needed this visit by Provider         Review of Systems   ROS COMP: Constitutional, HEENT, cardiovascular, pulmonary, gi and gu systems are negative, except as otherwise noted.       Objective   Reported vitals:  There were no vitals taken for this visit.   alert and no distress  Psych: Alert and oriented times 3; coherent speech, normal   rate and volume, able to articulate logical thoughts, able   to abstract reason, no tangential thoughts,  no hallucinations   or delusions  His affect is normal          Assessment/Plan:  1. Acute renal impairment  --Patient was in ER recently with influenza-like symptoms, diagnosed with acute renal impairment, feeling much better and denies any fever, chills, urinary difficulty or significant cough.  Appetite is improving and feels like getting back to normal.  Patient has completed Bactrim which was prescribed for UTI on March 15, 2020.  Patient is currently staying home as recommended by ER physician considering influenza-like symptoms and current COVID-19 pandemic.  Suggested to continue pushing fluids and holding metformin for now.  We will do nurse only appointment for repeat blood pressure check and labs next week.  Patient understood and in agreement with above plan.  All questions were answered      2. Influenza-like illness  --As above      Phone call duration:  12 minutes      Fortino Hyman MD

## 2020-04-10 NOTE — TELEPHONE ENCOUNTER
"Fortino Hyman MD Carlson, Lori J, RN Hi Lori,     We can do nurse only appointment next week for repeat blood pressure check and labs.     Dr Hyman      Spoke with pt, scheduled BP and lab appt 04-14-20,  clinic location.   Had pt check home BP this AM- 144/82, HR 69, fasting AM . States drinking mostly water, some milk. No soda or inder. Had ham, potatoes gr beans, 4 cookies for dinner last rita. Advised to limit sweets as holding metformin.  [facility] was advised by Dr. Gaston, ED provider, to return to ED for blood cultures. [facility] was instructed to call \"hosp\" to set up lab appt. Do not see any mention of this in notes.  BMP pended, any other labs?  NORMAN Rivas, SIMÓN      "

## 2020-04-13 LAB
BACTERIA SPEC CULT: NO GROWTH
BACTERIA SPEC CULT: NO GROWTH
Lab: NORMAL
Lab: NORMAL
SPECIMEN SOURCE: NORMAL
SPECIMEN SOURCE: NORMAL

## 2020-04-14 ENCOUNTER — ALLIED HEALTH/NURSE VISIT (OUTPATIENT)
Dept: FAMILY MEDICINE | Facility: CLINIC | Age: 65
End: 2020-04-14
Payer: COMMERCIAL

## 2020-04-14 VITALS — SYSTOLIC BLOOD PRESSURE: 130 MMHG | DIASTOLIC BLOOD PRESSURE: 78 MMHG

## 2020-04-14 DIAGNOSIS — I10 BENIGN ESSENTIAL HYPERTENSION: Primary | ICD-10-CM

## 2020-04-14 DIAGNOSIS — N28.9 ACUTE RENAL IMPAIRMENT: ICD-10-CM

## 2020-04-14 LAB
ANION GAP SERPL CALCULATED.3IONS-SCNC: 3 MMOL/L (ref 3–14)
BUN SERPL-MCNC: 20 MG/DL (ref 7–30)
CALCIUM SERPL-MCNC: 9.4 MG/DL (ref 8.5–10.1)
CHLORIDE SERPL-SCNC: 104 MMOL/L (ref 94–109)
CO2 SERPL-SCNC: 28 MMOL/L (ref 20–32)
CREAT SERPL-MCNC: 0.9 MG/DL (ref 0.66–1.25)
GFR SERPL CREATININE-BSD FRML MDRD: 88 ML/MIN/{1.73_M2}
GLUCOSE SERPL-MCNC: 244 MG/DL (ref 70–99)
POTASSIUM SERPL-SCNC: 4.2 MMOL/L (ref 3.4–5.3)
SODIUM SERPL-SCNC: 135 MMOL/L (ref 133–144)

## 2020-04-14 PROCEDURE — 99207 ZZC NO CHARGE NURSE ONLY: CPT

## 2020-04-14 PROCEDURE — 80048 BASIC METABOLIC PNL TOTAL CA: CPT | Performed by: FAMILY MEDICINE

## 2020-04-14 PROCEDURE — 36415 COLL VENOUS BLD VENIPUNCTURE: CPT | Performed by: FAMILY MEDICINE

## 2020-04-14 NOTE — PROGRESS NOTES
Js Jones is a 65 year old year old patient who comes in today for a Blood Pressure check because of ongoing blood pressure monitoring.    Patient is taking medication as prescribed  Patient is tolerating medications well.  Patient is monitoring Blood Pressure at home.    Current complaints: none  Pat AL MA

## 2020-06-05 ENCOUNTER — TRANSFERRED RECORDS (OUTPATIENT)
Dept: HEALTH INFORMATION MANAGEMENT | Facility: CLINIC | Age: 65
End: 2020-06-05

## 2020-06-05 LAB — RETINOPATHY: NEGATIVE

## 2020-07-15 DIAGNOSIS — Z11.59 SCREENING FOR VIRAL DISEASE: ICD-10-CM

## 2020-07-20 DIAGNOSIS — E11.9 TYPE 2 DIABETES MELLITUS WITHOUT COMPLICATION, WITHOUT LONG-TERM CURRENT USE OF INSULIN (H): Chronic | ICD-10-CM

## 2020-07-20 RX ORDER — GLIPIZIDE 10 MG/1
TABLET, FILM COATED, EXTENDED RELEASE ORAL
Qty: 90 TABLET | Refills: 0 | Status: SHIPPED | OUTPATIENT
Start: 2020-07-20 | End: 2020-10-28

## 2020-07-20 NOTE — TELEPHONE ENCOUNTER
Routing refill request to provider for review/approval because:  Labs not current  Last OV that addressed diabetes was 1/6/2020    Lab Results   Component Value Date    A1C 8.1 01/06/2020    A1C 6.9 06/07/2019    A1C 6.8 09/15/2018    A1C 7.2 06/15/2018    A1C 6.9 03/09/2018     Jennifer QUINN RN, BSN

## 2020-10-13 ENCOUNTER — VIRTUAL VISIT (OUTPATIENT)
Dept: URGENT CARE | Facility: CLINIC | Age: 65
End: 2020-10-13
Payer: COMMERCIAL

## 2020-10-13 ENCOUNTER — NURSE TRIAGE (OUTPATIENT)
Dept: NURSING | Facility: CLINIC | Age: 65
End: 2020-10-13

## 2020-10-13 DIAGNOSIS — Z20.822 EXPOSURE TO COVID-19 VIRUS: Primary | ICD-10-CM

## 2020-10-13 PROCEDURE — 99213 OFFICE O/P EST LOW 20 MIN: CPT | Mod: 95 | Performed by: EMERGENCY MEDICINE

## 2020-10-13 NOTE — TELEPHONE ENCOUNTER
"Pt calls to report covid exposure 7 days ago.  \"Was next to a person who tested positive later.\"  \"Exposure occurred 7 days ago (Oct 6th) -> \"was next to the person in an enclosed vehicle.\"  Person was not apparently sick at the time -> No coughing recalled.    Pt himself remains asymptomatic.  However needs covid testing due to employer's requirement following exposure.  Pt agrees therefore to schedule telephone provider to request order for covid PCR swab testing.  Warm transferred to a  for an appointment now.    [facility] Nurse Advisor     Reason for Disposition    [1] COVID-19 EXPOSURE (Close Contact) within last 14 days AND [2] needs COVID-19 lab test to return to work AND [3] NO symptoms    Additional Information    Negative: COVID-19 has been diagnosed by a healthcare provider (HCP)    Negative: COVID-19 lab test positive    Negative: [1] Symptoms of COVID-19 (e.g., cough, fever, SOB, or others) AND [2] lives in an area with community spread    Negative: [1] Symptoms of COVID-19 (e.g., cough, fever, SOB, or others) AND [2] within 14 days of EXPOSURE (close contact) with diagnosed or suspected COVID-19 patient    Negative: [1] Symptoms of COVID-19 (e.g., cough, fever, SOB, or others) AND [2] within 14 days of travel from high-risk area for COVID-19 community spread (identified by CDC)    Negative: [1] Difficulty breathing (shortness of breath) occurs AND [2] onset > 14 days after COVID-19 EXPOSURE (Close Contact) AND [3] no community spread where patient lives    Negative: [1] Dry cough occurs AND [2] onset > 14 days after COVID-19 EXPOSURE AND [3] no community spread where patient lives    Negative: [1] Wet cough (i.e., white-yellow, yellow, green, or estella colored sputum) AND [2] onset > 14 days after COVID-19 EXPOSURE AND [3] no community spread where patient lives    Negative: [1] Common cold symptoms AND [2] onset > 14 days after COVID-19 EXPOSURE AND [3] no community spread where " patient lives    [facility] Specific Disposition  - [facility] Specific Patient Instructions  COVID 19 Nurse Triage Plan/Patient Instructions    Please be aware that novel coronavirus (COVID-19) may be circulating in the community. If you develop symptoms such as fever, cough, or SOB or if you have concerns about the presence of another infection including coronavirus (COVID-19), please contact your health care provider or visit [email or web address].       Virtual Visit with provider recommended. Reference Visit Selection Guide.    Thank you for taking steps to prevent the spread of this virus.  Limit your contact with others.  Wear a simple mask to cover your cough.  Wash your hands well and often.    Resources  [facility]: About COVID-19: [email or web address]  CDC: What to Do If You're Sick: www.cdc.gov/coronavirus/2019-ncov/about/steps-when-sick.html  CDC: Ending Home Isolation: www.cdc.gov/coronavirus/2019-ncov/hcp/disposition-in-home-patients.html   CDC: Caring for Someone: www.cdc.gov/coronavirus/2019-ncov/if-you-are-sick/care-for-someone.html   [facility]: Interim Guidance for Hospital Discharge to Home: www.health.[facility].us/diseases/coronavirus/hcp/hospdischarge.pdf  [facility] clinical trials (COVID-19 research studies): clinicalaffairs.[email or web address]-clinical-trials   Below are the COVID-19 hotlines at the [facility] ([facility]). Interpreters are available.   For health questions: Call 111-853-9859 or 1-848.808.3431 (7 a.m. to 7 p.m.)  For questions about schools and childcare: Call 469-994-2208 or 1-268.182.7471 (7 a.m. to 7 p.m.)    Protocols used: CORONAVIRUS (COVID-19) EXPOSURE-A- 8.4.20

## 2020-10-13 NOTE — PROGRESS NOTES
HPI: Patient is a 65-year-old male who was informed by his employer that he was exposed to a COVID positive person about 1 week ago.  He has no cough, shortness of breath, or any other cold symptoms.      ROS: See HPI otherwise normal.    Allergies   Allergen Reactions     Bee Venom Unknown     Lisinopril Cough      Current Outpatient Medications   Medication Sig Dispense Refill     aspirin 81 MG tablet Take 1 tablet (81 mg) by mouth daily 90 tablet 3     atorvastatin (LIPITOR) 40 MG tablet Take 1 tablet (40 mg) by mouth daily 90 tablet 3     blood glucose monitoring (NO BRAND SPECIFIED) test strip Use to test blood sugar 3 times daily or as directed. Box of 100 each. 3 Box 3     Blood Glucose Monitoring Suppl W/DEVICE KIT 1 each daily 1 kit 0     carvedilol (COREG) 6.25 MG tablet Take 1 tablet (6.25 mg) by mouth 2 times daily (with meals) 180 tablet 3     glipiZIDE (GLUCOTROL XL) 10 MG 24 hr tablet Take 1 tablet by mouth once daily 90 tablet 0     losartan (COZAAR) 25 MG tablet Take 1 tablet (25 mg) by mouth daily 90 tablet 1     Melatonin 10 MG TABS Take 10 mg by mouth nightly as needed for sleep       metFORMIN (GLUCOPHAGE) 1000 MG tablet Take 1,000 mg (1 tablet) daily with breakfast, 500 mg( 1/2 tablet) with lunch, and 1,000 mg (1 tablet) with dinner (Patient not taking: Reported on 4/10/2020) 225 tablet 1     MULTIPLE VITAMIN PO Take 1 tablet by mouth       nitroGLYcerin (NITROSTAT) 0.4 MG sublingual tablet For chest pain place 1 tablet under the tongue every 5 minutes for 3 doses. If symptoms persist 5 minutes after 1st dose call 911. 50 tablet 11     Omega-3 Fatty Acids (OMEGA-3 FISH OIL PO) Take 1,200 mg by mouth 2 times daily (with meals)       omeprazole (PRILOSEC OTC) 20 MG tablet Take 1 tablet (20 mg) by mouth daily 90 tablet 1     ONE TOUCH LANCETS MISC 1 lancet 3 times daily 200 each 5         PE: No acute distress and nondyspneic sounding.        TREATMENT: None      ASSESSMENT: COVID exposure.   Asymptomatic.      DIAGNOSIS: COVID exposure      PLAN: COVID PCR ordered.  Testing team to follow.  Follow-up with PCP if any concerns.    Time: 10 minutes

## 2020-10-26 DIAGNOSIS — E11.9 TYPE 2 DIABETES MELLITUS WITHOUT COMPLICATION, WITHOUT LONG-TERM CURRENT USE OF INSULIN (H): Chronic | ICD-10-CM

## 2020-10-28 RX ORDER — GLIPIZIDE 10 MG/1
TABLET, FILM COATED, EXTENDED RELEASE ORAL
Qty: 90 TABLET | Refills: 0 | Status: SHIPPED | OUTPATIENT
Start: 2020-10-28 | End: 2021-02-05

## 2020-10-28 NOTE — TELEPHONE ENCOUNTER
Routing refill request to provider for review/approval because:  Labs not current:    Hemoglobin A1C   Date Value Ref Range Status   01/06/2020 8.1 (H) 0 - 5.6 % Final     Comment:     Normal <5.7% Prediabetes 5.7-6.4%  Diabetes 6.5% or higher - adopted from ADA   consensus guidelines.       Due for diabetic check.      DIRK RobledoN, RN

## 2020-11-19 NOTE — PROGRESS NOTES
"Js Jones is a 65 year old male who is being evaluated via a billable telephone visit.      The patient has been notified of following:     \"This telephone visit will be conducted via a call between you and your physician/provider. We have found that certain health care needs can be provided without the need for a physical exam.  This service lets us provide the care you need with a short phone conversation.  If a prescription is necessary we can send it directly to your pharmacy.  If lab work is needed we can place an order for that and you can then stop by our lab to have the test done at a later time.    Telephone visits are billed at different rates depending on your insurance coverage. During this emergency period, for some insurers they may be billed the same as an in-person visit.  Please reach out to your insurance provider with any questions.    If during the course of the call the physician/provider feels a telephone visit is not appropriate, you will not be charged for this service.\"    Patient has given verbal consent for Telephone visit?  Yes    What phone number would you like to be contacted at? [phone]    How would you like to obtain your AVS? Mail a copy    Phone call duration: 17 minutes    Hortencia Aguilar MD      Mr. Js Jones is  65 years old and is  followed for his history of coronary artery disease and multivessel coronary artery intervention in 2018. His wife joined the conversation.    He and his wife have had COVID for one week having tested positive at that time.  They are recovering after having primarily upper airway symptoms including transient pharyngitis. He denies any chest pain, shortness of breath or palpitations.  He had a viral illness with dehydration in April 2020 and was suspected of having COVID then as well though a COVID test is not in EPIC from April.    His past medical history is notable for hypertension, dyslipidemia, type 2 diabetes and a tobacco use " history of 30 pack years.  He has a family history of coronary artery disease (father had CABG at 60 years of age, his sister was in her 50's years of age).   He was hospitalized at [facility] during late 2018 with one week  of exertional chest pain.  He did an internet search for reasons to have chest pain and decided to seek evaluation for a possible cardiac cause of his symptoms.       He underwent  coronary angiography via the left radial artery with placement of two stents to the diagonal branch and one stent to the mid RCA.  There was mild LAD disease with a measured IFR of 0.92 indicating no physiologic obstruction.  An echocardiogram demonstrated a normal ejection fraction of 50-55% and mild MR.  His EKG had demonstrated a LBBB.  He continues on low dose aspirin, carvedilol, losartan and atorvastatin.  Mr. Jones has stopped smoking.  He completed a stress echocardiogram one year ago (late 2019) exercising just over 7 minutes without symptoms or evidence of ischemia.    He denies any angina or dyspnea or exertional intolerance.  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  He drives from [place] to [place] for work.  He leaves his home just prior to 4 AM for the 90 minute drive and arrives home after 7 PM.  He is now semi-retired and has cut back to 3 days weekly.  He likes the work and for now, his workplace still needs him.    Exam:  This is a man in no apparent distress.  Blood pressure  mmHg, pulse 67 bpm.      Assessment/Plan:  Mr. Jones has a history of coronary artery disease and he has undergone revascularization, is asymptomatic and doing well.  Given that he is recovering from a COVID infection, it is very reassuring that he has not had cardiac symptoms.  He is on appropriate risk factor intervention and he has not returned to smoking.  He will return to clinic in one year.    Lipids were rechecked in January and were at goal with an LDL of  70 mg/dl and an HDL of 43 mg/dl. His lipids are at goal.  He will call if he has any concerning symptoms in the interim.

## 2020-11-20 ENCOUNTER — VIRTUAL VISIT (OUTPATIENT)
Dept: CARDIOLOGY | Facility: CLINIC | Age: 65
End: 2020-11-20
Payer: COMMERCIAL

## 2020-11-20 DIAGNOSIS — I21.4 NSTEMI (NON-ST ELEVATED MYOCARDIAL INFARCTION) (H): ICD-10-CM

## 2020-11-20 PROCEDURE — 99214 OFFICE O/P EST MOD 30 MIN: CPT | Mod: 95 | Performed by: INTERNAL MEDICINE

## 2020-11-20 NOTE — LETTER
"11/20/2020    Kathya Iglesias, CNP  [address] [facility] [address]    RE: Js Jones       Dear Colleague,    I had the pleasure of seeing Js Jones in the [facility].    Js Jones is a 65 year old male who is being evaluated via a billable telephone visit.      The patient has been notified of following:     \"This telephone visit will be conducted via a call between you and your physician/provider. We have found that certain health care needs can be provided without the need for a physical exam.  This service lets us provide the care you need with a short phone conversation.  If a prescription is necessary we can send it directly to your pharmacy.  If lab work is needed we can place an order for that and you can then stop by our lab to have the test done at a later time.    Telephone visits are billed at different rates depending on your insurance coverage. During this emergency period, for some insurers they may be billed the same as an in-person visit.  Please reach out to your insurance provider with any questions.    If during the course of the call the physician/provider feels a telephone visit is not appropriate, you will not be charged for this service.\"    Patient has given verbal consent for Telephone visit?  Yes    What phone number would you like to be contacted at? [phone]    How would you like to obtain your AVS? Mail a copy    Phone call duration: 17 minutes    Hortencia Aguilar MD      Mr. Js Jones is  65 years old and is  followed for his history of coronary artery disease and multivessel coronary artery intervention in 2018. His wife joined the conversation.    He and his wife have had COVID for one week having tested positive at that time.  They are recovering after having primarily upper airway symptoms including transient pharyngitis. He denies any chest pain, shortness of breath or palpitations.  He had a viral illness with " dehydration in April 2020 and was suspected of having COVID then as well though a COVID test is not in EPIC from April.    His past medical history is notable for hypertension, dyslipidemia, type 2 diabetes and a tobacco use history of 30 pack years.  He has a family history of coronary artery disease (father had CABG at 60 years of age, his sister was in her 50's years of age).   He was hospitalized at [facility] during late 2018 with one week  of exertional chest pain.  He did an internet search for reasons to have chest pain and decided to seek evaluation for a possible cardiac cause of his symptoms.       He underwent  coronary angiography via the left radial artery with placement of two stents to the diagonal branch and one stent to the mid RCA.  There was mild LAD disease with a measured IFR of 0.92 indicating no physiologic obstruction.  An echocardiogram demonstrated a normal ejection fraction of 50-55% and mild MR.  His EKG had demonstrated a LBBB.  He continues on low dose aspirin, carvedilol, losartan and atorvastatin.  Mr. Jones has stopped smoking.  He completed a stress echocardiogram one year ago (late 2019) exercising just over 7 minutes without symptoms or evidence of ischemia.    He denies any angina or dyspnea or exertional intolerance.  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  He leaves his home just prior to 4 AM for the 90 minute drive and arrives home after 7 PM.  He is now semi-retired and has cut back to 3 days weekly.  He likes the work and for now, his workplace still needs him.    Exam:  This is a man in no apparent distress.  Blood pressure  mmHg, pulse 67 bpm.      Assessment/Plan:  Mr. Jones has a history of coronary artery disease and he has undergone revascularization, is asymptomatic and doing well.  Given that he is recovering from a COVID infection, it is very reassuring  that he has not had cardiac symptoms.  He is on appropriate risk factor intervention and he has not returned to smoking.  He will return to clinic in one year.    Lipids were rechecked in January and were at goal with an LDL of 70 mg/dl and an HDL of 43 mg/dl. His lipids are at goal.  He will call if he has any concerning symptoms in the interim.      Thank you for allowing me to participate in the care of your patient.    Sincerely,     Hortencia Aguilar MD     [facility]

## 2020-12-07 DIAGNOSIS — I10 BENIGN ESSENTIAL HYPERTENSION: Chronic | ICD-10-CM

## 2020-12-07 RX ORDER — LOSARTAN POTASSIUM 25 MG/1
25 TABLET ORAL DAILY
Qty: 90 TABLET | Refills: 1 | Status: SHIPPED | OUTPATIENT
Start: 2020-12-07 | End: 2021-02-22

## 2021-01-12 DIAGNOSIS — E11.9 TYPE 2 DIABETES MELLITUS WITHOUT COMPLICATION, WITHOUT LONG-TERM CURRENT USE OF INSULIN (H): Chronic | ICD-10-CM

## 2021-01-12 NOTE — TELEPHONE ENCOUNTER
"  Requested Prescriptions   Pending Prescriptions Disp Refills     metFORMIN (GLUCOPHAGE) 1000 MG tablet [Pharmacy Med Name: metFORMIN HCl 1000 MG Oral Tablet] 225 tablet 0     Sig: TAKE 1 TABLET BY MOUTH WITH BREAKFAST, 1/2 TABLET BY MOUTH WITH LUNCH, AND 1 TABLET BY MOUTH WITH DINNER       Biguanide Agents Failed - 1/12/2021  4:50 PM        Failed - Patient has documented A1c within the specified period of time.     If HgbA1C is 8 or greater, it needs to be on file within the past 3 months.  If less than 8, must be on file within the past 6 months.     Recent Labs   Lab Test 01/06/20  0828   A1C 8.1*             Failed - Recent (6 mo) or future (30 days) visit within the authorizing provider's specialty     Patient had office visit in the last 6 months or has a visit in the next 30 days with authorizing provider or within the authorizing provider's specialty.  See \"Patient Info\" tab in inbasket, or \"Choose Columns\" in Meds & Orders section of the refill encounter.            Passed - Patient is age 10 or older        Passed - Patient's CR is NOT>1.4 OR Patient's EGFR is NOT<45 within past 12 mos.     Recent Labs   Lab Test 04/14/20  0855   GFRESTIMATED 88   GFRESTBLACK >90       Recent Labs   Lab Test 04/14/20  0855   CR 0.90             Passed - Patient does NOT have a diagnosis of CHF.        Passed - Medication is active on med list           Jennifer QUINN RN, BSN      "

## 2021-02-04 DIAGNOSIS — E11.9 TYPE 2 DIABETES MELLITUS WITHOUT COMPLICATION, WITHOUT LONG-TERM CURRENT USE OF INSULIN (H): Chronic | ICD-10-CM

## 2021-02-04 NOTE — LETTER
[facility]  [address] [place] [id number]   Phone: [phone]       February 5, 2021         Js Jones  [address] [facility] [address]            Dear Js:    We are concerned about your health care.  We recently provided you with medication refills.  Many medications require routine follow-up with your doctor.    Your prescription(s) have been refilled for 90 days so you may have time for the above noted follow-up. Please call to schedule soon so we can assure you have an appointment before your next refills are needed.    Thank you,      Foritno Hyman MD/ Jennifer QUINN, RN, BSN

## 2021-02-05 RX ORDER — GLIPIZIDE 10 MG/1
TABLET, FILM COATED, EXTENDED RELEASE ORAL
Qty: 90 TABLET | Refills: 0 | Status: SHIPPED | OUTPATIENT
Start: 2021-02-05 | End: 2021-02-22

## 2021-02-05 NOTE — TELEPHONE ENCOUNTER
"  Requested Prescriptions   Pending Prescriptions Disp Refills     glipiZIDE (GLUCOTROL XL) 10 MG 24 hr tablet [Pharmacy Med Name: glipiZIDE ER 10 MG Oral Tablet Extended Release 24 Hour] 90 tablet 0     Sig: Take 1 tablet by mouth once daily       Sulfonylurea Agents Failed - 2/4/2021  4:21 PM        Failed - Patient has documented A1c within the specified period of time.     If HgbA1C is 8 or greater, it needs to be on file within the past 3 months.  If less than 8, must be on file within the past 6 months.     Recent Labs   Lab Test 01/06/20  0828   A1C 8.1*             Failed - Recent (6 mo) or future (30 days) visit within the authorizing provider's specialty     Patient had office visit in the last 6 months or has a visit in the next 30 days with authorizing provider or within the authorizing provider's specialty.  See \"Patient Info\" tab in inbasket, or \"Choose Columns\" in Meds & Orders section of the refill encounter.            Passed - Medication is active on med list        Passed - Patient is age 18 or older        Passed - Patient has a recent creatinine (normal) within the past 12 mos.     Recent Labs   Lab Test 04/14/20  0855   CR 0.90       Ok to refill medication if creatinine is low               "

## 2021-02-22 ENCOUNTER — OFFICE VISIT (OUTPATIENT)
Dept: FAMILY MEDICINE | Facility: CLINIC | Age: 66
End: 2021-02-22
Payer: COMMERCIAL

## 2021-02-22 VITALS
DIASTOLIC BLOOD PRESSURE: 76 MMHG | OXYGEN SATURATION: 99 % | BODY MASS INDEX: 22.63 KG/M2 | SYSTOLIC BLOOD PRESSURE: 160 MMHG | TEMPERATURE: 96.9 F | HEART RATE: 74 BPM | WEIGHT: 140.8 LBS | HEIGHT: 66 IN

## 2021-02-22 DIAGNOSIS — Z12.11 SPECIAL SCREENING FOR MALIGNANT NEOPLASMS, COLON: ICD-10-CM

## 2021-02-22 DIAGNOSIS — E11.59 TYPE 2 DIABETES MELLITUS WITH OTHER CIRCULATORY COMPLICATION, WITHOUT LONG-TERM CURRENT USE OF INSULIN (H): ICD-10-CM

## 2021-02-22 DIAGNOSIS — I10 BENIGN ESSENTIAL HYPERTENSION: Chronic | ICD-10-CM

## 2021-02-22 DIAGNOSIS — E78.5 HYPERLIPIDEMIA LDL GOAL <100: Chronic | ICD-10-CM

## 2021-02-22 DIAGNOSIS — K21.9 GASTROESOPHAGEAL REFLUX DISEASE WITHOUT ESOPHAGITIS: ICD-10-CM

## 2021-02-22 DIAGNOSIS — I25.10 CORONARY ARTERY DISEASE INVOLVING NATIVE HEART WITHOUT ANGINA PECTORIS, UNSPECIFIED VESSEL OR LESION TYPE: Primary | ICD-10-CM

## 2021-02-22 PROBLEM — I21.4 NSTEMI (NON-ST ELEVATED MYOCARDIAL INFARCTION) (H): Status: RESOLVED | Noted: 2018-09-14 | Resolved: 2021-02-22

## 2021-02-22 PROBLEM — I20.0 UNSTABLE ANGINA (H): Status: RESOLVED | Noted: 2018-09-14 | Resolved: 2021-02-22

## 2021-02-22 LAB
ANION GAP SERPL CALCULATED.3IONS-SCNC: 3 MMOL/L (ref 3–14)
BUN SERPL-MCNC: 19 MG/DL (ref 7–30)
CALCIUM SERPL-MCNC: 9.3 MG/DL (ref 8.5–10.1)
CHLORIDE SERPL-SCNC: 103 MMOL/L (ref 94–109)
CO2 SERPL-SCNC: 29 MMOL/L (ref 20–32)
CREAT SERPL-MCNC: 0.83 MG/DL (ref 0.66–1.25)
GFR SERPL CREATININE-BSD FRML MDRD: >90 ML/MIN/{1.73_M2}
GLUCOSE SERPL-MCNC: 208 MG/DL (ref 70–99)
HBA1C MFR BLD: 8.5 % (ref 0–5.6)
POTASSIUM SERPL-SCNC: 4.2 MMOL/L (ref 3.4–5.3)
SODIUM SERPL-SCNC: 135 MMOL/L (ref 133–144)

## 2021-02-22 PROCEDURE — 99214 OFFICE O/P EST MOD 30 MIN: CPT | Performed by: FAMILY MEDICINE

## 2021-02-22 PROCEDURE — 36415 COLL VENOUS BLD VENIPUNCTURE: CPT | Performed by: FAMILY MEDICINE

## 2021-02-22 PROCEDURE — 80048 BASIC METABOLIC PNL TOTAL CA: CPT | Performed by: FAMILY MEDICINE

## 2021-02-22 PROCEDURE — 83036 HEMOGLOBIN GLYCOSYLATED A1C: CPT | Performed by: FAMILY MEDICINE

## 2021-02-22 RX ORDER — ATORVASTATIN CALCIUM 40 MG/1
40 TABLET, FILM COATED ORAL DAILY
Qty: 90 TABLET | Refills: 3 | Status: SHIPPED | OUTPATIENT
Start: 2021-02-22 | End: 2022-03-17

## 2021-02-22 RX ORDER — CARVEDILOL 6.25 MG/1
6.25 TABLET ORAL 2 TIMES DAILY WITH MEALS
Qty: 180 TABLET | Refills: 3 | Status: SHIPPED | OUTPATIENT
Start: 2021-02-22 | End: 2021-08-23

## 2021-02-22 RX ORDER — GLIPIZIDE 10 MG/1
10 TABLET, FILM COATED, EXTENDED RELEASE ORAL DAILY
Qty: 90 TABLET | Refills: 3 | Status: SHIPPED | OUTPATIENT
Start: 2021-02-22 | End: 2022-05-18

## 2021-02-22 RX ORDER — LOSARTAN POTASSIUM 50 MG/1
50 TABLET ORAL DAILY
Qty: 90 TABLET | Refills: 3 | Status: SHIPPED | OUTPATIENT
Start: 2021-02-22 | End: 2021-08-09

## 2021-02-22 ASSESSMENT — MIFFLIN-ST. JEOR: SCORE: 1356.41

## 2021-02-22 NOTE — PROGRESS NOTES
"A; DM2 with CAD, recheck       CAD, clinically stable      Htn, not controlled       Hyperlipidemia, stable        Gerd, PPI, stable    P: fills.  Up to 50mg on losartan.  Back in a month f or BP f/u, anticipate A1C not being great as well.  Is going to change diet some.          Diabetes, hyperlipidemia, and hypertension recheck.   Due for a fit test or colonoscopy- he is leaning more for a colonoscopy, a1c, lipids, and microalbumin.     S: Js Jones is a 65 year old male with DM2 ,CAD.  Due for A1C.  Hasn't been great with diet    Htn: not controlled,  Willing to increase losartan to 50mg.     Hyperlipidemia: statin.  Fills    Gerd: PPI nightly.  Stable. No red flags    Needs FIT test, doesn't want colonoscopy    O:BP (!) 160/76   Pulse 74   Temp 96.9  F (36.1  C) (Tympanic)   Ht 1.676 m (5' 6\")   Wt 63.9 kg (140 lb 12.8 oz)   SpO2 99%   BMI 22.73 kg/m    GEN: Alert and oriented, in no acute distress  CV: RRR, no murmur  RESP: lungs clear bilaterally, good effort  EXT: no edema or lesions noted in lower extremities      "

## 2021-02-22 NOTE — LETTER
February 22, 2021      Js ESTHER Jones  [address] [facility] [address]        Dear ,    We are writing to inform you of your test results.    Diabetes has worsened, like we suspected.  Please make the changes we talked about, try the zero sugar coke if you want, and we'll see you back in a month or so.         Resulted Orders   Hemoglobin A1c   Result Value Ref Range    Hemoglobin A1C 8.5 (H) 0 - 5.6 %      Comment:      Normal <5.7% Prediabetes 5.7-6.4%  Diabetes 6.5% or higher - adopted from ADA   consensus guidelines.     Basic metabolic panel  (Ca, Cl, CO2, Creat, Gluc, K, Na, BUN)   Result Value Ref Range    Sodium 135 133 - 144 mmol/L    Potassium 4.2 3.4 - 5.3 mmol/L    Chloride 103 94 - 109 mmol/L    Carbon Dioxide 29 20 - 32 mmol/L    Anion Gap 3 3 - 14 mmol/L    Glucose 208 (H) 70 - 99 mg/dL      Comment:      Non Fasting    Urea Nitrogen 19 7 - 30 mg/dL    Creatinine 0.83 0.66 - 1.25 mg/dL    GFR Estimate >90 >60 mL/min/[1.73_m2]      Comment:      Non  GFR Calc  Starting 12/18/2018, serum creatinine based estimated GFR (eGFR) will be   calculated using the Chronic Kidney Disease Epidemiology Collaboration   (CKD-EPI) equation.      GFR Estimate If Black >90 >60 mL/min/[1.73_m2]      Comment:       GFR Calc  Starting 12/18/2018, serum creatinine based estimated GFR (eGFR) will be   calculated using the Chronic Kidney Disease Epidemiology Collaboration   (CKD-EPI) equation.      Calcium 9.3 8.5 - 10.1 mg/dL       If you have any questions or concerns, please call the clinic at the number listed above.       Sincerely,      Yifan Armstrong MD

## 2021-03-03 DIAGNOSIS — Z12.11 SPECIAL SCREENING FOR MALIGNANT NEOPLASMS, COLON: ICD-10-CM

## 2021-03-03 PROCEDURE — 82274 ASSAY TEST FOR BLOOD FECAL: CPT | Performed by: FAMILY MEDICINE

## 2021-03-06 LAB — HEMOCCULT STL QL IA: NEGATIVE

## 2021-03-22 ENCOUNTER — OFFICE VISIT (OUTPATIENT)
Dept: FAMILY MEDICINE | Facility: CLINIC | Age: 66
End: 2021-03-22
Payer: COMMERCIAL

## 2021-03-22 VITALS
DIASTOLIC BLOOD PRESSURE: 84 MMHG | SYSTOLIC BLOOD PRESSURE: 138 MMHG | TEMPERATURE: 98.7 F | WEIGHT: 138 LBS | HEIGHT: 66 IN | BODY MASS INDEX: 22.18 KG/M2 | OXYGEN SATURATION: 99 % | HEART RATE: 73 BPM | RESPIRATION RATE: 16 BRPM

## 2021-03-22 DIAGNOSIS — I25.10 CORONARY ARTERY DISEASE INVOLVING NATIVE HEART WITHOUT ANGINA PECTORIS, UNSPECIFIED VESSEL OR LESION TYPE: ICD-10-CM

## 2021-03-22 DIAGNOSIS — I10 BENIGN ESSENTIAL HYPERTENSION: Primary | Chronic | ICD-10-CM

## 2021-03-22 LAB
ANION GAP SERPL CALCULATED.3IONS-SCNC: 6 MMOL/L (ref 3–14)
BUN SERPL-MCNC: 13 MG/DL (ref 7–30)
CALCIUM SERPL-MCNC: 9.4 MG/DL (ref 8.5–10.1)
CHLORIDE SERPL-SCNC: 104 MMOL/L (ref 94–109)
CO2 SERPL-SCNC: 26 MMOL/L (ref 20–32)
CREAT SERPL-MCNC: 0.85 MG/DL (ref 0.66–1.25)
GFR SERPL CREATININE-BSD FRML MDRD: 90 ML/MIN/{1.73_M2}
GLUCOSE SERPL-MCNC: 173 MG/DL (ref 70–99)
POTASSIUM SERPL-SCNC: 4.2 MMOL/L (ref 3.4–5.3)
SODIUM SERPL-SCNC: 136 MMOL/L (ref 133–144)

## 2021-03-22 PROCEDURE — 36415 COLL VENOUS BLD VENIPUNCTURE: CPT | Performed by: FAMILY MEDICINE

## 2021-03-22 PROCEDURE — 80048 BASIC METABOLIC PNL TOTAL CA: CPT | Performed by: FAMILY MEDICINE

## 2021-03-22 PROCEDURE — 99214 OFFICE O/P EST MOD 30 MIN: CPT | Performed by: FAMILY MEDICINE

## 2021-03-22 ASSESSMENT — MIFFLIN-ST. JEOR: SCORE: 1343.71

## 2021-03-22 NOTE — LETTER
March 23, 2021      Js ESTHER Jones  [address]  [facility] [address]        Dear ,    We are writing to inform you of your test results.      Labs OK, sugar up but we knew that.  Keep working on your diet, and we'll see you in a few months.     Resulted Orders   Basic metabolic panel  (Ca, Cl, CO2, Creat, Gluc, K, Na, BUN)   Result Value Ref Range    Sodium 136 133 - 144 mmol/L    Potassium 4.2 3.4 - 5.3 mmol/L    Chloride 104 94 - 109 mmol/L    Carbon Dioxide 26 20 - 32 mmol/L    Anion Gap 6 3 - 14 mmol/L    Glucose 173 (H) 70 - 99 mg/dL      Comment:      Non Fasting    Urea Nitrogen 13 7 - 30 mg/dL    Creatinine 0.85 0.66 - 1.25 mg/dL    GFR Estimate 90 >60 mL/min/[1.73_m2]      Comment:      Non  GFR Calc  Starting 12/18/2018, serum creatinine based estimated GFR (eGFR) will be   calculated using the Chronic Kidney Disease Epidemiology Collaboration   (CKD-EPI) equation.      GFR Estimate If Black >90 >60 mL/min/[1.73_m2]      Comment:       GFR Calc  Starting 12/18/2018, serum creatinine based estimated GFR (eGFR) will be   calculated using the Chronic Kidney Disease Epidemiology Collaboration   (CKD-EPI) equation.      Calcium 9.4 8.5 - 10.1 mg/dL       If you have any questions or concerns, please call the clinic at the number listed above.       Sincerely,      Yifan Armstrong MD

## 2021-03-22 NOTE — PROGRESS NOTES
"A: DM2 with CAD, improving based on home numbers      Htn, improved, but borderline    P: continue to work on diet.  Update met panel.  Back in 3-4 months for A1C and BP recheck.  Could add some actos if needed next visit.            S: Jstea oJnes is a 65 year old male with DM2.  Improving some.  Was 8.5 on A1C a month ago.  Will need A1C in a few months.  Less sugar/pop at home    Htn: borderline after moving to 50mg on losartan last time.  Checks some at home too    Problem list, med list, additional histories reviewed and updated, as indicated.      No cp or sob    O:/84   Pulse 73   Temp 98.7  F (37.1  C) (Tympanic)   Resp 16   Ht 1.676 m (5' 6\")   Wt 62.6 kg (138 lb)   SpO2 99%   BMI 22.27 kg/m    GEN: Alert and oriented, in no acute distress  CV: RRR, no murmur  EXT: no edema or lesions noted in lower extremities             "

## 2021-05-20 NOTE — MR AVS SNAPSHOT
"              After Visit Summary   2017    Js Jones    MRN: [id number]           Patient Information     Date Of Birth          1955        Visit Information        Provider Department      2017 8:30 AM KEILA CEBALLOS RN [facility]        Today's Diagnoses     Benign essential hypertension, goal <140/90    -  1       Follow-ups after your visit        Who to contact     If you have questions or need follow up information about today's clinic visit or your schedule please contact [facility] directly at [phone].  Normal or non-critical lab and imaging results will be communicated to you by [employer], letter or phone within 4 business days after the clinic has received the results. If you do not hear from us within 7 days, please contact the clinic through [employer] or phone. If you have a critical or abnormal lab result, we will notify you by phone as soon as possible.  Submit refill requests through [email or web address] or call your pharmacy and they will forward the refill request to us. Please allow 3 business days for your refill to be completed.          Additional Information About Your Visit        MyChart Information     [email or web address] lets you send messages to your doctor, view your test results, renew your prescriptions, schedule appointments and more. To sign up, go to [email or web address] . Click on \"Log in\" on the left side of the screen, which will take you to the Welcome page. Then click on \"Sign up Now\" on the right side of the page.     You will be asked to enter the access code listed below, as well as some personal information. Please follow the directions to create your username and password.     Your access code is: [email or web address]  Expires: 2017  9:24 AM     Your access code will  in 90 days. If you need help or a new code, please call your [facility] or [phone].        Care EveryWhere ID     This is your Care EveryWhere ID. This could be used by " other organizations to access your [facility] medical records  [id number]        Your Vitals Were     Pulse                   80            Blood Pressure from Last 3 Encounters:   08/24/17 130/86   07/20/17 155/90   06/16/17 142/82    Weight from Last 3 Encounters:   07/20/17 153 lb (69.4 kg)   06/16/17 152 lb (68.9 kg)   10/12/16 155 lb (70.3 kg)              Today, you had the following     No orders found for display       Primary Care Provider Office Phone # Fax #    Kathya Iglesias, [facility] [phone]       [facility] [address]        Equal Access to Services     VICTORINO PATEL : Hadii екатерина osnua Sophani, waaxda luqadaha, qaybta kaalmada adedwight, ranulfo beaulieu. So [facility] [phone].    ATENCIÓN: Si habla español, tiene a mario disposición servicios gratuitos de asistencia lingüística. Llame al [phone].    We comply with applicable federal civil rights laws and [place] laws. We do not discriminate on the basis of race, color, national origin, age, disability sex, sexual orientation or gender identity.            Thank you!     Thank you for choosing [facility]  for your care. Our goal is always to provide you with excellent care. Hearing back from our patients is one way we can continue to improve our services. Please take a few minutes to complete the written survey that you may receive in the mail after your visit with us. Thank you!             Your Updated Medication List - Protect others around you: Learn how to safely use, store and throw away your medicines at www.disposemymeds.org.          This list is accurate as of: 8/24/17 10:54 AM.  Always use your most recent med list.                   Brand Name Dispense Instructions for use Diagnosis    ACE NOT PRESCRIBED (INTENTIONAL)     0 each    ACE Inhibitor not prescribed due to Allergy    Type 2 diabetes mellitus with hyperglycemia (H)       amLODIPine 10 MG  tablet    NORVASC    90 tablet    Take 1 tablet (10 mg) by mouth daily    Benign essential hypertension       aspirin 81 MG tablet     90 tablet    Take 1 tablet (81 mg) by mouth daily    HTN (hypertension), Type 2 diabetes, HbA1C goal < 8% (H)       atorvastatin 20 MG tablet    LIPITOR    90 tablet    Take 1 tablet (20 mg) by mouth daily    Hyperlipidemia LDL goal <100       Blood Glucose Monitoring Suppl W/DEVICE Kit     1 kit    1 each daily    Type 2 diabetes, HbA1C goal < 8% (H)       blood glucose monitoring test strip    no brand specified    3 Box    Use to test blood sugar 3 times daily or as directed. Box of 100 each.    Type 2 diabetes mellitus with hyperglycemia, without long-term current use of insulin (H)       glipiZIDE 10 MG 24 hr tablet    glipiZIDE XL    90 tablet    Take 1 tablet (10 mg) by mouth daily Needs HgbA1c checked before refill    Type 2 diabetes mellitus without complication, without long-term current use of insulin (H)       losartan 25 MG tablet    COZAAR    90 tablet    Take 1 tablet (25 mg) by mouth daily    Benign essential hypertension       Melatonin 10 MG Tabs tablet      Take 10 mg by mouth nightly as needed for sleep        metFORMIN 1000 MG tablet    GLUCOPHAGE    90 tablet    1000 mg with breakfast, 500 mg with lunch, 1000 mg with dinner. (Needs follow-up appointment for this medication)    Type 2 diabetes mellitus without complication, without long-term current use of insulin (H)       MULTIPLE VITAMIN PO      Take 1 tablet by mouth        OMEGA-3 FISH OIL PO      Take 1,200 mg by mouth 2 times daily (with meals)        omeprazole 20 MG tablet    priLOSEC OTC    90 tablet    Take 1 tablet (20 mg) by mouth daily    Gastroesophageal reflux disease without esophagitis       ONE TOUCH LANCETS Misc     200 each    1 lancet 3 times daily    Type 2 diabetes, HbA1C goal < 8% (H)          room air

## 2021-06-07 NOTE — TELEPHONE ENCOUNTER
"Pt reports fever and elevated blood glucose x 3 weeks.  Went to an urgent care near [place].  Was negative for influenza.  UTi was diagnosed.  Given antibiotics.    Currently has:  Chills    No coughs.  No body aches.  No sinus symptoms.  No other symptoms whatsoever.  Feels well other than \"chills.\"  \"Got warmed up with the car heater on.\"  \"Currently heading back to [place].\"  \"Took the rest of the day off.\"    Pt exhibits an ongoing laughing affect when not quite appropriate.    Pt has not followed up with his PCP in [place] per discharge paperwork from the urgent care which originally diagnosed his UTI.  Advised pt to f/u with his PCP immediately.    Pt persists in wanting to be referred to an ED.  Answer -> No, not warranted now, unless directed to do so after follow-up with his PCP.    Pt agrees to f/u with his PCP now.    No further action required for this triage encounter.    Ale Wen RN  Care Connection Triage     Reason for Disposition    [1] Taking antibiotic > 24 hours for UTI AND [2] fever persists    Protocols used: URINARY TRACT INFECTION ON ANTIBIOTIC FOLLOW-UP CALL - MALE-JAYSON-ANGELINA      "

## 2021-07-14 ENCOUNTER — OFFICE VISIT (OUTPATIENT)
Dept: FAMILY MEDICINE | Facility: CLINIC | Age: 66
End: 2021-07-14
Payer: COMMERCIAL

## 2021-07-14 ENCOUNTER — TELEPHONE (OUTPATIENT)
Dept: FAMILY MEDICINE | Facility: CLINIC | Age: 66
End: 2021-07-14

## 2021-07-14 VITALS
RESPIRATION RATE: 18 BRPM | SYSTOLIC BLOOD PRESSURE: 160 MMHG | HEART RATE: 68 BPM | HEIGHT: 66 IN | WEIGHT: 134 LBS | DIASTOLIC BLOOD PRESSURE: 90 MMHG | BODY MASS INDEX: 21.53 KG/M2 | TEMPERATURE: 97.2 F

## 2021-07-14 DIAGNOSIS — E11.59 TYPE 2 DIABETES MELLITUS WITH OTHER CIRCULATORY COMPLICATION, WITHOUT LONG-TERM CURRENT USE OF INSULIN (H): Primary | ICD-10-CM

## 2021-07-14 DIAGNOSIS — I10 BENIGN ESSENTIAL HYPERTENSION: ICD-10-CM

## 2021-07-14 LAB
CHOLEST SERPL-MCNC: 118 MG/DL
FASTING STATUS PATIENT QL REPORTED: NO
HBA1C MFR BLD: 7.1 % (ref 0–5.6)
HDLC SERPL-MCNC: 43 MG/DL
LDLC SERPL CALC-MCNC: 54 MG/DL
NONHDLC SERPL-MCNC: 75 MG/DL
TRIGL SERPL-MCNC: 105 MG/DL

## 2021-07-14 PROCEDURE — 36415 COLL VENOUS BLD VENIPUNCTURE: CPT | Performed by: FAMILY MEDICINE

## 2021-07-14 PROCEDURE — 83036 HEMOGLOBIN GLYCOSYLATED A1C: CPT | Performed by: FAMILY MEDICINE

## 2021-07-14 PROCEDURE — 80061 LIPID PANEL: CPT | Performed by: FAMILY MEDICINE

## 2021-07-14 PROCEDURE — 99207 PR FOOT EXAM NO CHARGE: CPT | Performed by: FAMILY MEDICINE

## 2021-07-14 PROCEDURE — 99214 OFFICE O/P EST MOD 30 MIN: CPT | Performed by: FAMILY MEDICINE

## 2021-07-14 RX ORDER — AMLODIPINE BESYLATE 10 MG/1
10 TABLET ORAL DAILY
Qty: 90 TABLET | Refills: 1 | Status: SHIPPED | OUTPATIENT
Start: 2021-07-14 | End: 2022-01-17

## 2021-07-14 ASSESSMENT — MIFFLIN-ST. JEOR: SCORE: 1320.57

## 2021-07-14 NOTE — PATIENT INSTRUCTIONS
Patient Education     Diet: Diabetes  Food is an important tool that you can use to control diabetes and stay healthy. Eating well-balanced meals in the correct amounts will help you control your blood glucose levels and prevent low blood sugar reactions. It will also help you reduce the health risks of diabetes. There is no one specific diet that is right for everyone with diabetes. But there are general guidelines to follow. A registered dietitian (RD) will create a tailored diet approach that s just right for you. He or she will also help you plan healthy meals and snacks. If you have any questions, call your dietitian for advice.  Guidelines for success  Talk with your healthcare provider before starting a diabetes diet or weight loss program. If you haven't talked with a dietitian yet, ask your provider for a referral. The following guidelines can help you succeed:    Select foods from the 6 food groups below. Your dietitian will help you find food choices within each group. He or she will also show you serving sizes and how many servings you can have at each meal.  ? Grains, beans, and starchy vegetables  ? Vegetables  ? Fruit  ? Milk or yogurt  ? Meat, poultry, fish, or tofu  ? Healthy fats    Check your blood sugar levels as directed by your provider. Take any medicine as prescribed by your provider.    Learn to read food labels and pick the right portion sizes.    Limit carbohydrates at each meal to help manage your diabetes. The carbohydrates you eat become glucose in the blood. Talk with your healthcare provider about how many grams of carbohydrates are recommended for you at each meal. Eat 3 meals a day, at consistent times. Don't skip meals. If you are hungry between meals, eat a small, low-carbohydrate snack.    Talk with your healthcare provider if you drink alcohol. Alcohol can have unpredictable effects on blood glucose. It's also high in empty calories and can raise a type of blood fat called  triglycerides. Drink water or calorie-free diet drinks instead.    Eat less fat to help lower your risk of heart disease. Use nonfat or low-fat dairy products and lean meats. Avoid fried foods. Use cooking oils that are unsaturated, such as olive, canola, or peanut oil.    Don't eat foods with added salt. Salt can contribute to high blood pressure, which can cause heart disease. People with diabetes already have a risk for high blood pressure and heart disease.    Stay at a healthy weight. If you need to lose weight, cut down on your portion sizes. But don t skip meals. Exercise is an important part of any weight management program. Talk with your provider about an exercise program that s right for you.    For more information about the best diet plan for you, talk with an RD. To find an RD in your area, contact:  ? Academy of Nutrition and Dietetics www.eatright.org  ? American Diabetes Association [phone] www.diabetes.org  [place] last reviewed this educational content on 7/1/2019 2000-2021 [employer]. All rights reserved. This information is not intended as a substitute for professional medical care. Always follow your healthcare professional's instructions.

## 2021-07-14 NOTE — LETTER
July 15, 2021      Js ESTHER Jones  [address] [facility] [address]        Dear ,    We are writing to inform you of your test results.    Hemoglobin A1c 7.1, consistent with well-controlled diabetes., great job!   Triglycerides level mildly elevated.   Will recommend to continue regular exercise, healthy diet and current medications.   Let us know if there are any questions.     If you have any questions or concerns, please call the clinic at the number listed above.       Sincerely,      Fortino Hmyan MD/ jose    Resulted Orders   Lipid panel reflex to direct LDL Fasting   Result Value Ref Range    Cholesterol 118 <200 mg/dL      Comment:      Age 0-19 years  Desirable: <170 mg/dL  Borderline high:  170-199 mg/dl  High:            >199 mg/dl    Age 20 years and older  Desirable: <200 mg/dL    Triglycerides 105 <150 mg/dL      Comment:      0-9 years:  Normal:    Less than 75 mg/dL  Borderline high:  75-99 mg/dL  High:             Greater than or equal to 100 mg/dL    0-19 years:  Normal:    Less than 90 mg/dL  Borderline high:   mg/dL  High:             Greater than or equal to 130 mg/dL    20 years and older:  Normal:    Less than 150 mg/dL  Borderline high:  150-199 mg/dL  High:             200-499 mg/dL  Very high:   Greater than or equal to 500 mg/dL    Direct Measure HDL 43 >=40 mg/dL      Comment:      0-19 years:       Greater than or equal to 45 mg/dL   Low: Less than 40 mg/dL   Borderline low: 40-44 mg/dL     20 years and older:   Female: Greater than or equal to 50 mg/dL   Male:   Greater than or equal to 40 mg/dL         LDL Cholesterol Calculated 54 <=100 mg/dL      Comment:      Age 0-19 years:  Desirable: 0-110 mg/dL   Borderline high: 110-129 mg/dL   High: >= 130 mg/dL    Age 20 years and older:  Desirable: <100mg/dL  Above desirable: 100-129 mg/dL   Borderline high: 130-159 mg/dL   High: 160-189 mg/dL   Very high: >= 190 mg/dL    Non HDL Cholesterol 75 <130 mg/dL       Comment:      0-19 years:  Desirable:          Less than 120 mg/dL  Borderline high:   120-144 mg/dL  High:                   Greater than or equal to 145 mg/dL    20 years and older:  Desirable:          130 mg/dL  Above Desirable: 130-159 mg/dL  Borderline high:   160-189 mg/dL  High:               190-219 mg/dL  Very high:     Greater than or equal to 220 mg/dL    Patient Fasting > 8hrs? No    Hemoglobin A1c   Result Value Ref Range    Hemoglobin A1C 7.1 (H) 0.0 - 5.6 %      Comment:      Normal <5.7%   Prediabetes 5.7-6.4%    Diabetes 6.5% or higher     Note: Adopted from ADA consensus guidelines.

## 2021-07-14 NOTE — NURSING NOTE
"Chief Complaint   Patient presents with     Diabetes     BP (!) 152/80 (Cuff Size: Adult Regular)   Pulse 68   Temp 97.2  F (36.2  C) (Tympanic)   Resp 18   Ht 1.676 m (5' 6\")   Wt 60.8 kg (134 lb)   BMI 21.63 kg/m   Estimated body mass index is 21.63 kg/m  as calculated from the following:    Height as of this encounter: 1.676 m (5' 6\").    Weight as of this encounter: 60.8 kg (134 lb).  Patient presents to the clinic using No DME      Health Maintenance that is potentially due pending provider review:    Health Maintenance Due   Topic Date Due     ANNUAL REVIEW OF HM ORDERS  Never done     AORTIC ANEURYSM SCREENING (SYSTEM ASSIGNED)  Never done     MICROALBUMIN  01/30/2020     MEDICARE ANNUAL WELLNESS VISIT  07/12/2020     FALL RISK ASSESSMENT  07/12/2020     LIPID  01/06/2021     DIABETIC FOOT EXAM  01/06/2021     EYE EXAM  06/05/2021                "

## 2021-07-14 NOTE — PROGRESS NOTES
Assessment & Plan     (E11.59) Type 2 diabetes mellitus with other circulatory complication, without long-term current use of insulin (H)  (primary encounter diagnosis)  Comment: Hemoglobin A1c 7.1, consistent with well-controlled diabetes.  Suggested to continue glipizide and Metformin.  Healthy lifestyle modifications stressed including regular exercise, strict diabetic diet and weight loss  Plan: Hemoglobin A1c, Lipid panel reflex to direct         LDL Fasting, Albumin Random Urine Quantitative         with Creat Ratio, FOOT EXAM, AMB Adult Diabetes        Educator Referral            (I10) Benign essential hypertension  Comment: Blood pressure above target goal of less than 140/90.  Treatment options discussed.  Amlodipine prescribed, common side effect discussed.  Other medications reviewed and no changes made.  Follow-up in 2 weeks or earlier if needed, due for annual physical exam  Plan: amLODIPine (NORVASC) 10 MG tablet          Patient Instructions     Patient Education     Diet: Diabetes  Food is an important tool that you can use to control diabetes and stay healthy. Eating well-balanced meals in the correct amounts will help you control your blood glucose levels and prevent low blood sugar reactions. It will also help you reduce the health risks of diabetes. There is no one specific diet that is right for everyone with diabetes. But there are general guidelines to follow. A registered dietitian (RD) will create a tailored diet approach that s just right for you. He or she will also help you plan healthy meals and snacks. If you have any questions, call your dietitian for advice.  Guidelines for success  Talk with your healthcare provider before starting a diabetes diet or weight loss program. If you haven't talked with a dietitian yet, ask your provider for a referral. The following guidelines can help you succeed:    Select foods from the 6 food groups below. Your dietitian will help you find food  We must evaluate the patient prior to ordering any tests  We will see her as scheduled on Friday  Please advise patient  choices within each group. He or she will also show you serving sizes and how many servings you can have at each meal.  ? Grains, beans, and starchy vegetables  ? Vegetables  ? Fruit  ? Milk or yogurt  ? Meat, poultry, fish, or tofu  ? Healthy fats    Check your blood sugar levels as directed by your provider. Take any medicine as prescribed by your provider.    Learn to read food labels and pick the right portion sizes.    Limit carbohydrates at each meal to help manage your diabetes. The carbohydrates you eat become glucose in the blood. Talk with your healthcare provider about how many grams of carbohydrates are recommended for you at each meal. Eat 3 meals a day, at consistent times. Don't skip meals. If you are hungry between meals, eat a small, low-carbohydrate snack.    Talk with your healthcare provider if you drink alcohol. Alcohol can have unpredictable effects on blood glucose. It's also high in empty calories and can raise a type of blood fat called triglycerides. Drink water or calorie-free diet drinks instead.    Eat less fat to help lower your risk of heart disease. Use nonfat or low-fat dairy products and lean meats. Avoid fried foods. Use cooking oils that are unsaturated, such as olive, canola, or peanut oil.    Don't eat foods with added salt. Salt can contribute to high blood pressure, which can cause heart disease. People with diabetes already have a risk for high blood pressure and heart disease.    Stay at a healthy weight. If you need to lose weight, cut down on your portion sizes. But don t skip meals. Exercise is an important part of any weight management program. Talk with your provider about an exercise program that s right for you.    For more information about the best diet plan for you, talk with an RD. To find an RD in your area, contact:  ? Academy of Nutrition and Dietetics www.eatright.org  ? American Diabetes Association [phone] www.diabetes.org  Mer[employer] last reviewed this  "educational content on 7/1/2019 2000-2021 [employer]. All rights reserved. This information is not intended as a substitute for professional medical care. Always follow your healthcare professional's instructions.                 Fortino Hyman MD  [facility]    Subjective   Js is a 66 year old who presents for the following health issues     HPI     Diabetes Follow-up    How often are you checking your blood sugar? A few times a week  What time of day are you checking your blood sugars (select all that apply)?  Before meals  Have you had any blood sugars above 200?  No  Have you had any blood sugars below 70?  No    What symptoms do you notice when your blood sugar is low?  Shaky    What concerns do you have today about your diabetes? None     Do you have any of these symptoms? (Select all that apply)  No numbness or tingling in feet.  No redness, sores or blisters on feet.  No complaints of excessive thirst.  No reports of blurry vision.  No significant changes to weight.    Have you had a diabetic eye exam in the last 12 months? No      BP Readings from Last 2 Encounters:   07/14/21 (!) 152/80   03/22/21 138/84     Hemoglobin A1C (%)   Date Value   02/22/2021 8.5 (H)   01/06/2020 8.1 (H)     LDL Cholesterol Calculated (mg/dL)   Date Value   01/06/2020 70   09/17/2018 54         Review of Systems   Constitutional, HEENT, cardiovascular, pulmonary, GI, , musculoskeletal, neuro, skin, endocrine and psych systems are negative, except as otherwise noted.      Objective    BP (!) 152/80 (Cuff Size: Adult Regular)   Pulse 68   Temp 97.2  F (36.2  C) (Tympanic)   Resp 18   Ht 1.676 m (5' 6\")   Wt 60.8 kg (134 lb)   BMI 21.63 kg/m    Body mass index is 21.63 kg/m .  Physical Exam   GENERAL: alert and no distress  EYES: Eyes grossly normal to inspection, PERRL and conjunctivae and sclerae normal  NECK: no adenopathy, no asymmetry, masses, or scars and thyroid normal " to palpation  RESP: lungs clear to auscultation - no rales, rhonchi or wheezes  CV: regular rate and rhythm, normal S1 S2, no S3 or S4, no murmur, click or rub, no peripheral edema and peripheral pulses strong  ABDOMEN: soft, nontender, no hepatosplenomegaly, no masses and bowel sounds normal  MS: no gross musculoskeletal defects noted, no edema  SKIN: no suspicious lesions or rashes  NEURO: Normal strength and tone, mentation intact and speech normal  PSYCH: mentation appears normal, affect normal/bright  FOOT: Normal monofilament testing, slightly reduced vibration sensation, pedal pulses 3+, no pedal edema noted      Lab Results   Component Value Date    A1C 7.1 07/14/2021    A1C 8.5 02/22/2021    A1C 8.1 01/06/2020    A1C 6.9 06/07/2019    A1C 6.8 09/15/2018    A1C 7.2 06/15/2018

## 2021-07-14 NOTE — TELEPHONE ENCOUNTER
Diabetes Education Scheduling Outreach #1:    Call to patient to schedule. Patient declined to schedule.    Ruby Akhtar  [facility] [facility]  Diabetes and Nutrition Scheduling

## 2021-08-09 ENCOUNTER — OFFICE VISIT (OUTPATIENT)
Dept: FAMILY MEDICINE | Facility: CLINIC | Age: 66
End: 2021-08-09
Payer: COMMERCIAL

## 2021-08-09 VITALS
WEIGHT: 137 LBS | OXYGEN SATURATION: 98 % | HEART RATE: 68 BPM | BODY MASS INDEX: 22.11 KG/M2 | SYSTOLIC BLOOD PRESSURE: 160 MMHG | TEMPERATURE: 97 F | DIASTOLIC BLOOD PRESSURE: 80 MMHG

## 2021-08-09 DIAGNOSIS — I10 BENIGN ESSENTIAL HYPERTENSION: Primary | ICD-10-CM

## 2021-08-09 DIAGNOSIS — E11.59 TYPE 2 DIABETES MELLITUS WITH OTHER CIRCULATORY COMPLICATION, WITHOUT LONG-TERM CURRENT USE OF INSULIN (H): ICD-10-CM

## 2021-08-09 LAB
CREAT UR-MCNC: 31 MG/DL
MICROALBUMIN UR-MCNC: <5 MG/L
MICROALBUMIN/CREAT UR: NORMAL MG/G{CREAT}

## 2021-08-09 PROCEDURE — 82043 UR ALBUMIN QUANTITATIVE: CPT | Performed by: FAMILY MEDICINE

## 2021-08-09 PROCEDURE — 99214 OFFICE O/P EST MOD 30 MIN: CPT | Performed by: FAMILY MEDICINE

## 2021-08-09 RX ORDER — LOSARTAN POTASSIUM 50 MG/1
50 TABLET ORAL DAILY
Qty: 90 TABLET | Refills: 3 | Status: SHIPPED | OUTPATIENT
Start: 2021-08-09 | End: 2022-08-08

## 2021-08-09 NOTE — PROGRESS NOTES
Assessment & Plan     Benign essential hypertension, goal <140/90  Blood pressure above target goal of less than 140/90.  History of ischemic heart disease, type 2 diabetes mellitus.  Patient stopped taking losartan by mistake.  Suggested to continue losartan, amlodipine and carvedilol for hypertension.  Healthy lifestyle modifications stressed including regular exercise, balanced diet.  Other medications reviewed and no changes made.  Follow-up in 2 weeks for Medicare physical, blood pressure recheck  - losartan (COZAAR) 50 MG tablet; Take 1 tablet (50 mg) by mouth daily    Type 2 diabetes mellitus with other circulatory complication, without long-term current use of insulin (H)  Last hemoglobin A1c 7.1, consistent with well-controlled diabetes.  Urine albumin ordered as per current guidelines.  Recommended to continue Metformin, glipizide and strict diabetic diet.  Lab Results   Component Value Date    A1C 7.1 07/14/2021    A1C 8.5 02/22/2021    A1C 8.1 01/06/2020    A1C 6.9 06/07/2019    A1C 6.8 09/15/2018    A1C 7.2 06/15/2018   - Albumin Random Urine Quantitative with Creat Ratio; Future      Patient Instructions     Patient Education     Low-Salt Choices  Eating salt (sodium) can make your body retain too much water. Extra water makes your heart work harder. Canned, packaged, and frozen foods are easy to prepare. But they are often high in sodium. Here are some ideas for low-salt foods you can easily make yourself.   For breakfast    Fruit or 100% fruit juice. It's better to have whole fruit instead of 100% fruit juice.    Whole-wheat bread or an English muffin. Look for sodium content on Nutrition Facts labels.    Low-fat milk or yogurt    Unsalted eggs    Shredded wheat    Corn tortillas    Unsalted steamed rice    Regular (not instant) hot cereal, made without salt    Stay away from    Sausage, flores, and ham    Flour tortillas    Packaged muffins, pancakes, and biscuits    Instant hot cereals    Cottage  cheese    For lunch and dinner    Fresh fish, chicken, turkey, or meat--baked, broiled, or roasted without salt    Dry beans, cooked without salt    Tofu, stir-fried without salt    Unsalted fresh fruit and vegetables, or frozen or canned fruit and vegetables with no added salt  Stay away from    Lunch or deli meat that is cured or smoked    Cheese    Tomato juice and ketchup    Canned vegetables, soups, and fish not labeled as no-salt-added or reduced sodium    Packaged gravies and sauces    Olives, pickles, and relish    Bottled salad dressings    For snacks and desserts    Yogurt    Unsalted, air-popped popcorn    Unsalted nuts or seeds  Stay away from    Pies and cakes    Packaged dessert mixes    Pizza    Canned and packaged puddings    Pretzels, chips, crackers, and nuts--unless the label says unsalted    [employer] last reviewed this educational content on 11/1/2019 2000-2021 [employer]. All rights reserved. This information is not intended as a substitute for professional medical care. Always follow your healthcare professional's instructions.               Return in about 2 weeks (around 8/23/2021) for medicare physical exam.    Fortino Hyman MD  [facility]    Subjective   Js is a 66 year old who presents for the following health issues     HPI     Hypertension Follow-up      Do you check your blood pressure regularly outside of the clinic? Yes     Are you following a low salt diet? Yes    Are your blood pressures ever more than 140 on the top number (systolic) OR more   than 90 on the bottom number (diastolic), for example 140/90? No      How many servings of fruits and vegetables do you eat daily?  2-3    On average, how many sweetened beverages do you drink each day (Examples: soda, juice, sweet tea, etc.  Do NOT count diet or artificially sweetened beverages)?   0    How many days per week do you exercise enough to make your heart beat faster? 3 or  less    How many minutes a day do you exercise enough to make your heart beat faster? 9 or less    How many days per week do you miss taking your medication? 0      Review of Systems   Constitutional, HEENT, cardiovascular, pulmonary, GI, , musculoskeletal, neuro, skin, endocrine and psych systems are negative, except as otherwise noted.      Objective    BP (!) 160/80   Pulse 68   Temp 97  F (36.1  C) (Tympanic)   Wt 62.1 kg (137 lb)   SpO2 98%   BMI 22.11 kg/m    Body mass index is 22.11 kg/m .  Physical Exam   GENERAL: alert and no distress  EYES: Eyes grossly normal to inspection  RESP: lungs clear to auscultation - no rales, rhonchi or wheezes  CV: regular rate and rhythm, normal S1 S2, no S3 or S4, no murmur, click or rub  ABDOMEN: soft, nontender, no hepatosplenomegaly, no masses  MS: no gross musculoskeletal defects noted, no edema  SKIN: no suspicious lesions or rashes  NEURO: Normal strength and tone, mentation intact and speech normal  PSYCH: mentation appears normal, affect normal/bright

## 2021-08-09 NOTE — PATIENT INSTRUCTIONS
Patient Education     Low-Salt Choices  Eating salt (sodium) can make your body retain too much water. Extra water makes your heart work harder. Canned, packaged, and frozen foods are easy to prepare. But they are often high in sodium. Here are some ideas for low-salt foods you can easily make yourself.   For breakfast    Fruit or 100% fruit juice. It's better to have whole fruit instead of 100% fruit juice.    Whole-wheat bread or an English muffin. Look for sodium content on Nutrition Facts labels.    Low-fat milk or yogurt    Unsalted eggs    Shredded wheat    Corn tortillas    Unsalted steamed rice    Regular (not instant) hot cereal, made without salt    Stay away from    Sausage, flores, and ham    Flour tortillas    Packaged muffins, pancakes, and biscuits    Instant hot cereals    Cottage cheese    For lunch and dinner    Fresh fish, chicken, turkey, or meat--baked, broiled, or roasted without salt    Dry beans, cooked without salt    Tofu, stir-fried without salt    Unsalted fresh fruit and vegetables, or frozen or canned fruit and vegetables with no added salt  Stay away from    Lunch or deli meat that is cured or smoked    Cheese    Tomato juice and ketchup    Canned vegetables, soups, and fish not labeled as no-salt-added or reduced sodium    Packaged gravies and sauces    Olives, pickles, and relish    Bottled salad dressings    For snacks and desserts    Yogurt    Unsalted, air-popped popcorn    Unsalted nuts or seeds  Stay away from    Pies and cakes    Packaged dessert mixes    Pizza    Canned and packaged puddings    Pretzels, chips, crackers, and nuts--unless the label says unsalted    [employer] last reviewed this educational content on 11/1/2019 2000-2021 [employer]. All rights reserved. This information is not intended as a substitute for professional medical care. Always follow your healthcare professional's instructions.

## 2021-08-11 ENCOUNTER — TRANSFERRED RECORDS (OUTPATIENT)
Dept: HEALTH INFORMATION MANAGEMENT | Facility: CLINIC | Age: 66
End: 2021-08-11

## 2021-08-11 LAB
RETINOPATHY: NEGATIVE
RETINOPATHY: NEGATIVE

## 2021-08-23 ENCOUNTER — OFFICE VISIT (OUTPATIENT)
Dept: FAMILY MEDICINE | Facility: CLINIC | Age: 66
End: 2021-08-23
Payer: COMMERCIAL

## 2021-08-23 VITALS
WEIGHT: 135 LBS | RESPIRATION RATE: 18 BRPM | TEMPERATURE: 97.3 F | DIASTOLIC BLOOD PRESSURE: 90 MMHG | BODY MASS INDEX: 21.69 KG/M2 | HEIGHT: 66 IN | HEART RATE: 70 BPM | SYSTOLIC BLOOD PRESSURE: 150 MMHG

## 2021-08-23 DIAGNOSIS — Z00.00 ENCOUNTER FOR MEDICARE ANNUAL WELLNESS EXAM: Primary | ICD-10-CM

## 2021-08-23 DIAGNOSIS — N52.9 ERECTILE DYSFUNCTION, UNSPECIFIED ERECTILE DYSFUNCTION TYPE: ICD-10-CM

## 2021-08-23 DIAGNOSIS — I10 BENIGN ESSENTIAL HYPERTENSION: ICD-10-CM

## 2021-08-23 PROCEDURE — G0438 PPPS, INITIAL VISIT: HCPCS | Performed by: FAMILY MEDICINE

## 2021-08-23 PROCEDURE — 99214 OFFICE O/P EST MOD 30 MIN: CPT | Mod: 25 | Performed by: FAMILY MEDICINE

## 2021-08-23 RX ORDER — CARVEDILOL 12.5 MG/1
12.5 TABLET ORAL 2 TIMES DAILY WITH MEALS
Qty: 90 TABLET | Refills: 1 | Status: SHIPPED | OUTPATIENT
Start: 2021-08-23 | End: 2021-11-30

## 2021-08-23 RX ORDER — SILDENAFIL CITRATE 20 MG/1
TABLET ORAL
Qty: 30 TABLET | Refills: 1 | Status: SHIPPED | OUTPATIENT
Start: 2021-08-23 | End: 2024-04-29

## 2021-08-23 ASSESSMENT — ENCOUNTER SYMPTOMS: DIARRHEA: 1

## 2021-08-23 ASSESSMENT — ACTIVITIES OF DAILY LIVING (ADL): CURRENT_FUNCTION: NO ASSISTANCE NEEDED

## 2021-08-23 ASSESSMENT — MIFFLIN-ST. JEOR: SCORE: 1325.11

## 2021-08-23 NOTE — PROGRESS NOTES
"SUBJECTIVE:   Js Jones is a 66 year old male who presents for Preventive Visit.    Patient has been advised of split billing requirements and indicates understanding: Yes   Are you in the first 12 months of your Medicare coverage?  No    Healthy Habits:     In general, how would you rate your overall health?  Good    Frequency of exercise:  None    Do you usually eat at least 4 servings of fruit and vegetables a day, include whole grains    & fiber and avoid regularly eating high fat or \"junk\" foods?  Yes    Medication side effects:  None    Ability to successfully perform activities of daily living:  No assistance needed    Home Safety:  No safety concerns identified    Hearing Impairment:  Feel that people are mumbling or not speaking clearly, difficult to understand a speaker at a public meeting or Orthodox service, need to ask people to speak up or repeat themselves and difficulty understanding soft or whispered speech    In the past 6 months, have you been bothered by leaking of urine?  No    In general, how would you rate your overall mental or emotional health?  Good      PHQ-2 Total Score: 0    Additional concerns today:  No    Do you feel safe in your environment? Yes    Have you ever done Advance Care Planning? (For example, a Health Directive, POLST, or a discussion with a medical provider or your loved ones about your wishes): No, advance care planning information given to patient to review.  Patient plans to discuss their wishes with loved ones or provider.         Fall risk  Fallen 2 or more times in the past year?: No  Any fall with injury in the past year?: No    Cognitive Screening   1) Repeat 3 items (Leader, Season, Table)    2) Clock draw: NORMAL  3) 3 item recall: Recalls 2 objects   Results: NORMAL clock, 1-2 items recalled: COGNITIVE IMPAIRMENT LESS LIKELY    Mini-CogTM Copyright DENYS Ochoa. Licensed by the author for use in [facility]; reprinted with permission " (claudio@[email or web address]). All rights reserved.      Home blood pressure readings above target goal of less than 140/90.  Complains of ongoing erectile dysfunction.  No other relevant systemic symptoms.    Reviewed and updated as needed this visit by clinical staff  Tobacco  Allergies  Meds              Reviewed and updated as needed this visit by Provider                Social History     Tobacco Use     Smoking status: Former Smoker     Packs/day: 1.00     Years: 30.00     Pack years: 30.00     Quit date: 2000     Years since quittin.6     Smokeless tobacco: Never Used   Substance Use Topics     Alcohol use: No         Alcohol Use 2021   Prescreen: >3 drinks/day or >7 drinks/week? No         PROBLEMS TO ADD ON...  Has been monitoring bp at home. Has had some good 120's and then some higher at 140       Current providers sharing in care for this patient include:   Patient Care Team:  Fortino Hyman MD as PCP - General (Family Medicine)  Hortencia Aguilar MD as Assigned Heart and Vascular Provider  Yifna Armstrong MD as Assigned PCP    The following health maintenance items are reviewed in Epic and correct as of today:  Health Maintenance Due   Topic Date Due     ANNUAL REVIEW OF HM ORDERS  Never done     AORTIC ANEURYSM SCREENING (SYSTEM ASSIGNED)  Never done     Lab work is in process  Labs reviewed in EPIC  BP Readings from Last 3 Encounters:   21 (!) 144/82   21 (!) 160/80   21 (!) 160/90    Wt Readings from Last 3 Encounters:   21 61.2 kg (135 lb)   21 62.1 kg (137 lb)   21 60.8 kg (134 lb)                  Patient Active Problem List   Diagnosis     Benign essential hypertension, goal <140/90     Hyperlipidemia LDL goal <100     Gastroesophageal reflux disease without esophagitis     LBBB (left bundle branch block)     Coronary artery disease involving native heart without angina pectoris, unspecified vessel or lesion type     Type 2 diabetes mellitus with circulatory  disorder, without long-term current use of insulin (H)     Past Surgical History:   Procedure Laterality Date     HERNIORRHAPHY INGUINAL BILATERAL         Social History     Tobacco Use     Smoking status: Former Smoker     Packs/day: 1.00     Years: 30.00     Pack years: 30.00     Quit date: 2000     Years since quittin.6     Smokeless tobacco: Never Used   Substance Use Topics     Alcohol use: No     Family History   Problem Relation Age of Onset     Diabetes Mother      Hypertension Mother      Cardiovascular Father 81        CHF     Heart Disease Sister 50     Cancer Sister      Heart Disease Brother 50         Current Outpatient Medications   Medication Sig Dispense Refill     amLODIPine (NORVASC) 10 MG tablet Take 1 tablet (10 mg) by mouth daily 90 tablet 1     aspirin 81 MG tablet Take 1 tablet (81 mg) by mouth daily 90 tablet 3     atorvastatin (LIPITOR) 40 MG tablet Take 1 tablet (40 mg) by mouth daily 90 tablet 3     blood glucose monitoring (NO BRAND SPECIFIED) test strip Use to test blood sugar 3 times daily or as directed. Box of 100 each. 3 Box 3     Blood Glucose Monitoring Suppl W/DEVICE KIT 1 each daily 1 kit 0     carvedilol (COREG) 6.25 MG tablet Take 1 tablet (6.25 mg) by mouth 2 times daily (with meals) 180 tablet 3     glipiZIDE (GLUCOTROL XL) 10 MG 24 hr tablet Take 1 tablet (10 mg) by mouth daily 90 tablet 3     losartan (COZAAR) 50 MG tablet Take 1 tablet (50 mg) by mouth daily 90 tablet 3     Melatonin 10 MG TABS Take 10 mg by mouth nightly as needed for sleep       metFORMIN (GLUCOPHAGE) 1000 MG tablet TAKE 1 TABLET BY MOUTH WITH BREAKFAST AND 1 TABLET BY MOUTH WITH DINNER 180 tablet 3     MULTIPLE VITAMIN PO Take 1 tablet by mouth       nitroGLYcerin (NITROSTAT) 0.4 MG sublingual tablet For chest pain place 1 tablet under the tongue every 5 minutes for 3 doses. If symptoms persist 5 minutes after 1st dose call 911. 50 tablet 11     Omega-3 Fatty Acids (OMEGA-3 FISH OIL PO) Take  "1,200 mg by mouth 2 times daily (with meals)       omeprazole (PRILOSEC OTC) 20 MG tablet Take 1 tablet (20 mg) by mouth daily 90 tablet 1     ONE TOUCH LANCETS MISC 1 lancet 3 times daily 200 each 5     Allergies   Allergen Reactions     Bee Venom Unknown     Lisinopril Cough     Recent Labs   Lab Test 07/14/21  0954 03/22/21  1012 02/22/21  1017 04/07/20  1709 01/06/20  0828 09/18/18  0555 09/17/18  0550 09/15/18  0546 09/14/18  1818 06/15/18  0843 03/09/18  0815 11/04/15  1630   A1C 7.1*  --  8.5*  --  8.1*   < >  --   --   --   --  6.9* 7.3*   LDL 54  --   --   --  70  --  54  --   --    < > 72  --    HDL 43  --   --   --  43  --  35*  --   --    < > 38*  --    TRIG 105  --   --   --  170*  --  208*  --   --    < > 73  --    ALT  --   --   --  40 44  --   --   --  39  --   --   --    CR  --  0.85 0.83 2.36* 0.82  --  0.90   < > 0.92  --   --   --    GFRESTIMATED  --  90 >90 27* >90  --  84   < > 82  --   --   --    GFRESTBLACK  --  >90 >90 32* >90  --  >90   < > >90  --   --   --    POTASSIUM  --  4.2 4.2 3.9 4.1  --  3.9   < > 3.4  --   --   --    TSH  --   --   --   --   --   --   --   --   --   --  1.02 1.47    < > = values in this interval not displayed.          Review of Systems     Constitutional, HEENT, cardiovascular, pulmonary, GI, , musculoskeletal, neuro, skin, endocrine and psych systems are negative, except as otherwise noted.    OBJECTIVE:   BP (!) 144/82 (Cuff Size: Adult Regular)   Pulse 70   Temp 97.3  F (36.3  C) (Tympanic)   Resp 18   Ht 1.676 m (5' 6\")   Wt 61.2 kg (135 lb)   BMI 21.79 kg/m   Estimated body mass index is 21.79 kg/m  as calculated from the following:    Height as of this encounter: 1.676 m (5' 6\").    Weight as of this encounter: 61.2 kg (135 lb).  Physical Exam  GENERAL: alert and no distress  EYES: Eyes grossly normal to inspection, PERRL and conjunctivae and sclerae normal  HENT: normal cephalic/atraumatic, nose and mouth without ulcers or lesions, oropharynx clear " "and oral mucous membranes moist  NECK: no adenopathy, no asymmetry, masses, or scars and thyroid normal to palpation  RESP: lungs clear to auscultation - no rales, rhonchi or wheezes  CV: regular rate and rhythm, normal S1 S2, no S3 or S4, no murmur, click or rub, no peripheral edema and peripheral pulses strong  ABDOMEN: soft, nontender, no hepatosplenomegaly, no masses and bowel sounds normal  MS: no gross musculoskeletal defects noted, no edema  SKIN: no suspicious lesions or rashes  NEURO: Normal strength and tone, mentation intact and speech normal  PSYCH: mentation appears normal, affect normal/bright    ASSESSMENT / PLAN:   (Z00.00) Encounter for Medicare annual wellness exam  (primary encounter diagnosis)  Comment: Blood pressure above target goal of less than 140/90.  Carvedilol increased to 12.5 mg twice daily.  Suggested to continue amlodipine 10 mg and losartan 50 mg daily.  Healthy lifestyle modifications stressed including regular exercise and limiting salt/caffeine.  Follow-up in 2 weeks for repeat blood pressure check      (I10) Benign essential hypertension  Comment: As above  Plan: carvedilol (COREG) 12.5 MG tablet            (N52.9) Erectile dysfunction, unspecified erectile dysfunction type  Comment: Complains of erectile dysfunction.  Treatment options discussed.  Sildenafil prescribed, drug interaction with nitroglycerin explained in detail.  Stressed on healthy lifestyle modifications  Plan: sildenafil (REVATIO) 20 MG tablet     COUNSELING:  Reviewed preventive health counseling, as reflected in patient instructions    Estimated body mass index is 21.79 kg/m  as calculated from the following:    Height as of this encounter: 1.676 m (5' 6\").    Weight as of this encounter: 61.2 kg (135 lb).        He reports that he quit smoking about 21 years ago. He has a 30.00 pack-year smoking history. He has never used smokeless tobacco.      Appropriate preventive services were discussed with this " patient, including applicable screening as appropriate for cardiovascular disease, diabetes, osteopenia/osteoporosis, and glaucoma.  As appropriate for age/gender, discussed screening for colorectal cancer, prostate cancer, breast cancer, and cervical cancer. Checklist reviewing preventive services available has been given to the patient.    Reviewed patients plan of care and provided an AVS. The Basic Care Plan (routine screening as documented in Health Maintenance) for Js meets the Care Plan requirement. This Care Plan has been established and reviewed with the Patient.    Counseling Resources:  ATP IV Guidelines  Pooled Cohorts Equation Calculator  Breast Cancer Risk Calculator  Breast Cancer: Medication to Reduce Risk  FRAX Risk Assessment  ICSI Preventive Guidelines  Dietary Guidelines for Americans, 2010  [employer]'s MyPlate  ASA Prophylaxis  Lung CA Screening    Fortino Hyman MD  [facility]    Identified Health Risks:    He is at risk for lack of exercise and has been provided with information to increase physical activity for the benefit of his well-being.  The patient was provided with written information regarding signs of hearing loss.

## 2021-08-23 NOTE — NURSING NOTE
"Chief Complaint   Patient presents with     Physical     BP (!) 144/82 (Cuff Size: Adult Regular)   Pulse 70   Temp 97.3  F (36.3  C) (Tympanic)   Resp 18   Ht 1.676 m (5' 6\")   Wt 61.2 kg (135 lb)   BMI 21.79 kg/m   Estimated body mass index is 21.79 kg/m  as calculated from the following:    Height as of this encounter: 1.676 m (5' 6\").    Weight as of this encounter: 61.2 kg (135 lb).  Patient presents to the clinic using No DME      Health Maintenance that is potentially due pending provider review:    Health Maintenance Due   Topic Date Due     ANNUAL REVIEW OF HM ORDERS  Never done     AORTIC ANEURYSM SCREENING (SYSTEM ASSIGNED)  Never done                "

## 2021-08-23 NOTE — PATIENT INSTRUCTIONS
Patient Education   Personalized Prevention Plan  You are due for the preventive services outlined below.  Your care team is available to assist you in scheduling these services.  If you have already completed any of these items, please share that information with your care team to update in your medical record.  Health Maintenance Due   Topic Date Due     ANNUAL REVIEW OF HM ORDERS  Never done     AORTIC ANEURYSM SCREENING (SYSTEM ASSIGNED)  Never done       Exercise for a Healthier Heart  You may wonder how you can improve the health of your heart. If you re thinking about exercise, you re on the right track. You don t need to become an athlete. But you do need a certain amount of brisk exercise to help strengthen your heart. If you have been diagnosed with a heart condition, your healthcare provider may advise exercise to help stabilize your condition. To help make exercise a habit, choose safe, fun activities.      Exercise with a friend. When activity is fun, you're more likely to stick with it.   Before you start  Check with your healthcare provider before starting an exercise program. This is especially important if you have not been active for a while. It's also important if you have a long-term (chronic) health problem such as heart disease, diabetes, or obesity. Or if you are at high risk for having these problems.   Why exercise?  Exercising regularly offers many healthy rewards. It can help you do all of the following:     Improve your blood cholesterol level to help prevent further heart trouble    Lower your blood pressure to help prevent a stroke or heart attack    Control diabetes, or reduce your risk of getting this disease    Improve your heart and lung function    Reach and stay at a healthy weight    Make your muscles stronger so you can stay active    Prevent falls and fractures by slowing the loss of bone mass (osteoporosis)    Manage stress better    Reduce your blood pressure    Improve  your sense of self and your body image  Exercise tips      Ease into your routine. Set small goals. Then build on them. If you are not sure what your activity level should be, talk with your healthcare provider first before starting an exercise routine.    Exercise on most days. Aim for a total of 150 minutes (2 hours and 30 minutes) or more of moderate-intensity aerobic activity each week. Or 75 minutes (1 hour and 15 minutes) or more of vigorous-intensity aerobic activity each week. Or try for a combination of both. Moderate activity means that you breathe heavier and your heart rate increases but you can still talk. Think about doing 40 minutes of moderate exercise, 3 to 4 times a week. For best results, activity should last for about 40 minutes to lower blood pressure and cholesterol. It's OK to work up to the 40-minute period over time. Examples of moderate-intensity activity are walking 1 mile in 15 minutes. Or doing 30 to 45 minutes of yard work.    Step up your daily activity level.  Along with your exercise program, try being more active the whole day. Walk instead of drive. Or park further away so that you take more steps each day. Do more household tasks or yard work. You may not be able to meet the advised mount of physical activity. But doing some moderate- or vigorous-intensity aerobic activity can help reduce your risk for heart disease. Your healthcare provider can help you figure out what is best for you.    Choose 1 or more activities you enjoy.  Walking is one of the easiest things you can do. You can also try swimming, riding a bike, dancing, or taking an exercise class.    When to call your healthcare provider  Call your healthcare provider if you have any of these:     Chest pain or feel dizzy or lightheaded    Burning, tightness, pressure, or heaviness in your chest, neck, shoulders, back, or arms    Abnormal shortness of breath    More joint or muscle pain    A very fast or irregular  heartbeat (palpitations)  [employer] last reviewed this educational content on 7/1/2019 2000-2021 [employer]. All rights reserved. This information is not intended as a substitute for professional medical care. Always follow your healthcare professional's instructions.          Signs of Hearing Loss      Hearing much better with one ear can be a sign of hearing loss.   Hearing loss is a problem shared by many people. In fact, it is one of the most common health problems, particularly as people age. Most people age 65 and older have some hearing loss. By age 80, almost everyone does. Hearing loss often occurs slowly over the years. So you may not realize your hearing has gotten worse.  Have your hearing checked  Call your healthcare provider if you:    Have to strain to hear normal conversation    Have to watch other people s faces very carefully to follow what they re saying    Need to ask people to repeat what they ve said    Often misunderstand what people are saying    Turn the volume of the television or radio up so high that others complain    Feel that people are mumbling when they re talking to you    Find that the effort to hear leaves you feeling tired and irritated    Notice, when using the phone, that you hear better with one ear than the other  [employer] last reviewed this educational content on 1/1/2020 2000-2021 [employer]. All rights reserved. This information is not intended as a substitute for professional medical care. Always follow your healthcare professional's instructions.           Patient Education     Prevention Guidelines, Men Ages 65 and Older  Screening tests and vaccines are an important part of managing your health.A screening test is done to find possible disorders or diseases in people who don't have any symptoms. The goal is to find a disease early so lifestyle changes can be made and you can be watched more closely to reduce the risk of disease, or  to detect it early enough to treat it most effectively. Screening tests are not considered diagnostic, but are used to determine if more testing is needed.  Health counseling is essential, too. Below are guidelines for these, for men ages 65 and older. Talk with your healthcare provider to make sure you re up-to-date on what you need.  Screening Who needs it How often   Abdominal aortic aneurysm Men ages 65 to 75 who have ever smoked. Men in this age group who have never smoked could still be offered screening, depending on their family history or other risk factors they may have. 1-time ultrasound   Unhealthy alcohol use All men in this age group At routine exams   Blood pressure All men in this age group Yearly checkup if your blood pressure is normal  Normal blood pressure is less than 120/80 mm Hg  If your blood pressure reading is higher than normal, follow the advice of your healthcare provider   Colorectal cancer All men at average risk in this age group through age 75 who are in good health. For men ages 76 to 85, talk with your healthcare provider to see if you should continue screening. For men 85 and older, screening is not needed. Several tests are available and are used at different times. Possible tests include:    Flexible sigmoidoscopy every 5 years, or    Colonoscopy every 10 years, or    CT colonography (virtual colonoscopy) every 5 years, or    Yearly fecal occult blood test, or    Stool DNA test every 3 years  If you choose a test other than a colonoscopy and have an abnormal test result, you will need to have a colonoscopy. Screening recommendations vary among expert groups. Talk with your healthcare provider about which tests are best for you.  Some men should be screened using a different schedule because of their personal or family history. Talk with your healthcare provider about your health history.   Depression All men in this age group At routine exams   Type 2 diabetes or prediabetes All  men beginning at age 45 and men without symptoms at any age who are overweight or obese and have one or more other risk factors for diabetes At least every 3 years (yearly if your blood sugar has already begun to rise)   Type 2 diabetes All men with prediabetes Every year   Hepatitis C Men at increased risk for infection; those born between 1945 and 1965 At routine exams   High cholesterol or triglycerides All men in this age group At least every 5 years   HIV Men at increased risk for infection At routine exams   Lung cancer Men ages 55 to 74 who are in fairly good health and are at higher risk for lung cancer    Currently smoke or who have quite within the past 15 years    30-pack year smoking history  Eligibility criteria and age limit (possibly up to age 80) may vary across major organizations  Talk with your healthcare provider for more information Yearly lung screening in smokers with low-dose CT scan (LDCT)   Obesity All men in this age group At yearly routine exams   Prostate cancer All men in this age group, talk with your healthcare provider about risks and benefits of testing with digital rectal exam (BENJAMIN) and prostate-specific antigen (PSA) screening At routine exams if you decide to be tested   Syphilis Men at increased risk for infection At routine exams   Tuberculosis Men at increased risk for infection Talk with your healthcare provider   Vision All men in this age group Every 1 to 2 years; if you have a chronic health condition, ask your healthcare provider if you need exams more often   Vaccine Who needs it How often   Chickenpox (varicella) All men in this age group who have no record of this infection or vaccine 2 doses; second dose should be given at least 4 weeks after the first dose   Hepatitis A Men at increased risk for infection 2 or 3 doses (depending on vaccine) given at least 6 months apart   Hepatitis B Men at increased risk for infection 2 or 3 doses (depending on the vaccine); second  dose should be given 1 month after the first dose; if a third dose, it should be given at least 2 months after the second dose and at least 4 months after the first dose   Haemophilus influenzae type B (HIB) Men at increased risk for infection 1 to 3 doses   Influenza (flu) All men in this age group Once a year   Meningococcal Men at increased risk for infection 1, 2, or 3 doses (depending on vaccine); ask your healthcare provider if you need a booster dose   Pneumococcal conjugate vaccine (PCV13) and pneumococcal polysaccharide vaccine (PPSV23) PPSV 23: All men in this age group who have not been vaccinated or have not had infection  PCV 13: Men at risk for infection PPSV 23: 1 dose  PCV 13: 1 dose   Tetanus/diphtheria/pertussis (Td/Tdap) booster All men in this age group Td every 10 years, or a 1-time dose of Tdap instead of a Td booster, then Td every 10 years   Zoster (shingles) All men in this age group 2 vaccines are available:    Recombinant zoster vaccine (RZV; Shingrix) is the preferred shingles vaccine. It's given in a series of 2 doses. The second dose is given 2 to 6 months after the first. This is given even if you had shingles before or have had a zoster live vaccine in the past.    Zoster live vaccine (ZVL; Zostavax) may be given to healthy adults over age 60. It's given in one dose.   Counseling Who needs it How often   Diet and exercise Men who are overweight or obese When diagnosed, and then at routine exams   Fall prevention (exercise, vitamin D supplements) All men in this age group At yearly routine exams   Sexually transmitted infection Men at increased risk for infection At yearly routine exams   Use of daily aspirin Men up to age 70 who are at high risk for cardiovascular problems and not at an increased risk of bleeding as identified by your healthcare provider When your risk is known   Use of tobacco and the health effects it can cause All men in this age group Every visit   [employer] last  reviewed this educational content on 4/1/2020 2000-2021 [employer]. All rights reserved. This information is not intended as a substitute for professional medical care. Always follow your healthcare professional's instructions.

## 2021-09-07 ENCOUNTER — OFFICE VISIT (OUTPATIENT)
Dept: FAMILY MEDICINE | Facility: CLINIC | Age: 66
End: 2021-09-07
Payer: COMMERCIAL

## 2021-09-07 VITALS
TEMPERATURE: 97.1 F | HEART RATE: 92 BPM | DIASTOLIC BLOOD PRESSURE: 70 MMHG | SYSTOLIC BLOOD PRESSURE: 120 MMHG | HEIGHT: 66 IN | BODY MASS INDEX: 21.86 KG/M2 | WEIGHT: 136 LBS

## 2021-09-07 DIAGNOSIS — Z23 NEED FOR PROPHYLACTIC VACCINATION AND INOCULATION AGAINST INFLUENZA: ICD-10-CM

## 2021-09-07 DIAGNOSIS — Z23 INFLUENZA VACCINE NEEDED: ICD-10-CM

## 2021-09-07 DIAGNOSIS — E11.59 TYPE 2 DIABETES MELLITUS WITH OTHER CIRCULATORY COMPLICATION, WITHOUT LONG-TERM CURRENT USE OF INSULIN (H): ICD-10-CM

## 2021-09-07 DIAGNOSIS — I10 BENIGN ESSENTIAL HYPERTENSION: Primary | ICD-10-CM

## 2021-09-07 PROCEDURE — 90662 IIV NO PRSV INCREASED AG IM: CPT | Performed by: FAMILY MEDICINE

## 2021-09-07 PROCEDURE — 99214 OFFICE O/P EST MOD 30 MIN: CPT | Mod: 25 | Performed by: FAMILY MEDICINE

## 2021-09-07 PROCEDURE — G0008 ADMIN INFLUENZA VIRUS VAC: HCPCS | Performed by: FAMILY MEDICINE

## 2021-09-07 ASSESSMENT — MIFFLIN-ST. JEOR: SCORE: 1329.64

## 2021-09-07 NOTE — PATIENT INSTRUCTIONS
Patient Education     Low-Salt Choices  Eating salt (sodium) can make your body retain too much water. Extra water makes your heart work harder. Canned, packaged, and frozen foods are easy to prepare. But they are often high in sodium. Here are some ideas for low-salt foods you can easily make yourself.   For breakfast    Fruit or 100% fruit juice. It's better to have whole fruit instead of 100% fruit juice.    Whole-wheat bread or an English muffin. Look for sodium content on Nutrition Facts labels.    Low-fat milk or yogurt    Unsalted eggs    Shredded wheat    Corn tortillas    Unsalted steamed rice    Regular (not instant) hot cereal, made without salt    Stay away from    Sausage, flores, and ham    Flour tortillas    Packaged muffins, pancakes, and biscuits    Instant hot cereals    Cottage cheese    For lunch and dinner    Fresh fish, chicken, turkey, or meat--baked, broiled, or roasted without salt    Dry beans, cooked without salt    Tofu, stir-fried without salt    Unsalted fresh fruit and vegetables, or frozen or canned fruit and vegetables with no added salt  Stay away from    Lunch or deli meat that is cured or smoked    Cheese    Tomato juice and ketchup    Canned vegetables, soups, and fish not labeled as no-salt-added or reduced sodium    Packaged gravies and sauces    Olives, pickles, and relish    Bottled salad dressings    For snacks and desserts    Yogurt    Unsalted, air-popped popcorn    Unsalted nuts or seeds  Stay away from    Pies and cakes    Packaged dessert mixes    Pizza    Canned and packaged puddings    Pretzels, chips, crackers, and nuts--unless the label says unsalted    [employer] last reviewed this educational content on 11/1/2019 2000-2021 [employer]. All rights reserved. This information is not intended as a substitute for professional medical care. Always follow your healthcare professional's instructions.

## 2021-09-07 NOTE — PROGRESS NOTES
Assessment & Plan     (I10) Benign essential hypertension, goal <140/90  (primary encounter diagnosis)  Comment: Blood pressure within target goal of less than 140/90.  Suggested to continue amlodipine, losartan and carvedilol along with regular exercise and healthy diet      (E11.59) Type 2 diabetes mellitus with other circulatory complication, without long-term current use of insulin (H)  Comment: Last hemoglobin A1c 7.1, consistent with well controlled diabetes.  Suggested to continue Metformin and glipizide.  Patient deferred diabetic educator referral.  Lab Results   Component Value Date    A1C 7.1 07/14/2021    A1C 8.5 02/22/2021    A1C 8.1 01/06/2020    A1C 6.9 06/07/2019    A1C 6.8 09/15/2018    A1C 7.2 06/15/2018         Patient Instructions     Patient Education     Low-Salt Choices  Eating salt (sodium) can make your body retain too much water. Extra water makes your heart work harder. Canned, packaged, and frozen foods are easy to prepare. But they are often high in sodium. Here are some ideas for low-salt foods you can easily make yourself.   For breakfast    Fruit or 100% fruit juice. It's better to have whole fruit instead of 100% fruit juice.    Whole-wheat bread or an English muffin. Look for sodium content on Nutrition Facts labels.    Low-fat milk or yogurt    Unsalted eggs    Shredded wheat    Corn tortillas    Unsalted steamed rice    Regular (not instant) hot cereal, made without salt    Stay away from    Sausage, flores, and ham    Flour tortillas    Packaged muffins, pancakes, and biscuits    Instant hot cereals    Cottage cheese    For lunch and dinner    Fresh fish, chicken, turkey, or meat--baked, broiled, or roasted without salt    Dry beans, cooked without salt    Tofu, stir-fried without salt    Unsalted fresh fruit and vegetables, or frozen or canned fruit and vegetables with no added salt  Stay away from    Lunch or deli meat that is cured or smoked    Cheese    Tomato juice and  ketchup    Canned vegetables, soups, and fish not labeled as no-salt-added or reduced sodium    Packaged gravies and sauces    Olives, pickles, and relish    Bottled salad dressings    For snacks and desserts    Yogurt    Unsalted, air-popped popcorn    Unsalted nuts or seeds  Stay away from    Pies and cakes    Packaged dessert mixes    Pizza    Canned and packaged puddings    Pretzels, chips, crackers, and nuts--unless the label says unsalted    [employer] last reviewed this educational content on 11/1/2019 2000-2021 [employer]. All rights reserved. This information is not intended as a substitute for professional medical care. Always follow your healthcare professional's instructions.                 Fortino Hyman MD  [facility]    Subjective   Js is a 66 year old who presents for the following health issues  HPI     Diabetes Follow-up    How often are you checking your blood sugar? Three times daily  Blood sugar testing frequency justification:  Patient modifying lifestyle changes (diet, exercise) with blood sugars  What time of day are you checking your blood sugars (select all that apply)?  Before meals  Have you had any blood sugars above 200?  Yes   Have you had any blood sugars below 70?  No    What symptoms do you notice when your blood sugar is low?  None    What concerns do you have today about your diabetes? Blood sugar is often over 200     Do you have any of these symptoms? (Select all that apply)  No numbness or tingling in feet.  No redness, sores or blisters on feet.  No complaints of excessive thirst.  No reports of blurry vision.  No significant changes to weight.    BP Readings from Last 2 Encounters:   09/07/21 124/70   08/23/21 (!) 150/90     Hemoglobin A1C (%)   Date Value   07/14/2021 7.1 (H)   02/22/2021 8.5 (H)   01/06/2020 8.1 (H)     LDL Cholesterol Calculated (mg/dL)   Date Value   07/14/2021 54   01/06/2020 70   09/17/2018 54  "      Hypertension Follow-up      Do you check your blood pressure regularly outside of the clinic? Yes     Are you following a low salt diet? Yes    Are your blood pressures ever more than 140 on the top number (systolic) OR more   than 90 on the bottom number (diastolic), for example 140/90? Yes      How many servings of fruits and vegetables do you eat daily?  0-1    On average, how many sweetened beverages do you drink each day (Examples: soda, juice, sweet tea, etc.  Do NOT count diet or artificially sweetened beverages)?   0    How many days per week do you exercise enough to make your heart beat faster?  2-3 days a week    How many minutes a day do you exercise enough to make your heart beat faster? 10 - 19    How many days per week do you miss taking your medication? 0        Review of Systems   Constitutional, HEENT, cardiovascular, pulmonary, GI, , musculoskeletal, neuro, skin, endocrine and psych systems are negative, except as otherwise noted.      Objective    /70 (BP Location: Right arm, Cuff Size: Adult Regular)   Pulse 92   Temp 97.1  F (36.2  C) (Tympanic)   Ht 1.676 m (5' 6\")   Wt 61.7 kg (136 lb)   BMI 21.95 kg/m    Body mass index is 21.95 kg/m .  Physical Exam   GENERAL: alert and no distress  NECK: no adenopathy, no asymmetry, masses, or scars and thyroid normal to palpation  RESP: lungs clear to auscultation - no rales, rhonchi or wheezes  CV: regular rate and rhythm, normal S1 S2, no S3 or S4, no murmur, click or rub, no peripheral edema and peripheral pulses strong  ABDOMEN: soft, nontender, no hepatosplenomegaly, no masses  MS: no gross musculoskeletal defects noted, no edema  NEURO: Normal strength and tone, mentation intact and speech normal  PSYCH: mentation appears normal, affect normal/bright        "

## 2021-11-10 ENCOUNTER — OFFICE VISIT (OUTPATIENT)
Dept: FAMILY MEDICINE | Facility: CLINIC | Age: 66
End: 2021-11-10
Payer: COMMERCIAL

## 2021-11-10 ENCOUNTER — ANCILLARY PROCEDURE (OUTPATIENT)
Dept: GENERAL RADIOLOGY | Facility: CLINIC | Age: 66
End: 2021-11-10
Attending: FAMILY MEDICINE
Payer: COMMERCIAL

## 2021-11-10 VITALS
DIASTOLIC BLOOD PRESSURE: 64 MMHG | WEIGHT: 142 LBS | HEART RATE: 74 BPM | OXYGEN SATURATION: 97 % | RESPIRATION RATE: 24 BRPM | SYSTOLIC BLOOD PRESSURE: 126 MMHG | TEMPERATURE: 97.6 F | BODY MASS INDEX: 22.92 KG/M2

## 2021-11-10 DIAGNOSIS — M25.511 CHRONIC RIGHT SHOULDER PAIN: ICD-10-CM

## 2021-11-10 DIAGNOSIS — G89.29 CHRONIC RIGHT SHOULDER PAIN: Primary | ICD-10-CM

## 2021-11-10 DIAGNOSIS — M25.511 CHRONIC RIGHT SHOULDER PAIN: Primary | ICD-10-CM

## 2021-11-10 DIAGNOSIS — G89.29 CHRONIC RIGHT SHOULDER PAIN: ICD-10-CM

## 2021-11-10 PROCEDURE — 73030 X-RAY EXAM OF SHOULDER: CPT | Mod: RT | Performed by: RADIOLOGY

## 2021-11-10 PROCEDURE — 20610 DRAIN/INJ JOINT/BURSA W/O US: CPT | Mod: RT | Performed by: FAMILY MEDICINE

## 2021-11-10 PROCEDURE — 99213 OFFICE O/P EST LOW 20 MIN: CPT | Mod: 25 | Performed by: FAMILY MEDICINE

## 2021-11-10 RX ORDER — TRIAMCINOLONE ACETONIDE 40 MG/ML
40 INJECTION, SUSPENSION INTRA-ARTICULAR; INTRAMUSCULAR ONCE
Status: COMPLETED | OUTPATIENT
Start: 2021-11-10 | End: 2021-11-10

## 2021-11-10 RX ADMIN — TRIAMCINOLONE ACETONIDE 40 MG: 40 INJECTION, SUSPENSION INTRA-ARTICULAR; INTRAMUSCULAR at 13:32

## 2021-11-10 ASSESSMENT — PAIN SCALES - GENERAL: PAINLEVEL: EXTREME PAIN (8)

## 2021-11-10 NOTE — PROGRESS NOTES
Assessment & Plan     Chronic right shoulder pain  Differentials discussed in detail including rotator cuff tendinopathy, osteoarthritis, shoulder impingement syndrome.  Steroid injection administered in office today.  Suggested physical therapy, over-the-counter analgesia and activity as tolerated.  All questions answered.  - Large Joint/Bursa injection and/or drainage (Shoulder, Knee)  - triamcinolone (KENALOG-40) injection 40 mg  - REVIEW OF HEALTH MAINTENANCE PROTOCOL ORDERS  - XR Shoulder Right G/E 3 Views; Future  - Physical Therapy Referral; Future      Fortino Hyman MD  [facility]    Subjective   Js is a 66 year old who presents for the following health issues     HPI     Musculoskeletal problem/pain  Onset/Duration: years but worse past 6 months  Description  Location: shoulders - bilateral right > left  Joint Swelling: no  Redness: no  Pain: YES  Warmth: no  Intensity:  severe  Progression of Symptoms:  worsening  Accompanying signs and symptoms:   Fevers: no  Numbness/tingling/weakness: no  History  Trauma to the area: no  Recent illness:  no  Previous similar problem: YES  Previous evaluation:  YES injection years ago  Precipitating or alleviating factors:  Aggravating factors include: lifting, exercise and overuse  Therapies tried and outcome: acetaminophen is some help        Review of Systems   Constitutional, HEENT, cardiovascular, pulmonary, gi and gu systems are negative, except as otherwise noted.      Objective    /64   Pulse 74   Temp 97.6  F (36.4  C)   Resp 24   Wt 64.4 kg (142 lb)   SpO2 97%   BMI 22.92 kg/m    Body mass index is 22.92 kg/m .  Physical Exam   GENERAL: alert and no distress  NECK: no adenopathy, no asymmetry, masses, or scars and thyroid normal to palpation  RESP: lungs clear to auscultation - no rales, rhonchi or wheezes  CV: regular rate and rhythm, normal S1 S2, no S3 or S4, no murmur, click or rub, no peripheral edema and  peripheral pulses strong  ABDOMEN: soft, nontender, no hepatosplenomegaly, no masses and bowel sounds normal  MS: slightly limited right shoulder external and internal rotation.  No joint swelling or skin discoloration noted, normal upper and lower extremities strength, radial pulses 3+  NEURO: Normal strength and tone, mentation intact and speech normal        PROCEDURE:  JOINT INJECTION.    After a discussion of risks, benefits and side effects of procedure, informed patient consent was obtained.  The right shoulder was prepped and draped in the usual clean fashion (sterile not required for this procedure).  3 cc of 1 % lidocaine was used for local analgesia.    ASPIRATION: Not performed.    INJECTION:  Using 3 cc of 1 % lidocaine mixed with 40 mg of Kenalog, the right shoulder was successfully injected without complication.  Band-Aid applied and routine postprocedure care discussed.    Fortino Hyman MD  [facility]

## 2021-11-30 DIAGNOSIS — I10 BENIGN ESSENTIAL HYPERTENSION: ICD-10-CM

## 2021-11-30 RX ORDER — CARVEDILOL 12.5 MG/1
TABLET ORAL
Qty: 90 TABLET | Refills: 1 | Status: SHIPPED | OUTPATIENT
Start: 2021-11-30 | End: 2022-03-14

## 2022-01-13 DIAGNOSIS — I10 BENIGN ESSENTIAL HYPERTENSION: ICD-10-CM

## 2022-01-17 ENCOUNTER — TELEPHONE (OUTPATIENT)
Dept: FAMILY MEDICINE | Facility: CLINIC | Age: 67
End: 2022-01-17
Payer: COMMERCIAL

## 2022-01-17 RX ORDER — AMLODIPINE BESYLATE 10 MG/1
TABLET ORAL
Qty: 90 TABLET | Refills: 2 | Status: SHIPPED | OUTPATIENT
Start: 2022-01-17 | End: 2022-09-07

## 2022-01-17 NOTE — TELEPHONE ENCOUNTER
"Requested Prescriptions   Pending Prescriptions Disp Refills     amLODIPine (NORVASC) 10 MG tablet [Pharmacy Med Name: amLODIPine Besylate 10 MG Oral Tablet] 90 tablet 0     Sig: Take 1 tablet by mouth once daily       Calcium Channel Blockers Protocol  Passed - 1/17/2022 11:41 AM        Passed - Blood pressure under 140/90 in past 12 months     BP Readings from Last 3 Encounters:   11/10/21 126/64   09/07/21 120/70   08/23/21 (!) 150/90                 Passed - Recent (12 mo) or future (30 days) visit within the authorizing provider's specialty     Patient has had an office visit with the authorizing provider or a provider within the authorizing providers department within the previous 12 mos or has a future within next 30 days. See \"Patient Info\" tab in inbasket, or \"Choose Columns\" in Meds & Orders section of the refill encounter.              Passed - Medication is active on med list        Passed - Patient is age 18 or older        Passed - Normal serum creatinine on file in past 12 months     Recent Labs   Lab Test 03/22/21  1012   CR 0.85       Ok to refill medication if creatinine is low               "

## 2022-01-17 NOTE — TELEPHONE ENCOUNTER
Reason for Call:  Medication refill.      Detailed comments: Patient called stating he is out of his amlodipine.  He called on the 13th for a refill and is still waiting.  Pharmacy is [facility].      Phone Number Patient can be reached at: [phone]    Can we leave a detailed message on this number?  Yes    Call taken on 1/17/2022 at 11:44 AM by Jess Ortiz

## 2022-03-07 ENCOUNTER — OFFICE VISIT (OUTPATIENT)
Dept: FAMILY MEDICINE | Facility: CLINIC | Age: 67
End: 2022-03-07
Payer: COMMERCIAL

## 2022-03-07 VITALS
RESPIRATION RATE: 18 BRPM | BODY MASS INDEX: 21.38 KG/M2 | DIASTOLIC BLOOD PRESSURE: 62 MMHG | HEIGHT: 66 IN | WEIGHT: 133 LBS | SYSTOLIC BLOOD PRESSURE: 110 MMHG | TEMPERATURE: 97.2 F | HEART RATE: 70 BPM

## 2022-03-07 DIAGNOSIS — Z12.11 COLON CANCER SCREENING: ICD-10-CM

## 2022-03-07 DIAGNOSIS — E11.69 TYPE 2 DIABETES MELLITUS WITH OTHER SPECIFIED COMPLICATION, WITHOUT LONG-TERM CURRENT USE OF INSULIN (H): Primary | ICD-10-CM

## 2022-03-07 DIAGNOSIS — E78.5 HYPERLIPIDEMIA LDL GOAL <100: ICD-10-CM

## 2022-03-07 DIAGNOSIS — I10 BENIGN ESSENTIAL HYPERTENSION: ICD-10-CM

## 2022-03-07 DIAGNOSIS — Z13.6 SCREENING FOR AAA (ABDOMINAL AORTIC ANEURYSM): ICD-10-CM

## 2022-03-07 LAB
ANION GAP SERPL CALCULATED.3IONS-SCNC: 6 MMOL/L (ref 3–14)
BUN SERPL-MCNC: 19 MG/DL (ref 7–30)
CALCIUM SERPL-MCNC: 9.2 MG/DL (ref 8.5–10.1)
CHLORIDE BLD-SCNC: 105 MMOL/L (ref 94–109)
CO2 SERPL-SCNC: 25 MMOL/L (ref 20–32)
CREAT SERPL-MCNC: 0.75 MG/DL (ref 0.66–1.25)
GFR SERPL CREATININE-BSD FRML MDRD: >90 ML/MIN/1.73M2
GLUCOSE BLD-MCNC: 273 MG/DL (ref 70–99)
HBA1C MFR BLD: 11.2 % (ref 0–5.6)
POTASSIUM BLD-SCNC: 4.4 MMOL/L (ref 3.4–5.3)
SODIUM SERPL-SCNC: 136 MMOL/L (ref 133–144)

## 2022-03-07 PROCEDURE — 83036 HEMOGLOBIN GLYCOSYLATED A1C: CPT | Performed by: FAMILY MEDICINE

## 2022-03-07 PROCEDURE — 99214 OFFICE O/P EST MOD 30 MIN: CPT | Performed by: FAMILY MEDICINE

## 2022-03-07 PROCEDURE — 36415 COLL VENOUS BLD VENIPUNCTURE: CPT | Performed by: FAMILY MEDICINE

## 2022-03-07 PROCEDURE — 80048 BASIC METABOLIC PNL TOTAL CA: CPT | Performed by: FAMILY MEDICINE

## 2022-03-07 ASSESSMENT — PAIN SCALES - GENERAL: PAINLEVEL: NO PAIN (0)

## 2022-03-07 NOTE — NURSING NOTE
"Chief Complaint   Patient presents with     Hypertension     Diabetes     Lipids     /62 (Cuff Size: Adult Regular)   Pulse 70   Temp 97.2  F (36.2  C) (Tympanic)   Resp 18   Ht 1.676 m (5' 6\")   Wt 60.3 kg (133 lb)   BMI 21.47 kg/m   Estimated body mass index is 21.47 kg/m  as calculated from the following:    Height as of this encounter: 1.676 m (5' 6\").    Weight as of this encounter: 60.3 kg (133 lb).  Patient presents to the clinic using No DME      Health Maintenance that is potentially due pending provider review:    Health Maintenance Due   Topic Date Due     AORTIC ANEURYSM SCREENING (SYSTEM ASSIGNED)  Never done     A1C  01/14/2022     COLORECTAL CANCER SCREENING  03/03/2022     BMP  03/22/2022                "

## 2022-03-07 NOTE — PROGRESS NOTES
Assessment & Plan     Type 2 diabetes mellitus with other specified complication, without long-term current use of insulin (H)  Lab Results   Component Value Date    A1C 7.1 07/14/2021    A1C 8.5 02/22/2021    A1C 8.1 01/06/2020    A1C 6.9 06/07/2019    A1C 6.8 09/15/2018    A1C 7.2 06/15/2018   Healthy lifestyle modifications stressed including regular exercise, eating fewer sweets, drinking water instead of soda, and balanced diet. Encouraged patient to stay consistent with diabetes medications.   - Hemoglobin A1c; Future    Benign essential hypertension  Discussed limiting salt intake and stressed balanced diet and regular exercise. Encouraged patient to stay consisted with hypertension medications. BP was in goal today in clinic.   - Basic metabolic panel  (Ca, Cl, CO2, Creat, Gluc, K, Na, BUN); Future    Hyperlipidemia LDL goal <100  Healthy lifestyle modifications stressed. Encouraged patient to stay consistent with taking statin medication.      Colon cancer screening  Encouraged patient to complete FIT testing for colon cancer screening. Patient plans on doing so.  - Fecal colorectal cancer screen (FIT); Future    Screening for AAA (abdominal aortic aneurysm)  Encouraged patient to get AAA ultrasound due to age and smoking history. Educated patient on the reason for the test. Patient stated he would get screened, and information for screening provided.  - US Abdominal Aorta Imaging; Future    JADA Valencia    I have reviewed today's vital signs, notes, medications, labs and imaging. I have also seen and examined the patient and agree with the findings and plan as outlined above.    Fortino Hyman MD  [facility]    Subjective   Js is a 66 year old who presents for the following health issues     History of Present Illness       Diabetes:   He presents for follow up of diabetes.  He is checking home blood glucose a few times a week. He checks blood glucose before meals.   "Blood glucose is sometimes over 200 and never under 70. He is aware of hypoglycemia symptoms including shakiness. He has no concerns regarding his diabetes at this time.  He is not experiencing numbness or burning in feet, excessive thirst, blurry vision, weight changes or redness, sores or blisters on feet.         Hyperlipidemia:  He presents for follow up of hyperlipidemia.  He is taking medication to lower cholesterol. He is not having myalgia or other side effects to statin medications.    Hypertension: He presents for follow up of hypertension.  He does check blood pressure  regularly outside of the clinic. Outpatient blood pressures have not been over 140/90. He follows a low salt diet.     He eats 0-1 servings of fruits and vegetables daily.He consumes 0 sweetened beverage(s) daily.He exercises with enough effort to increase his heart rate 9 or less minutes per day.  He is missing 1 dose(s) of medications per week.     BP Readings from Last 3 Encounters:   03/07/22 110/62   11/10/21 126/64   09/07/21 120/70      Lab Results   Component Value Date    A1C 7.1 07/14/2021    A1C 8.5 02/22/2021    A1C 8.1 01/06/2020    A1C 6.9 06/07/2019    A1C 6.8 09/15/2018    A1C 7.2 06/15/2018         Review of Systems   Constitutional, HEENT, cardiovascular, pulmonary, GI, , musculoskeletal, neuro, skin, endocrine and psych systems are negative, except as otherwise noted.      Objective    /62 (Cuff Size: Adult Regular)   Pulse 70   Temp 97.2  F (36.2  C) (Tympanic)   Resp 18   Ht 1.676 m (5' 6\")   Wt 60.3 kg (133 lb)   BMI 21.47 kg/m    Body mass index is 21.47 kg/m .  Physical Exam   GENERAL: alert and no distress  EYES: Eyes grossly normal to inspection, PERRL and conjunctivae and sclerae normal  HENT: normal cephalic/atraumatic, nose and mouth without ulcers or lesions, oropharynx clear and oral mucous membranes moist  NECK: no adenopathy, no asymmetry, masses, or scars and thyroid normal to " palpation  RESP: lungs clear to auscultation - no rales, rhonchi or wheezes  CV: regular rate and rhythm, normal S1 S2, no S3 or S4, no murmur, click or rub, no peripheral edema and peripheral pulses strong  ABDOMEN: soft, nontender, no hepatosplenomegaly, no masses and bowel sounds normal  MS: no gross musculoskeletal defects noted, no edema  SKIN: no suspicious lesions or rashes  NEURO: Normal strength and tone, mentation intact and speech normal  PSYCH: mentation appears normal, affect normal/bright

## 2022-03-07 NOTE — PATIENT INSTRUCTIONS
Please call [phone] to schedule U/S aorta.       Patient Education     Diet: Diabetes  Food is an important tool that you can use to control diabetes and stay healthy. Eating well-balanced meals in the correct amounts will help you control your blood glucose levels and prevent low blood sugar reactions. It will also help you reduce the health risks of diabetes. There is no one specific diet that is right for everyone with diabetes. But there are general guidelines to follow. A registered dietitian (ZABRINA) will create a tailored diet approach that s just right for you. He or she will also help you plan healthy meals and snacks. If you have any questions, call your dietitian for advice.  Guidelines for success  Talk with your healthcare provider before starting a diabetes diet or weight loss program. If you haven't talked with a dietitian yet, ask your provider for a referral. The following guidelines can help you succeed:    Select foods from the 6 food groups below. Your dietitian will help you find food choices within each group. He or she will also show you serving sizes and how many servings you can have at each meal.  ? Grains, beans, and starchy vegetables  ? Vegetables  ? Fruit  ? Milk or yogurt  ? Meat, poultry, fish, or tofu  ? Healthy fats    Check your blood sugar levels as directed by your provider. Take any medicine as prescribed by your provider.    Learn to read food labels and pick the right portion sizes.    Limit carbohydrates at each meal to help manage your diabetes. The carbohydrates you eat become glucose in the blood. Talk with your healthcare provider about how many grams of carbohydrates are recommended for you at each meal. Eat 3 meals a day, at consistent times. Don't skip meals. If you are hungry between meals, eat a small, low-carbohydrate snack.    Talk with your healthcare provider if you drink alcohol. Alcohol can have unpredictable effects on blood glucose. It's also high in empty  calories and can raise a type of blood fat called triglycerides. Drink water or calorie-free diet drinks instead.    Eat less fat to help lower your risk of heart disease. Use nonfat or low-fat dairy products and lean meats. Avoid fried foods. Use cooking oils that are unsaturated, such as olive, canola, or peanut oil.    Don't eat foods with added salt. Salt can contribute to high blood pressure, which can cause heart disease. People with diabetes already have a risk for high blood pressure and heart disease.    Stay at a healthy weight. If you need to lose weight, cut down on your portion sizes. But don t skip meals. Exercise is an important part of any weight management program. Talk with your provider about an exercise program that s right for you.    For more information about the best diet plan for you, talk with an RD. To find an RD in your area, contact:  ? Academy of Nutrition and Dietetics www.eatright.org  ? American Diabetes Association [phone] www.diabetes.org  Merle last reviewed this educational content on 7/1/2019 2000-2021 [employer]. All rights reserved. This information is not intended as a substitute for professional medical care. Always follow your healthcare professional's instructions.           Patient Education     Exercise for a Healthier Heart  You may wonder how you can improve the health of your heart. If you re thinking about exercise, you re on the right track. You don t need to become an athlete. But you do need a certain amount of brisk exercise to help strengthen your heart. If you have been diagnosed with a heart condition, your healthcare provider may advise exercise to help stabilize your condition. To help make exercise a habit, choose safe, fun activities.      Exercise with a friend. When activity is fun, you're more likely to stick with it.   Before you start  Check with your healthcare provider before starting an exercise program. This is especially  important if you have not been active for a while. It's also important if you have a long-term (chronic) health problem such as heart disease, diabetes, or obesity. Or if you are at high risk for having these problems.   Why exercise?  Exercising regularly offers many healthy rewards. It can help you do all of the following:     Improve your blood cholesterol level to help prevent further heart trouble    Lower your blood pressure to help prevent a stroke or heart attack    Control diabetes, or reduce your risk of getting this disease    Improve your heart and lung function    Reach and stay at a healthy weight    Make your muscles stronger so you can stay active    Prevent falls and fractures by slowing the loss of bone mass (osteoporosis)    Manage stress better    Reduce your blood pressure    Improve your sense of self and your body image  Exercise tips      Ease into your routine. Set small goals. Then build on them. If you are not sure what your activity level should be, talk with your healthcare provider first before starting an exercise routine.    Exercise on most days. Aim for a total of 150 minutes (2 hours and 30 minutes) or more of moderate-intensity aerobic activity each week. Or 75 minutes (1 hour and 15 minutes) or more of vigorous-intensity aerobic activity each week. Or try for a combination of both. Moderate activity means that you breathe heavier and your heart rate increases but you can still talk. Think about doing 40 minutes of moderate exercise, 3 to 4 times a week. For best results, activity should last for about 40 minutes to lower blood pressure and cholesterol. It's OK to work up to the 40-minute period over time. Examples of moderate-intensity activity are walking 1 mile in 15 minutes. Or doing 30 to 45 minutes of yard work.    Step up your daily activity level.  Along with your exercise program, try being more active the whole day. Walk instead of drive. Or park further away so that you  take more steps each day. Do more household tasks or yard work. You may not be able to meet the advised mount of physical activity. But doing some moderate- or vigorous-intensity aerobic activity can help reduce your risk for heart disease. Your healthcare provider can help you figure out what is best for you.    Choose 1 or more activities you enjoy.  Walking is one of the easiest things you can do. You can also try swimming, riding a bike, dancing, or taking an exercise class.    When to call your healthcare provider  Call your healthcare provider if you have any of these:     Chest pain or feel dizzy or lightheaded    Burning, tightness, pressure, or heaviness in your chest, neck, shoulders, back, or arms    Abnormal shortness of breath    More joint or muscle pain    A very fast or irregular heartbeat (palpitations)  Merle last reviewed this educational content on 7/1/2019 2000-2021 [employer]. All rights reserved. This information is not intended as a substitute for professional medical care. Always follow your healthcare professional's instructions.

## 2022-03-11 ENCOUNTER — LAB (OUTPATIENT)
Dept: LAB | Facility: CLINIC | Age: 67
End: 2022-03-11
Payer: COMMERCIAL

## 2022-03-11 DIAGNOSIS — Z12.11 COLON CANCER SCREENING: ICD-10-CM

## 2022-03-12 DIAGNOSIS — I10 BENIGN ESSENTIAL HYPERTENSION: ICD-10-CM

## 2022-03-13 ENCOUNTER — HOSPITAL ENCOUNTER (OUTPATIENT)
Facility: CLINIC | Age: 67
Discharge: HOME | End: 2022-03-13
Admitting: FAMILY MEDICINE
Payer: COMMERCIAL

## 2022-03-13 PROCEDURE — 82274 ASSAY TEST FOR BLOOD FECAL: CPT

## 2022-03-14 DIAGNOSIS — E11.59 TYPE 2 DIABETES MELLITUS WITH OTHER CIRCULATORY COMPLICATION, WITHOUT LONG-TERM CURRENT USE OF INSULIN (H): ICD-10-CM

## 2022-03-14 RX ORDER — CARVEDILOL 12.5 MG/1
TABLET ORAL
Qty: 90 TABLET | Refills: 0 | Status: SHIPPED | OUTPATIENT
Start: 2022-03-14 | End: 2022-05-05

## 2022-03-15 LAB — HEMOCCULT STL QL IA: NEGATIVE

## 2022-03-17 ENCOUNTER — HOSPITAL ENCOUNTER (OUTPATIENT)
Dept: ULTRASOUND IMAGING | Facility: CLINIC | Age: 67
Discharge: HOME | End: 2022-03-17
Attending: FAMILY MEDICINE | Admitting: FAMILY MEDICINE
Payer: COMMERCIAL

## 2022-03-17 DIAGNOSIS — I77.1 ILIAC ARTERY STENOSIS, LEFT (H): Primary | ICD-10-CM

## 2022-03-17 DIAGNOSIS — Z13.6 SCREENING FOR AAA (ABDOMINAL AORTIC ANEURYSM): ICD-10-CM

## 2022-03-17 DIAGNOSIS — E78.5 HYPERLIPIDEMIA LDL GOAL <100: Chronic | ICD-10-CM

## 2022-03-17 PROCEDURE — 76775 US EXAM ABDO BACK WALL LIM: CPT

## 2022-03-17 RX ORDER — ATORVASTATIN CALCIUM 40 MG/1
TABLET, FILM COATED ORAL
Qty: 90 TABLET | Refills: 0 | Status: SHIPPED | OUTPATIENT
Start: 2022-03-17 | End: 2022-06-23

## 2022-03-24 ENCOUNTER — HOSPITAL ENCOUNTER (OUTPATIENT)
Dept: ULTRASOUND IMAGING | Facility: CLINIC | Age: 67
Discharge: HOME | End: 2022-03-24
Attending: FAMILY MEDICINE | Admitting: FAMILY MEDICINE
Payer: COMMERCIAL

## 2022-03-24 DIAGNOSIS — I77.1 ILIAC ARTERY STENOSIS, LEFT (H): ICD-10-CM

## 2022-03-24 PROCEDURE — 93924 LWR XTR VASC STDY BILAT: CPT

## 2022-03-30 ENCOUNTER — OFFICE VISIT (OUTPATIENT)
Dept: FAMILY MEDICINE | Facility: CLINIC | Age: 67
End: 2022-03-30
Payer: COMMERCIAL

## 2022-03-30 VITALS
SYSTOLIC BLOOD PRESSURE: 110 MMHG | OXYGEN SATURATION: 98 % | DIASTOLIC BLOOD PRESSURE: 72 MMHG | WEIGHT: 135.2 LBS | HEIGHT: 66 IN | HEART RATE: 67 BPM | BODY MASS INDEX: 21.73 KG/M2 | TEMPERATURE: 97.8 F | RESPIRATION RATE: 22 BRPM

## 2022-03-30 DIAGNOSIS — E11.69 TYPE 2 DIABETES MELLITUS WITH OTHER SPECIFIED COMPLICATION, WITH LONG-TERM CURRENT USE OF INSULIN (H): Primary | ICD-10-CM

## 2022-03-30 DIAGNOSIS — Z79.4 TYPE 2 DIABETES MELLITUS WITH OTHER SPECIFIED COMPLICATION, WITH LONG-TERM CURRENT USE OF INSULIN (H): Primary | ICD-10-CM

## 2022-03-30 PROCEDURE — 99214 OFFICE O/P EST MOD 30 MIN: CPT | Performed by: FAMILY MEDICINE

## 2022-03-30 NOTE — PROGRESS NOTES
Assessment & Plan     Type 2 diabetes mellitus with other specified complication, with long-term current use of insulin (H)  Last hemoglobin A1c 11.2, consistent with poorly controlled diabetes, home blood glucose readings elevated as well.  Stressed on following strict diabetic diet.  Jardiance prescribed, common side effects discussed.  Suggested to continue Metformin and glipizide.  Diabetic educator referral placed.  Follow-up in 3 months or earlier if needed.  All questions answered.  - metFORMIN (GLUCOPHAGE) 1000 MG tablet; Take 1 tablet (1,000 mg) by mouth 2 times daily (with meals)  - empagliflozin (JARDIANCE) 10 MG TABS tablet; Take 1 tablet (10 mg) by mouth daily  - [facility] Adult Diabetes Educator Referral; Future        Patient Instructions     Patient Education     Jardiance Oral Tablet 10 mg   Uses  For diabetes.  Instructions  This medicine may be taken with or without food.  It is very important that you take the medicine at about the same time every day. It will work best if you do this.  Store at room temperature in a dry place. Do not keep in the bathroom.  Keep the medicine away from heat and light.  Drink plenty of water while on this medicine.  Tell your doctor if you have severe or persistent sweating, diarrhea or vomiting. These can increase your risk of a serious side effect.  It is important that you keep taking each dose of this medicine on time even if you are feeling well.  If you forget to take a dose on time, take it as soon as you remember. If it is almost time for the next dose, do not take the missed dose. Return to your normal dosing schedule. Do not take 2 doses of this medicine at one time.  Please tell your doctor and pharmacist about all the medicines you take. Include both prescription and over-the-counter medicines. Also tell them about any vitamins, herbal medicines, or anything else you take for your health.  If your symptoms do not improve or they worsen while on this  medicine, contact your doctor.  This medicine may cause low blood sugar. It is very important to have regular meals and exercise regularly as instructed by your doctor. Notify your doctor if you experience symptoms of low blood sugar.  Symptoms of low blood sugar may include nausea, shaking, sweating, cold skin, fast heartbeat, hunger, and irritability.  It is very important that you follow your doctor's instructions for all blood tests.  It is very important that you keep all appointments for medical exams and tests while on this medicine.  Cautions  Tell your doctor and pharmacist if you ever had an allergic reaction to a medicine. Symptoms of an allergic reaction can include trouble breathing, skin rash, itching, swelling, or severe dizziness.  This medicine is associated with a rare but very serious medical condition. Please speak with your doctor about symptoms you should look out for while on this medicine. Notify your doctor immediately if you develop those symptoms.  Some patients taking this medicine have experienced serious side effects. Please speak with your doctor to understand the risks and benefits associated with this medicine.  Do not use the medication any more than instructed.  This medicine may cause dizziness or fainting, especially after exercising or in hot weather. Be very careful when standing or sitting up quickly.  Your ability to stay alert or to react quickly may be impaired by this medicine. Do not drive or operate machinery until you know how this medicine will affect you.  Please check with your doctor before drinking alcohol while on this medicine.  If you drink more than a few alcoholic beverages each day, ask your doctor whether you should be on this medicine.  Avoid becoming overheated during exercise or other activities. Try to stay cool in hot weather.  Tell the doctor or pharmacist if you are pregnant, planning to be pregnant, or breastfeeding.  Do not breastfeed while on this  medicine.  Ask your pharmacist if this medicine can interact with any of your other medicines. Be sure to tell them about all the medicines you take.  Please tell all your doctors and dentists that you are on this medicine before they provide care.  Do not start or stop any other medicines without first speaking to your doctor or pharmacist.  Do not share this medicine with anyone who has not been prescribed this medicine.  This medicine can cause serious side effects in some patients. Important information from the [place] Food and Drug Administration (FDA) is available from your pharmacist. Please review it carefully with your pharmacist to understand the risks associated with this medicine.  Always refill this medicine before it runs out.  Side Effects  The following is a list of some common side effects from this medicine. Please speak with your doctor about what you should do if you experience these or other side effects.    dizziness    increased urinary frequency    waking up at night to urinate  Call your doctor or get medical help right away if you notice any of these more serious side effects:    blurry vision    dry mouth    fainting    fast or irregular heart beats    nausea    feeling of numbness or tingling in your hands and feet    shakiness    shortness of breath    stomach pain    thirst    unusual or unexplained tiredness or weakness    difficulty or discomfort urinating    blood in urine    vaginal itching or discharge    vaginal itching or yeast infection    vomiting    yeast infection of the penis (redness, itching, swelling or discharge)  A few people may have an allergic reactions to this medicine. Symptoms can include difficulty breathing, skin rash, itching, swelling, or severe dizziness. If you notice any of these symptoms, seek medical help quickly.  Extra  Please speak with your doctor, nurse, or pharmacist if you have any questions about this  medicine.  [email or web address]  IMPORTANT NOTE: This document tells you briefly how to take your medicine, but it does not tell you all there is to know about it.Your doctor or pharmacist may give you other documents about your medicine. Please talk to them if you have any questions.Always follow their advice. There is a more complete description of this medicine available in English.Scan this code on your smartphone or tablet or use the web address below. You can also ask your pharmacist for a printout. If you have any questions, please ask your pharmacist.     2021 [employer].           Patient Education     Diet: Diabetes  Food is an important tool that you can use to control diabetes and stay healthy. Eating well-balanced meals in the correct amounts will help you control your blood glucose levels and prevent low blood sugar reactions. It will also help you reduce the health risks of diabetes. There is no one specific diet that is right for everyone with diabetes. But there are general guidelines to follow. A registered dietitian (RD) will create a tailored diet approach that s just right for you. He or she will also help you plan healthy meals and snacks. If you have any questions, call your dietitian for advice.  Guidelines for success  Talk with your healthcare provider before starting a diabetes diet or weight loss program. If you haven't talked with a dietitian yet, ask your provider for a referral. The following guidelines can help you succeed:    Select foods from the 6 food groups below. Your dietitian will help you find food choices within each group. He or she will also show you serving sizes and how many servings you can have at each meal.  ? Grains, beans, and starchy vegetables  ? Vegetables  ? Fruit  ? Milk or yogurt  ? Meat, poultry, fish, or tofu  ? Healthy fats    Check your blood sugar levels as directed by your provider. Take any medicine as prescribed by  your provider.    Learn to read food labels and pick the right portion sizes.    Limit carbohydrates at each meal to help manage your diabetes. The carbohydrates you eat become glucose in the blood. Talk with your healthcare provider about how many grams of carbohydrates are recommended for you at each meal. Eat 3 meals a day, at consistent times. Don't skip meals. If you are hungry between meals, eat a small, low-carbohydrate snack.    Talk with your healthcare provider if you drink alcohol. Alcohol can have unpredictable effects on blood glucose. It's also high in empty calories and can raise a type of blood fat called triglycerides. Drink water or calorie-free diet drinks instead.    Eat less fat to help lower your risk of heart disease. Use nonfat or low-fat dairy products and lean meats. Avoid fried foods. Use cooking oils that are unsaturated, such as olive, canola, or peanut oil.    Don't eat foods with added salt. Salt can contribute to high blood pressure, which can cause heart disease. People with diabetes already have a risk for high blood pressure and heart disease.    Stay at a healthy weight. If you need to lose weight, cut down on your portion sizes. But don t skip meals. Exercise is an important part of any weight management program. Talk with your provider about an exercise program that s right for you.    For more information about the best diet plan for you, talk with an RD. To find an RD in your area, contact:  ? Academy of Nutrition and Dietetics www.eatright.org  ? American Diabetes Association [phone] www.diabetes.org  Merle last reviewed this educational content on 7/1/2019 2000-2021 [employer]. All rights reserved. This information is not intended as a substitute for professional medical care. Always follow your healthcare professional's instructions.               Return in about 3 months (around 6/30/2022).    Fortino Hyman MD  [facility]  "ELIECER Weinstein is a 67 year old who presents for the following health issues     History of Present Illness       Diabetes:   He presents for follow up of diabetes.  He is checking home blood glucose two times daily. He checks blood glucose before meals.  Blood glucose is sometimes over 200 and never under 70. He is aware of hypoglycemia symptoms including shakiness. He is concerned about blood sugar frequently over 200.  He is not experiencing numbness or burning in feet, excessive thirst, blurry vision, weight changes or redness, sores or blisters on feet.         He eats 0-1 servings of fruits and vegetables daily.He consumes 0 sweetened beverage(s) daily.He exercises with enough effort to increase his heart rate 10 to 19 minutes per day.  He exercises with enough effort to increase his heart rate 4 days per week. He is missing 1 dose(s) of medications per week.      Blood sugar testing frequency justification:  Uncontrolled diabetes    Review of Systems   Constitutional, HEENT, cardiovascular, pulmonary, GI, , musculoskeletal, neuro, skin, endocrine and psych systems are negative, except as otherwise noted.        Objective    /72 (BP Location: Right arm, Patient Position: Sitting, Cuff Size: Adult Regular)   Pulse 67   Temp 97.8  F (36.6  C) (Tympanic)   Resp 22   Ht 1.676 m (5' 6\")   Wt 61.3 kg (135 lb 3.2 oz)   SpO2 98%   BMI 21.82 kg/m    Body mass index is 21.82 kg/m .  Physical Exam   GENERAL: alert and no distress  NECK: no adenopathy, no asymmetry, masses, or scars and thyroid normal to palpation  RESP: lungs clear to auscultation - no rales, rhonchi or wheezes  CV: regular rate and rhythm, normal S1 S2, no S3 or S4, no murmur, click or rub, no peripheral edema and peripheral pulses strong  ABDOMEN: soft, nontender, no hepatosplenomegaly, no masses and bowel sounds normal  MS: no gross musculoskeletal defects noted, no edema  SKIN: no suspicious lesions or rashes  NEURO: " Normal strength and tone, mentation intact and speech normal  PSYCH: mentation appears normal, affect normal/bright    Lab Results   Component Value Date    A1C 11.2 03/07/2022    A1C 7.1 07/14/2021    A1C 8.5 02/22/2021    A1C 8.1 01/06/2020    A1C 6.9 06/07/2019    A1C 6.8 09/15/2018    A1C 7.2 06/15/2018

## 2022-03-30 NOTE — PATIENT INSTRUCTIONS
Patient Education     Jardiance Oral Tablet 10 mg   Uses  For diabetes.  Instructions  This medicine may be taken with or without food.  It is very important that you take the medicine at about the same time every day. It will work best if you do this.  Store at room temperature in a dry place. Do not keep in the bathroom.  Keep the medicine away from heat and light.  Drink plenty of water while on this medicine.  Tell your doctor if you have severe or persistent sweating, diarrhea or vomiting. These can increase your risk of a serious side effect.  It is important that you keep taking each dose of this medicine on time even if you are feeling well.  If you forget to take a dose on time, take it as soon as you remember. If it is almost time for the next dose, do not take the missed dose. Return to your normal dosing schedule. Do not take 2 doses of this medicine at one time.  Please tell your doctor and pharmacist about all the medicines you take. Include both prescription and over-the-counter medicines. Also tell them about any vitamins, herbal medicines, or anything else you take for your health.  If your symptoms do not improve or they worsen while on this medicine, contact your doctor.  This medicine may cause low blood sugar. It is very important to have regular meals and exercise regularly as instructed by your doctor. Notify your doctor if you experience symptoms of low blood sugar.  Symptoms of low blood sugar may include nausea, shaking, sweating, cold skin, fast heartbeat, hunger, and irritability.  It is very important that you follow your doctor's instructions for all blood tests.  It is very important that you keep all appointments for medical exams and tests while on this medicine.  Cautions  Tell your doctor and pharmacist if you ever had an allergic reaction to a medicine. Symptoms of an allergic reaction can include trouble breathing, skin rash, itching, swelling, or severe dizziness.  This  medicine is associated with a rare but very serious medical condition. Please speak with your doctor about symptoms you should look out for while on this medicine. Notify your doctor immediately if you develop those symptoms.  Some patients taking this medicine have experienced serious side effects. Please speak with your doctor to understand the risks and benefits associated with this medicine.  Do not use the medication any more than instructed.  This medicine may cause dizziness or fainting, especially after exercising or in hot weather. Be very careful when standing or sitting up quickly.  Your ability to stay alert or to react quickly may be impaired by this medicine. Do not drive or operate machinery until you know how this medicine will affect you.  Please check with your doctor before drinking alcohol while on this medicine.  If you drink more than a few alcoholic beverages each day, ask your doctor whether you should be on this medicine.  Avoid becoming overheated during exercise or other activities. Try to stay cool in hot weather.  Tell the doctor or pharmacist if you are pregnant, planning to be pregnant, or breastfeeding.  Do not breastfeed while on this medicine.  Ask your pharmacist if this medicine can interact with any of your other medicines. Be sure to tell them about all the medicines you take.  Please tell all your doctors and dentists that you are on this medicine before they provide care.  Do not start or stop any other medicines without first speaking to your doctor or pharmacist.  Do not share this medicine with anyone who has not been prescribed this medicine.  This medicine can cause serious side effects in some patients. Important information from the U[place]. Food and Drug Administration (FDA) is available from your pharmacist. Please review it carefully with your pharmacist to understand the risks associated with this medicine.  Always refill this medicine before it runs out.  Side  Effects  The following is a list of some common side effects from this medicine. Please speak with your doctor about what you should do if you experience these or other side effects.    dizziness    increased urinary frequency    waking up at night to urinate  Call your doctor or get medical help right away if you notice any of these more serious side effects:    blurry vision    dry mouth    fainting    fast or irregular heart beats    nausea    feeling of numbness or tingling in your hands and feet    shakiness    shortness of breath    stomach pain    thirst    unusual or unexplained tiredness or weakness    difficulty or discomfort urinating    blood in urine    vaginal itching or discharge    vaginal itching or yeast infection    vomiting    yeast infection of the penis (redness, itching, swelling or discharge)  A few people may have an allergic reactions to this medicine. Symptoms can include difficulty breathing, skin rash, itching, swelling, or severe dizziness. If you notice any of these symptoms, seek medical help quickly.  Extra  Please speak with your doctor, nurse, or pharmacist if you have any questions about this medicine.  [email or web address]  IMPORTANT NOTE: This document tells you briefly how to take your medicine, but it does not tell you all there is to know about it.Your doctor or pharmacist may give you other documents about your medicine. Please talk to them if you have any questions.Always follow their advice. There is a more complete description of this medicine available in English.Scan this code on your smartphone or tablet or use the web address below. You can also ask your pharmacist for a printout. If you have any questions, please ask your pharmacist.     2021 [employer].           Patient Education     Diet: Diabetes  Food is an important tool that you can use to control diabetes and stay healthy. Eating well-balanced meals in the correct amounts  will help you control your blood glucose levels and prevent low blood sugar reactions. It will also help you reduce the health risks of diabetes. There is no one specific diet that is right for everyone with diabetes. But there are general guidelines to follow. A registered dietitian (ZABRINA) will create a tailored diet approach that s just right for you. He or she will also help you plan healthy meals and snacks. If you have any questions, call your dietitian for advice.  Guidelines for success  Talk with your healthcare provider before starting a diabetes diet or weight loss program. If you haven't talked with a dietitian yet, ask your provider for a referral. The following guidelines can help you succeed:    Select foods from the 6 food groups below. Your dietitian will help you find food choices within each group. He or she will also show you serving sizes and how many servings you can have at each meal.  ? Grains, beans, and starchy vegetables  ? Vegetables  ? Fruit  ? Milk or yogurt  ? Meat, poultry, fish, or tofu  ? Healthy fats    Check your blood sugar levels as directed by your provider. Take any medicine as prescribed by your provider.    Learn to read food labels and pick the right portion sizes.    Limit carbohydrates at each meal to help manage your diabetes. The carbohydrates you eat become glucose in the blood. Talk with your healthcare provider about how many grams of carbohydrates are recommended for you at each meal. Eat 3 meals a day, at consistent times. Don't skip meals. If you are hungry between meals, eat a small, low-carbohydrate snack.    Talk with your healthcare provider if you drink alcohol. Alcohol can have unpredictable effects on blood glucose. It's also high in empty calories and can raise a type of blood fat called triglycerides. Drink water or calorie-free diet drinks instead.    Eat less fat to help lower your risk of heart disease. Use nonfat or low-fat dairy products and lean meats.  Avoid fried foods. Use cooking oils that are unsaturated, such as olive, canola, or peanut oil.    Don't eat foods with added salt. Salt can contribute to high blood pressure, which can cause heart disease. People with diabetes already have a risk for high blood pressure and heart disease.    Stay at a healthy weight. If you need to lose weight, cut down on your portion sizes. But don t skip meals. Exercise is an important part of any weight management program. Talk with your provider about an exercise program that s right for you.    For more information about the best diet plan for you, talk with an RD. To find an RD in your area, contact:  ? Academy of Nutrition and Dietetics www.eatright.org  ? American Diabetes Association 919-102-3993 www.diabetes.org  [place] last reviewed this educational content on 7/1/2019 2000-2021 [employer]. All rights reserved. This information is not intended as a substitute for professional medical care. Always follow your healthcare professional's instructions.

## 2022-03-31 ENCOUNTER — TELEPHONE (OUTPATIENT)
Dept: FAMILY MEDICINE | Facility: CLINIC | Age: 67
End: 2022-03-31
Payer: COMMERCIAL

## 2022-03-31 NOTE — TELEPHONE ENCOUNTER
Diabetes Education Scheduling Outreach #1:    Call to patient to schedule. Patient declined to schedule and has been making a lot of changes already. The medication also has been helping. He will call us if he needs future assistance.    [facility]  Diabetes and Nutrition Scheduling

## 2022-04-12 ENCOUNTER — TRANSFERRED RECORDS (OUTPATIENT)
Dept: HEALTH INFORMATION MANAGEMENT | Facility: CLINIC | Age: 67
End: 2022-04-12
Payer: COMMERCIAL

## 2022-04-12 LAB — RETINOPATHY: NEGATIVE

## 2022-05-05 DIAGNOSIS — I10 BENIGN ESSENTIAL HYPERTENSION: ICD-10-CM

## 2022-05-05 RX ORDER — CARVEDILOL 12.5 MG/1
TABLET ORAL
Qty: 90 TABLET | Refills: 3 | Status: SHIPPED | OUTPATIENT
Start: 2022-05-05 | End: 2022-11-29

## 2022-05-18 DIAGNOSIS — E11.59 TYPE 2 DIABETES MELLITUS WITH OTHER CIRCULATORY COMPLICATION, WITHOUT LONG-TERM CURRENT USE OF INSULIN (H): ICD-10-CM

## 2022-05-18 RX ORDER — GLIPIZIDE 10 MG/1
TABLET, FILM COATED, EXTENDED RELEASE ORAL
Qty: 90 TABLET | Refills: 0 | Status: SHIPPED | OUTPATIENT
Start: 2022-05-18 | End: 2022-05-20

## 2022-05-20 DIAGNOSIS — E11.59 TYPE 2 DIABETES MELLITUS WITH OTHER CIRCULATORY COMPLICATION, WITHOUT LONG-TERM CURRENT USE OF INSULIN (H): ICD-10-CM

## 2022-05-20 RX ORDER — GLIPIZIDE 10 MG/1
TABLET, FILM COATED, EXTENDED RELEASE ORAL
Qty: 90 TABLET | Refills: 0 | Status: SHIPPED | OUTPATIENT
Start: 2022-05-20 | End: 2022-09-07

## 2022-05-24 ENCOUNTER — OFFICE VISIT (OUTPATIENT)
Dept: VASCULAR SURGERY | Facility: CLINIC | Age: 67
End: 2022-05-24
Attending: FAMILY MEDICINE
Payer: COMMERCIAL

## 2022-05-24 VITALS
HEIGHT: 66 IN | SYSTOLIC BLOOD PRESSURE: 124 MMHG | BODY MASS INDEX: 21.69 KG/M2 | WEIGHT: 135 LBS | DIASTOLIC BLOOD PRESSURE: 72 MMHG | TEMPERATURE: 97.5 F | HEART RATE: 76 BPM

## 2022-05-24 DIAGNOSIS — I77.1 ILIAC ARTERY STENOSIS, LEFT (H): ICD-10-CM

## 2022-05-24 DIAGNOSIS — I25.10 CORONARY ARTERY DISEASE INVOLVING NATIVE HEART WITHOUT ANGINA PECTORIS, UNSPECIFIED VESSEL OR LESION TYPE: Primary | ICD-10-CM

## 2022-05-24 DIAGNOSIS — E11.51 TYPE 2 DIABETES MELLITUS WITH DIABETIC PERIPHERAL ANGIOPATHY WITHOUT GANGRENE, WITHOUT LONG-TERM CURRENT USE OF INSULIN (H): ICD-10-CM

## 2022-05-24 DIAGNOSIS — Z48.812 ENCOUNTER FOR SURGICAL AFTERCARE FOLLOWING SURGERY ON THE CIRCULATORY SYSTEM: ICD-10-CM

## 2022-05-24 PROCEDURE — 99204 OFFICE O/P NEW MOD 45 MIN: CPT | Performed by: SURGERY

## 2022-05-24 ASSESSMENT — PAIN SCALES - GENERAL: PAINLEVEL: NO PAIN (0)

## 2022-05-24 NOTE — NURSING NOTE
"Initial /72 (BP Location: Right arm, Patient Position: Sitting, Cuff Size: Adult Regular)   Pulse 76   Temp 97.5  F (36.4  C) (Tympanic)   Ht 1.676 m (5' 6\")   Wt 61.2 kg (135 lb)   BMI 21.79 kg/m   Estimated body mass index is 21.79 kg/m  as calculated from the following:    Height as of this encounter: 1.676 m (5' 6\").    Weight as of this encounter: 61.2 kg (135 lb). .      Jennifer Valencia LPN on 5/24/2022 at 2:59 PM    "

## 2022-05-24 NOTE — PROGRESS NOTES
Patient is in clinic today to see MD Aviva Williamson.      Patient is here for a consult to discuss  Iliac artery stenosis, left.    The patient does not smoke.    Pt is currently taking ASA and statin.    The patient states he/she are NOT wearing compression .    Swelling is observed in N/A.    Pt rates pain 0/10 located at .    Pts symptoms include none in  extremity.    The provider has been notified that the patient has no complaints.

## 2022-05-24 NOTE — PROGRESS NOTES
VASCULAR SURGERY PROGRESS NOTE   VASCULAR SURGEON: Lucia Williamson MD, RPVI     LOCATION:  [facility]     Js Jones   Medical Record #:  [id number]  YOB: 1955  Age:  67 year old     Date of Service: 5/24/2022    PRIMARY CARE PROVIDER: Fortino Hyman      Reason for visit: Evaluation of possible PAD.    IMPRESSION: 67-year-old male who comes to vascular surgery clinic for evaluation of possible PAD.  Ultrasound of aortoiliac system is relatively normal.  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  ABIs are relatively normal.  Patient is on optimal medical management therapy.    RECOMMENDATION/RISKS: No indications for any intervention.  Continue optimal medical management therapy.  Follow-up in vascular surgery clinic as needed.    HPI:  Js Jones is a 67 year old male who was seen today in consultation for possible PAD.  Patient is doing well overall and denies any significant complaints.  Patient is complaining of intermittent numbness and tingling in bilateral lower extremities but denies any classical symptoms of claudication.    REVIEW OF SYSTEMS:    A 12 point ROS was reviewed and is negative      PHH:    Past Medical History:   Diagnosis Date     DM type 2 (diabetes mellitus, type 2) (H)      HTN (hypertension), benign      Hyperlipidemia           Past Surgical History:   Procedure Laterality Date     HERNIORRHAPHY INGUINAL BILATERAL         ALLERGIES:  Bee venom and Lisinopril    MEDS:    Current Outpatient Medications:      amLODIPine (NORVASC) 10 MG tablet, Take 1 tablet by mouth once daily, Disp: 90 tablet, Rfl: 2     aspirin 81 MG tablet, Take 1 tablet (81 mg) by mouth daily, Disp: 90 tablet, Rfl: 3     atorvastatin (LIPITOR) 40 MG tablet, Take 1 tablet by mouth once daily, Disp: 90 tablet, Rfl: 0     carvedilol (COREG) 12.5 MG tablet, TAKE 1 TABLET BY MOUTH TWICE DAILY WITH MEALS, Disp: 90 tablet, Rfl: 3     empagliflozin  (JARDIANCE) 10 MG TABS tablet, Take 1 tablet (10 mg) by mouth daily, Disp: 90 tablet, Rfl: 1     glipiZIDE (GLUCOTROL XL) 10 MG 24 hr tablet, Take 1 tablet by mouth once daily, Disp: 90 tablet, Rfl: 0     losartan (COZAAR) 50 MG tablet, Take 1 tablet (50 mg) by mouth daily, Disp: 90 tablet, Rfl: 3     Melatonin 10 MG TABS, Take 10 mg by mouth nightly as needed for sleep, Disp: , Rfl:      metFORMIN (GLUCOPHAGE) 1000 MG tablet, Take 1 tablet (1,000 mg) by mouth 2 times daily (with meals), Disp: 90 tablet, Rfl: 1     MULTIPLE VITAMIN PO, Take 1 tablet by mouth, Disp: , Rfl:      Omega-3 Fatty Acids (OMEGA-3 FISH OIL PO), Take 1,200 mg by mouth 2 times daily (with meals), Disp: , Rfl:      omeprazole (PRILOSEC OTC) 20 MG tablet, Take 1 tablet (20 mg) by mouth daily, Disp: 90 tablet, Rfl: 1     blood glucose monitoring (NO BRAND SPECIFIED) test strip, Use to test blood sugar 3 times daily or as directed. Box of 100 each., Disp: 3 Box, Rfl: 3     Blood Glucose Monitoring Suppl W/DEVICE KIT, 1 each daily, Disp: 1 kit, Rfl: 0     nitroGLYcerin (NITROSTAT) 0.4 MG sublingual tablet, For chest pain place 1 tablet under the tongue every 5 minutes for 3 doses. If symptoms persist 5 minutes after 1st dose call 911. (Patient not taking: Reported on 5/24/2022), Disp: 50 tablet, Rfl: 11     ONE TOUCH LANCETS MISC, 1 lancet 3 times daily, Disp: 200 each, Rfl: 5     sildenafil (REVATIO) 20 MG tablet, Take 1-2 tabs daily as needed 1 hour before sexual activity; may be taken up to 4 hours before sexual activity. (Patient not taking: Reported on 5/24/2022), Disp: 30 tablet, Rfl: 1    SOCIAL HABITS:    History   Smoking Status     Former Smoker     Packs/day: 1.00     Years: 30.00     Quit date: 1/1/2000   Smokeless Tobacco     Never Used     Social History    Substance and Sexual Activity      Alcohol use: No      History   Drug Use No       FAMILY HISTORY:    Family History   Problem Relation Age of Onset     Diabetes Mother       "Hypertension Mother      Cardiovascular Father 81        CHF     Heart Disease Sister 50     Cancer Sister      Heart Disease Brother 50       PE:  /72 (BP Location: Right arm, Patient Position: Sitting, Cuff Size: Adult Regular)   Pulse 76   Temp 97.5  F (36.4  C) (Tympanic)   Ht 1.676 m (5' 6\")   Wt 61.2 kg (135 lb)   BMI 21.79 kg/m    Wt Readings from Last 1 Encounters:   05/24/22 61.2 kg (135 lb)     Body mass index is 21.79 kg/m .    EXAM:  GENERAL: This is a well-developed 67 year old male who appears his stated age  EYES: Grossly normal.  MOUTH: Buccal mucosa normal   CARDIAC: Normal   CHEST/LUNG: Clear to auscultation bilaterally  GASTROINTESINAL soft nontender nondistended  MUSCULOSKELETAL: Grossly normal and both lower extremities are intact.  HEME/LYMPH: No lymphedema  NEUROLOGIC: Focally intact, Alert and oriented x 3.   PSYCH: appropriate affect  INTEGUMENT: No open lesions or ulcers  Pulse Exam: Palpable pulses in all 4 extremities          DIAGNOSTIC STUDIES:     Images:  No results found.        LABS:      Sodium   Date Value Ref Range Status   03/07/2022 136 133 - 144 mmol/L Final   03/22/2021 136 133 - 144 mmol/L Final   02/22/2021 135 133 - 144 mmol/L Final   04/14/2020 135 133 - 144 mmol/L Final     Urea Nitrogen   Date Value Ref Range Status   03/07/2022 19 7 - 30 mg/dL Final   03/22/2021 13 7 - 30 mg/dL Final   02/22/2021 19 7 - 30 mg/dL Final   04/14/2020 20 7 - 30 mg/dL Final     Hemoglobin   Date Value Ref Range Status   04/07/2020 12.4 (L) 13.3 - 17.7 g/dL Final   01/06/2020 14.4 13.3 - 17.7 g/dL Final   09/18/2018 13.3 13.3 - 17.7 g/dL Final     Platelet Count   Date Value Ref Range Status   04/07/2020 124 (L) 150 - 450 10e9/L Final   01/06/2020 161 150 - 450 10e9/L Final   09/18/2018 154 150 - 450 10e9/L Final     INR   Date Value Ref Range Status   09/14/2018 1.16 (H) 0.86 - 1.14 Final       40 minutes spent on the day of encounter doing chart review, history and exam, " documentation, and further activities as noted.         Lucia Williamson MD, [facility]  VASCULAR SURGERY

## 2022-05-24 NOTE — PATIENT INSTRUCTIONS
Wili Jones ,    Thank you for entrusting your care with us today. After your visit today with Dr Cisco Williamson this is the plan that was discussed at your appointment.    Return ultrasound in 1 year    2. See Gladys WILLIS after the ultrasound          I am including additional information on these things and our contact information if you have any questions or concerns.   Please do not hesitate to reach out to us if you felt we did not answer your questions or you are unsure of the treatment plan after your visit today. Our number is [phone].Thank you for trusting us with your care.         Again thank you for your time.     Libra Dennis RN

## 2022-06-23 DIAGNOSIS — E78.5 HYPERLIPIDEMIA LDL GOAL <100: Chronic | ICD-10-CM

## 2022-06-23 RX ORDER — ATORVASTATIN CALCIUM 40 MG/1
TABLET, FILM COATED ORAL
Qty: 90 TABLET | Refills: 0 | Status: SHIPPED | OUTPATIENT
Start: 2022-06-23 | End: 2022-09-07

## 2022-07-21 DIAGNOSIS — E11.69 TYPE 2 DIABETES MELLITUS WITH OTHER SPECIFIED COMPLICATION, WITH LONG-TERM CURRENT USE OF INSULIN (H): ICD-10-CM

## 2022-07-21 DIAGNOSIS — Z79.4 TYPE 2 DIABETES MELLITUS WITH OTHER SPECIFIED COMPLICATION, WITH LONG-TERM CURRENT USE OF INSULIN (H): ICD-10-CM

## 2022-07-23 DIAGNOSIS — Z79.4 TYPE 2 DIABETES MELLITUS WITH OTHER SPECIFIED COMPLICATION, WITH LONG-TERM CURRENT USE OF INSULIN (H): ICD-10-CM

## 2022-07-23 DIAGNOSIS — E11.69 TYPE 2 DIABETES MELLITUS WITH OTHER SPECIFIED COMPLICATION, WITH LONG-TERM CURRENT USE OF INSULIN (H): ICD-10-CM

## 2022-07-25 DIAGNOSIS — Z79.4 TYPE 2 DIABETES MELLITUS WITH OTHER SPECIFIED COMPLICATION, WITH LONG-TERM CURRENT USE OF INSULIN (H): ICD-10-CM

## 2022-07-25 DIAGNOSIS — E11.69 TYPE 2 DIABETES MELLITUS WITH OTHER SPECIFIED COMPLICATION, WITH LONG-TERM CURRENT USE OF INSULIN (H): ICD-10-CM

## 2022-07-29 NOTE — TELEPHONE ENCOUNTER
Pended script has been discontinued. Called and spoke to pharmacy, they confirm it was an old script that was called in by patient, told pharmacy to deactivate pended script as PCP prescribed metformin 1000 mg two times daily at last office visit on 3-30-22, resent new script.      SIMÓN Kline

## 2022-09-07 ENCOUNTER — OFFICE VISIT (OUTPATIENT)
Dept: FAMILY MEDICINE | Facility: CLINIC | Age: 67
End: 2022-09-07
Payer: COMMERCIAL

## 2022-09-07 VITALS
SYSTOLIC BLOOD PRESSURE: 136 MMHG | DIASTOLIC BLOOD PRESSURE: 80 MMHG | WEIGHT: 133 LBS | BODY MASS INDEX: 21.38 KG/M2 | RESPIRATION RATE: 18 BRPM | TEMPERATURE: 97.2 F | HEIGHT: 66 IN | HEART RATE: 67 BPM | OXYGEN SATURATION: 97 %

## 2022-09-07 DIAGNOSIS — E11.59 TYPE 2 DIABETES MELLITUS WITH OTHER CIRCULATORY COMPLICATION, WITH LONG-TERM CURRENT USE OF INSULIN (H): ICD-10-CM

## 2022-09-07 DIAGNOSIS — Z13.220 SCREENING FOR HYPERLIPIDEMIA: ICD-10-CM

## 2022-09-07 DIAGNOSIS — I10 BENIGN ESSENTIAL HYPERTENSION: ICD-10-CM

## 2022-09-07 DIAGNOSIS — E11.69 TYPE 2 DIABETES MELLITUS WITH OTHER SPECIFIED COMPLICATION, WITH LONG-TERM CURRENT USE OF INSULIN (H): Primary | ICD-10-CM

## 2022-09-07 DIAGNOSIS — Z79.4 TYPE 2 DIABETES MELLITUS WITH OTHER SPECIFIED COMPLICATION, WITH LONG-TERM CURRENT USE OF INSULIN (H): Primary | ICD-10-CM

## 2022-09-07 DIAGNOSIS — E78.5 HYPERLIPIDEMIA LDL GOAL <100: Chronic | ICD-10-CM

## 2022-09-07 DIAGNOSIS — Z79.4 TYPE 2 DIABETES MELLITUS WITH OTHER CIRCULATORY COMPLICATION, WITH LONG-TERM CURRENT USE OF INSULIN (H): ICD-10-CM

## 2022-09-07 LAB
ANION GAP SERPL CALCULATED.3IONS-SCNC: 8 MMOL/L (ref 7–15)
BUN SERPL-MCNC: 23.5 MG/DL (ref 8–23)
CALCIUM SERPL-MCNC: 9.7 MG/DL (ref 8.8–10.2)
CHLORIDE SERPL-SCNC: 101 MMOL/L (ref 98–107)
CHOLEST SERPL-MCNC: 136 MG/DL
CREAT SERPL-MCNC: 0.85 MG/DL (ref 0.67–1.17)
CREAT UR-MCNC: 24.8 MG/DL
DEPRECATED HCO3 PLAS-SCNC: 29 MMOL/L (ref 22–29)
GFR SERPL CREATININE-BSD FRML MDRD: >90 ML/MIN/1.73M2
GLUCOSE SERPL-MCNC: 176 MG/DL (ref 70–99)
HBA1C MFR BLD: 7.2 % (ref 0–5.6)
HDLC SERPL-MCNC: 37 MG/DL
LDLC SERPL CALC-MCNC: 77 MG/DL
MICROALBUMIN UR-MCNC: <12 MG/L
MICROALBUMIN/CREAT UR: NORMAL MG/G{CREAT}
NONHDLC SERPL-MCNC: 99 MG/DL
POTASSIUM SERPL-SCNC: 4.6 MMOL/L (ref 3.4–5.3)
SODIUM SERPL-SCNC: 138 MMOL/L (ref 136–145)
TRIGL SERPL-MCNC: 112 MG/DL

## 2022-09-07 PROCEDURE — 83036 HEMOGLOBIN GLYCOSYLATED A1C: CPT | Performed by: FAMILY MEDICINE

## 2022-09-07 PROCEDURE — 36415 COLL VENOUS BLD VENIPUNCTURE: CPT | Performed by: FAMILY MEDICINE

## 2022-09-07 PROCEDURE — 80061 LIPID PANEL: CPT | Performed by: FAMILY MEDICINE

## 2022-09-07 PROCEDURE — 82043 UR ALBUMIN QUANTITATIVE: CPT | Performed by: FAMILY MEDICINE

## 2022-09-07 PROCEDURE — 99214 OFFICE O/P EST MOD 30 MIN: CPT | Performed by: FAMILY MEDICINE

## 2022-09-07 PROCEDURE — 80048 BASIC METABOLIC PNL TOTAL CA: CPT | Performed by: FAMILY MEDICINE

## 2022-09-07 PROCEDURE — 99207 PR FOOT EXAM NO CHARGE: CPT | Performed by: FAMILY MEDICINE

## 2022-09-07 RX ORDER — AMLODIPINE BESYLATE 10 MG/1
10 TABLET ORAL DAILY
Qty: 90 TABLET | Refills: 3 | Status: SHIPPED | OUTPATIENT
Start: 2022-09-07 | End: 2023-10-23

## 2022-09-07 RX ORDER — ATORVASTATIN CALCIUM 40 MG/1
40 TABLET, FILM COATED ORAL DAILY
Qty: 90 TABLET | Refills: 1 | Status: SHIPPED | OUTPATIENT
Start: 2022-09-07 | End: 2023-05-03

## 2022-09-07 RX ORDER — GLIPIZIDE 10 MG/1
10 TABLET, FILM COATED, EXTENDED RELEASE ORAL DAILY
Qty: 90 TABLET | Refills: 1 | Status: SHIPPED | OUTPATIENT
Start: 2022-09-07 | End: 2023-08-04

## 2022-09-07 ASSESSMENT — PAIN SCALES - GENERAL: PAINLEVEL: NO PAIN (0)

## 2022-09-07 NOTE — PROGRESS NOTES
Assessment & Plan     Type 2 diabetes mellitus with other specified complication, with long-term current use of insulin (H)  Type 2 diabetes mellitus with other circulatory complication, with long-term current use of insulin (H)  Last hemoglobin A1c 11.2, consistent with poorly controlled diabetes.  History of coronary artery disease.  Patient has been trying to adhere healthy diet.  Recommended to monitor blood glucose regularly and schedule appointment diabetic educator as well.  Metformin, glipizide and Jardiance refilled.  Hemoglobin A1c, urine albumin ordered.  - Albumin Random Urine Quantitative with Creat Ratio; Future  - HEMOGLOBIN A1C; Future  - FOOT EXAM  - empagliflozin (JARDIANCE) 10 MG TABS tablet; Take 1 tablet (10 mg) by mouth daily  - glipiZIDE (GLUCOTROL XL) 10 MG 24 hr tablet; Take 1 tablet (10 mg) by mouth daily  - metFORMIN (GLUCOPHAGE) 1000 MG tablet; Take 1 tablet (1,000 mg) by mouth 2 times daily (with meals)  - [facility] Educator Referral; Future  - HEMOGLOBIN A1C  - Albumin Random Urine Quantitative with Creat Ratio    Screening for hyperlipidemia  - Lipid panel reflex to direct LDL Non-fasting; Future  - Lipid panel reflex to direct LDL Non-fasting    Benign essential hypertension  Blood pressure within target goal of less than 140/90.  Amlodipine refilled, will continue carvedilol, losartan  - amLODIPine (NORVASC) 10 MG tablet; Take 1 tablet (10 mg) by mouth daily  - Basic metabolic panel  (Ca, Cl, CO2, Creat, Gluc, K, Na, BUN); Future  - Basic metabolic panel  (Ca, Cl, CO2, Creat, Gluc, K, Na, BUN)    Hyperlipidemia LDL goal <100  - atorvastatin (LIPITOR) 40 MG tablet; Take 1 tablet (40 mg) by mouth daily      Fortino Hyman MD  [facility]    Subjective   Js is a 67 year old, presenting for the following health issues:  Diabetes and Hypertension      History of Present Illness       Diabetes:   He presents for follow up of diabetes.  He is  "checking home blood glucose a few times a week. He checks blood glucose before meals.  Blood glucose is sometimes over 200 and never under 70. He is aware of hypoglycemia symptoms including shakiness. He has no concerns regarding his diabetes at this time.  He is not experiencing numbness or burning in feet, excessive thirst, blurry vision, weight changes or redness, sores or blisters on feet.         Hypertension: He presents for follow up of hypertension.  He does check blood pressure  regularly outside of the clinic. Outpatient blood pressures have not been over 140/90. He follows a low salt diet.           Review of Systems   Constitutional, HEENT, cardiovascular, pulmonary, GI, , musculoskeletal, neuro, skin, endocrine and psych systems are negative, except as otherwise noted.      Objective    /80 (Cuff Size: Adult Regular)   Pulse 67   Temp 97.2  F (36.2  C) (Tympanic)   Resp 18   Ht 1.676 m (5' 6\")   Wt 60.3 kg (133 lb)   SpO2 97%   BMI 21.47 kg/m    Body mass index is 21.47 kg/m .  Physical Exam   GENERAL: healthy, alert and no distress  EYES: Eyes grossly normal to inspection, PERRL and conjunctivae and sclerae normal  HENT: normal cephalic/atraumatic, nose and mouth without ulcers or lesions, oropharynx clear and oral mucous membranes moist  NECK: no adenopathy, no asymmetry, masses, or scars and thyroid normal to palpation  RESP: lungs clear to auscultation - no rales, rhonchi or wheezes  CV: regular rate and rhythm, normal S1 S2, no S3 or S4, no murmur, click or rub, no peripheral edema and peripheral pulses strong  ABDOMEN: soft, nontender, no hepatosplenomegaly, no masses  MS: no gross musculoskeletal defects noted, no edema  SKIN: no suspicious lesions or rashes  NEURO: Normal strength and tone, mentation intact and speech normal  Diabetic foot exam: Pedal pulses 3+ bilaterally, sensation to touch and pressure intact, no pedal edema    Lab Results   Component Value Date    A1C 11.2 " 03/07/2022    A1C 7.1 07/14/2021    A1C 8.5 02/22/2021    A1C 8.1 01/06/2020    A1C 6.9 06/07/2019    A1C 6.8 09/15/2018    A1C 7.2 06/15/2018

## 2022-09-07 NOTE — NURSING NOTE
"Chief Complaint   Patient presents with     Diabetes     Hypertension     /80 (Cuff Size: Adult Regular)   Pulse 67   Temp 97.2  F (36.2  C) (Tympanic)   Resp 18   Ht 1.676 m (5' 6\")   Wt 60.3 kg (133 lb)   SpO2 97%   BMI 21.47 kg/m   Estimated body mass index is 21.47 kg/m  as calculated from the following:    Height as of this encounter: 1.676 m (5' 6\").    Weight as of this encounter: 60.3 kg (133 lb).  Patient presents to the clinic using No DME      Health Maintenance that is potentially due pending provider review:    Health Maintenance Due   Topic Date Due     COVID-19 Vaccine (4 - Booster for Moderna series) 02/28/2022     LIPID  07/14/2022     DIABETIC FOOT EXAM  07/14/2022     MICROALBUMIN  08/09/2022     MEDICARE ANNUAL WELLNESS VISIT  08/23/2022     A1C  09/07/2022                "

## 2022-09-08 ENCOUNTER — TELEPHONE (OUTPATIENT)
Dept: FAMILY MEDICINE | Facility: CLINIC | Age: 67
End: 2022-09-08

## 2022-09-08 NOTE — TELEPHONE ENCOUNTER
Diabetes Education Scheduling Outreach #1:    Call to patient to schedule. Left message with phone number to call to schedule.    Plan for 2nd outreach attempt within 1 week.    Ruby Akhtar  [place] [facility]  Diabetes and Nutrition Scheduling

## 2022-09-15 NOTE — TELEPHONE ENCOUNTER
Diabetes Education Scheduling Outreach #2:    Call to patient to schedule. Left message with phone number to call to schedule.    Ruby Akhtar  [place] [facility]  Diabetes and Nutrition Scheduling

## 2022-10-25 ENCOUNTER — TELEPHONE (OUTPATIENT)
Dept: FAMILY MEDICINE | Facility: CLINIC | Age: 67
End: 2022-10-25

## 2022-10-25 DIAGNOSIS — Z41.2 MALE CIRCUMCISION: Primary | ICD-10-CM

## 2022-10-25 NOTE — TELEPHONE ENCOUNTER
Urology referral placed.  Please let patient know to call [phone] to schedule appointment.    Dr Hyman

## 2022-10-25 NOTE — TELEPHONE ENCOUNTER
Reason for Call: Request for an order or referral:    Order or referral being requested: Pt is requesting to have an adult circumcision. He wonders who he would need to see for this. Will he need a referral?    Date needed: at your convenience    Has the patient been seen by the PCP for this problem? NO    Phone number Patient can be reached at:  Home number on file [phone] (home)    Best Time:  anytime    Can we leave a detailed message on this number?  YES    Call taken on 10/25/2022 at 1:24 PM by Nancy Handy

## 2022-11-08 ENCOUNTER — TELEPHONE (OUTPATIENT)
Dept: FAMILY MEDICINE | Facility: CLINIC | Age: 67
End: 2022-11-08

## 2022-11-15 ENCOUNTER — OFFICE VISIT (OUTPATIENT)
Dept: ONCOLOGY | Facility: CLINIC | Age: 67
End: 2022-11-15
Attending: STUDENT IN AN ORGANIZED HEALTH CARE EDUCATION/TRAINING PROGRAM
Payer: COMMERCIAL

## 2022-11-15 ENCOUNTER — TELEPHONE (OUTPATIENT)
Dept: FAMILY MEDICINE | Facility: CLINIC | Age: 67
End: 2022-11-15

## 2022-11-15 VITALS
OXYGEN SATURATION: 97 % | WEIGHT: 135 LBS | DIASTOLIC BLOOD PRESSURE: 84 MMHG | SYSTOLIC BLOOD PRESSURE: 160 MMHG | BODY MASS INDEX: 21.69 KG/M2 | RESPIRATION RATE: 18 BRPM | HEART RATE: 77 BPM | HEIGHT: 66 IN

## 2022-11-15 DIAGNOSIS — N47.1 PHIMOSIS: ICD-10-CM

## 2022-11-15 DIAGNOSIS — N48.1 BALANITIS: Primary | ICD-10-CM

## 2022-11-15 DIAGNOSIS — E11.59 TYPE 2 DIABETES MELLITUS WITH OTHER CIRCULATORY COMPLICATION, WITHOUT LONG-TERM CURRENT USE OF INSULIN (H): ICD-10-CM

## 2022-11-15 PROCEDURE — G0463 HOSPITAL OUTPT CLINIC VISIT: HCPCS

## 2022-11-15 PROCEDURE — 99204 OFFICE O/P NEW MOD 45 MIN: CPT | Performed by: STUDENT IN AN ORGANIZED HEALTH CARE EDUCATION/TRAINING PROGRAM

## 2022-11-15 RX ORDER — CEFAZOLIN SODIUM 2 G/100ML
2 INJECTION, SOLUTION INTRAVENOUS SEE ADMIN INSTRUCTIONS
Status: CANCELLED | OUTPATIENT
Start: 2022-11-15

## 2022-11-15 RX ORDER — CLOTRIMAZOLE 1 %
CREAM (GRAM) TOPICAL 2 TIMES DAILY
Qty: 45 G | Refills: 1 | Status: SHIPPED | OUTPATIENT
Start: 2022-11-15 | End: 2022-12-30

## 2022-11-15 RX ORDER — CEFAZOLIN SODIUM 2 G/100ML
2 INJECTION, SOLUTION INTRAVENOUS
Status: CANCELLED | OUTPATIENT
Start: 2022-11-15

## 2022-11-15 RX ORDER — FLUCONAZOLE 200 MG/1
200 TABLET ORAL ONCE
Qty: 1 TABLET | Refills: 0 | Status: SHIPPED | OUTPATIENT
Start: 2022-11-15 | End: 2022-11-15

## 2022-11-15 ASSESSMENT — PAIN SCALES - GENERAL: PAINLEVEL: NO PAIN (0)

## 2022-11-15 NOTE — LETTER
"    11/15/2022         RE: Js Jones  [id number] [facility] [address]        Dear Colleague,    Thank you for referring your patient, Js Jones, to the [facility]. Please see a copy of my visit note below.    Urology Rooming Note    November 15, 2022 9:10 AM   Js Jones is a 67 year old male who presents for:    Chief Complaint   Patient presents with     Urology     Male circumcision      Initial Vitals: BP (!) 160/84   Pulse 77   Resp 18   Ht 1.676 m (5' 6\")   Wt 61.2 kg (135 lb)   SpO2 97%   BMI 21.79 kg/m   Estimated body mass index is 21.79 kg/m  as calculated from the following:    Height as of this encounter: 1.676 m (5' 6\").    Weight as of this encounter: 61.2 kg (135 lb). Body surface area is 1.69 meters squared.  No Pain (0) Comment: Data Unavailable     Allergies reviewed: Yes  Medications reviewed: Yes    Medications: Medication refills not needed today.  Pharmacy name entered into [facility]: [facility] [address] [place] [address] - [address] [place] [address]          Chief Complaint:   Recurrent balanitis  Phimosis           Consult or Referral:     Mr. Js Jones is a 67 year old male seen at the request of Dr. Hyman.         History of Present Illness:     Js Jones is a 67 year old male being seen for phimosis and recurrent balanitis.  Duration of problem: 1 year  Previous treatments: None      Reviewed previous notes from Dr. Salmon    Js is here to discuss his ongoing issues with recurrent infections of the tip of the penis with inability to completely retract his prepuce  He is a known diabetic on oral hypoglycemic agents and also has been taking Jardiance  No prior history of circumcision  His brother also had a similar issue and had to have circumcision in his 60s  Currently has a lot of issues with itching and redness which bother him a lot             Past Medical History:     Past Medical History: "   Diagnosis Date     DM type 2 (diabetes mellitus, type 2) (H)      HTN (hypertension), benign      Hyperlipidemia             Past Surgical History:     Past Surgical History:   Procedure Laterality Date     HERNIORRHAPHY INGUINAL BILATERAL              Medications     Current Outpatient Medications   Medication     amLODIPine (NORVASC) 10 MG tablet     aspirin 81 MG tablet     atorvastatin (LIPITOR) 40 MG tablet     blood glucose monitoring (NO BRAND SPECIFIED) test strip     Blood Glucose Monitoring Suppl W/DEVICE KIT     carvedilol (COREG) 12.5 MG tablet     clotrimazole (LOTRIMIN) 1 % external cream     empagliflozin (JARDIANCE) 10 MG TABS tablet     fluconazole (DIFLUCAN) 200 MG tablet     glipiZIDE (GLUCOTROL XL) 10 MG 24 hr tablet     losartan (COZAAR) 50 MG tablet     Melatonin 10 MG TABS     metFORMIN (GLUCOPHAGE) 1000 MG tablet     MULTIPLE VITAMIN PO     nitroGLYcerin (NITROSTAT) 0.4 MG sublingual tablet     Omega-3 Fatty Acids (OMEGA-3 FISH OIL PO)     omeprazole (PRILOSEC OTC) 20 MG tablet     ONE TOUCH LANCETS MISC     sildenafil (REVATIO) 20 MG tablet     No current facility-administered medications for this visit.            Family History:     Family History   Problem Relation Age of Onset     Diabetes Mother      Hypertension Mother      Cardiovascular Father 81        CHF     Heart Disease Sister 50     Cancer Sister      Heart Disease Brother 50            Social History:     Social History     Socioeconomic History     Marital status:      Spouse name: Not on file     Number of children: 3     Years of education: Not on file     Highest education level: Not on file   Occupational History     Occupation:    Tobacco Use     Smoking status: Former     Packs/day: 1.00     Years: 30.00     Pack years: 30.00     Types: Cigarettes     Quit date: 2000     Years since quittin.8     Smokeless tobacco: Never   Vaping Use     Vaping Use: Never used   Substance and Sexual  "Activity     Alcohol use: No     Drug use: No     Sexual activity: Yes     Partners: Female   Other Topics Concern     Parent/sibling w/ CABG, MI or angioplasty before 65F 55M? No   Social History Narrative    , 3 children, works as a small .  (last updated 9/14/2018)      Social Determinants of Health     Financial Resource Strain: Not on file   Food Insecurity: Not on file   Transportation Needs: Not on file   Physical Activity: Not on file   Stress: Not on file   Social Connections: Not on file   Intimate Partner Violence: Not on file   Housing Stability: Not on file            Allergies:   Bee venom and Lisinopril         Review of Systems:  From intake questionnaire     Skin: negative  Eyes: negative  Ears/Nose/Throat: negative  Respiratory: No shortness of breath, dyspnea on exertion, cough, or hemoptysis  Cardiovascular: No chest pain or palpitations  Gastrointestinal: negative; no nausea/vomiting, constipation or diarrhea  Genitourinary: as per HPI  Musculoskeletal: negative  Neurologic: negative  Psychiatric: negative  Hematologic/Lymphatic/Immunologic: negative  Endocrine: negative         Physical Exam:     Patient is a 67 year old  male   Vitals: Blood pressure (!) 160/84, pulse 77, resp. rate 18, height 1.676 m (5' 6\"), weight 61.2 kg (135 lb), SpO2 97 %.  Constitutional: Body mass index is 21.79 kg/m .  Alert, no acute distress, oriented, conversant  Eyes: no scleral icterus; extraocular muscles intact, moist conjunctivae  Neck: trachea midline, no thyromegaly  Ears/nose/mouth: throat/mouth:normal, good dentition  Respiratory: no respiratory distress, or pursed lip breathing  Cardiovascular: pulses strong and intact; no obvious jugular venous distension present  Gastrointestinal: soft, nontender, no organomegaly or masses,   Lymphatics: No inguinal adenopathy  Musculoskeletal: extremities normal, no peripheral edema  Skin: no suspicious lesions or rashes  Neuro: Alert, oriented, " speech and mentation normal  Psych: affect and mood normal, alert and oriented to person, place and time  Gait: Normal  : Evidence of balanitis with erythematous glans penis and prepuce  Phimosis with the prepuce which is not completely retractile      Labs and Pathology:    The following labs were reviewed by me and discussed with the patient:    Significant for   Lab Results   Component Value Date    CR 0.85 09/07/2022    CR 0.75 03/07/2022    CR 0.85 03/22/2021    CR 0.83 02/22/2021    CR 0.90 04/14/2020    CR 2.18 04/07/2020    CR 2.36 04/07/2020    CR 0.82 01/06/2020    CR 0.88 09/18/2018    CR 0.90 09/17/2018    CR 0.93 09/16/2018    CR 0.92 09/14/2018     No results found for: PSA          Imaging:    The following imaging exams were independently viewed and interpreted by me and discussed with patient:                 Assessment and Plan:     Phimosis  Discussed about the diagnosis of phimosis and the inability to retract the prepuce fully  Discussed with him about circumcision as a line of treatment considering his recurrent balanitis  We discussed in detail about the procedure, likely complications including but not limited to bleeding and infection  And additionally we also talked about postoperative outcome.    - Adult Urology  Referral  - Case Request: CIRCUMCISION; Standing  - Case Request: CIRCUMCISION    Balanitis  Does have some ongoing balanitis  Recommended for him to take 1 dose of fluconazole and then use clotrimazole ointment on a regular basis after cleaning the penis  I also discussed with him that his balanitis is probably fungal in origin and he would benefit with regular use of clotrimazole ointment prior to for circumcision  Is also on Jardiance for his diabetes which might increase his risk for recurrent balanitis which I discussed with him and encouraged him to talk to his primary care about this    - fluconazole (DIFLUCAN) 200 MG tablet; Take 1 tablet (200 mg) by mouth once  for 1 dose  - clotrimazole (LOTRIMIN) 1 % external cream; Apply topically 2 times daily for 45 days  - Case Request: CIRCUMCISION; Standing  - Case Request: CIRCUMCISION  - UA reflex to Microscopic and Culture [[id number]]    Type 2 diabetes mellitus with other circulatory complication, without long-term current use of insulin (H)  Discussed with the patient about his diabetes use of Jardiance and the increased risk for fungal balanitis because of the same  I have asked him to call his primary care and discuss his alternate options    Plan:  Plan for circumcision  Start on antifungal    Orders  Orders Placed This Encounter   Procedures     Case Request: CIRCUMCISION       Manuel Bates MD  [facility]      ==========================    Additional Billing and Coding Information:  Review of external notes as documented above   Review of the result(s) of each unique test - Creatinine    Independent interpretation of a test performed by another physician/other qualified health care professional (not separately reported) -       Discussion of management or test interpretation with external physician/other qualified healthcare professional/appropriate source -           20 minutes spent on the date of the encounter doing chart review, review of test results, interpretation of tests, patient visit and documentation     ==========================      Again, thank you for allowing me to participate in the care of your patient.        Sincerely,        Manuel Bates MD

## 2022-11-15 NOTE — PROGRESS NOTES
Chief Complaint:   Recurrent balanitis  Phimosis           Consult or Referral:     Mr. Js Jones is a 67 year old male seen at the request of Dr. Hyman.         History of Present Illness:     Js Jones is a 67 year old male being seen for phimosis and recurrent balanitis.  Duration of problem: 1 year  Previous treatments: None      Reviewed previous notes from Dr. Salmon    Js is here to discuss his ongoing issues with recurrent infections of the tip of the penis with inability to completely retract his prepuce  He is a known diabetic on oral hypoglycemic agents and also has been taking Jardiance  No prior history of circumcision  His brother also had a similar issue and had to have circumcision in his 60s  Currently has a lot of issues with itching and redness which bother him a lot             Past Medical History:     Past Medical History:   Diagnosis Date     DM type 2 (diabetes mellitus, type 2) (H)      HTN (hypertension), benign      Hyperlipidemia             Past Surgical History:     Past Surgical History:   Procedure Laterality Date     HERNIORRHAPHY INGUINAL BILATERAL              Medications     Current Outpatient Medications   Medication     amLODIPine (NORVASC) 10 MG tablet     aspirin 81 MG tablet     atorvastatin (LIPITOR) 40 MG tablet     blood glucose monitoring (NO BRAND SPECIFIED) test strip     Blood Glucose Monitoring Suppl W/DEVICE KIT     carvedilol (COREG) 12.5 MG tablet     clotrimazole (LOTRIMIN) 1 % external cream     empagliflozin (JARDIANCE) 10 MG TABS tablet     fluconazole (DIFLUCAN) 200 MG tablet     glipiZIDE (GLUCOTROL XL) 10 MG 24 hr tablet     losartan (COZAAR) 50 MG tablet     Melatonin 10 MG TABS     metFORMIN (GLUCOPHAGE) 1000 MG tablet     MULTIPLE VITAMIN PO     nitroGLYcerin (NITROSTAT) 0.4 MG sublingual tablet     Omega-3 Fatty Acids (OMEGA-3 FISH OIL PO)     omeprazole (PRILOSEC OTC) 20 MG tablet     ONE TOUCH LANCETS MISC     sildenafil (REVATIO)  20 MG tablet     No current facility-administered medications for this visit.            Family History:     Family History   Problem Relation Age of Onset     Diabetes Mother      Hypertension Mother      Cardiovascular Father 81        CHF     Heart Disease Sister 50     Cancer Sister      Heart Disease Brother 50            Social History:     Social History     Socioeconomic History     Marital status:      Spouse name: Not on file     Number of children: 3     Years of education: Not on file     Highest education level: Not on file   Occupational History     Occupation:    Tobacco Use     Smoking status: Former     Packs/day: 1.00     Years: 30.00     Pack years: 30.00     Types: Cigarettes     Quit date: 2000     Years since quittin.8     Smokeless tobacco: Never   Vaping Use     Vaping Use: Never used   Substance and Sexual Activity     Alcohol use: No     Drug use: No     Sexual activity: Yes     Partners: Female   Other Topics Concern     Parent/sibling w/ CABG, MI or angioplasty before 65F 55M? No   Social History Narrative    , 3 children, works as a small .  (last updated 2018)      Social Determinants of Health     Financial Resource Strain: Not on file   Food Insecurity: Not on file   Transportation Needs: Not on file   Physical Activity: Not on file   Stress: Not on file   Social Connections: Not on file   Intimate Partner Violence: Not on file   Housing Stability: Not on file            Allergies:   Bee venom and Lisinopril         Review of Systems:  From intake questionnaire     Skin: negative  Eyes: negative  Ears/Nose/Throat: negative  Respiratory: No shortness of breath, dyspnea on exertion, cough, or hemoptysis  Cardiovascular: No chest pain or palpitations  Gastrointestinal: negative; no nausea/vomiting, constipation or diarrhea  Genitourinary: as per HPI  Musculoskeletal: negative  Neurologic: negative  Psychiatric:  "negative  Hematologic/Lymphatic/Immunologic: negative  Endocrine: negative         Physical Exam:     Patient is a 67 year old  male   Vitals: Blood pressure (!) 160/84, pulse 77, resp. rate 18, height 1.676 m (5' 6\"), weight 61.2 kg (135 lb), SpO2 97 %.  Constitutional: Body mass index is 21.79 kg/m .  Alert, no acute distress, oriented, conversant  Eyes: no scleral icterus; extraocular muscles intact, moist conjunctivae  Neck: trachea midline, no thyromegaly  Ears/nose/mouth: throat/mouth:normal, good dentition  Respiratory: no respiratory distress, or pursed lip breathing  Cardiovascular: pulses strong and intact; no obvious jugular venous distension present  Gastrointestinal: soft, nontender, no organomegaly or masses,   Lymphatics: No inguinal adenopathy  Musculoskeletal: extremities normal, no peripheral edema  Skin: no suspicious lesions or rashes  Neuro: Alert, oriented, speech and mentation normal  Psych: affect and mood normal, alert and oriented to person, place and time  Gait: Normal  : Evidence of balanitis with erythematous glans penis and prepuce  Phimosis with the prepuce which is not completely retractile      Labs and Pathology:    The following labs were reviewed by me and discussed with the patient:    Significant for   Lab Results   Component Value Date    CR 0.85 09/07/2022    CR 0.75 03/07/2022    CR 0.85 03/22/2021    CR 0.83 02/22/2021    CR 0.90 04/14/2020    CR 2.18 04/07/2020    CR 2.36 04/07/2020    CR 0.82 01/06/2020    CR 0.88 09/18/2018    CR 0.90 09/17/2018    CR 0.93 09/16/2018    CR 0.92 09/14/2018     No results found for: PSA          Imaging:    The following imaging exams were independently viewed and interpreted by me and discussed with patient:                 Assessment and Plan:     Phimosis  Discussed about the diagnosis of phimosis and the inability to retract the prepuce fully  Discussed with him about circumcision as a line of treatment considering his recurrent " balanitis  We discussed in detail about the procedure, likely complications including but not limited to bleeding and infection  And additionally we also talked about postoperative outcome.    - Adult Urology  Referral  - Case Request: CIRCUMCISION; Standing  - Case Request: CIRCUMCISION    Balanitis  Does have some ongoing balanitis  Recommended for him to take 1 dose of fluconazole and then use clotrimazole ointment on a regular basis after cleaning the penis  I also discussed with him that his balanitis is probably fungal in origin and he would benefit with regular use of clotrimazole ointment prior to for circumcision  Is also on Jardiance for his diabetes which might increase his risk for recurrent balanitis which I discussed with him and encouraged him to talk to his primary care about this    - fluconazole (DIFLUCAN) 200 MG tablet; Take 1 tablet (200 mg) by mouth once for 1 dose  - clotrimazole (LOTRIMIN) 1 % external cream; Apply topically 2 times daily for 45 days  - Case Request: CIRCUMCISION; Standing  - Case Request: CIRCUMCISION  - UA reflex to Microscopic and Culture [[id number]]    Type 2 diabetes mellitus with other circulatory complication, without long-term current use of insulin (H)  Discussed with the patient about his diabetes use of Jardiance and the increased risk for fungal balanitis because of the same  I have asked him to call his primary care and discuss his alternate options    Plan:  Plan for circumcision  Start on antifungal    Orders  Orders Placed This Encounter   Procedures     Case Request: CIRCUMCISION       Manuel Bates MD  [facility]      ==========================    Additional Billing and Coding Information:  Review of external notes as documented above   Review of the result(s) of each unique test - Creatinine    Independent interpretation of a test performed by another physician/other qualified health care professional (not separately  reported) -       Discussion of management or test interpretation with external physician/other qualified healthcare professional/appropriate source -           20 minutes spent on the date of the encounter doing chart review, review of test results, interpretation of tests, patient visit and documentation     ==========================

## 2022-11-15 NOTE — PROGRESS NOTES
"Urology Rooming Note    November 15, 2022 9:10 AM   Js ESTHER Jones is a 67 year old male who presents for:    Chief Complaint   Patient presents with     Urology     Male circumcision      Initial Vitals: BP (!) 160/84   Pulse 77   Resp 18   Ht 1.676 m (5' 6\")   Wt 61.2 kg (135 lb)   SpO2 97%   BMI 21.79 kg/m   Estimated body mass index is 21.79 kg/m  as calculated from the following:    Height as of this encounter: 1.676 m (5' 6\").    Weight as of this encounter: 61.2 kg (135 lb). Body surface area is 1.69 meters squared.  No Pain (0) Comment: Data Unavailable     Allergies reviewed: Yes  Medications reviewed: Yes    Medications: Medication refills not needed today.  Pharmacy name entered into [facility]: [facility] - [place], [place] - [address] [place], [place] [address]SHAE  "

## 2022-11-15 NOTE — TELEPHONE ENCOUNTER
Medication Question or Refill        What medication are you calling about (include dose and sig)?: empagliflozin (JARDIANCE) 10 MG TABS     Controlled Substance Agreement on file:   CSA -- Patient Level:    CSA: None found at the patient level.       Who prescribed the medication?: Dr Hyman    Do you need a refill? No    When did you use the medication last? Yesterday morning 11/14    Patient offered an appointment? No    Do you have any questions or concerns?  Yes: , per pt, seen with a provider at [facility] today 11/15, that provider today feels the jardiance medication may be interfering with something, and recommends patient checks with PCP to see if there is an alternative medication they can prescribe instead.    Preferred Pharmacy:   [facility] [address] [place] [id number]  Phone: [phone] Fax: [phone]      Okay to leave a detailed message?: Yes at Home number on file [phone] (home)

## 2022-11-16 ENCOUNTER — HOSPITAL ENCOUNTER (OUTPATIENT)
Facility: CLINIC | Age: 67
End: 2022-11-16
Attending: STUDENT IN AN ORGANIZED HEALTH CARE EDUCATION/TRAINING PROGRAM | Admitting: STUDENT IN AN ORGANIZED HEALTH CARE EDUCATION/TRAINING PROGRAM
Payer: COMMERCIAL

## 2022-11-26 DIAGNOSIS — I10 BENIGN ESSENTIAL HYPERTENSION: ICD-10-CM

## 2022-11-28 ENCOUNTER — VIRTUAL VISIT (OUTPATIENT)
Dept: FAMILY MEDICINE | Facility: CLINIC | Age: 67
End: 2022-11-28
Payer: COMMERCIAL

## 2022-11-28 ENCOUNTER — TELEPHONE (OUTPATIENT)
Dept: FAMILY MEDICINE | Facility: CLINIC | Age: 67
End: 2022-11-28

## 2022-11-28 DIAGNOSIS — Z79.4 TYPE 2 DIABETES MELLITUS WITH OTHER SPECIFIED COMPLICATION, WITH LONG-TERM CURRENT USE OF INSULIN (H): ICD-10-CM

## 2022-11-28 DIAGNOSIS — E11.69 TYPE 2 DIABETES MELLITUS WITH OTHER SPECIFIED COMPLICATION, WITH LONG-TERM CURRENT USE OF INSULIN (H): ICD-10-CM

## 2022-11-28 DIAGNOSIS — Z00.00 MEDICARE ANNUAL WELLNESS VISIT, SUBSEQUENT: Primary | ICD-10-CM

## 2022-11-28 PROCEDURE — G0439 PPPS, SUBSEQ VISIT: HCPCS | Mod: 95 | Performed by: FAMILY MEDICINE

## 2022-11-28 PROCEDURE — 99214 OFFICE O/P EST MOD 30 MIN: CPT | Mod: 95 | Performed by: FAMILY MEDICINE

## 2022-11-28 RX ORDER — LIRAGLUTIDE 6 MG/ML
INJECTION SUBCUTANEOUS
Qty: 9 ML | Refills: 1 | Status: SHIPPED | OUTPATIENT
Start: 2022-11-28 | End: 2023-05-08

## 2022-11-28 ASSESSMENT — ACTIVITIES OF DAILY LIVING (ADL): CURRENT_FUNCTION: NO ASSISTANCE NEEDED

## 2022-11-28 NOTE — PROGRESS NOTES
"Js is a 67 year old who is being evaluated via a billable video visit.      How would you like to obtain your AVS? Mail a copy  If the video visit is dropped, the invitation should be resent by: Text to cell phone: [phone]  Will anyone else be joining your video visit? No      Assessment & Plan     Medicare annual wellness visit, subsequent  Medically doing well.  Scheduled to have circumcision in January for balanitis and phimosis.  Jardiance discontinued by surgeon.  Home blood glucose readings above 200.  Alternate options discussed.  Victoza prescribed and recommended to continue metformin, glipizide.  Other medications reviewed and no changes made.  Diabetic educator referral placed as well.  Suggested well hydration, healthy diet and regular walks.    Type 2 diabetes mellitus with other specified complication, with long-term current use of insulin (H)  As above  - liraglutide (VICTOZA) 18 MG/3ML solution; SUBQ: Initial: 0.6 mg once daily for 1 week, then increase to 1.2 mg once daily  - AMB Adult Diabetes Educator Referral; Future      Fortino Hyman MD  [facility]    Subjective   Js is a 67 year old, presenting for the following health issues:  Wellness Visit and Diabetes (Would like to discuss a glucose meter &Jardiance)      Healthy Habits:    In general, how would you rate your overall health?  Good    Frequency of exercise:  None    Duration of exercise:  Less than 15 minutes    Do you usually eat at least 4 servings of fruit and vegetables a day, include whole grains    & fiber and avoid regularly eating high fat or \"junk\" foods?  Yes    Taking medications regularly:  Yes    Barriers to taking medications:  None    Medication side effects:  None    Ability to successfully perform activities of daily living:  No assistance needed    Home Safety:  No safety concerns identified    Hearing Impairment:  Feel that people are mumbling or not speaking clearly    In the past " "6 months, have you been bothered by leaking of urine?  No    In general, how would you rate your overall mental or emotional health?  Good      PHQ-2 Total Score:    Additional concerns today:  No       Annual Wellness Visit    Patient has been advised of split billing requirements and indicates understanding: Yes     Are you in the first 12 months of your Medicare Part B coverage?  No    Physical Health:    In general, how would you rate your overall physical health? good    Outside of work, how many days during the week do you exercise?none    Outside of work, approximately how many minutes a day do you exercise?not applicable    If you drink alcohol do you typically have >3 drinks per day or >7 drinks per week? No    Do you usually eat at least 4 servings of fruit and vegetables a day, include whole grains & fiber and avoid regularly eating high fat or \"junk\" foods? Yes    Do you have any problems taking medications regularly? No     Do you have any side effects from medications? none    Needs assistance for the following daily activities: no assistance needed    Which of the following safety concerns are present in your home?  none identified     Hearing impairment: Yes, Feel that people are mumbling or not speaking clearly.    In the past 6 months, have you been bothered by leaking of urine? no    There were no vitals taken for this visit.  Weight: Unable to obtain due to video visit  Height: Unable to obtain due to video visit  BMI: Unable to obtain due to video visit  Blood Pressure: Unable to obtain due to video visit    Mental Health:    In general, how would you rate your overall mental or emotional health? good  PHQ-2 Score:      Do you feel safe in your environment? YES    Have you ever done Advance Care Planning? (For example, a Health Directive, POLST, or a discussion with a medical provider or your loved ones about your wishes)? No, advance care planning information given to patient to review.  " Patient plans to discuss their wishes with loved ones or provider.      Fall risk:  Fallen 2 or more times in the past year?: No  Any fall with injury in the past year?: No    Cognitive Screening: Unable to complete due to virtual visit; need for additional assessment in future face-to-face visit      Current providers sharing in care for this patient include:   Patient Care Team:  Fortino Hyman MD as PCP - General (Family Medicine)  Fortino Hyman MD as Assigned PCP  Lucia Williamson MD as Assigned Heart and Vascular Provider  Manuel Bates MD as Assigned Surgical Provider        Review of Systems   Constitutional, HEENT, cardiovascular, pulmonary, GI, , musculoskeletal, neuro, skin, endocrine and psych systems are negative, except as otherwise noted.      Objective           Vitals:  No vitals were obtained today due to virtual visit.    Physical Exam   GENERAL: Healthy, alert and no distress  EYES: Eyes grossly normal to inspection.  No discharge or erythema, or obvious scleral/conjunctival abnormalities.  RESP: No audible wheeze, cough, or visible cyanosis.  No visible retractions or increased work of breathing.    SKIN: Visible skin clear. No significant rash, abnormal pigmentation or lesions.  NEURO: Cranial nerves grossly intact.  Mentation and speech appropriate for age.  PSYCH: Mentation appears normal, affect normal/bright, judgement and insight intact, normal speech and appearance well-groomed.        Video-Visit Details    Video Start Time: 11:10 AM    Type of service:  Video Visit    Video End Time:11:30 AM    Originating Location (pt. Location): Home        Distant Location (provider location):  On-site    Platform used for Video Visit: Gerardo

## 2022-11-28 NOTE — TELEPHONE ENCOUNTER
Patient called stating that he forgot to speak with Dr. Hyman about a home glucose monitor for arm that connects to his phone. Wondering if Boogie would call back to discuss?        Okay to leave a detailed message?: Yes at Home number on file [phone] (home)

## 2022-11-28 NOTE — TELEPHONE ENCOUNTER
Please recommend patient to schedule appointment with diabetic educator who can provide him with more education about continuous glucose monitoring.    Dr Hyman

## 2022-11-29 ENCOUNTER — ALLIED HEALTH/NURSE VISIT (OUTPATIENT)
Dept: EDUCATION SERVICES | Facility: CLINIC | Age: 67
End: 2022-11-29
Payer: COMMERCIAL

## 2022-11-29 DIAGNOSIS — Z79.4 TYPE 2 DIABETES MELLITUS WITH OTHER SPECIFIED COMPLICATION, WITH LONG-TERM CURRENT USE OF INSULIN (H): ICD-10-CM

## 2022-11-29 DIAGNOSIS — E11.69 TYPE 2 DIABETES MELLITUS WITH OTHER SPECIFIED COMPLICATION, WITH LONG-TERM CURRENT USE OF INSULIN (H): ICD-10-CM

## 2022-11-29 PROCEDURE — G0108 DIAB MANAGE TRN  PER INDIV: HCPCS | Performed by: DIETITIAN, REGISTERED

## 2022-11-29 RX ORDER — CARVEDILOL 12.5 MG/1
TABLET ORAL
Qty: 90 TABLET | Refills: 0 | Status: SHIPPED | OUTPATIENT
Start: 2022-11-29 | End: 2023-01-16

## 2022-11-29 NOTE — TELEPHONE ENCOUNTER
Routing to provider for consideration, last BP not in range     11/15/2022 9/7/2022 5/24/2022    BP: 160/84 Abnormal  136/80 124/72      Santa Gavin RN MSN

## 2022-11-29 NOTE — PROGRESS NOTES
Diabetes Self-Management Education & Support    Presents for: Individual review    Type of Service: In Person Visit    Assessment Type:   ASSESSMENT:  -he just started the victoza today  -hoping the numbers go down some with that.  -he is hoping to go back on the Jardiance once the circumcision is done since it is cheaper than the victoza.  His a1c was 7.2% while on that.  -he tries to watch his food choices/portions.  Patient's most recent   Lab Results   Component Value Date    A1C 7.2 09/07/2022    A1C 8.5 02/22/2021     is meeting goal of <8.0    Diabetes knowledge and skills assessment:   Patient is knowledgeable in diabetes management concepts related to: Healthy Eating, Being Active and Monitoring    Continue education with the following diabetes management concepts: Healthy Eating, Being Active, Monitoring, Taking Medication, Problem Solving, Reducing Risks and Healthy Coping    Based on learning assessment above, most appropriate setting for further diabetes education would be: Individual setting.      PLAN  1.   Take 0.6 mg dose of victoza each morning, next Tuesday increase the dose to 1.2 mg daily.  The pen you are using can stay out of the refrigerator.      2.     Call me on Dec 13th with your blood sugars.  Joana [phone].    Topics to cover at upcoming visits: Healthy Eating, Being Active, Monitoring, Taking Medication, Problem Solving, Reducing Risks and Healthy Coping    Follow-up: as needed.    See Care Plan for co-developed, patient-state behavior change goals.  AVS provided for patient today.    Education Materials Provided:  No new materials provided today      SUBJECTIVE/OBJECTIVE:  Presents for: Individual review  Diabetes education in the past 24mo: No  Diabetes type: Type 2  Cultural Influences/Ethnic Background:  Choose not to answer      Diabetes Symptoms & Complications:  Fatigue: No  Neuropathy: No  Polydipsia: No  Polyphagia: No  Polyuria: No  Visual change: No  Autonomic neuropathy:  "No  CVA: No  Heart disease: Yes    Patient Problem List and Family Medical History reviewed for relevant medical history, current medical status, and diabetes risk factors.    Vitals:  There were no vitals taken for this visit.  Estimated body mass index is 21.79 kg/m  as calculated from the following:    Height as of 11/15/22: 1.676 m (5' 6\").    Weight as of 11/15/22: 61.2 kg (135 lb).   Last 3 BP:   BP Readings from Last 3 Encounters:   11/15/22 (!) 160/84   09/07/22 136/80   05/24/22 124/72       History   Smoking Status     Former     Packs/day: 1.00     Years: 30.00     Types: Cigarettes     Quit date: 1/1/2000   Smokeless Tobacco     Never       Labs:  Lab Results   Component Value Date    A1C 7.2 09/07/2022    A1C 8.5 02/22/2021     Lab Results   Component Value Date     09/07/2022     03/07/2022     03/22/2021     Lab Results   Component Value Date    LDL 77 09/07/2022    LDL 70 01/06/2020     HDL Cholesterol   Date Value Ref Range Status   01/06/2020 43 >39 mg/dL Final     Direct Measure HDL   Date Value Ref Range Status   09/07/2022 37 (L) >=40 mg/dL Final   ]  GFR Estimate   Date Value Ref Range Status   09/07/2022 >90 >60 mL/min/1.73m2 Final     Comment:     Effective December 21, 2021 eGFRcr in adults is calculated using the 2021 CKD-EPI creatinine equation which includes age and gender (Dania navarro al., [place], DOI: 10.1056/OMMInt5861533)   03/22/2021 90 >60 mL/min/[1.73_m2] Final     Comment:     Non  GFR Calc  Starting 12/18/2018, serum creatinine based estimated GFR (eGFR) will be   calculated using the Chronic Kidney Disease Epidemiology Collaboration   (CKD-EPI) equation.       GFR Estimate If Black   Date Value Ref Range Status   03/22/2021 >90 >60 mL/min/[1.73_m2] Final     Comment:      GFR Calc  Starting 12/18/2018, serum creatinine based estimated GFR (eGFR) will be   calculated using the Chronic Kidney Disease Epidemiology Collaboration "   (CKD-EPI) equation.       Lab Results   Component Value Date    CR 0.85 09/07/2022    CR 0.85 03/22/2021     No results found for: MICROALBUMIN    Healthy Eating:  Cultural/Nondenominational diet restrictions?: No  Meal planning/habits: Carb counting, Low carb, Low salt, Frequent snacking  How many times a week on average do you eat food made away from home (restaurant/take-out)?: 0  Meals include: Breakfast, Lunch, Dinner, Evening Snack  Breakfast: cornflakes and protein drink in am.  Lunch: brings to lunch: sandwich, sliced turkey lunch meat or pepper turkey  (both him and his wife are back working), lettuce and cheese with low carb bread. milk with.  Dinner: veggies, meat, potato or hotdish  beef stew recently.  goulash.  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  Beverages: Water, Coffee, Milk    Being Active:  Being Active Assessed Today:  (active at work)  Barrier to exercise: Time    Monitoring:  Blood Glucose Meter: Reli-On  Times checking blood sugar at home (number): 1  Times checking blood sugar at home (per): Day  Blood glucose trend: No change   Breakfast pp Lunch pp Supper  pp HS   18-Nov 218         19-Nov 254         21-Nov 241         25-Nov 330         26-Nov 275         27-Nov 285         28-Nov 228         29-Nov 232  168        #DIV/0! 168 #DIV/0! #DIV/0! #DIV/0! #DIV/0!           Taking Medications:  Diabetes Medication(s)     Biguanides       metFORMIN (GLUCOPHAGE) 1000 MG tablet    Take 1 tablet (1,000 mg) by mouth 2 times daily (with meals)    Sulfonylureas       glipiZIDE (GLUCOTROL XL) 10 MG 24 hr tablet    Take 1 tablet (10 mg) by mouth daily    Incretin Mimetic Agents       liraglutide (VICTOZA) 18 MG/3ML solution    SUBQ: Initial: 0.6 mg once daily for 1 week, then increase to 1.2 mg once daily          Current Treatments: Oral Medication (taken by mouth)    Problem Solving:   not assessed              Reducing Risks:  CAD Risks: Family history,  Hypertension    Healthy Coping:  Informal Support system:: Family  Patient Activation Measure Survey Score:  REMIGIO Score (Last Two) 7/13/2012 9/4/2013   REMIGIO Raw Score 40 28   Activation Score 60 34.7   REMIGIO Level 3 1         Care Plan and Education Provided:  Care Plan: Diabetes   Updates made by Joana Blanton RD since 11/29/2022 12:00 AM      Problem: HbA1C Not In Goal       Goal: Establish Regular Follow-Ups with PCP       Task: Discuss with PCP the recommended timing for patient's next follow up visit(s)    Responsible User: Joana Blanton RD      Task: Discuss schedule for PCP visits with patient    Responsible User: Joana Blanton RD      Goal: Get HbA1C Level in Goal       Task: Educate patient on diabetes education self-management topics    Responsible User: Joana Blanton RD      Task: Educate patient on benefits of regular glucose monitoring    Responsible User: Joana Blanton RD      Task: Refer patient to appropriate extended care team member, as needed (Medication Therapy Management, Behavioral Health, Physical Therapy, etc.)    Responsible User: Joana Blanton RD      Task: Discuss diabetes treatment plan with patient    Responsible User: Joana Blanton RD      Problem: Diabetes Self-Management Education Needed to Optimize Self-Care Behaviors       Goal: Understand diabetes pathophysiology and disease progression       Task: Provide education on diabetes pathophysiology and disease progression specfic to patient's diabetes type    Responsible User: Joana Blanton RD      Goal: Healthy Eating - follow a healthy eating pattern for diabetes       Task: Provide education on portion control and consistency in amount, composition and timing of food intake    Responsible User: Joana Blanton RD      Task: Provide education on managing carbohydrate intake (carbohydrate counting, plate planning method, etc.) Completed 11/29/2022   Responsible User: Joana Blanton RD      Task: Provide education on weight management    Responsible  User: Joana Blanton RD      Task: Provide education on heart healthy eating    Responsible User: Joana Blanton RD      Task: Provide education on eating out    Responsible User: Joana Blanton RD      Task: Develop individualized healthy eating plan with patient    Responsible User: Joana Blanton RD      Goal: Being Active - get regular physical activity, working up to at least 150 minutes per week       Task: Provide education on relationship of activity to glucose and precautions to take if at risk for low glucose    Responsible User: Joana Blanton RD      Task: Discuss barriers to physical activity with patient    Responsible User: Joana Blanton RD      Task: Develop physical activity plan with patient    Responsible User: Joana Blanton RD      Task: Explore community resources including walking groups, assistance programs, and home videos    Responsible User: Joana Blanton RD      Goal: Monitoring - monitor glucose and ketones as directed       Task: Provide education on blood glucose monitoring (purpose, proper technique, frequency, glucose targets, interpreting results, when to use glucose control solution, sharps disposal)    Responsible User: Joana Blanton RD      Task: Provide education on continuous glucose monitoring (sensor placement, use of cheng or /reader, understanding glucose trends, alerts and alarms, differences between sensor glucose and blood glucose)    Responsible User: Joana Blanton RD      Task: Provide education on ketone monitoring (when to monitor, frequency, etc.)    Responsible User: Joana Blanton RD      Goal: Taking Medication - patient is consistently taking medications as directed       Task: Provide education on action of prescribed medication, including when to take and possible side effects Completed 11/29/2022   Responsible User: Joana Blanton RD      Task: Provide education on insulin and injectable diabetes medications, including administration, storage, site selection and rotation  for injection sites Completed 11/29/2022   Responsible User: Joana Blanton RD      Task: Discuss barriers to medication adherence with patient and provide management technique ideas as appropriate    Responsible User: Joana Blanton RD      Task: Provide education on frequency and refill details of medications    Responsible User: Joana Blanton RD      Goal: Problem Solving - know how to prevent and manage short-term diabetes complications       Task: Provide education on high blood glucose - causes, signs/symptoms, prevention and treatment    Responsible User: Joana Blanton RD      Task: Provide education on low blood glucose - causes, signs/symptoms, prevention, treatment, carrying a carbohydrate source at all times, and medical identification    Responsible User: Joana Blanton RD      Task: Provide education on safe travel with diabetes    Responsible User: Joana Blanton RD      Task: Provide education on how to care for diabetes on sick days    Responsible User: Joana Blanton RD      Task: Provide education on when to call a health care provider    Responsible User: Joana Blanton RD      Goal: Reducing Risks - know how to prevent and treat long-term diabetes complications       Task: Provide education on major complications of diabetes, prevention, early diagnostic measures and treatment of complications    Responsible User: Joana Blanton RD      Task: Provide education on recommended care for dental, eye and foot health    Responsible User: Joana Blanton RD      Task: Provide education on Hemoglobin A1c - goals and relationship to blood glucose levels    Responsible User: Joana Blanton RD      Task: Provide education on recommendations for heart health - lipid levels and goals, blood pressure and goals, and aspirin therapy, if indicated    Responsible User: Joana Blanton RD      Task: Provide education on tobacco cessation    Responsible User: Joana Blanton RD      Goal: Healthy Coping - use available resources to cope with  the challenges of managing diabetes       Task: Discuss recognizing feelings about having diabetes    Responsible User: Joana Blanton RD      Task: Provide education on the benefits of making appropriate lifestyle changes    Responsible User: Joana Blanton RD      Task: Provide education on benefits of utilizing support systems    Responsible User: Joana Blanton RD      Task: Discuss methods for coping with stress    Responsible User: Joana Blanton RD      Task: Provide education on when to seek professional counseling    Responsible User: Joana Blanton RD              Time Spent: 30 minutes  Encounter Type: Individual    Any diabetes medication dose changes were made via the CDE Protocol per the patient's primary care provider. A copy of this encounter was shared with the provider.

## 2022-11-29 NOTE — PATIENT INSTRUCTIONS
Take 0.6 mg dose of victoza each morning, next Tuesday increase the dose to 1.2 mg daily.  The pen you are using can stay out of the refrigerator.      2.     Call me on Dec 13th with your blood sugars.  Joana [phone].

## 2022-12-08 ENCOUNTER — OFFICE VISIT (OUTPATIENT)
Dept: FAMILY MEDICINE | Facility: CLINIC | Age: 67
End: 2022-12-08
Payer: COMMERCIAL

## 2022-12-08 VITALS
TEMPERATURE: 97.8 F | DIASTOLIC BLOOD PRESSURE: 68 MMHG | HEART RATE: 84 BPM | HEIGHT: 66 IN | RESPIRATION RATE: 18 BRPM | WEIGHT: 136 LBS | OXYGEN SATURATION: 97 % | BODY MASS INDEX: 21.86 KG/M2 | SYSTOLIC BLOOD PRESSURE: 118 MMHG

## 2022-12-08 DIAGNOSIS — E11.69 TYPE 2 DIABETES MELLITUS WITH OTHER SPECIFIED COMPLICATION, WITH LONG-TERM CURRENT USE OF INSULIN (H): ICD-10-CM

## 2022-12-08 DIAGNOSIS — Z00.00 ENCOUNTER FOR MEDICARE ANNUAL WELLNESS EXAM: ICD-10-CM

## 2022-12-08 DIAGNOSIS — N48.1 BALANITIS: ICD-10-CM

## 2022-12-08 DIAGNOSIS — I25.10 CORONARY ARTERY DISEASE INVOLVING NATIVE HEART WITHOUT ANGINA PECTORIS, UNSPECIFIED VESSEL OR LESION TYPE: ICD-10-CM

## 2022-12-08 DIAGNOSIS — K21.9 GASTROESOPHAGEAL REFLUX DISEASE WITHOUT ESOPHAGITIS: ICD-10-CM

## 2022-12-08 DIAGNOSIS — E78.5 HYPERLIPIDEMIA LDL GOAL <100: ICD-10-CM

## 2022-12-08 DIAGNOSIS — Z01.818 PREOP GENERAL PHYSICAL EXAM: Primary | ICD-10-CM

## 2022-12-08 DIAGNOSIS — Z79.4 TYPE 2 DIABETES MELLITUS WITH OTHER SPECIFIED COMPLICATION, WITH LONG-TERM CURRENT USE OF INSULIN (H): ICD-10-CM

## 2022-12-08 DIAGNOSIS — I77.1 ILIAC ARTERY STENOSIS, LEFT (H): ICD-10-CM

## 2022-12-08 DIAGNOSIS — N47.1 PHIMOSIS: ICD-10-CM

## 2022-12-08 DIAGNOSIS — I10 BENIGN ESSENTIAL HYPERTENSION: ICD-10-CM

## 2022-12-08 PROCEDURE — 99214 OFFICE O/P EST MOD 30 MIN: CPT | Performed by: FAMILY MEDICINE

## 2022-12-08 ASSESSMENT — PAIN SCALES - GENERAL: PAINLEVEL: NO PAIN (0)

## 2022-12-08 NOTE — PROGRESS NOTES
[facility]  [address] [place] [address]  Phone: [phone]  Fax: [phone]  Primary Provider: Fortino Hyman  Pre-op Performing Provider: FORTINO HYMAN      PREOPERATIVE EVALUATION:  Today's date: 12/8/2022    Js ESTHER Jones is a 67 year old male who presents for a preoperative evaluation.    Surgical Information:  Surgery/Procedure: Circumcision   Surgery Location: [facility]  Surgeon: Dr. Bates  Surgery Date: 01/04/2023  Time of Surgery: 05:00am  Where patient plans to recover: At home with family  Fax number for surgical facility: Note does not need to be faxed, will be available electronically in Epic.    Type of Anesthesia Anticipated: General    Assessment & Plan     The proposed surgical procedure is considered LOW risk.      ICD-10-CM    1. Preop general physical exam  Z01.818       2. Balanitis  N48.1       3. Phimosis  N47.1       4. Type 2 diabetes mellitus with other specified complication, with long-term current use of insulin (H)  E11.69     Z79.4       5. Benign essential hypertension  I10       6. Iliac artery stenosis, left (H)  I77.1       7. Gastroesophageal reflux disease without esophagitis  K21.9       8. Encounter for Medicare annual wellness exam  Z00.00       9. Hyperlipidemia LDL goal <100  E78.5       10. Coronary artery disease involving native heart without angina pectoris, unspecified vessel or lesion type  I25.10           Risks and Recommendations:  The patient has the following additional risks and recommendations for perioperative complications:   - No identified additional risk factors other than previously addressed    Medication Instructions:   - metformin: HOLD day of surgery.   - sulfonylurea (e.g. glyburide, glipizide): HOLD day of surgery   - GLP-1 Injectable (exenitide, liraglutide, semaglutide, dulaglutide, etc.): HOLD day of surgery     - Aspirin: History of angioplasty, s/p angioplasty, 3 stents in 2018:  Bleeding risk is moderate.  Recommended to hold off aspirin for 5 days before surgery      RECOMMENDATION:  APPROVAL GIVEN to proceed with proposed procedure, without further diagnostic evaluation.        Subjective     HPI related to upcoming procedure:   67-year-old male presents for a preop physical exam.  Patient is scheduled to have circumcision on January 4, 2023. He requires evaluation and anesthesia risk assessment prior to undergoing surgery/procedure.  Patient denies any fever, chills, sore throat, cough, shortness of breath, chest pain, palpitation, diarrhea, constipation, abdominal pain, headache or other relevant systemic symptoms.      Preop Questions 12/8/2022   1. Have you ever had a heart attack or stroke? YES - IHD   2. Have you ever had surgery on your heart or blood vessels, such as a stent placement, a coronary artery bypass, or surgery on an artery in your head, neck, heart, or legs? YES - NSTEMI, s/p CAYLA of RCA and diagonal lesions   3. Do you have chest pain with activity? No   4. Do you have a history of  heart failure? No   5. Do you currently have a cold, bronchitis or symptoms of other infection? No   6. Do you have a cough, shortness of breath, or wheezing? No   7. Do you or anyone in your family have previous history of blood clots? No   8. Do you or does anyone in your family have a serious bleeding problem such as prolonged bleeding following surgeries or cuts? No   9. Have you ever had problems with anemia or been told to take iron pills? No   10. Have you had any abnormal blood loss such as black, tarry or bloody stools? No   11. Have you ever had a blood transfusion? No   12. Are you willing to have a blood transfusion if it is medically needed before, during, or after your surgery? Yes   13. Have you or any of your relatives ever had problems with anesthesia? No   14. Do you have sleep apnea, excessive snoring or daytime drowsiness? No   15. Do you have any artifical heart valves  or other implanted medical devices like a pacemaker, defibrillator, or continuous glucose monitor? No   16. Do you have artificial joints? No   17. Are you allergic to latex? No       Health Care Directive:  Patient does not have a Health Care Directive or Living Will: Discussed advance care planning with patient; information given to patient to review.    Preoperative Review of :   reviewed - no record of controlled substances prescribed.      Status of Chronic Conditions:  See problem list for active medical problems.  Problems all longstanding and stable, except as noted/documented.  See ROS for pertinent symptoms related to these conditions.      Review of Systems  CONSTITUTIONAL: NEGATIVE for fever, chills, change in weight  INTEGUMENTARY/SKIN: NEGATIVE for worrisome rashes, moles or lesions  EYES: NEGATIVE for vision changes or irritation  ENT/MOUTH: NEGATIVE for ear, mouth and throat problems  RESP: NEGATIVE for significant cough or SOB  CV: NEGATIVE for chest pain, palpitations or peripheral edema  GI: NEGATIVE for nausea, abdominal pain, heartburn, or change in bowel habits  : NEGATIVE for frequency, dysuria, or hematuria  MUSCULOSKELETAL: NEGATIVE for significant arthralgias or myalgia  NEURO: NEGATIVE for weakness, dizziness or paresthesias  ENDOCRINE: NEGATIVE for temperature intolerance, skin/hair changes  HEME: NEGATIVE for bleeding problems  PSYCHIATRIC: NEGATIVE for changes in mood or affect    Patient Active Problem List    Diagnosis Date Noted     Coronary artery disease involving native heart without angina pectoris, unspecified vessel or lesion type 02/22/2021     Priority: Medium     3  Stents RCA 2018.    Normal EF, no cardiomyopathy        Type 2 diabetes mellitus with circulatory disorder, without long-term current use of insulin (H) 02/22/2021     Priority: Medium     CAD, 3 stents 2018       LBBB (left bundle branch block) 09/14/2018     Priority: Medium     Gastroesophageal reflux  disease without esophagitis 11/04/2015     Priority: Medium     Hyperlipidemia LDL goal <100 10/16/2014     Priority: Medium     Benign essential hypertension, goal <140/90 03/17/2014     Priority: Medium      Past Medical History:   Diagnosis Date     DM type 2 (diabetes mellitus, type 2) (H)      HTN (hypertension), benign      Hyperlipidemia      Past Surgical History:   Procedure Laterality Date     HERNIORRHAPHY INGUINAL BILATERAL       Current Outpatient Medications   Medication Sig Dispense Refill     amLODIPine (NORVASC) 10 MG tablet Take 1 tablet (10 mg) by mouth daily 90 tablet 3     aspirin 81 MG tablet Take 1 tablet (81 mg) by mouth daily 90 tablet 3     atorvastatin (LIPITOR) 40 MG tablet Take 1 tablet (40 mg) by mouth daily 90 tablet 1     blood glucose monitoring (NO BRAND SPECIFIED) test strip Use to test blood sugar 3 times daily or as directed. Box of 100 each. 3 Box 3     Blood Glucose Monitoring Suppl W/DEVICE KIT 1 each daily 1 kit 0     carvedilol (COREG) 12.5 MG tablet TAKE 1 TABLET BY MOUTH TWICE DAILY WITH MEALS 90 tablet 0     clotrimazole (LOTRIMIN) 1 % external cream Apply topically 2 times daily for 45 days 45 g 1     glipiZIDE (GLUCOTROL XL) 10 MG 24 hr tablet Take 1 tablet (10 mg) by mouth daily 90 tablet 1     liraglutide (VICTOZA) 18 MG/3ML solution SUBQ: Initial: 0.6 mg once daily for 1 week, then increase to 1.2 mg once daily 9 mL 1     losartan (COZAAR) 50 MG tablet Take 1 tablet by mouth once daily 90 tablet 1     Melatonin 10 MG TABS Take 10 mg by mouth nightly as needed for sleep       metFORMIN (GLUCOPHAGE) 1000 MG tablet Take 1 tablet (1,000 mg) by mouth 2 times daily (with meals) 180 tablet 1     MULTIPLE VITAMIN PO Take 1 tablet by mouth       nitroGLYcerin (NITROSTAT) 0.4 MG sublingual tablet For chest pain place 1 tablet under the tongue every 5 minutes for 3 doses. If symptoms persist 5 minutes after 1st dose call 911. 50 tablet 11     Omega-3 Fatty Acids (OMEGA-3 FISH  "OIL PO) Take 1,200 mg by mouth 2 times daily (with meals)       omeprazole (PRILOSEC OTC) 20 MG tablet Take 1 tablet (20 mg) by mouth daily 90 tablet 1     ONE TOUCH LANCETS MISC 1 lancet 3 times daily 200 each 5     sildenafil (REVATIO) 20 MG tablet Take 1-2 tabs daily as needed 1 hour before sexual activity; may be taken up to 4 hours before sexual activity. 30 tablet 1       Allergies   Allergen Reactions     Bee Venom Unknown     Lisinopril Cough        Social History     Tobacco Use     Smoking status: Former     Packs/day: 1.00     Years: 30.00     Pack years: 30.00     Types: Cigarettes     Quit date: 2000     Years since quittin.9     Smokeless tobacco: Never   Substance Use Topics     Alcohol use: No     Family History   Problem Relation Age of Onset     Diabetes Mother      Hypertension Mother      Cardiovascular Father 81        CHF     Heart Disease Sister 50     Cancer Sister      Heart Disease Brother 50     History   Drug Use No         Objective     /68 (BP Location: Right arm, Patient Position: Sitting, Cuff Size: Adult Regular)   Pulse 84   Temp 97.8  F (36.6  C) (Tympanic)   Resp 18   Ht 1.676 m (5' 6\")   Wt 61.7 kg (136 lb)   SpO2 97%   BMI 21.95 kg/m      Physical Exam    GENERAL APPEARANCE: alert, active and no distress     EYES: EOMI,  PERRL     HENT: ear canals and TM's normal and nose and mouth without ulcers or lesions     NECK: no adenopathy, no asymmetry, masses, or scars and thyroid normal to palpation     RESP: lungs clear to auscultation - no rales, rhonchi or wheezes     CV: regular rates and rhythm, normal S1 S2, no S3 or S4 and no murmur, click or rub     ABDOMEN:  soft, nontender, no HSM or masses and bowel sounds normal     MS: extremities normal- no gross deformities noted, no evidence of inflammation in joints, FROM in all extremities.     SKIN: no suspicious lesions or rashes     NEURO: Normal strength and tone, sensory exam grossly normal, mentation " intact and speech normal     PSYCH: mentation appears normal. and affect normal/bright     LYMPHATICS: No cervical adenopathy    Recent Labs   Lab Test 09/07/22  0944 03/07/22  0923    136   POTASSIUM 4.6 4.4   CR 0.85 0.75   A1C 7.2* 11.2*        Diagnostics:  No labs were ordered during this visit.   No EKG required for low risk surgery (cataract, skin procedure, breast biopsy, etc).    Revised Cardiac Risk Index (RCRI):  The patient has the following serious cardiovascular risks for perioperative complications:   - Coronary Artery Disease (MI, positive stress test, angina, Qs on EKG) = 1 point     RCRI Interpretation: 1 point: Class II (low risk - 0.9% complication rate)           Signed Electronically by: Fortino Hyman MD  Copy of this evaluation report is provided to requesting physician.

## 2022-12-13 ENCOUNTER — TELEPHONE (OUTPATIENT)
Dept: EDUCATION SERVICES | Facility: CLINIC | Age: 67
End: 2022-12-13

## 2022-12-14 NOTE — TELEPHONE ENCOUNTER
Diabetes education contact:     Breakfast pp Lunch pp Supper  pp HS      6-Dec 126    97 cookies 197      7-Dec 144    76  120      8-Dec 128  137          9-Dec 139    206  114 went to [facility] had wendys lunch   10-Dec 154    84  86      11-Dec 140  124    114      12-Dec 154    84        13-Dec 157 178 165 91 99         178 142 91 108 #DIV/0! 126                     Numbers looking much better with the Victoza.  Joana Blanton RD, [facility]    Any diabetes medication dose changes were made via the CDE Protocol and Collaborative Practice Agreement with the patient's primary care provider. A copy of this encounter was shared with the provider.

## 2022-12-28 RX ORDER — FLUCONAZOLE 200 MG/1
200 TABLET ORAL DAILY
COMMUNITY
Start: 2022-11-15 | End: 2023-07-04 | Stop reason: HOSPADM

## 2023-01-15 DIAGNOSIS — I10 BENIGN ESSENTIAL HYPERTENSION: ICD-10-CM

## 2023-01-16 RX ORDER — CARVEDILOL 12.5 MG/1
TABLET ORAL
Qty: 90 TABLET | Refills: 1 | Status: SHIPPED | OUTPATIENT
Start: 2023-01-16 | End: 2023-04-18

## 2023-02-07 DIAGNOSIS — I10 BENIGN ESSENTIAL HYPERTENSION: ICD-10-CM

## 2023-02-07 RX ORDER — LOSARTAN POTASSIUM 50 MG/1
TABLET ORAL
Qty: 90 TABLET | Refills: 1 | Status: SHIPPED | OUTPATIENT
Start: 2023-02-07 | End: 2023-08-22

## 2023-04-17 DIAGNOSIS — I10 BENIGN ESSENTIAL HYPERTENSION: ICD-10-CM

## 2023-04-18 RX ORDER — CARVEDILOL 12.5 MG/1
TABLET ORAL
Qty: 90 TABLET | Refills: 1 | Status: SHIPPED | OUTPATIENT
Start: 2023-04-18 | End: 2023-07-24

## 2023-04-26 ENCOUNTER — OFFICE VISIT (OUTPATIENT)
Dept: FAMILY MEDICINE | Facility: CLINIC | Age: 68
End: 2023-04-26
Payer: COMMERCIAL

## 2023-04-26 VITALS
BODY MASS INDEX: 22.31 KG/M2 | SYSTOLIC BLOOD PRESSURE: 130 MMHG | DIASTOLIC BLOOD PRESSURE: 86 MMHG | HEIGHT: 66 IN | TEMPERATURE: 96.3 F | WEIGHT: 138.8 LBS | RESPIRATION RATE: 20 BRPM | HEART RATE: 68 BPM | OXYGEN SATURATION: 98 %

## 2023-04-26 DIAGNOSIS — Z12.11 SCREEN FOR COLON CANCER: ICD-10-CM

## 2023-04-26 DIAGNOSIS — E11.69 TYPE 2 DIABETES MELLITUS WITH OTHER SPECIFIED COMPLICATION, WITH LONG-TERM CURRENT USE OF INSULIN (H): ICD-10-CM

## 2023-04-26 DIAGNOSIS — Z79.4 TYPE 2 DIABETES MELLITUS WITH OTHER SPECIFIED COMPLICATION, WITH LONG-TERM CURRENT USE OF INSULIN (H): ICD-10-CM

## 2023-04-26 DIAGNOSIS — Z00.00 ROUTINE HISTORY AND PHYSICAL EXAMINATION OF ADULT: Primary | ICD-10-CM

## 2023-04-26 DIAGNOSIS — Z12.5 SCREENING FOR PROSTATE CANCER: ICD-10-CM

## 2023-04-26 LAB
ANION GAP SERPL CALCULATED.3IONS-SCNC: 11 MMOL/L (ref 7–15)
BUN SERPL-MCNC: 14.5 MG/DL (ref 8–23)
CALCIUM SERPL-MCNC: 10 MG/DL (ref 8.8–10.2)
CHLORIDE SERPL-SCNC: 103 MMOL/L (ref 98–107)
CREAT SERPL-MCNC: 0.89 MG/DL (ref 0.67–1.17)
DEPRECATED HCO3 PLAS-SCNC: 26 MMOL/L (ref 22–29)
GFR SERPL CREATININE-BSD FRML MDRD: >90 ML/MIN/1.73M2
GLUCOSE SERPL-MCNC: 156 MG/DL (ref 70–99)
HBA1C MFR BLD: 6.8 % (ref 0–5.6)
POTASSIUM SERPL-SCNC: 4.1 MMOL/L (ref 3.4–5.3)
PSA SERPL DL<=0.01 NG/ML-MCNC: 1.03 NG/ML (ref 0–6.5)
SODIUM SERPL-SCNC: 140 MMOL/L (ref 136–145)

## 2023-04-26 PROCEDURE — G0103 PSA SCREENING: HCPCS | Performed by: FAMILY MEDICINE

## 2023-04-26 PROCEDURE — 99213 OFFICE O/P EST LOW 20 MIN: CPT | Mod: 25 | Performed by: FAMILY MEDICINE

## 2023-04-26 PROCEDURE — 80048 BASIC METABOLIC PNL TOTAL CA: CPT | Performed by: FAMILY MEDICINE

## 2023-04-26 PROCEDURE — 36415 COLL VENOUS BLD VENIPUNCTURE: CPT | Performed by: FAMILY MEDICINE

## 2023-04-26 PROCEDURE — G0439 PPPS, SUBSEQ VISIT: HCPCS | Performed by: FAMILY MEDICINE

## 2023-04-26 PROCEDURE — 83036 HEMOGLOBIN GLYCOSYLATED A1C: CPT | Performed by: FAMILY MEDICINE

## 2023-04-26 ASSESSMENT — ENCOUNTER SYMPTOMS
HEMATURIA: 0
PALPITATIONS: 0
ABDOMINAL PAIN: 0
HEADACHES: 0
NERVOUS/ANXIOUS: 0
COUGH: 0
MYALGIAS: 0
EYE PAIN: 0
WEAKNESS: 0
CHILLS: 0
CONSTIPATION: 0
ARTHRALGIAS: 0
NAUSEA: 0
SHORTNESS OF BREATH: 0
DYSURIA: 0
HEMATOCHEZIA: 0
DIZZINESS: 0
HEARTBURN: 0
FEVER: 0
FREQUENCY: 0
PARESTHESIAS: 0
DIARRHEA: 1
SORE THROAT: 0
JOINT SWELLING: 0

## 2023-04-26 ASSESSMENT — ACTIVITIES OF DAILY LIVING (ADL): CURRENT_FUNCTION: NO ASSISTANCE NEEDED

## 2023-04-26 ASSESSMENT — PAIN SCALES - GENERAL: PAINLEVEL: NO PAIN (0)

## 2023-04-26 NOTE — PROGRESS NOTES
"SUBJECTIVE:   Js is a 68 year old who presents for Preventive Visit.      4/26/2023     8:31 AM   Additional Questions   Roomed by Christina MILES CMA     Patient has been advised of split billing requirements and indicates understanding: Yes  Are you in the first 12 months of your Medicare coverage?  No    Healthy Habits:     In general, how would you rate your overall health?  Good    Frequency of exercise:  1 day/week    Duration of exercise:  N/A    Do you usually eat at least 4 servings of fruit and vegetables a day, include whole grains    & fiber and avoid regularly eating high fat or \"junk\" foods?  Yes    Taking medications regularly:  Yes    Medication side effects:  None    Ability to successfully perform activities of daily living:  No assistance needed    Home Safety:  No safety concerns identified    Hearing Impairment:  Feel that people are mumbling or not speaking clearly, difficult to understand a speaker at a public meeting or Gnosticist service, need to ask people to speak up or repeat themselves and difficulty understanding soft or whispered speech    In the past 6 months, have you been bothered by leaking of urine?  No    In general, how would you rate your overall mental or emotional health?  Good      PHQ-2 Total Score: 0    Additional concerns today:  No  -Would like cholesterol levels checked as he feels he hasn't had them checked in a while.     Have you ever done Advance Care Planning? (For example, a Health Directive, POLST, or a discussion with a medical provider or your loved ones about your wishes): Yes, advance care planning is on file.       Fall risk  Fallen 2 or more times in the past year?: No  Any fall with injury in the past year?: No    Cognitive Screening   1) Repeat 3 items (Leader, Season, Table)    2) Clock draw: NORMAL  3) 3 item recall: Recalls 2 objects   Results: NORMAL clock, 1-2 items recalled: COGNITIVE IMPAIRMENT LESS LIKELY    Mini-CogTM Copyright S Gabriela. Licensed by " the author for use in [facility]; reprinted with permission (claudio@[facility]). All rights reserved.      Do you have sleep apnea, excessive snoring or daytime drowsiness?: no    Reviewed and updated as needed this visit by clinical staff   Tobacco  Allergies  Meds              Reviewed and updated as needed this visit by Provider                 Social History     Tobacco Use     Smoking status: Former     Packs/day: 1.00     Years: 30.00     Pack years: 30.00     Types: Cigarettes     Quit date: 2000     Years since quittin.3     Smokeless tobacco: Never   Vaping Use     Vaping status: Never Used   Substance Use Topics     Alcohol use: Never             2023     8:21 AM   Alcohol Use   Prescreen: >3 drinks/day or >7 drinks/week? Not Applicable     Do you have a current opioid prescription? No  Do you use any other controlled substances or medications that are not prescribed by a provider? None        Current providers sharing in care for this patient include:   Patient Care Team:  Fortino Hyman MD as PCP - General (Family Medicine)  Fortino Hyman MD as Assigned PCP  Lucia Williamson MD as Assigned Heart and Vascular Provider  Manuel Bates MD as Assigned Surgical Provider  Joana Blanton RD as Diabetes Educator (Dietitian, Registered)    The following health maintenance items are reviewed in Epic and correct as of today:  Health Maintenance   Topic Date Due     A1C  2023     COLORECTAL CANCER SCREENING  2023     EYE EXAM  2023     BMP  2023     LIPID  2023     MICROALBUMIN  2023     DIABETIC FOOT EXAM  2023     MEDICARE ANNUAL WELLNESS VISIT  2023     ANNUAL REVIEW OF HM ORDERS  2023     FALL RISK ASSESSMENT  2024     ADVANCE CARE PLANNING  2027     DTAP/TDAP/TD IMMUNIZATION (3 - Td or Tdap) 10/20/2029     HEPATITIS C SCREENING  Completed     PHQ-2 (once per calendar year)  Completed     INFLUENZA  VACCINE  Completed     Pneumococcal Vaccine: 65+ Years  Completed     ZOSTER IMMUNIZATION  Completed     AORTIC ANEURYSM SCREENING (SYSTEM ASSIGNED)  Completed     COVID-19 Vaccine  Completed     IPV IMMUNIZATION  Aged Out     MENINGITIS IMMUNIZATION  Aged Out     Lab work is in process  Labs reviewed in EPIC  BP Readings from Last 3 Encounters:   23 130/86   22 118/68   11/15/22 (!) 160/84    Wt Readings from Last 3 Encounters:   23 63 kg (138 lb 12.8 oz)   22 61.7 kg (136 lb)   11/15/22 61.2 kg (135 lb)                  Patient Active Problem List   Diagnosis     Benign essential hypertension, goal <140/90     Hyperlipidemia LDL goal <100     Gastroesophageal reflux disease without esophagitis     LBBB (left bundle branch block)     Coronary artery disease involving native heart without angina pectoris, unspecified vessel or lesion type     Type 2 diabetes mellitus with circulatory disorder, without long-term current use of insulin (H)     Past Surgical History:   Procedure Laterality Date     HERNIORRHAPHY INGUINAL BILATERAL         Social History     Tobacco Use     Smoking status: Former     Packs/day: 1.00     Years: 30.00     Pack years: 30.00     Types: Cigarettes     Quit date: 2000     Years since quittin.3     Smokeless tobacco: Never   Vaping Use     Vaping status: Never Used   Substance Use Topics     Alcohol use: Never     Family History   Problem Relation Age of Onset     Diabetes Mother      Hypertension Mother      Cardiovascular Father 81        CHF     Heart Disease Sister 50     Cancer Sister      Heart Disease Brother 50         Current Outpatient Medications   Medication Sig Dispense Refill     amLODIPine (NORVASC) 10 MG tablet Take 1 tablet (10 mg) by mouth daily 90 tablet 3     APPLE CIDER VINEGAR PO Take 450 mg by mouth 2 times daily       aspirin 81 MG tablet Take 1 tablet (81 mg) by mouth daily 90 tablet 3     atorvastatin (LIPITOR) 40 MG tablet Take 1  tablet (40 mg) by mouth daily 90 tablet 1     carvedilol (COREG) 12.5 MG tablet TAKE 1 TABLET BY MOUTH TWICE DAILY WITH MEALS 90 tablet 1     fluconazole (DIFLUCAN) 200 MG tablet Take 200 mg by mouth daily       glipiZIDE (GLUCOTROL XL) 10 MG 24 hr tablet Take 1 tablet (10 mg) by mouth daily 90 tablet 1     liraglutide (VICTOZA) 18 MG/3ML solution SUBQ: Initial: 0.6 mg once daily for 1 week, then increase to 1.2 mg once daily 9 mL 1     losartan (COZAAR) 50 MG tablet Take 1 tablet by mouth once daily 90 tablet 1     Melatonin 10 MG TABS Take 10 mg by mouth nightly as needed for sleep       metFORMIN (GLUCOPHAGE) 1000 MG tablet Take 1 tablet (1,000 mg) by mouth 2 times daily (with meals) 180 tablet 1     MULTIPLE VITAMIN PO Take 1 tablet by mouth       Omega-3 Fatty Acids (OMEGA-3 FISH OIL PO) Take 1,200 mg by mouth 2 times daily (with meals)       omeprazole (PRILOSEC OTC) 20 MG tablet Take 1 tablet (20 mg) by mouth daily 90 tablet 1     sildenafil (REVATIO) 20 MG tablet Take 1-2 tabs daily as needed 1 hour before sexual activity; may be taken up to 4 hours before sexual activity. 30 tablet 1     blood glucose monitoring (NO BRAND SPECIFIED) test strip Use to test blood sugar 3 times daily or as directed. Box of 100 each. 3 Box 3     Blood Glucose Monitoring Suppl W/DEVICE KIT 1 each daily 1 kit 0     nitroGLYcerin (NITROSTAT) 0.4 MG sublingual tablet For chest pain place 1 tablet under the tongue every 5 minutes for 3 doses. If symptoms persist 5 minutes after 1st dose call 911. 50 tablet 11     ONE TOUCH LANCETS MISC 1 lancet 3 times daily 200 each 5     Allergies   Allergen Reactions     Bee Venom Unknown     Lisinopril Cough     Recent Labs   Lab Test 09/07/22  0944 03/07/22  0923 07/14/21  0954 03/22/21  1012 02/22/21  1017 04/07/20  1958 04/07/20  1709 01/06/20  0828 09/15/18  0546 09/14/18  1818 06/15/18  0843 03/09/18  0815 07/14/16  0900 11/04/15  1630   A1C 7.2* 11.2* 7.1*  --  8.5*  --   --  8.1*   < >   "--    < > 6.9*   < > 7.3*   LDL 77  --  54  --   --   --   --  70   < >  --   --  72   < >  --    HDL 37*  --  43  --   --   --   --  43   < >  --   --  38*   < >  --    TRIG 112  --  105  --   --   --   --  170*   < >  --   --  73   < >  --    ALT  --   --   --   --   --   --  40 44  --  39  --   --    < >  --    CR 0.85 0.75  --  0.85 0.83   < > 2.36* 0.82   < > 0.92  --   --    < >  --    GFRESTIMATED >90 >90  --  90 >90   < > 27* >90   < > 82  --   --    < >  --    GFRESTBLACK  --   --   --  >90 >90   < > 32* >90   < > >90  --   --    < >  --    POTASSIUM 4.6 4.4  --  4.2 4.2   < > 3.9 4.1   < > 3.4  --   --    < >  --    TSH  --   --   --   --   --   --   --   --   --   --   --  1.02  --  1.47    < > = values in this interval not displayed.          Review of Systems   Constitutional: Negative for chills and fever.   HENT: Positive for hearing loss. Negative for congestion, ear pain and sore throat.    Eyes: Negative for pain and visual disturbance.   Respiratory: Negative for cough and shortness of breath.    Cardiovascular: Negative for chest pain, palpitations and peripheral edema.   Gastrointestinal: Positive for diarrhea. Negative for abdominal pain, constipation, heartburn, hematochezia and nausea.   Genitourinary: Positive for impotence. Negative for dysuria, frequency, genital sores, hematuria, penile discharge and urgency.   Musculoskeletal: Negative for arthralgias, joint swelling and myalgias.   Skin: Negative for rash.   Neurological: Negative for dizziness, weakness, headaches and paresthesias.   Psychiatric/Behavioral: Negative for mood changes. The patient is not nervous/anxious.        Please call [phone] to schedule colonoscopy.  OBJECTIVE:   /86 (BP Location: Right arm, Patient Position: Sitting, Cuff Size: Adult Regular)   Pulse 68   Temp (!) 96.3  F (35.7  C) (Tympanic)   Resp 20   Ht 1.676 m (5' 6\")   Wt 63 kg (138 lb 12.8 oz)   SpO2 98%   BMI 22.40 kg/m   Estimated body " "mass index is 22.4 kg/m  as calculated from the following:    Height as of this encounter: 1.676 m (5' 6\").    Weight as of this encounter: 63 kg (138 lb 12.8 oz).  Physical Exam  GENERAL: alert and no distress  EYES: Eyes grossly normal to inspection, PERRL and conjunctivae and sclerae normal  HENT: normal cephalic/atraumatic, nose and mouth without ulcers or lesions, oropharynx clear and oral mucous membranes moist  NECK: no adenopathy, no asymmetry, masses, or scars and thyroid normal to palpation  RESP: lungs clear to auscultation - no rales, rhonchi or wheezes  CV: regular rate and rhythm, normal S1 S2, no S3 or S4, no murmur, click or rub, no peripheral edema and peripheral pulses strong  ABDOMEN: soft, nontender, no hepatosplenomegaly, no masses and bowel sounds normal  MS: no gross musculoskeletal defects noted, no edema  SKIN: no suspicious lesions or rashes  NEURO: Normal strength and tone, mentation intact and speech normal  PSYCH: mentation appears normal, affect normal/bright      Lab Results   Component Value Date    A1C 7.2 09/07/2022    A1C 11.2 03/07/2022    A1C 7.1 07/14/2021    A1C 8.5 02/22/2021    A1C 8.1 01/06/2020    A1C 6.9 06/07/2019    A1C 6.8 09/15/2018    A1C 7.2 06/15/2018         ASSESSMENT / PLAN:   (Z00.00) Routine history and physical examination of adult  (primary encounter diagnosis)  Comment: Medically doing well.  Medications reviewed and no changes made.  Recommended regular walks, strict diabetic diet      (Z12.11) Screen for colon cancer  Comment: Screening colonoscopy ordered  Plan: (S+30), Adult GI  Referral - Procedure        Only            (E11.69,  Z79.4) Type 2 diabetes mellitus with other specified complication, with long-term current use of insulin (H)  Comment: Last hemoglobin A1c 7.2, consistent with well-controlled diabetes.  Recommended to continue metformin, glipizide and Victoza.  Suggested to continue following diabetic educator, referral placed  Lab " Results   Component Value Date    A1C 7.2 09/07/2022    A1C 11.2 03/07/2022    A1C 7.1 07/14/2021    A1C 8.5 02/22/2021    A1C 8.1 01/06/2020    A1C 6.9 06/07/2019    A1C 6.8 09/15/2018    A1C 7.2 06/15/2018   Plan: HEMOGLOBIN A1C, Basic metabolic panel  (Ca, Cl,        CO2, Creat, Gluc, K, Na, BUN), AMB Adult         Diabetes Educator Referral            (Z12.5) Screening for prostate cancer  Comment:   Plan: PSA, screen              COUNSELING:  Reviewed preventive health counseling, as reflected in patient instructions        He reports that he quit smoking about 23 years ago. His smoking use included cigarettes. He has a 30.00 pack-year smoking history. He has never used smokeless tobacco.      Appropriate preventive services were discussed with this patient, including applicable screening as appropriate for cardiovascular disease, diabetes, osteopenia/osteoporosis, and glaucoma.  As appropriate for age/gender, discussed screening for colorectal cancer, prostate cancer, breast cancer, and cervical cancer. Checklist reviewing preventive services available has been given to the patient.    Reviewed patients plan of care and provided an AVS. The Basic Care Plan (routine screening as documented in Health Maintenance) for Js meets the Care Plan requirement. This Care Plan has been established and reviewed with the Patient.            Fortino Hyman MD  [facility]

## 2023-04-26 NOTE — LETTER
April 26, 2023      Js ESTHER Jones  [address]  [place] [address]        Dear ,    We are writing to inform you of your test results.    Hemoglobin A1c 6.8, consistent with well-controlled diabetes.  Other lab results unremarkable including normal PSA level.       Will recommend to continue regular walks, healthy diet and current medications.  Follow-up in 6 months or earlier if needed.      Resulted Orders   PSA, screen   Result Value Ref Range    Prostate Specific Antigen Screen 1.03 0.00 - 6.50 ng/mL    Narrative    This result is obtained using the Roche Elecsys total PSA method on the dalton e601 immunoassay analyzer. Results obtained with different assay methods or kits cannot be used interchangeably.   Basic metabolic panel  (Ca, Cl, CO2, Creat, Gluc, K, Na, BUN)   Result Value Ref Range    Sodium 140 136 - 145 mmol/L    Potassium 4.1 3.4 - 5.3 mmol/L    Chloride 103 98 - 107 mmol/L    Carbon Dioxide (CO2) 26 22 - 29 mmol/L    Anion Gap 11 7 - 15 mmol/L    Urea Nitrogen 14.5 8.0 - 23.0 mg/dL    Creatinine 0.89 0.67 - 1.17 mg/dL    Calcium 10.0 8.8 - 10.2 mg/dL    Glucose 156 (H) 70 - 99 mg/dL    GFR Estimate >90 >60 mL/min/1.73m2      Comment:      eGFR calculated using 2021 CKD-EPI equation.   HEMOGLOBIN A1C   Result Value Ref Range    Hemoglobin A1C 6.8 (H) 0.0 - 5.6 %      Comment:      Normal <5.7%   Prediabetes 5.7-6.4%    Diabetes 6.5% or higher     Note: Adopted from ADA consensus guidelines.       If you have any questions or concerns, please call the clinic at the number listed above.       Sincerely,      Fortino Hyman MD

## 2023-05-03 DIAGNOSIS — E78.5 HYPERLIPIDEMIA LDL GOAL <100: Chronic | ICD-10-CM

## 2023-05-03 RX ORDER — ATORVASTATIN CALCIUM 40 MG/1
TABLET, FILM COATED ORAL
Qty: 90 TABLET | Refills: 1 | Status: SHIPPED | OUTPATIENT
Start: 2023-05-03 | End: 2023-10-30

## 2023-05-08 DIAGNOSIS — Z79.4 TYPE 2 DIABETES MELLITUS WITH OTHER SPECIFIED COMPLICATION, WITH LONG-TERM CURRENT USE OF INSULIN (H): ICD-10-CM

## 2023-05-08 DIAGNOSIS — E11.69 TYPE 2 DIABETES MELLITUS WITH OTHER SPECIFIED COMPLICATION, WITH LONG-TERM CURRENT USE OF INSULIN (H): ICD-10-CM

## 2023-05-08 RX ORDER — LIRAGLUTIDE 6 MG/ML
INJECTION SUBCUTANEOUS
Qty: 9 ML | Refills: 3 | Status: SHIPPED | OUTPATIENT
Start: 2023-05-08 | End: 2023-11-27

## 2023-05-10 DIAGNOSIS — E11.69 TYPE 2 DIABETES MELLITUS WITH OTHER SPECIFIED COMPLICATION, WITH LONG-TERM CURRENT USE OF INSULIN (H): ICD-10-CM

## 2023-05-10 DIAGNOSIS — Z79.4 TYPE 2 DIABETES MELLITUS WITH OTHER SPECIFIED COMPLICATION, WITH LONG-TERM CURRENT USE OF INSULIN (H): ICD-10-CM

## 2023-05-12 ENCOUNTER — TELEPHONE (OUTPATIENT)
Dept: SURGERY | Facility: CLINIC | Age: 68
End: 2023-05-12
Payer: COMMERCIAL

## 2023-05-12 NOTE — TELEPHONE ENCOUNTER
Screening Questions  BLUE  KIND OF PREP RED  LOCATION [review exclusion criteria] GREEN  SEDATION TYPE        n Are you active on mychart?       Boogie Ordering/Referring Provider?        [facility] What type of coverage do you have?      n Have you had a positive covid test in the last 14 days?     22.4 1. BMI  [BMI 40+ - review exclusion criteria& smart-phrase document]    y  2. Are you able to give consent for your medical care? [IF NO,RN REVIEW]          n  3. Are you taking any prescription pain medications on a routine schedule   (ex narcotics: oxycodone, roxicodone, oxycontin,  and percocet)? [RN Review]        n  3a. EXTENDED PREP What kind of prescription?     n 4. Do you have any chemical dependencies such as alcohol, street drugs, or methadone?        **If yes 3- 5 , please schedule with MAC sedation.**          IF YES TO ANY 6 - 10 - HOSPITAL SETTING ONLY.     n 6.   Do you need assistance transferring?     n 7.   Have you had a heart or lung transplant?    n 8.   Are you currently on dialysis?   n 9.   Do you use daily home oxygen?   n 10. Do you take nitroglycerin?   10a. n If yes, how often?     n 11. Are you currently pregnant?    11a. n If yes, how many weeks? [ Greater than 12 weeks, OR NEEDED]    n 12. Do you have Pulmonary Hypertension? *NEED PAC APPT AT [facility] w/ [facility]*     n 13. [review exclusion criteria]  Do you have any implantable devices in your body (pacemaker, defib, LVAD)?    n 14. In the past 6 months, have you had any heart related issues including cardiomyopathy or heart attack?     14a. n If yes, did it require cardiac stenting if so when?     n 15. Have you had a stroke or Transient ischemic attack (TIA - aka  mini stroke ) within 6 months?      n 16. Do you have mod to severe Obstructive Sleep Apnea?  [Hospital only]    n 17. Do you have SEVERE AND UNCONTROLLED asthma? *NEED PAC APPT AT [facility] w/MAC*     18.Do you take blood thinners?  No    n 19. Do you take the medication  "named Phentermine?    19a. If yes, \"Hold for 7 days before procedure.  Please consult your prescribing provider if you have questions about holding this medication.\"     n  20. Do you have chronic kidney disease?      y  21. Do you have a diagnosis of diabetes?     n  22. On a regular basis do you go 3-5 days between bowel movements?     y 23. Preferred LOCAL Pharmacy for Pre Prescription      [id number] - [place], [place] - [address]        - CLOSING REMINDERS -    You will receive a call from a Nurse to review instructions and health history.  This assessment must be completed prior to your procedure.  Failure to complete the Nurse assessment may result in the procedure being cancelled.      On the day of your procedure, please designatean adult(s) who can drive you home stay with you for the next 24 hours. The medicines used in the exam will make you sleepy. You will not be able to drive.      You cannot take public transportation, ride share services, or non-medical taxi service without a responsible caregiver.  Medical transport services are allowed with the requirement that a responsible caregiver will receive you at your destination.  We require that drivers and caregivers are confirmed prior to your procedure.      - SCHEDULING DETAILS -  n & n Hospital Setting Required & If yes, what is the exclusion?   Boogie  Surgeon    8/14/23  Date of Procedure  Lower Endoscopy [Colonoscopy]  Type of Procedure Scheduled  [facility]- If you answer yes to questions #8, #20, #21 [  pts ]Which Colonoscopy Prep was Sent?     general Sedation Type     n PAC / Pre-op Required                 "

## 2023-05-22 ENCOUNTER — VIRTUAL VISIT (OUTPATIENT)
Dept: EDUCATION SERVICES | Facility: OTHER | Age: 68
End: 2023-05-22
Attending: FAMILY MEDICINE
Payer: COMMERCIAL

## 2023-05-22 DIAGNOSIS — Z79.4 TYPE 2 DIABETES MELLITUS WITH OTHER SPECIFIED COMPLICATION, WITH LONG-TERM CURRENT USE OF INSULIN (H): ICD-10-CM

## 2023-05-22 DIAGNOSIS — E11.69 TYPE 2 DIABETES MELLITUS WITH OTHER SPECIFIED COMPLICATION, WITH LONG-TERM CURRENT USE OF INSULIN (H): ICD-10-CM

## 2023-05-22 PROCEDURE — 98967 PH1 ASSMT&MGMT NQHP 11-20: CPT | Performed by: DIETITIAN, REGISTERED

## 2023-05-22 NOTE — CONFIDENTIAL NOTE
Diabetes Education Follow-up  Type of Service: Telephone Visit    Originating Location (Patient Location): Home  Distant Location (Provider Location): Offsite  Mode of Communication:  Telephone    Telephone Visit Start Time: 1:30 pm  Telephone Visit End Time (telephone visit stop time): 1:42 pm    How would patient like to obtain AVS? Mail a copy      Subjective/Objective:    Js Jones sent in blood glucose log for review. Last date of communication was: 11/22.    Diabetes is being managed with   Lifestyle (diet/activity), Diabetes Medications   Diabetes Medication(s)     Biguanides       metFORMIN (GLUCOPHAGE) 1000 MG tablet    TAKE 1 TABLET BY MOUTH TWICE DAILY WITH MEALS    Sulfonylureas       glipiZIDE (GLUCOTROL XL) 10 MG 24 hr tablet    Take 1 tablet (10 mg) by mouth daily    Incretin Mimetic Agents       liraglutide (VICTOZA PEN) 18 MG/3ML solution    INJECT 0.6MG SUBCUTANEOUSLY DAILY X7 DAYS THEN INCREASE TO 1.2MG ONCE DAILY          BG/Food Log:   Pt says he really hasn't been checking blood sugars much, but when does, in the 150 range.  Last A1C was 6.8    Assessment:    Js does not eat consistently.  Says he is still on 0.6 mg daily dose of Victoza - didn't realize he was supposed to increase it to 1.2 mg daily.  Explained how due to his inconsistent eating, safer to go up on Victoza and hopefully stop Glipizide XL or have it be a lower dose.    Plan/Response:  Hold Glipizide XL when starts 1.2 mg daily dose of Victoza    Monitor blood sugars 3 times per day    We will follow up in 1 month to review blood sugars and response to being off Glipizide XL and new Victoza dose        Any diabetes medication dose changes were made via the CDE Protocol and Collaborative Practice Agreement with the patient's referring provider. A copy of this encounter was shared with the provider.    12 minutes  Shelby Villarreal RD  CD[facility]  [phone]

## 2023-05-22 NOTE — LETTER
5/22/2023         RE: Js Jones  [address] [facility] [address]        Dear Colleague,    Thank you for referring your patient, Js Jones, to the [facility]. Please see a copy of my visit note below.    No notes on file

## 2023-05-30 ENCOUNTER — HOSPITAL ENCOUNTER (OUTPATIENT)
Dept: ULTRASOUND IMAGING | Facility: CLINIC | Age: 68
Discharge: HOME | End: 2023-05-30
Attending: FAMILY MEDICINE | Admitting: FAMILY MEDICINE
Payer: COMMERCIAL

## 2023-05-30 DIAGNOSIS — I25.10 CORONARY ARTERY DISEASE INVOLVING NATIVE HEART WITHOUT ANGINA PECTORIS, UNSPECIFIED VESSEL OR LESION TYPE: ICD-10-CM

## 2023-05-30 DIAGNOSIS — E11.51 TYPE 2 DIABETES MELLITUS WITH DIABETIC PERIPHERAL ANGIOPATHY WITHOUT GANGRENE, WITHOUT LONG-TERM CURRENT USE OF INSULIN (H): ICD-10-CM

## 2023-05-30 DIAGNOSIS — I77.1 ILIAC ARTERY STENOSIS, LEFT (H): ICD-10-CM

## 2023-05-30 DIAGNOSIS — Z48.812 ENCOUNTER FOR SURGICAL AFTERCARE FOLLOWING SURGERY ON THE CIRCULATORY SYSTEM: ICD-10-CM

## 2023-05-30 PROCEDURE — 93978 VASCULAR STUDY: CPT

## 2023-06-05 ENCOUNTER — VIRTUAL VISIT (OUTPATIENT)
Dept: VASCULAR SURGERY | Facility: CLINIC | Age: 68
End: 2023-06-05
Attending: FAMILY MEDICINE
Payer: COMMERCIAL

## 2023-06-05 DIAGNOSIS — I77.1 ILIAC ARTERY STENOSIS, LEFT (H): Primary | ICD-10-CM

## 2023-06-05 PROCEDURE — 99214 OFFICE O/P EST MOD 30 MIN: CPT | Mod: 95 | Performed by: NURSE PRACTITIONER

## 2023-06-05 NOTE — LETTER
"6/5/2023       RE: Js Jones  [address] [facility] [address]     Dear Colleague,    Thank you for referring your patient, Js Jones, to the [facility] [place] at [facility]. Please see a copy of my visit note below.    [facility] Vascular      Type of Visit: Virtual: Telephone    Patient is here for a return visit to discuss 1 year.     Vitals - Patient Reported  Weight (Patient Reported): 63.5 kg (140 lb)  Height (Patient Reported): 167.6 cm (5' 6\")  BMI (Based on Pt Reported Ht/Wt): 22.6  Pain Score: No Pain (0)      Questions patient would like addressed today are: Patient verbalized no questions/concerns, this has been communicated to the provider.     Refills are needed: No    How would you like to obtain your AVS? MyChart   Virtual Visit Details    Type of service:  Telephone Visit   Phone call duration: 20 minutes     Heaven Urbina        VASCULAR SURGERY PROGRESS NOTE    LOCATION: [facility]     Js Jones  Medical Record #:  [id number]  YOB: 1955  Age:  68 year old     Date of Service: 6/5/2023    PRIMARY CARE PROVIDER: Fortino Hyman    Reason for visit: Annual surveillance, left artery iliac stenosis    IMPRESSION / RECOMMENDATION:   67-year-old male who presents to [facility] vascular surgery clinic for annual surveillance of left iliac stenosis.  Ultrasound of aortoiliac system is relatively normal, with mild atheromatous plaque in the abdominal aorta.  His right common iliac artery diameter is measured at 1.0 cm and his left common iliac artery diameter is measured at 0.9 cm, both appear adequate. Patient is on optimal medical management therapy. Clinically, patient notes that he is able to walk about half a mile before his legs get tired, otherwise denies pain, denies numbness or tingling in his lower extremities.  Occasionally with more intense exercise he notes that his " "\"upper legs become tight\". Encouraged patient to do stretching exercises and continue with his membership at [employer].  We will follow-up in 1 year with clinic visit, to assess symptoms and if new testing is required.    All questions were answered and support provided. He has our contact information and knows to reach out with any additional questions or concerns.     Afshan Samuel [facility]  Vascular Surgery  Pager: [phone]  [email or web address]  Send message or 10 digit call back number Securely via [employer] with the Vocera Web Console (learn more here)        HPI:  Js Jones is a 68 year old male with past medical history significant for longstanding type 2 diabetes.  His most recent Hgb A1C was 6.8%.  Patient is diligent with his medications and continues to take aspirin and statin.  He is lipid profile is optimal on current regimen with LDL at 77.  Last year was found to have stenosis in left common iliac artery.  He is ABIs were normal.  No significant stenosis found on current aortoiliac duplex.  Mr. Jones states that he is inconsistent with his exercising and is not very proud of that however he will try to his resume his physical exercises at [employer].  Patient denies wounds or discoloration in his lower extremities.    REVIEW OF SYSTEMS:    A 12 point ROS was reviewed and is negative except for what is listed above in HPI.    PHH:    Past Medical History:   Diagnosis Date    DM type 2 (diabetes mellitus, type 2) (H)     HTN (hypertension), benign     Hyperlipidemia     Stented coronary artery           Past Surgical History:   Procedure Laterality Date    HERNIORRHAPHY INGUINAL BILATERAL         ALLERGIES:  Bee venom and Lisinopril    MEDS:    Current Outpatient Medications:     amLODIPine (NORVASC) 10 MG tablet, Take 1 tablet (10 mg) by mouth daily, Disp: 90 tablet, Rfl: 3    APPLE CIDER VINEGAR PO, Take 450 mg by mouth 2 times daily, Disp: , Rfl:     aspirin 81 MG tablet, Take 1 " tablet (81 mg) by mouth daily, Disp: 90 tablet, Rfl: 3    atorvastatin (LIPITOR) 40 MG tablet, Take 1 tablet by mouth once daily, Disp: 90 tablet, Rfl: 1    blood glucose monitoring (NO BRAND SPECIFIED) test strip, Use to test blood sugar 3 times daily or as directed. Box of 100 each., Disp: 3 Box, Rfl: 3    Blood Glucose Monitoring Suppl W/DEVICE KIT, 1 each daily, Disp: 1 kit, Rfl: 0    carvedilol (COREG) 12.5 MG tablet, TAKE 1 TABLET BY MOUTH TWICE DAILY WITH MEALS, Disp: 90 tablet, Rfl: 1    liraglutide (VICTOZA PEN) 18 MG/3ML solution, INJECT 0.6MG SUBCUTANEOUSLY DAILY X7 DAYS THEN INCREASE TO 1.2MG ONCE DAILY, Disp: 9 mL, Rfl: 3    losartan (COZAAR) 50 MG tablet, Take 1 tablet by mouth once daily, Disp: 90 tablet, Rfl: 1    Melatonin 10 MG TABS, Take 10 mg by mouth nightly as needed for sleep, Disp: , Rfl:     metFORMIN (GLUCOPHAGE) 1000 MG tablet, TAKE 1 TABLET BY MOUTH TWICE DAILY WITH MEALS, Disp: 180 tablet, Rfl: 1    MULTIPLE VITAMIN PO, Take 1 tablet by mouth, Disp: , Rfl:     nitroGLYcerin (NITROSTAT) 0.4 MG sublingual tablet, For chest pain place 1 tablet under the tongue every 5 minutes for 3 doses. If symptoms persist 5 minutes after 1st dose call 911., Disp: 50 tablet, Rfl: 11    Omega-3 Fatty Acids (OMEGA-3 FISH OIL PO), Take 1,200 mg by mouth 2 times daily (with meals), Disp: , Rfl:     omeprazole (PRILOSEC OTC) 20 MG tablet, Take 1 tablet (20 mg) by mouth daily, Disp: 90 tablet, Rfl: 1    ONE TOUCH LANCETS MISC, 1 lancet 3 times daily, Disp: 200 each, Rfl: 5    sildenafil (REVATIO) 20 MG tablet, Take 1-2 tabs daily as needed 1 hour before sexual activity; may be taken up to 4 hours before sexual activity., Disp: 30 tablet, Rfl: 1    fluconazole (DIFLUCAN) 200 MG tablet, Take 200 mg by mouth daily (Patient not taking: Reported on 6/5/2023), Disp: , Rfl:     glipiZIDE (GLUCOTROL XL) 10 MG 24 hr tablet, Take 1 tablet (10 mg) by mouth daily (Patient not taking: Reported on 6/5/2023), Disp: 90 tablet,  Rfl: 1    SOCIAL HABITS:    History   Smoking Status    Former    Packs/day: 1.00    Years: 30.00    Types: Cigarettes    Quit date: 1/1/2000   Smokeless Tobacco    Never     Social History    Substance and Sexual Activity      Alcohol use: Never      History   Drug Use No       FAMILY HISTORY:    Family History   Problem Relation Age of Onset    Diabetes Mother     Hypertension Mother     Cardiovascular Father 81        CHF    Heart Disease Sister 50    Cancer Sister     Heart Disease Brother 50       PE:  There were no vitals taken for this visit.  Wt Readings from Last 1 Encounters:   04/26/23 138 lb 12.8 oz     There is no height or weight on file to calculate BMI.    EXAM (via telephone):  GENERAL: Healthy, alert and no distress  RESP: No audible wheeze, cough, or visible cyanosis.   NEURO: Cranial nerves grossly intact.  Mentation and speech appropriate for age.  PSYCH: Mentation appears normal, affect normal/bright, judgement and insight intact, normal speech and appearance well-groomed.  Denies wounds, discoloration of skin.    DIAGNOSTIC STUDIES:     Images:  US Aorta/Ivc/Iliac Duplex Complete    Result Date: 5/30/2023  US AORTA/IVC/ILIAC DUPLEX COMPLETE 5/30/2023 8:43 AM \: Transverse and longitudinal images of the aorta. Color flow and spectral Doppler with waveform analysis. COMPARISON: 3/17/2022 FINDINGS: No abdominal aortic aneurysm.  There is mild atheromatous plaque in the abdominal aorta. MEASUREMENTS: Proximal Aorta: 1.9 cm. Mid Aorta: 1.2 cm. Distal Aorta: 1.4 cm. Right Common Iliac Artery: 1.0 cm. Left Common Iliac Artery: 0.9 cm.     LABS:      Sodium   Date Value Ref Range Status   04/26/2023 140 136 - 145 mmol/L Final   09/07/2022 138 136 - 145 mmol/L Final   03/07/2022 136 133 - 144 mmol/L Final   03/22/2021 136 133 - 144 mmol/L Final   02/22/2021 135 133 - 144 mmol/L Final   04/14/2020 135 133 - 144 mmol/L Final     Urea Nitrogen   Date Value Ref Range Status   04/26/2023 14.5 8.0 - 23.0  mg/dL Final   09/07/2022 23.5 (H) 8.0 - 23.0 mg/dL Final   03/07/2022 19 7 - 30 mg/dL Final   03/22/2021 13 7 - 30 mg/dL Final   02/22/2021 19 7 - 30 mg/dL Final   04/14/2020 20 7 - 30 mg/dL Final     Hemoglobin   Date Value Ref Range Status   04/07/2020 12.4 (L) 13.3 - 17.7 g/dL Final   01/06/2020 14.4 13.3 - 17.7 g/dL Final   09/18/2018 13.3 13.3 - 17.7 g/dL Final     Platelet Count   Date Value Ref Range Status   04/07/2020 124 (L) 150 - 450 10e9/L Final   01/06/2020 161 150 - 450 10e9/L Final   09/18/2018 154 150 - 450 10e9/L Final     INR   Date Value Ref Range Status   09/14/2018 1.16 (H) 0.86 - 1.14 Final       30 minutes spent on the day of encounter doing chart review, history and exam, documentation, and further activities as noted.         Afshan Samuel, CNP  Vascular Surgery  Pager: [phone]  [email or web address]  Send message or 10 digit call back number Securely via [employer] with the Vocera Web Console (learn more here)

## 2023-06-05 NOTE — PROGRESS NOTES
"[facility] Vascular      Type of Visit: Virtual: Telephone    Patient is here for a return visit to discuss 1 year.     Vitals - Patient Reported  Weight (Patient Reported): 63.5 kg (140 lb)  Height (Patient Reported): 167.6 cm (5' 6\")  BMI (Based on Pt Reported Ht/Wt): 22.6  Pain Score: No Pain (0)      Questions patient would like addressed today are: Patient verbalized no questions/concerns, this has been communicated to the provider.     Refills are needed: No    How would you like to obtain your AVS? [facility]   Virtual Visit Details    Type of service:  Telephone Visit   Phone call duration: 20 minutes     Heaven Urbina  "

## 2023-06-05 NOTE — PROGRESS NOTES
"    VASCULAR SURGERY PROGRESS NOTE    LOCATION: [facility]     Js Jones  Medical Record #:  [id number]  YOB: 1955  Age:  68 year old     Date of Service: 6/5/2023    PRIMARY CARE PROVIDER: Fortino Hyman    Reason for visit: Annual surveillance, left artery iliac stenosis    IMPRESSION / RECOMMENDATION:   67-year-old male who presents to virtual vascular surgery clinic for annual surveillance of left iliac stenosis.  Ultrasound of aortoiliac system is relatively normal, with mild atheromatous plaque in the abdominal aorta.  His right common iliac artery diameter is measured at 1.0 cm and his left common iliac artery diameter is measured at 0.9 cm, both appear adequate. Patient is on optimal medical management therapy. Clinically, patient notes that he is able to walk about half a mile before his legs get tired, otherwise denies pain, denies numbness or tingling in his lower extremities.  Occasionally with more intense exercise he notes that his \"upper legs become tight\". Encouraged patient to do stretching exercises and continue with his membership at [employer].  We will follow-up in 1 year with clinic visit, to assess symptoms and if new testing is required.    All questions were answered and support provided. He has our contact information and knows to reach out with any additional questions or concerns.     Afshan Samuel, [facility]  Vascular Surgery  Pager: [phone]  [email or web address]  Send message or 10 digit call back number Securely via [employer] with the Eventtus Web Console (learn more here)        HPI:  Js Jones is a 68 year old male with past medical history significant for longstanding type 2 diabetes.  His most recent Hgb A1C was 6.8%.  Patient is diligent with his medications and continues to take aspirin and statin.  He is lipid profile is optimal on current regimen with LDL at 77.  Last year was found to have stenosis in left common iliac artery.  He is " ABIs were normal.  No significant stenosis found on current aortoiliac duplex.  Mr. Jones states that he is inconsistent with his exercising and is not very proud of that however he will try to his resume his physical exercises at [employer].  Patient denies wounds or discoloration in his lower extremities.    REVIEW OF SYSTEMS:    A 12 point ROS was reviewed and is negative except for what is listed above in HPI.    PHH:    Past Medical History:   Diagnosis Date     DM type 2 (diabetes mellitus, type 2) (H)      HTN (hypertension), benign      Hyperlipidemia      Stented coronary artery           Past Surgical History:   Procedure Laterality Date     HERNIORRHAPHY INGUINAL BILATERAL         ALLERGIES:  Bee venom and Lisinopril    MEDS:    Current Outpatient Medications:      amLODIPine (NORVASC) 10 MG tablet, Take 1 tablet (10 mg) by mouth daily, Disp: 90 tablet, Rfl: 3     APPLE CIDER VINEGAR PO, Take 450 mg by mouth 2 times daily, Disp: , Rfl:      aspirin 81 MG tablet, Take 1 tablet (81 mg) by mouth daily, Disp: 90 tablet, Rfl: 3     atorvastatin (LIPITOR) 40 MG tablet, Take 1 tablet by mouth once daily, Disp: 90 tablet, Rfl: 1     blood glucose monitoring (NO BRAND SPECIFIED) test strip, Use to test blood sugar 3 times daily or as directed. Box of 100 each., Disp: 3 Box, Rfl: 3     Blood Glucose Monitoring Suppl W/DEVICE KIT, 1 each daily, Disp: 1 kit, Rfl: 0     carvedilol (COREG) 12.5 MG tablet, TAKE 1 TABLET BY MOUTH TWICE DAILY WITH MEALS, Disp: 90 tablet, Rfl: 1     liraglutide (VICTOZA PEN) 18 MG/3ML solution, INJECT 0.6MG SUBCUTANEOUSLY DAILY X7 DAYS THEN INCREASE TO 1.2MG ONCE DAILY, Disp: 9 mL, Rfl: 3     losartan (COZAAR) 50 MG tablet, Take 1 tablet by mouth once daily, Disp: 90 tablet, Rfl: 1     Melatonin 10 MG TABS, Take 10 mg by mouth nightly as needed for sleep, Disp: , Rfl:      metFORMIN (GLUCOPHAGE) 1000 MG tablet, TAKE 1 TABLET BY MOUTH TWICE DAILY WITH MEALS, Disp: 180 tablet, Rfl:  1     MULTIPLE VITAMIN PO, Take 1 tablet by mouth, Disp: , Rfl:      nitroGLYcerin (NITROSTAT) 0.4 MG sublingual tablet, For chest pain place 1 tablet under the tongue every 5 minutes for 3 doses. If symptoms persist 5 minutes after 1st dose call 911., Disp: 50 tablet, Rfl: 11     Omega-3 Fatty Acids (OMEGA-3 FISH OIL PO), Take 1,200 mg by mouth 2 times daily (with meals), Disp: , Rfl:      omeprazole (PRILOSEC OTC) 20 MG tablet, Take 1 tablet (20 mg) by mouth daily, Disp: 90 tablet, Rfl: 1     ONE TOUCH LANCETS MISC, 1 lancet 3 times daily, Disp: 200 each, Rfl: 5     sildenafil (REVATIO) 20 MG tablet, Take 1-2 tabs daily as needed 1 hour before sexual activity; may be taken up to 4 hours before sexual activity., Disp: 30 tablet, Rfl: 1     fluconazole (DIFLUCAN) 200 MG tablet, Take 200 mg by mouth daily (Patient not taking: Reported on 6/5/2023), Disp: , Rfl:      glipiZIDE (GLUCOTROL XL) 10 MG 24 hr tablet, Take 1 tablet (10 mg) by mouth daily (Patient not taking: Reported on 6/5/2023), Disp: 90 tablet, Rfl: 1    SOCIAL HABITS:    History   Smoking Status     Former     Packs/day: 1.00     Years: 30.00     Types: Cigarettes     Quit date: 1/1/2000   Smokeless Tobacco     Never     Social History    Substance and Sexual Activity      Alcohol use: Never      History   Drug Use No       FAMILY HISTORY:    Family History   Problem Relation Age of Onset     Diabetes Mother      Hypertension Mother      Cardiovascular Father 81        CHF     Heart Disease Sister 50     Cancer Sister      Heart Disease Brother 50       PE:  There were no vitals taken for this visit.  Wt Readings from Last 1 Encounters:   04/26/23 138 lb 12.8 oz     There is no height or weight on file to calculate BMI.    EXAM (via telephone):  GENERAL: Healthy, alert and no distress  RESP: No audible wheeze, cough, or visible cyanosis.   NEURO: Cranial nerves grossly intact.  Mentation and speech appropriate for age.  PSYCH: Mentation appears normal,  affect normal/bright, judgement and insight intact, normal speech and appearance well-groomed.  Denies wounds, discoloration of skin.    DIAGNOSTIC STUDIES:     Images:  US Aorta/Ivc/Iliac Duplex Complete    Result Date: 5/30/2023  US AORTA/IVC/ILIAC DUPLEX COMPLETE 5/30/2023 8:43 AM \  TECHNIQUE: Transverse and longitudinal images of the aorta. Color flow and spectral Doppler with waveform analysis. COMPARISON: 3/17/2022 FINDINGS: No abdominal aortic aneurysm.  There is mild atheromatous plaque in the abdominal aorta. MEASUREMENTS: Proximal Aorta: 1.9 cm. Mid Aorta: 1.2 cm. Distal Aorta: 1.4 cm. Right Common Iliac Artery: 1.0 cm. Left Common Iliac Artery: 0.9 cm.     LABS:      Sodium   Date Value Ref Range Status   04/26/2023 140 136 - 145 mmol/L Final   09/07/2022 138 136 - 145 mmol/L Final   03/07/2022 136 133 - 144 mmol/L Final   03/22/2021 136 133 - 144 mmol/L Final   02/22/2021 135 133 - 144 mmol/L Final   04/14/2020 135 133 - 144 mmol/L Final     Urea Nitrogen   Date Value Ref Range Status   04/26/2023 14.5 8.0 - 23.0 mg/dL Final   09/07/2022 23.5 (H) 8.0 - 23.0 mg/dL Final   03/07/2022 19 7 - 30 mg/dL Final   03/22/2021 13 7 - 30 mg/dL Final   02/22/2021 19 7 - 30 mg/dL Final   04/14/2020 20 7 - 30 mg/dL Final     Hemoglobin   Date Value Ref Range Status   04/07/2020 12.4 (L) 13.3 - 17.7 g/dL Final   01/06/2020 14.4 13.3 - 17.7 g/dL Final   09/18/2018 13.3 13.3 - 17.7 g/dL Final     Platelet Count   Date Value Ref Range Status   04/07/2020 124 (L) 150 - 450 10e9/L Final   01/06/2020 161 150 - 450 10e9/L Final   09/18/2018 154 150 - 450 10e9/L Final     INR   Date Value Ref Range Status   09/14/2018 1.16 (H) 0.86 - 1.14 Final       30 minutes spent on the day of encounter doing chart review, history and exam, documentation, and further activities as noted.         Afshan Samuel CNP  Vascular Surgery  Pager: [phone]  [email or web address]  Send message or 10 digit call back number Securely via  [employer] with the [employer] Web Console (learn more here)

## 2023-06-05 NOTE — NURSING NOTE
Patient confirms medications and allergies are accurate via patients echeck in completion, and or denies any changes since last reviewed/verified.     Is the patient currently in the state of [place]? YES    Visit mode:VIDEO    If the visit is dropped, the patient can be reconnected by: VIDEO VISIT: Text to cell phone: [phone]    Will anyone else be joining the visit? NO      How would you like to obtain your AVS? MyChart    Are changes needed to the allergy or medication list? NO    Reason for visit: Follow Up            Heaven Urbina, Juan Miguel Facilitator

## 2023-06-23 ENCOUNTER — VIRTUAL VISIT (OUTPATIENT)
Dept: EDUCATION SERVICES | Facility: OTHER | Age: 68
End: 2023-06-23
Payer: COMMERCIAL

## 2023-06-23 DIAGNOSIS — Z79.4 TYPE 2 DIABETES MELLITUS WITH OTHER SPECIFIED COMPLICATION, WITH LONG-TERM CURRENT USE OF INSULIN (H): Primary | ICD-10-CM

## 2023-06-23 DIAGNOSIS — E11.69 TYPE 2 DIABETES MELLITUS WITH OTHER SPECIFIED COMPLICATION, WITH LONG-TERM CURRENT USE OF INSULIN (H): Primary | ICD-10-CM

## 2023-06-23 PROCEDURE — 98967 PH1 ASSMT&MGMT NQHP 11-20: CPT | Performed by: DIETITIAN, REGISTERED

## 2023-06-23 RX ORDER — GLIPIZIDE 5 MG/1
5 TABLET, FILM COATED, EXTENDED RELEASE ORAL DAILY
Qty: 30 TABLET | Refills: 1 | Status: SHIPPED | OUTPATIENT
Start: 2023-06-23 | End: 2023-07-13

## 2023-06-23 NOTE — PROGRESS NOTES
"Diabetes Education Follow-up    Type of Service: Telephone Visit    Originating Location (Patient Location): Home  Distant Location (Provider Location): [facility]  Mode of Communication:  Telephone    Telephone Visit Start Time: 10:00am  Telephone Visit End Time (telephone visit stop time): 10:20 am    How would patient like to obtain AVS? Mail a copy      Subjective/Objective:    Js Jones sent in blood glucose log for review. Last date of communication was: 5/22/23.    Diabetes is being managed with   Lifestyle (diet/activity), Diabetes Medications   Diabetes Medication(s)     Biguanides       metFORMIN (GLUCOPHAGE) 1000 MG tablet    TAKE 1 TABLET BY MOUTH TWICE DAILY WITH MEALS    Sulfonylureas       glipiZIDE (GLUCOTROL XL) 5 MG 24 hr tablet    Take 1 tablet (5 mg) by mouth daily     glipiZIDE (GLUCOTROL XL) 10 MG 24 hr tablet    Take 1 tablet (10 mg) by mouth daily     Patient not taking: Reported on 6/5/2023    Incretin Mimetic Agents       liraglutide (VICTOZA PEN) 18 MG/3ML solution    INJECT 0.6MG SUBCUTANEOUSLY DAILY X7 DAYS THEN INCREASE TO 1.2MG ONCE DAILY          BG/  Date Breakfast  Lunch  Dinner  Bedtime    Before After Before After Before After    6/23 176         6/22  331 Says had a few snacks shouldn't have 152    220   6/21 200  119      99   6/20 168  129       6/19 142         6/18 200 says forgot to take Victoza this day         6/17 141             Date Breakfast  Lunch  Dinner  Bedtime    Before After Before After Before After    6/16   160    107   6/15 174  121       6/14 170  121       6/13 160  113    164   6/12 162  148       6/11 158  149    202   6/10 177      121       \"I've been goofing up\"  Says has been eating junk food but working on it now      Assessment:    Fasting blood glucose: 0% in target.  Before lunch glucose: 50% in target.  Bedtime glucose: 50% in target.    Says does not want to go up to 1.8 mg dose on Victoza, wants to add Glipizide XL " "back in.  Says knows what it's like to get low blood sugar and how to treat.    \"I've been goofing up\"  Says has been eating junk food but working on it now.    Wants to try back on a smaller amount of Glipizide XL.        Plan/Response:      Ordered 5 mg Glipizide Xl.    I also checked into what Ozempic would cost for pt and this would be $47 per  Month.  We didn't have a chance to discuss this today as pt was ready to get off phone.  Pt did mention Victoza is kind of a lot at $30 per month, so I am not sure he would be open to Ozempic. Will address next visit.    Any diabetes medication dose changes were made via the CDE Protocol and Collaborative Practice Agreement with the patient's referring provider. A copy of this encounter was shared with the provider.    ZABRINA Yu [facility]  [phone]    20 minutes    ZABRINA Yu [facility]  [phone]    "

## 2023-06-23 NOTE — LETTER
"    6/23/2023         RE: Js SANTANA Robert  [address] [facility] [address]        Dear Colleague,    Thank you for referring your patient, Js Jones, to the [facility]. Please see a copy of my visit note below.    Diabetes Education Follow-up    Type of Service: Telephone Visit    Originating Location (Patient Location): Home  Distant Location (Provider Location): [facility]  Mode of Communication:  Telephone    Telephone Visit Start Time: 10:00am  Telephone Visit End Time (telephone visit stop time): 10:20 am    How would patient like to obtain AVS? Mail a copy      Subjective/Objective:    Js Jones sent in blood glucose log for review. Last date of communication was: 5/22/23.    Diabetes is being managed with   Lifestyle (diet/activity), Diabetes Medications   Diabetes Medication(s)     Biguanides       metFORMIN (GLUCOPHAGE) 1000 MG tablet    TAKE 1 TABLET BY MOUTH TWICE DAILY WITH MEALS    Sulfonylureas       glipiZIDE (GLUCOTROL XL) 5 MG 24 hr tablet    Take 1 tablet (5 mg) by mouth daily     glipiZIDE (GLUCOTROL XL) 10 MG 24 hr tablet    Take 1 tablet (10 mg) by mouth daily     Patient not taking: Reported on 6/5/2023    Incretin Mimetic Agents       liraglutide (VICTOZA PEN) 18 MG/3ML solution    INJECT 0.6MG SUBCUTANEOUSLY DAILY X7 DAYS THEN INCREASE TO 1.2MG ONCE DAILY          BG/  Date Breakfast  Lunch  Dinner  Bedtime    Before After Before After Before After    6/23 176         6/22  331 Says had a few snacks shouldn't have 152    220   6/21 200  119      99   6/20 168  129       6/19 142         6/18 200 says forgot to take Victoza this day         6/17 141             Date Breakfast  Lunch  Dinner  Bedtime    Before After Before After Before After    6/16   160    107   6/15 174  121       6/14 170  121       6/13 160  113    164   6/12 162  148       6/11 158  149    202   6/10 177      121       \"I've been goofing up\"  Says has " "been eating junk food but working on it now      Assessment:    Fasting blood glucose: 0% in target.  Before lunch glucose: 50% in target.  Bedtime glucose: 50% in target.    Says does not want to go up to 1.8 mg dose on Victoza, wants to add Glipizide XL back in.  Says knows what it's like to get low blood sugar and how to treat.    \"I've been goofing up\"  Says has been eating junk food but working on it now.    Wants to try back on a smaller amount of Glipizide XL.        Plan/Response:      Ordered 5 mg Glipizide Xl.    I also checked into what Ozempic would cost for pt and this would be $47 per  Month.  We didn't have a chance to discuss this today as pt was ready to get off phone.  Pt did mention Victoza is kind of a lot at $30 per month, so I am not sure he would be open to Ozempic. Will address next visit.    Any diabetes medication dose changes were made via the CDE Protocol and Collaborative Practice Agreement with the patient's referring provider. A copy of this encounter was shared with the provider.    ZABRINA Yu [facility]  [phone]    20 minutes    ZABRINA Yu [facility]  [phone]          "

## 2023-07-13 ENCOUNTER — TELEPHONE (OUTPATIENT)
Dept: FAMILY MEDICINE | Facility: CLINIC | Age: 68
End: 2023-07-13
Payer: COMMERCIAL

## 2023-07-13 DIAGNOSIS — E11.69 TYPE 2 DIABETES MELLITUS WITH OTHER SPECIFIED COMPLICATION, WITH LONG-TERM CURRENT USE OF INSULIN (H): ICD-10-CM

## 2023-07-13 DIAGNOSIS — Z79.4 TYPE 2 DIABETES MELLITUS WITH OTHER SPECIFIED COMPLICATION, WITH LONG-TERM CURRENT USE OF INSULIN (H): ICD-10-CM

## 2023-07-13 RX ORDER — GLIPIZIDE 5 MG/1
5 TABLET, FILM COATED, EXTENDED RELEASE ORAL DAILY
Qty: 90 TABLET | Refills: 1 | Status: SHIPPED | OUTPATIENT
Start: 2023-07-13 | End: 2023-08-04

## 2023-07-13 NOTE — TELEPHONE ENCOUNTER
"Fax received from Pharmacy:    \"Insurance is requesting patient fill 90 day supply for Glipizide ER 5mg tab. Please send new RX\"  "

## 2023-07-13 NOTE — TELEPHONE ENCOUNTER
Dr. Hyman:    Is it ok to change quantity for the Glipizide 5 mg 24 hour tablet to 90 day supply? Script is cued up with refill if appropriate.      SIMÓN Kline

## 2023-07-20 ENCOUNTER — TRANSFERRED RECORDS (OUTPATIENT)
Dept: HEALTH INFORMATION MANAGEMENT | Facility: CLINIC | Age: 68
End: 2023-07-20
Payer: COMMERCIAL

## 2023-07-20 LAB
RETINOPATHY: NEGATIVE
RETINOPATHY: NEGATIVE

## 2023-07-24 DIAGNOSIS — I10 BENIGN ESSENTIAL HYPERTENSION: ICD-10-CM

## 2023-07-24 RX ORDER — CARVEDILOL 12.5 MG/1
TABLET ORAL
Qty: 90 TABLET | Refills: 0 | Status: SHIPPED | OUTPATIENT
Start: 2023-07-24 | End: 2023-09-12

## 2023-08-03 NOTE — PROGRESS NOTES
Patient Quality Outreach    Patient is due for the following:   Diabetes -  Eye Exam    Next Steps:   Chart routed to abstraction.      Type of outreach:    Chart review performed, no outreach needed.      Questions for provider review:    None           Brenna Gonzales CMA

## 2023-08-04 ENCOUNTER — VIRTUAL VISIT (OUTPATIENT)
Dept: EDUCATION SERVICES | Facility: OTHER | Age: 68
End: 2023-08-04
Payer: COMMERCIAL

## 2023-08-04 DIAGNOSIS — E11.69 TYPE 2 DIABETES MELLITUS WITH OTHER SPECIFIED COMPLICATION, WITH LONG-TERM CURRENT USE OF INSULIN (H): Primary | ICD-10-CM

## 2023-08-04 DIAGNOSIS — Z79.4 TYPE 2 DIABETES MELLITUS WITH OTHER SPECIFIED COMPLICATION, WITH LONG-TERM CURRENT USE OF INSULIN (H): Primary | ICD-10-CM

## 2023-08-04 PROCEDURE — G0108 DIAB MANAGE TRN  PER INDIV: HCPCS | Mod: AE | Performed by: DIETITIAN, REGISTERED

## 2023-08-04 RX ORDER — BISACODYL 5 MG/1
TABLET, DELAYED RELEASE ORAL
Qty: 4 TABLET | Refills: 0 | Status: SHIPPED | OUTPATIENT
Start: 2023-08-04 | End: 2023-08-14

## 2023-08-04 RX ORDER — GLIPIZIDE 5 MG/1
TABLET, FILM COATED, EXTENDED RELEASE ORAL
Qty: 180 TABLET | Refills: 1 | Status: SHIPPED | OUTPATIENT
Start: 2023-08-04 | End: 2024-03-22

## 2023-08-04 NOTE — LETTER
8/4/2023         RE: Js Jones  [address] [facility] [address]        Dear Colleague,    Thank you for referring your patient, Js Jones, to the [facility]. Please see a copy of my visit note below.    Diabetes Education Follow-up    Subjective/Objective:    Type of Service: Telephone Visit    Originating Location (Patient Location): Home  Distant Location (Provider Location): Offsite  Mode of Communication:  Telephone    Telephone Visit Start Time: 10:08 am  Telephone Visit End Time (telephone visit stop time): 10:31 am    How would patient like to obtain AVS? Mail    Js Jones sent in blood glucose log for review. Last date of communication was: 6/23/23.    Diabetes is being managed with Lifestyle (diet/activity), Diabetes Medications   Diabetes Medication(s)       Biguanides       metFORMIN (GLUCOPHAGE) 1000 MG tablet    TAKE 1 TABLET BY MOUTH TWICE DAILY WITH MEALS      Sulfonylureas       glipiZIDE (GLUCOTROL XL) 10 MG 24 hr tablet    Take 1 tablet (10 mg) by mouth daily     Patient not taking: Reported on 6/5/2023     glipiZIDE (GLUCOTROL XL) 5 MG 24 hr tablet    Take 1 tablet (5 mg) by mouth daily      Incretin Mimetic Agents       liraglutide (VICTOZA PEN) 18 MG/3ML solution    INJECT 0.6MG SUBCUTANEOUSLY DAILY X7 DAYS THEN INCREASE TO 1.2MG ONCE DAILY            BG/Food Log:   Date Breakfast  Lunch  Dinner  Bedtime    Before After Before After Before After    8/4 94         8/3 178* says had a snack overnight  91    219*says ate a lot of potatoes at dinner   8/2 121  108  163     8/1 148* says will crave junk before bed  120  122     7/31 123  85  177     7/30 181    95     7/29 161    100          Date Breakfast  Lunch  Dinner  Bedtime    Before After Before After Before After    7/28 203    126     7/27 165         7/26 181     256* after a lot of potatoes    7/25 188  104  160     7/24 134  151  113     7/23 184  103  117     7/22 180  193  157           Assessment:  PCP goal for A1C is under 8.  Last A1C pt's A1C was under 7%    Using A1C goal under 7%    Fasting blood glucose: 21% in target.  After breakfast glucose: x% in target.  Before lunch glucose: 88% in target.  After lunch glucose: x% in target.  Before dinner glucose: 60% in target.  After dinner glucose: 0% in target.  Bedtime glucose: 0% in target.    Pt increased Glipizide XL from 5 mg daily to 5 mg BID on his own after last week.  Discussed how I really don't see much of a change this week with more Glipizide XL, fastings slightly better.  He also continues to take metformin and Victoza 1.2 mg daily.  Discussed how he should be taking Glipizide XL all in the morning and he agrees to do this.  He would like to stay on (2) 5 mg tabs daily and keep victoza dose for now. He just picked up a new refill of Victoza.  Discussed how he is eligible for Ozempic at $17 more per month than Victoza and he may be interested in a weekly injection vs daily - and we will discuss more next meeting.  Discussed how also may have more lowering effect on his blood sugars than Victoza.    Plan/Response:  See Patient Instructions for co-developed, patient-stated behavior change goals.  Take (2) 5 mg tabs of Glipizide XL in the morning    Continue same doses of Victoza and metformin    Follow-up in a month for blood sugar and med review    Any diabetes medication dose changes were made via the CDE Protocol and Collaborative Practice Agreement with the patient's referring provider. A copy of this encounter was shared with the provider.     23 minutes    Shelby Villarreal RD [facility]  [phone]

## 2023-08-04 NOTE — PATIENT INSTRUCTIONS
1).  Take two 5 mg tablets of Glipizide XL in the morning before breakfast  2).  Continue 1.2 mg daily dose of Victoza  3).  Continue to take metformin 1000 mg twice daily

## 2023-08-04 NOTE — PROGRESS NOTES
Diabetes Education Follow-up    Subjective/Objective:    Type of Service: Telephone Visit    Originating Location (Patient Location): Home  Distant Location (Provider Location): Offsite  Mode of Communication:  Telephone    Telephone Visit Start Time: 10:08 am  Telephone Visit End Time (telephone visit stop time): 10:31 am    How would patient like to obtain AVS? Mail    Js Jones sent in blood glucose log for review. Last date of communication was: 6/23/23.    Diabetes is being managed with Lifestyle (diet/activity), Diabetes Medications   Diabetes Medication(s)       Biguanides       metFORMIN (GLUCOPHAGE) 1000 MG tablet    TAKE 1 TABLET BY MOUTH TWICE DAILY WITH MEALS      Sulfonylureas       glipiZIDE (GLUCOTROL XL) 10 MG 24 hr tablet    Take 1 tablet (10 mg) by mouth daily     Patient not taking: Reported on 6/5/2023     glipiZIDE (GLUCOTROL XL) 5 MG 24 hr tablet    Take 1 tablet (5 mg) by mouth daily      Incretin Mimetic Agents       liraglutide (VICTOZA PEN) 18 MG/3ML solution    INJECT 0.6MG SUBCUTANEOUSLY DAILY X7 DAYS THEN INCREASE TO 1.2MG ONCE DAILY            BG/Food Log:   Date Breakfast  Lunch  Dinner  Bedtime    Before After Before After Before After    8/4 94         8/3 178* says had a snack overnight  91    219*says ate a lot of potatoes at dinner   8/2 121  108  163     8/1 148* says will crave junk before bed  120  122     7/31 123  85  177     7/30 181    95     7/29 161    100          Date Breakfast  Lunch  Dinner  Bedtime    Before After Before After Before After    7/28 203    126     7/27 165         7/26 181     256* after a lot of potatoes    7/25 188  104  160     7/24 134  151  113     7/23 184  103  117     7/22 180  193  157          Assessment:  PCP goal for A1C is under 8.  Last A1C pt's A1C was under 7%    Using A1C goal under 7%    Fasting blood glucose: 21% in target.  After breakfast glucose: x% in target.  Before lunch glucose: 88% in target.  After lunch glucose: x% in  target.  Before dinner glucose: 60% in target.  After dinner glucose: 0% in target.  Bedtime glucose: 0% in target.    Pt increased Glipizide XL from 5 mg daily to 5 mg BID on his own after last week.  Discussed how I really don't see much of a change this week with more Glipizide XL, fastings slightly better.  He also continues to take metformin and Victoza 1.2 mg daily.  Discussed how he should be taking Glipizide XL all in the morning and he agrees to do this.  He would like to stay on (2) 5 mg tabs daily and keep victoza dose for now. He just picked up a new refill of Victoza.  Discussed how he is eligible for Ozempic at $17 more per month than Victoza and he may be interested in a weekly injection vs daily - and we will discuss more next meeting.  Discussed how also may have more lowering effect on his blood sugars than Victoza.    Plan/Response:  See Patient Instructions for co-developed, patient-stated behavior change goals.  Take (2) 5 mg tabs of Glipizide XL in the morning    Continue same doses of Victoza and metformin    Follow-up in a month for blood sugar and med review    Any diabetes medication dose changes were made via the CDE Protocol and Collaborative Practice Agreement with the patient's referring provider. A copy of this encounter was shared with the provider.     23 minutes    ZABRINA uY [facility]  [phone]

## 2023-08-11 ENCOUNTER — ANESTHESIA EVENT (OUTPATIENT)
Dept: GASTROENTEROLOGY | Facility: CLINIC | Age: 68
End: 2023-08-11
Payer: COMMERCIAL

## 2023-08-11 ASSESSMENT — LIFESTYLE VARIABLES: TOBACCO_USE: 1

## 2023-08-11 NOTE — ANESTHESIA PREPROCEDURE EVALUATION
Anesthesia Pre-Procedure Evaluation    Patient: Js Jones   MRN: [id number] : 1955        Procedure : Procedure(s):  Colonoscopy          Past Medical History:   Diagnosis Date     DM type 2 (diabetes mellitus, type 2) (H)      HTN (hypertension), benign      Hyperlipidemia      Stented coronary artery       Past Surgical History:   Procedure Laterality Date     HERNIORRHAPHY INGUINAL BILATERAL        Allergies   Allergen Reactions     Bee Venom Unknown     Lisinopril Cough      Social History     Tobacco Use     Smoking status: Former     Packs/day: 1.00     Years: 30.00     Pack years: 30.00     Types: Cigarettes     Quit date: 2000     Years since quittin.6     Smokeless tobacco: Never   Substance Use Topics     Alcohol use: Never      Wt Readings from Last 1 Encounters:   23 63 kg (138 lb 12.8 oz)        Anesthesia Evaluation   Pt has had prior anesthetic. Type: General.    No history of anesthetic complications       ROS/MED HX  ENT/Pulmonary:     (+)                tobacco use, Past use,                      Neurologic:  - neg neurologic ROS     Cardiovascular:     (+) Dyslipidemia hypertension- -  CAD -  - stent-. 2                                Previous cardiac testing   Echo: Date: Results:    Stress Test:  Date: 10/2019 Results:  interpretation Summary  This was a normal stress echocardiogram with no evidence of stress-induced  ischemia.  Contrast was used without apparent complications.    ECG Reviewed:  Date: Results:    Cath:  Date: Results:      METS/Exercise Tolerance:     Hematologic:  - neg hematologic  ROS     Musculoskeletal:  - neg musculoskeletal ROS     GI/Hepatic:     (+) GERD, Asymptomatic on medication,                  Renal/Genitourinary:  - neg Renal ROS     Endo:     (+)  type II DM,   Not using insulin, - not using insulin pump.                Psychiatric/Substance Use:  - neg psychiatric ROS     Infectious Disease:  - neg infectious disease ROS      Malignancy:  - neg malignancy ROS     Other:  - neg other ROS          Physical Exam    Airway  airway exam normal           Respiratory Devices and Support         Dental       (+) Minor Abnormalities - some fillings, tiny chips and Removable bridges or other hardware      Cardiovascular   cardiovascular exam normal          Pulmonary   pulmonary exam normal            OUTSIDE LABS:  CBC:   Lab Results   Component Value Date    WBC 8.9 04/07/2020    WBC 7.9 01/06/2020    HGB 12.4 (L) 04/07/2020    HGB 14.4 01/06/2020    HCT 36.4 (L) 04/07/2020    HCT 42.1 01/06/2020     (L) 04/07/2020     01/06/2020     BMP:   Lab Results   Component Value Date     04/26/2023     09/07/2022    POTASSIUM 4.1 04/26/2023    POTASSIUM 4.6 09/07/2022    CHLORIDE 103 04/26/2023    CHLORIDE 101 09/07/2022    CO2 26 04/26/2023    CO2 29 09/07/2022    BUN 14.5 04/26/2023    BUN 23.5 (H) 09/07/2022    CR 0.89 04/26/2023    CR 0.85 09/07/2022     (H) 04/26/2023     (H) 09/07/2022     COAGS:   Lab Results   Component Value Date    PTT 28 09/14/2018    INR 1.16 (H) 09/14/2018     POC:   Lab Results   Component Value Date     (H) 09/18/2018     HEPATIC:   Lab Results   Component Value Date    ALBUMIN 3.1 (L) 04/07/2020    PROTTOTAL 6.7 (L) 04/07/2020    ALT 40 04/07/2020    AST 26 04/07/2020    ALKPHOS 66 04/07/2020    BILITOTAL 1.3 04/07/2020     OTHER:   Lab Results   Component Value Date    LACT 1.6 04/07/2020    A1C 6.8 (H) 04/26/2023    HOWARD 10.0 04/26/2023    MAG 1.6 04/07/2020    LIPASE 353 (H) 09/05/2013    TSH 1.02 03/09/2018       Anesthesia Plan    ASA Status:  3    NPO Status:  NPO Appropriate    Anesthesia Type: General.     - Airway: Native airway      Maintenance: Balanced.        Consents    Anesthesia Plan(s) and associated risks, benefits, and realistic alternatives discussed. Questions answered and patient/representative(s) expressed understanding.     - Discussed: Risks,  Benefits and Alternatives for BOTH SEDATION and the PROCEDURE were discussed     - Discussed with:  Patient, Spouse            Postoperative Care            Comments:              ABILIO Bliss CRNA

## 2023-08-14 ENCOUNTER — ANESTHESIA (OUTPATIENT)
Dept: GASTROENTEROLOGY | Facility: CLINIC | Age: 68
End: 2023-08-14
Payer: COMMERCIAL

## 2023-08-14 ENCOUNTER — HOSPITAL ENCOUNTER (OUTPATIENT)
Facility: CLINIC | Age: 68
Discharge: HOME | End: 2023-08-14
Attending: SURGERY | Admitting: SURGERY
Payer: COMMERCIAL

## 2023-08-14 VITALS
SYSTOLIC BLOOD PRESSURE: 156 MMHG | HEART RATE: 72 BPM | TEMPERATURE: 98 F | RESPIRATION RATE: 16 BRPM | OXYGEN SATURATION: 98 % | DIASTOLIC BLOOD PRESSURE: 88 MMHG

## 2023-08-14 DIAGNOSIS — Z12.11 SPECIAL SCREENING FOR MALIGNANT NEOPLASMS, COLON: Primary | ICD-10-CM

## 2023-08-14 LAB
COLONOSCOPY: NORMAL
GLUCOSE BLDC GLUCOMTR-MCNC: 138 MG/DL (ref 70–99)

## 2023-08-14 PROCEDURE — 45378 DIAGNOSTIC COLONOSCOPY: CPT | Performed by: SURGERY

## 2023-08-14 PROCEDURE — 370N000017 HC ANESTHESIA TECHNICAL FEE, PER MIN: Performed by: SURGERY

## 2023-08-14 PROCEDURE — 250N000009 HC RX 250: Performed by: NURSE ANESTHETIST, CERTIFIED REGISTERED

## 2023-08-14 PROCEDURE — 258N000003 HC RX IP 258 OP 636: Performed by: SURGERY

## 2023-08-14 PROCEDURE — G0105 COLORECTAL SCRN; HI RISK IND: HCPCS | Performed by: SURGERY

## 2023-08-14 PROCEDURE — 250N000011 HC RX IP 250 OP 636: Performed by: NURSE ANESTHETIST, CERTIFIED REGISTERED

## 2023-08-14 PROCEDURE — 82962 GLUCOSE BLOOD TEST: CPT

## 2023-08-14 RX ORDER — SODIUM CHLORIDE, SODIUM LACTATE, POTASSIUM CHLORIDE, CALCIUM CHLORIDE 600; 310; 30; 20 MG/100ML; MG/100ML; MG/100ML; MG/100ML
INJECTION, SOLUTION INTRAVENOUS CONTINUOUS
Status: DISCONTINUED | OUTPATIENT
Start: 2023-08-14 | End: 2023-08-14 | Stop reason: HOSPADM

## 2023-08-14 RX ORDER — NALOXONE HYDROCHLORIDE 0.4 MG/ML
0.4 INJECTION, SOLUTION INTRAMUSCULAR; INTRAVENOUS; SUBCUTANEOUS
Status: DISCONTINUED | OUTPATIENT
Start: 2023-08-14 | End: 2023-08-14 | Stop reason: HOSPADM

## 2023-08-14 RX ORDER — FLUMAZENIL 0.1 MG/ML
0.2 INJECTION, SOLUTION INTRAVENOUS
Status: DISCONTINUED | OUTPATIENT
Start: 2023-08-14 | End: 2023-08-14 | Stop reason: HOSPADM

## 2023-08-14 RX ORDER — PROPOFOL 10 MG/ML
INJECTION, EMULSION INTRAVENOUS CONTINUOUS PRN
Status: DISCONTINUED | OUTPATIENT
Start: 2023-08-14 | End: 2023-08-14

## 2023-08-14 RX ORDER — GLYCOPYRROLATE 0.2 MG/ML
INJECTION, SOLUTION INTRAMUSCULAR; INTRAVENOUS PRN
Status: DISCONTINUED | OUTPATIENT
Start: 2023-08-14 | End: 2023-08-14

## 2023-08-14 RX ORDER — LIDOCAINE HYDROCHLORIDE 10 MG/ML
INJECTION, SOLUTION INFILTRATION; PERINEURAL PRN
Status: DISCONTINUED | OUTPATIENT
Start: 2023-08-14 | End: 2023-08-14

## 2023-08-14 RX ORDER — NALOXONE HYDROCHLORIDE 0.4 MG/ML
0.2 INJECTION, SOLUTION INTRAMUSCULAR; INTRAVENOUS; SUBCUTANEOUS
Status: DISCONTINUED | OUTPATIENT
Start: 2023-08-14 | End: 2023-08-14 | Stop reason: HOSPADM

## 2023-08-14 RX ORDER — ONDANSETRON 2 MG/ML
4 INJECTION INTRAMUSCULAR; INTRAVENOUS
Status: DISCONTINUED | OUTPATIENT
Start: 2023-08-14 | End: 2023-08-14 | Stop reason: HOSPADM

## 2023-08-14 RX ORDER — LIDOCAINE 40 MG/G
CREAM TOPICAL
Status: DISCONTINUED | OUTPATIENT
Start: 2023-08-14 | End: 2023-08-14 | Stop reason: HOSPADM

## 2023-08-14 RX ADMIN — GLYCOPYRROLATE 0.2 MG: 0.2 INJECTION, SOLUTION INTRAMUSCULAR; INTRAVENOUS at 15:03

## 2023-08-14 RX ADMIN — SODIUM CHLORIDE, POTASSIUM CHLORIDE, SODIUM LACTATE AND CALCIUM CHLORIDE: 600; 310; 30; 20 INJECTION, SOLUTION INTRAVENOUS at 15:03

## 2023-08-14 RX ADMIN — LIDOCAINE HYDROCHLORIDE 30 MG: 10 INJECTION, SOLUTION INFILTRATION; PERINEURAL at 15:04

## 2023-08-14 RX ADMIN — PROPOFOL 50 MG: 10 INJECTION, EMULSION INTRAVENOUS at 15:04

## 2023-08-14 RX ADMIN — PROPOFOL 200 MCG/KG/MIN: 10 INJECTION, EMULSION INTRAVENOUS at 15:03

## 2023-08-14 ASSESSMENT — ACTIVITIES OF DAILY LIVING (ADL): ADLS_ACUITY_SCORE: 35

## 2023-08-14 NOTE — H&P
68 year old year old male here for colonoscopy for family history of colon cancer.  Patients sister was diagnosed with colon cancer twice.  This is the patient's first colonoscopy.  Patient denies change in stool caliber or blood in stool.    Patient Active Problem List   Diagnosis    Benign essential hypertension, goal <140/90    Hyperlipidemia LDL goal <100    Gastroesophageal reflux disease without esophagitis    LBBB (left bundle branch block)    Coronary artery disease involving native heart without angina pectoris, unspecified vessel or lesion type    Type 2 diabetes mellitus with circulatory disorder, without long-term current use of insulin (H)       Past Medical History:   Diagnosis Date    DM type 2 (diabetes mellitus, type 2) (H)     HTN (hypertension), benign     Hyperlipidemia     Stented coronary artery        Past Surgical History:   Procedure Laterality Date    HERNIORRHAPHY INGUINAL BILATERAL         Family History   Problem Relation Age of Onset    Diabetes Mother     Hypertension Mother     Cardiovascular Father 81        CHF    Heart Disease Sister 50    Cancer Sister     Heart Disease Brother 50       Current Outpatient Rx   Medication Sig Dispense Refill    bisacodyl (DULCOLAX) 5 MG EC tablet Take 2 tablets at 3 pm the day before your procedure. If your procedure is before 11 am, take 2 additional tablets at 11 pm. If your procedure is after 11 am, take 2 additional tablets at 6 am. For additional instructions refer to your colonoscopy prep instructions. 4 tablet 0    polyethylene glycol (GOLYTELY) 236 g suspension The night before the exam at 6 pm drink an 8-ounce glass every 15 minutes until the jug is half empty. If you arrive before 11 AM: Drink the other half of the Golytely jug at 11 PM night before procedure. If you arrive after 11 AM: Drink the other half of the Golytely jug at 6 AM day of procedure. For additional instructions refer to your colonoscopy prep instructions. 4000 mL 0        Allergies   Allergen Reactions    Bee Venom Unknown    Lisinopril Cough       Pt reports that he quit smoking about 23 years ago. His smoking use included cigarettes. He has a 30.00 pack-year smoking history. He has never used smokeless tobacco. He reports that he does not drink alcohol and does not use drugs.    Exam:  There were no vitals taken for this visit.    Awake, Alert OX3  Lungs - CTA bilaterally  CV - RRR, no murmurs, distal pulses intact  Abd - soft, non-distended, non-tender, +BS  Extr - No cyanosis or edema    A/P 68 year old year old male in need of colonoscopy for family history of colon cancer. Risks, benefits, alternatives, and complications were discussed including the possibility of perforation, bleeding, missed lesion, anesthetic complication, need for additional surgery/procedure, and the patient agreed to proceed.    Shlomo Gan, DO on 8/14/2023 at 2:22 PM

## 2023-08-14 NOTE — ANESTHESIA POSTPROCEDURE EVALUATION
Patient: Js Jones    Procedure: Procedure(s):  Colonoscopy       Anesthesia Type:  General    Note:  Disposition: Outpatient   Postop Pain Control:             Sign Out: Well controlled pain   PONV:    Neuro/Psych:             Sign Out: Acceptable/Baseline neuro status   Airway/Respiratory:             Sign Out: Acceptable/Baseline resp. status   CV/Hemodynamics:             Sign Out: Acceptable CV status   Other NRE: NONE   DID A NON-ROUTINE EVENT OCCUR? No       Last vitals:  Vitals Value Taken Time   /70 08/14/23 1525   Temp     Pulse 78 08/14/23 1525   Resp     SpO2 98 % 08/14/23 1528   Vitals shown include unvalidated device data.    Electronically Signed By: ABILIO Bliss CRNA  August 14, 2023  3:28 PM

## 2023-08-14 NOTE — ANESTHESIA CARE TRANSFER NOTE
Patient: Js Jones    Procedure: Procedure(s):  Colonoscopy       Diagnosis: Screen for colon cancer [Z12.11]  Diagnosis Additional Information: No value filed.    Anesthesia Type:   General     Note:    Oropharynx: spontaneously breathing  Level of Consciousness: drowsy  Oxygen Supplementation: room air    Independent Airway: airway patency satisfactory and stable  Dentition: dentition unchanged  Vital Signs Stable: post-procedure vital signs reviewed and stable  Report to RN Given: handoff report given  Patient transferred to: Phase II    Handoff Report: Identifed the Patient, Identified the Reponsible Provider, Reviewed the pertinent medical history, Discussed the surgical course, Reviewed Intra-OP anesthesia mangement and issues during anesthesia, Set expectations for post-procedure period and Allowed opportunity for questions and acknowledgement of understanding  Vitals:  Vitals Value Taken Time   /70 08/14/23 1525   Temp     Pulse 78 08/14/23 1525   Resp     SpO2 98 % 08/14/23 1526   Vitals shown include unvalidated device data.    Electronically Signed By: ABILIO Bliss CRNA  August 14, 2023  3:28 PM

## 2023-08-21 DIAGNOSIS — I10 BENIGN ESSENTIAL HYPERTENSION: ICD-10-CM

## 2023-08-22 RX ORDER — LOSARTAN POTASSIUM 50 MG/1
TABLET ORAL
Qty: 90 TABLET | Refills: 0 | Status: SHIPPED | OUTPATIENT
Start: 2023-08-22 | End: 2023-10-30

## 2023-09-11 DIAGNOSIS — I10 BENIGN ESSENTIAL HYPERTENSION: ICD-10-CM

## 2023-09-12 ENCOUNTER — TRANSFERRED RECORDS (OUTPATIENT)
Dept: HEALTH INFORMATION MANAGEMENT | Facility: CLINIC | Age: 68
End: 2023-09-12
Payer: COMMERCIAL

## 2023-09-12 RX ORDER — CARVEDILOL 12.5 MG/1
TABLET ORAL
Qty: 90 TABLET | Refills: 0 | Status: SHIPPED | OUTPATIENT
Start: 2023-09-12 | End: 2023-10-23

## 2023-09-25 DIAGNOSIS — E11.69 TYPE 2 DIABETES MELLITUS WITH OTHER SPECIFIED COMPLICATION, WITH LONG-TERM CURRENT USE OF INSULIN (H): ICD-10-CM

## 2023-09-25 DIAGNOSIS — Z79.4 TYPE 2 DIABETES MELLITUS WITH OTHER SPECIFIED COMPLICATION, WITH LONG-TERM CURRENT USE OF INSULIN (H): ICD-10-CM

## 2023-09-29 ENCOUNTER — TELEPHONE (OUTPATIENT)
Dept: EDUCATION SERVICES | Facility: OTHER | Age: 68
End: 2023-09-29

## 2023-09-29 ENCOUNTER — VIRTUAL VISIT (OUTPATIENT)
Dept: EDUCATION SERVICES | Facility: OTHER | Age: 68
End: 2023-09-29
Payer: COMMERCIAL

## 2023-09-29 DIAGNOSIS — E11.69 TYPE 2 DIABETES MELLITUS WITH OTHER SPECIFIED COMPLICATION, WITH LONG-TERM CURRENT USE OF INSULIN (H): Primary | ICD-10-CM

## 2023-09-29 DIAGNOSIS — Z79.4 TYPE 2 DIABETES MELLITUS WITH OTHER SPECIFIED COMPLICATION, WITH LONG-TERM CURRENT USE OF INSULIN (H): Primary | ICD-10-CM

## 2023-09-29 PROCEDURE — G0108 DIAB MANAGE TRN  PER INDIV: HCPCS | Mod: 93 | Performed by: DIETITIAN, REGISTERED

## 2023-09-29 RX ORDER — METFORMIN HCL 500 MG
TABLET, EXTENDED RELEASE 24 HR ORAL
Qty: 270 TABLET | Refills: 1 | Status: SHIPPED | OUTPATIENT
Start: 2023-09-29 | End: 2024-03-22

## 2023-09-29 NOTE — TELEPHONE ENCOUNTER
Cong Hyman,    I met with pt today in DM education.  Says he was having diarrhea on 2 immediate release (each tab is 1000 mg) metformin per day.  Says he went down to half tab in the morning and 1 tab in the evening (1500 mg per day) and feels better, but not 100%.  He would like to try the extended release version.  Blood sugars are doing good except fasting, but when taking 1500 mg versus 2000 mg, didn't seem to make a difference and less blood sugars in the mid 70s on 1500 mg per day with his combination of Victoza and Glipizide XL.  Pending 1500 mg daily Metformin XR for this reason.    Please review and sign if agree and Discontinue immediate release metformin.    Thanks!    Shelby Villarreal, ZABRINA [facility]  [phone]     1500mg metformin:    Date Breakfast  Lunch  Dinner  Bedtime    Before After Before After Before After    9/29 164         9/28 165  87       9/27 142  85  130     9/26    155 94     9/25 247*says forgot to take Victoza day night before    131     9/24 138  136       9/23 180    125        Date Breakfast  Lunch  Dinner  Bedtime    Before After Before After Before After    9/22 182  85  88     9/21 157  86  131     9/20 180  206  109     9/19 153         9/18 161  89       9/17 150  110  82     9/16 156     221        2000 mg metformin:    Date Breakfast  Lunch  Dinner  Bedtime    Before After Before After Before After    9/11 193* says had lots of grilled cheese the night before  80  114     9/10 150  101  108     9/9 185  77   197    9/8 170  120       9/7 163  135  126     9/6 166         9/5 149  78  187

## 2023-09-29 NOTE — LETTER
"    9/29/2023         RE: Js Jones  [address] [facility] [address]        Dear Colleague,    Thank you for referring your patient, Jstea Coylesabiha, to the [facility]. Please see a copy of my visit note below.    Date Breakfast  Lunch  Dinner  Bedtime    Before After Before After Before After    9/29 164         9/28 165  87       9/27 142  85  130     9/26    155 94     9/25 247*says forgot to take Victoza day night before    131     9/24 138  136       9/23 180    125        Date Breakfast  Lunch  Dinner  Bedtime    Before After Before After Before After    9/22 182  85  88     9/21 157  86  131     9/20 180  206  109     9/19 153         9/18 161  89       9/17 150  110  82     9/16 156     221         2000 mg metformin per day  Date Breakfast  Lunch  Dinner  Bedtime    Before After Before After Before After    9/11 193* says had lots of grilled cheese the night before  80  114     9/10 150  101  108     9/9 185  77   197    9/8 170  120       9/7 163  135  126     9/6 166         9/5 149  78  187        says might be eating \"junk\" before bed or cereal  Says had Physical with insurance company, says A1C meter showed 6.1  Says has reduced metformin to half tab 500 mg in the morning and whole tab (1000mg) in the evening.  Feels \"shaky if gets down to 85 blood sugar\"    Diabetes Self-Management Education & Support    Presents for: Follow-up    Type of Service: Telephone Visit    Originating Location (Patient Location): Home  Distant Location (Provider Location): Offsite  Mode of Communication:  Telephone    Telephone Visit Start Time: 2:35 pm  Telephone Visit End Time (telephone visit stop time): 3:10 pm    How would patient like to obtain AVS? MyChart    Assessment Type:   ASSESSMENT:  Pt's blood sugars are improving.  Struggling with fasting blood sugars, but blood sugars improve as day goes on.  Pt says he had diarrhea with 2000 mg of metformin per day and mentions is " was recommended by insurance RN to reduce to 1500 mg per day and says this has helped, but not 100%.  He would like to try extended release. I did a review of his blood sugars pre and post 2000 down to 1500 mg daily of regular release metformin, and there wasn't much changes with his fasting blood sugar.  If fact, the 2000 mg per day seemed to take his pre meal blood sugar numbers later in the day to the mid 70s and Js starts to feel low blood sugars around 85.  Given he has overall good control of blood sugars, I recommend change to extended release and keep to 1500 mg daily.  Pt will be due for another A1C draw in late October, so encouraged Js to make this appointment with PCP.    Patient's most recent   Lab Results   Component Value Date    A1C 6.8 04/26/2023    A1C 8.5 02/22/2021     is meeting goal of <7.0    Diabetes knowledge and skills assessment:   Patient is knowledgeable in diabetes management concepts related to: Taking Medication    Continue education with the following diabetes management concepts: Healthy Eating, Being Active, Monitoring, and Taking Medication    Based on learning assessment above, most appropriate setting for further diabetes education would be: Individual setting.      PLAN    Pt does not need follow up with Diabetes Education unless A1C increases    Sent telephone encounter to PCP of extended release metformin at 1500 mg daily  Topics to cover at upcoming visits: Healthy Eating, Being Active, Monitoring, and Taking Medication    Follow-up: as needed    See Care Plan for co-developed, patient-state behavior change goals.  AVS provided for patient today.    Education Materials Provided:  No new materials provided today      SUBJECTIVE/OBJECTIVE:  Presents for: Follow-up  Accompanied by: Self  Diabetes education in the past 24mo: Yes  Diabetes type: Type 2  Disease course: Improving  Difficulty affording diabetes medication?: No  Other concerns:: None  Cultural Influences/Ethnic  "Background:  Not  or       Diabetes Symptoms & Complications:  Fatigue: No  Neuropathy: No  Polydipsia: No  Polyphagia: No  Polyuria: No  Visual change: No  Symptom course: Improving  Autonomic neuropathy: No  CVA: No  Heart disease: Yes    Patient Problem List and Family Medical History reviewed for relevant medical history, current medical status, and diabetes risk factors.    Vitals:  There were no vitals taken for this visit.  Estimated body mass index is 22.4 kg/m  as calculated from the following:    Height as of 4/26/23: 1.676 m (5' 6\").    Weight as of 4/26/23: 63 kg (138 lb 12.8 oz).   Last 3 BP:   BP Readings from Last 3 Encounters:   08/14/23 (!) 156/88   04/26/23 130/86   12/08/22 118/68       History   Smoking Status     Former     Packs/day: 1.00     Years: 30.00     Types: Cigarettes     Quit date: 1/1/2000   Smokeless Tobacco     Never       Labs:  Lab Results   Component Value Date    A1C 6.8 04/26/2023    A1C 8.5 02/22/2021     Lab Results   Component Value Date     08/14/2023     03/07/2022     03/22/2021     Lab Results   Component Value Date    LDL 77 09/07/2022    LDL 70 01/06/2020     HDL Cholesterol   Date Value Ref Range Status   01/06/2020 43 >39 mg/dL Final     Direct Measure HDL   Date Value Ref Range Status   09/07/2022 37 (L) >=40 mg/dL Final   ]  GFR Estimate   Date Value Ref Range Status   04/26/2023 >90 >60 mL/min/1.73m2 Final     Comment:     eGFR calculated using 2021 CKD-EPI equation.   03/22/2021 90 >60 mL/min/[1.73_m2] Final     Comment:     Non  GFR Calc  Starting 12/18/2018, serum creatinine based estimated GFR (eGFR) will be   calculated using the Chronic Kidney Disease Epidemiology Collaboration   (CKD-EPI) equation.       GFR Estimate If Black   Date Value Ref Range Status   03/22/2021 >90 >60 mL/min/[1.73_m2] Final     Comment:      GFR Calc  Starting 12/18/2018, serum creatinine based estimated GFR (eGFR) " will be   calculated using the Chronic Kidney Disease Epidemiology Collaboration   (CKD-EPI) equation.       Lab Results   Component Value Date    CR 0.89 04/26/2023    CR 0.85 03/22/2021     No results found for: MICROALBUMIN    Healthy Eating:  Healthy Eating Assessed Today: No  Cultural/Zoroastrian diet restrictions?: No  Meal planning/habits: Carb counting, Low carb, Low salt, Frequent snacking  How many times a week on average do you eat food made away from home (restaurant/take-out)?: 0  Meals include: Breakfast, Lunch, Dinner, Evening Snack  Breakfast: cornflakes and protein drink in am.  Lunch: brings to lunch: sandwich, sliced turkey lunch meat or pepper turkey  (both him and his wife are back working), lettuce and cheese with low carb bread. milk with.  Dinner: veggies, meat, potato or hotdish  beef stew recently.  goulash.  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  Beverages: Water, Coffee, Milk  Has patient met with a dietitian in the past?: Yes    Being Active:  Being Active Assessed Today: No (active at work)  Barrier to exercise: Time    Monitoring:  Monitoring Assessed Today: Yes  Did patient bring glucose meter to appointment? : Yes  Blood Glucose Meter: Reli-On  Times checking blood sugar at home (number): 3  Times checking blood sugar at home (per): Day  Blood glucose trend: Decreasing    See top of note    Taking Medications:  Diabetes Medication(s)       Biguanides       metFORMIN (GLUCOPHAGE XR) 500 MG 24 hr tablet    Take 1 tablet with breakfast and 2 tablets with dinner      Sulfonylureas       glipiZIDE (GLUCOTROL XL) 5 MG 24 hr tablet    Take 2 tablets by mouth daily in the morning before breakfast      Incretin Mimetic Agents       liraglutide (VICTOZA PEN) 18 MG/3ML solution    INJECT 0.6MG SUBCUTANEOUSLY DAILY X7 DAYS THEN INCREASE TO 1.2MG ONCE DAILY            Taking Medication Assessed Today: Yes  Current Treatments: Oral Medication (taken by mouth),  Non-insulin Injectables  Problems taking diabetes medications regularly?: No  Diabetes medication side effects?: Yes (diarrhea with metformin)    Problem Solving:  Problem Solving Assessed Today: No  Is the patient at risk for hypoglycemia?: Yes  Hypoglycemia Frequency: Rarely    Hypoglycemia symptoms  Feeling shaky: Yes         Reducing Risks:  Reducing Risks Assessed Today: No  CAD Risks: Family history, Hypertension    Healthy Coping:  Healthy Coping Assessed Today: Yes  Emotional response to diabetes: Ready to learn, Acceptance  Informal Support system:: Family  Stage of change: ACTION (Actively working towards change)  Patient Activation Measure Survey Score:      7/13/2012     4:00 PM 9/4/2013    11:00 AM   REMIGIO Score (Last Two)   REMIGIO Raw Score 40 28   Activation Score 60 34.7   REMIGIO Level 3 1         Care Plan and Education Provided:  Care Plan: Diabetes   Updates made by Shelby Villarreal RD since 9/29/2023 12:00 AM        Problem: Diabetes Self-Management Education Needed to Optimize Self-Care Behaviors         Goal: Taking Medication - patient is consistently taking medications as directed    Start Date: 9/29/2023   This Visit's Progress: 0%   Note:    Switch to extended release metformin to help with diarrhea             Time Spent: 35 minutes  Encounter Type: Individual    Any diabetes medication dose changes were made via the CDE Protocol per the patient's referring provider. A copy of this encounter was shared with the provider.    Shelby Villarreal RD [facility]  [phone]

## 2023-09-29 NOTE — PROGRESS NOTES
"Date Breakfast  Lunch  Dinner  Bedtime    Before After Before After Before After    9/29 164         9/28 165  87       9/27 142  85  130     9/26    155 94     9/25 247*says forgot to take Victoza day night before    131     9/24 138  136       9/23 180    125        Date Breakfast  Lunch  Dinner  Bedtime    Before After Before After Before After    9/22 182  85  88     9/21 157  86  131     9/20 180  206  109     9/19 153         9/18 161  89       9/17 150  110  82     9/16 156     221         2000 mg metformin per day  Date Breakfast  Lunch  Dinner  Bedtime    Before After Before After Before After    9/11 193* says had lots of grilled cheese the night before  80  114     9/10 150  101  108     9/9 185  77   197    9/8 170  120       9/7 163  135  126     9/6 166         9/5 149  78  187        says might be eating \"junk\" before bed or cereal  Says had Physical with insurance company, says A1C meter showed 6.1  Says has reduced metformin to half tab 500 mg in the morning and whole tab (1000mg) in the evening.  Feels \"shaky if gets down to 85 blood sugar\"    Diabetes Self-Management Education & Support    Presents for: Follow-up    Type of Service: Telephone Visit    Originating Location (Patient Location): Home  Distant Location (Provider Location): Offsite  Mode of Communication:  Telephone    Telephone Visit Start Time: 2:35 pm  Telephone Visit End Time (telephone visit stop time): 3:10 pm    How would patient like to obtain AVS? Pkhart    Assessment Type:   ASSESSMENT:  Pt's blood sugars are improving.  Struggling with fasting blood sugars, but blood sugars improve as day goes on.  Pt says he had diarrhea with 2000 mg of metformin per day and mentions is was recommended by insurance RN to reduce to 1500 mg per day and says this has helped, but not 100%.  He would like to try extended release. I did a review of his blood sugars pre and post 2000 down to 1500 mg daily of regular release metformin, and there " wasn't much changes with his fasting blood sugar.  If fact, the 2000 mg per day seemed to take his pre meal blood sugar numbers later in the day to the mid 70s and Js starts to feel low blood sugars around 85.  Given he has overall good control of blood sugars, I recommend change to extended release and keep to 1500 mg daily.  Pt will be due for another A1C draw in late October, so encouraged Js to make this appointment with PCP.    Patient's most recent   Lab Results   Component Value Date    A1C 6.8 04/26/2023    A1C 8.5 02/22/2021     is meeting goal of <7.0    Diabetes knowledge and skills assessment:   Patient is knowledgeable in diabetes management concepts related to: Taking Medication    Continue education with the following diabetes management concepts: Healthy Eating, Being Active, Monitoring, and Taking Medication    Based on learning assessment above, most appropriate setting for further diabetes education would be: Individual setting.      PLAN    Pt does not need follow up with Diabetes Education unless A1C increases    Sent telephone encounter to PCP of extended release metformin at 1500 mg daily  Topics to cover at upcoming visits: Healthy Eating, Being Active, Monitoring, and Taking Medication    Follow-up: as needed    See Care Plan for co-developed, patient-state behavior change goals.  AVS provided for patient today.    Education Materials Provided:  No new materials provided today      SUBJECTIVE/OBJECTIVE:  Presents for: Follow-up  Accompanied by: Self  Diabetes education in the past 24mo: Yes  Diabetes type: Type 2  Disease course: Improving  Difficulty affording diabetes medication?: No  Other concerns:: None  Cultural Influences/Ethnic Background:  Not  or       Diabetes Symptoms & Complications:  Fatigue: No  Neuropathy: No  Polydipsia: No  Polyphagia: No  Polyuria: No  Visual change: No  Symptom course: Improving  Autonomic neuropathy: No  CVA: No  Heart disease:  "Yes    Patient Problem List and Family Medical History reviewed for relevant medical history, current medical status, and diabetes risk factors.    Vitals:  There were no vitals taken for this visit.  Estimated body mass index is 22.4 kg/m  as calculated from the following:    Height as of 4/26/23: 1.676 m (5' 6\").    Weight as of 4/26/23: 63 kg (138 lb 12.8 oz).   Last 3 BP:   BP Readings from Last 3 Encounters:   08/14/23 (!) 156/88   04/26/23 130/86   12/08/22 118/68       History   Smoking Status    Former    Packs/day: 1.00    Years: 30.00    Types: Cigarettes    Quit date: 1/1/2000   Smokeless Tobacco    Never       Labs:  Lab Results   Component Value Date    A1C 6.8 04/26/2023    A1C 8.5 02/22/2021     Lab Results   Component Value Date     08/14/2023     03/07/2022     03/22/2021     Lab Results   Component Value Date    LDL 77 09/07/2022    LDL 70 01/06/2020     HDL Cholesterol   Date Value Ref Range Status   01/06/2020 43 >39 mg/dL Final     Direct Measure HDL   Date Value Ref Range Status   09/07/2022 37 (L) >=40 mg/dL Final   ]  GFR Estimate   Date Value Ref Range Status   04/26/2023 >90 >60 mL/min/1.73m2 Final     Comment:     eGFR calculated using 2021 CKD-EPI equation.   03/22/2021 90 >60 mL/min/[1.73_m2] Final     Comment:     Non  GFR Calc  Starting 12/18/2018, serum creatinine based estimated GFR (eGFR) will be   calculated using the Chronic Kidney Disease Epidemiology Collaboration   (CKD-EPI) equation.       GFR Estimate If Black   Date Value Ref Range Status   03/22/2021 >90 >60 mL/min/[1.73_m2] Final     Comment:      GFR Calc  Starting 12/18/2018, serum creatinine based estimated GFR (eGFR) will be   calculated using the Chronic Kidney Disease Epidemiology Collaboration   (CKD-EPI) equation.       Lab Results   Component Value Date    CR 0.89 04/26/2023    CR 0.85 03/22/2021     No results found for: MICROALBUMIN    Healthy Eating:  Healthy " Eating Assessed Today: No  Cultural/Scientology diet restrictions?: No  Meal planning/habits: Carb counting, Low carb, Low salt, Frequent snacking  How many times a week on average do you eat food made away from home (restaurant/take-out)?: 0  Meals include: Breakfast, Lunch, Dinner, Evening Snack  Breakfast: cornflakes and protein drink in am.  Lunch: brings to lunch: sandwich, sliced turkey lunch meat or pepper turkey  (both him and his wife are back working), lettuce and cheese with low carb bread. milk with.  Dinner: veggies, meat, potato or hotdish  beef stew recently.  goulash.  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  Beverages: Water, Coffee, Milk  Has patient met with a dietitian in the past?: Yes    Being Active:  Being Active Assessed Today: No (active at work)  Barrier to exercise: Time    Monitoring:  Monitoring Assessed Today: Yes  Did patient bring glucose meter to appointment? : Yes  Blood Glucose Meter: Reli-On  Times checking blood sugar at home (number): 3  Times checking blood sugar at home (per): Day  Blood glucose trend: Decreasing    See top of note    Taking Medications:  Diabetes Medication(s)       Biguanides       metFORMIN (GLUCOPHAGE XR) 500 MG 24 hr tablet    Take 1 tablet with breakfast and 2 tablets with dinner      Sulfonylureas       glipiZIDE (GLUCOTROL XL) 5 MG 24 hr tablet    Take 2 tablets by mouth daily in the morning before breakfast      Incretin Mimetic Agents       liraglutide (VICTOZA PEN) 18 MG/3ML solution    INJECT 0.6MG SUBCUTANEOUSLY DAILY X7 DAYS THEN INCREASE TO 1.2MG ONCE DAILY            Taking Medication Assessed Today: Yes  Current Treatments: Oral Medication (taken by mouth), Non-insulin Injectables  Problems taking diabetes medications regularly?: No  Diabetes medication side effects?: Yes (diarrhea with metformin)    Problem Solving:  Problem Solving Assessed Today: No  Is the patient at risk for hypoglycemia?: Yes  Hypoglycemia  Frequency: Rarely    Hypoglycemia symptoms  Feeling shaky: Yes         Reducing Risks:  Reducing Risks Assessed Today: No  CAD Risks: Family history, Hypertension    Healthy Coping:  Healthy Coping Assessed Today: Yes  Emotional response to diabetes: Ready to learn, Acceptance  Informal Support system:: Family  Stage of change: ACTION (Actively working towards change)  Patient Activation Measure Survey Score:      7/13/2012     4:00 PM 9/4/2013    11:00 AM   REMIGIO Score (Last Two)   REMIGIO Raw Score 40 28   Activation Score 60 34.7   REMIGIO Level 3 1         Care Plan and Education Provided:  Care Plan: Diabetes   Updates made by Shelby Villarreal RD since 9/29/2023 12:00 AM        Problem: Diabetes Self-Management Education Needed to Optimize Self-Care Behaviors         Goal: Taking Medication - patient is consistently taking medications as directed    Start Date: 9/29/2023   This Visit's Progress: 0%   Note:    Switch to extended release metformin to help with diarrhea             Time Spent: 35 minutes  Encounter Type: Individual    Any diabetes medication dose changes were made via the CDE Protocol per the patient's referring provider. A copy of this encounter was shared with the provider.    Shelby Villarreal RD [facility]  [phone]

## 2023-10-23 DIAGNOSIS — I10 BENIGN ESSENTIAL HYPERTENSION: ICD-10-CM

## 2023-10-23 RX ORDER — CARVEDILOL 12.5 MG/1
TABLET ORAL
Qty: 90 TABLET | Refills: 0 | Status: SHIPPED | OUTPATIENT
Start: 2023-10-23 | End: 2023-12-29

## 2023-10-23 RX ORDER — AMLODIPINE BESYLATE 10 MG/1
10 TABLET ORAL DAILY
Qty: 90 TABLET | Refills: 0 | Status: SHIPPED | OUTPATIENT
Start: 2023-10-23 | End: 2024-01-23

## 2023-10-30 ENCOUNTER — OFFICE VISIT (OUTPATIENT)
Dept: FAMILY MEDICINE | Facility: CLINIC | Age: 68
End: 2023-10-30
Payer: COMMERCIAL

## 2023-10-30 VITALS
RESPIRATION RATE: 18 BRPM | OXYGEN SATURATION: 97 % | WEIGHT: 134 LBS | BODY MASS INDEX: 21.53 KG/M2 | DIASTOLIC BLOOD PRESSURE: 70 MMHG | HEART RATE: 76 BPM | TEMPERATURE: 97 F | SYSTOLIC BLOOD PRESSURE: 136 MMHG | HEIGHT: 66 IN

## 2023-10-30 DIAGNOSIS — I25.10 CORONARY ARTERY DISEASE INVOLVING NATIVE HEART WITHOUT ANGINA PECTORIS, UNSPECIFIED VESSEL OR LESION TYPE: ICD-10-CM

## 2023-10-30 DIAGNOSIS — E11.69 TYPE 2 DIABETES MELLITUS WITH OTHER SPECIFIED COMPLICATION, WITH LONG-TERM CURRENT USE OF INSULIN (H): Primary | ICD-10-CM

## 2023-10-30 DIAGNOSIS — I10 BENIGN ESSENTIAL HYPERTENSION: ICD-10-CM

## 2023-10-30 DIAGNOSIS — E78.5 HYPERLIPIDEMIA LDL GOAL <100: Chronic | ICD-10-CM

## 2023-10-30 DIAGNOSIS — N52.9 ERECTILE DYSFUNCTION, UNSPECIFIED ERECTILE DYSFUNCTION TYPE: ICD-10-CM

## 2023-10-30 DIAGNOSIS — Z79.4 TYPE 2 DIABETES MELLITUS WITH OTHER SPECIFIED COMPLICATION, WITH LONG-TERM CURRENT USE OF INSULIN (H): Primary | ICD-10-CM

## 2023-10-30 LAB
CHOLEST SERPL-MCNC: 95 MG/DL
CREAT UR-MCNC: 73.8 MG/DL
HBA1C MFR BLD: 7.1 % (ref 0–5.6)
HDLC SERPL-MCNC: 30 MG/DL
LDLC SERPL CALC-MCNC: 46 MG/DL
MICROALBUMIN UR-MCNC: <12 MG/L
MICROALBUMIN/CREAT UR: NORMAL MG/G{CREAT}
NONHDLC SERPL-MCNC: 65 MG/DL
TRIGL SERPL-MCNC: 96 MG/DL

## 2023-10-30 PROCEDURE — 82570 ASSAY OF URINE CREATININE: CPT | Performed by: FAMILY MEDICINE

## 2023-10-30 PROCEDURE — 99207 PR FOOT EXAM NO CHARGE: CPT | Performed by: FAMILY MEDICINE

## 2023-10-30 PROCEDURE — 80061 LIPID PANEL: CPT | Performed by: FAMILY MEDICINE

## 2023-10-30 PROCEDURE — 99214 OFFICE O/P EST MOD 30 MIN: CPT | Performed by: FAMILY MEDICINE

## 2023-10-30 PROCEDURE — 82043 UR ALBUMIN QUANTITATIVE: CPT | Performed by: FAMILY MEDICINE

## 2023-10-30 PROCEDURE — 36415 COLL VENOUS BLD VENIPUNCTURE: CPT | Performed by: FAMILY MEDICINE

## 2023-10-30 PROCEDURE — 83036 HEMOGLOBIN GLYCOSYLATED A1C: CPT | Performed by: FAMILY MEDICINE

## 2023-10-30 RX ORDER — SILDENAFIL 25 MG/1
25-50 TABLET, FILM COATED ORAL DAILY PRN
Qty: 30 TABLET | Refills: 1 | Status: SHIPPED | OUTPATIENT
Start: 2023-10-30

## 2023-10-30 RX ORDER — ATORVASTATIN CALCIUM 40 MG/1
TABLET, FILM COATED ORAL
Qty: 90 TABLET | Refills: 0 | Status: SHIPPED | OUTPATIENT
Start: 2023-10-30 | End: 2024-01-23

## 2023-10-30 RX ORDER — LOSARTAN POTASSIUM 50 MG/1
50 TABLET ORAL DAILY
Qty: 90 TABLET | Refills: 1 | Status: SHIPPED | OUTPATIENT
Start: 2023-10-30 | End: 2024-03-22

## 2023-10-30 ASSESSMENT — PAIN SCALES - GENERAL: PAINLEVEL: NO PAIN (0)

## 2023-10-30 NOTE — LETTER
November 1, 2023      Js ESTHER Jones  [address]  [place] [address]        Dear ,    We are writing to inform you of your test results.    Hemoglobin A1c 7.1, consistent with stable diabetes.  HDL (good cholesterol) low.  Urine test showed no significant protein leakage.     Will recommend to continue regular walks, healthy diet and current medications.     Resulted Orders   HEMOGLOBIN A1C   Result Value Ref Range    Hemoglobin A1C 7.1 (H) 0.0 - 5.6 %      Comment:      Normal <5.7%   Prediabetes 5.7-6.4%    Diabetes 6.5% or higher     Note: Adopted from ADA consensus guidelines.   Albumin Random Urine Quantitative with Creat Ratio   Result Value Ref Range    Creatinine Urine mg/dL 73.8 mg/dL      Comment:      The reference ranges have not been established in urine creatinine. The results should be integrated into the clinical context for interpretation.    Albumin Urine mg/L <12.0 mg/L      Comment:      The reference ranges have not been established in urine albumin. The results should be integrated into the clinical context for interpretation.    Albumin Urine mg/g Cr        Comment:      Unable to calculate, urine albumin and/or urine creatinine is outside detectable limits.  Microalbuminuria is defined as an albumin:creatinine ratio of 17 to 299 for males and 25 to 299 for females. A ratio of albumin:creatinine of 300 or higher is indicative of overt proteinuria.  Due to biologic variability, positive results should be confirmed by a second, first-morning random or 24-hour timed urine specimen. If there is discrepancy, a third specimen is recommended. When 2 out of 3 results are in the microalbuminuria range, this is evidence for incipient nephropathy and warrants increased efforts at glucose control, blood pressure control, and institution of therapy with an angiotensin-converting-enzyme (ACE) inhibitor (if the patient can tolerate it).     Lipid panel reflex to direct LDL  Non-fasting   Result Value Ref Range    Cholesterol 95 <200 mg/dL    Triglycerides 96 <150 mg/dL    Direct Measure HDL 30 (L) >=40 mg/dL    LDL Cholesterol Calculated 46 <=100 mg/dL    Non HDL Cholesterol 65 <130 mg/dL    Narrative    Cholesterol  Desirable:  <200 mg/dL    Triglycerides  Normal:  Less than 150 mg/dL  Borderline High:  150-199 mg/dL  High:  200-499 mg/dL  Very High:  Greater than or equal to 500 mg/dL    Direct Measure HDL  Female:  Greater than or equal to 50 mg/dL   Male:  Greater than or equal to 40 mg/dL    LDL Cholesterol  Desirable:  <100mg/dL  Above Desirable:  100-129 mg/dL   Borderline High:  130-159 mg/dL   High:  160-189 mg/dL   Very High:  >= 190 mg/dL    Non HDL Cholesterol  Desirable:  130 mg/dL  Above Desirable:  130-159 mg/dL  Borderline High:  160-189 mg/dL  High:  190-219 mg/dL  Very High:  Greater than or equal to 220 mg/dL       If you have any questions or concerns, please call the clinic at the number listed above.       Sincerely,      Fortino Hyman MD

## 2023-10-30 NOTE — NURSING NOTE
Patient Quality Outreach    Patient is due for the following:   Diabetes -  A1C and LDL (Fasting)    Next Steps:   Patient has upcoming appointment, these items will be addressed at that time.    Type of outreach:    Chart review performed, no outreach needed.      Questions for provider review:    None           Brenna Gonzales CMA

## 2023-10-30 NOTE — PROGRESS NOTES
Assessment & Plan     Type 2 diabetes mellitus with other specified complication, with long-term current use of insulin (H)  Last hemoglobin A1c 6.8, consistent with well-controlled diabetes.  Recommended to continue metformin, glipizide and Victoza.  Lab Results   Component Value Date    A1C 6.8 04/26/2023    A1C 7.2 09/07/2022    A1C 11.2 03/07/2022    A1C 7.1 07/14/2021    A1C 8.5 02/22/2021    A1C 8.1 01/06/2020    A1C 6.9 06/07/2019    A1C 6.8 09/15/2018    A1C 7.2 06/15/2018   - Albumin Random Urine Quantitative with Creat Ratio; Future  - HEMOGLOBIN A1C; Future  - FOOT EXAM    Coronary artery disease involving native heart without angina pectoris, unspecified vessel or lesion type  Continue aspirin, Lipitor and carvedilol  - Lipid panel reflex to direct LDL Non-fasting; Future    Benign essential hypertension, goal <140/90  Blood pressure within target goal of less than 140/90.  Losartan refilled.  Recommended regular walks, healthy diet  - losartan (COZAAR) 50 MG tablet; Take 1 tablet (50 mg) by mouth daily    Erectile dysfunction, unspecified erectile dysfunction type  - sildenafil (VIAGRA) 25 MG tablet; Take 1-2 tablets (25-50 mg) by mouth daily as needed      Fortino Hyman MD  [facility]    Subjective   Js is a 68 year old, presenting for the following health issues:  Diabetes      History of Present Illness       Diabetes:   He presents for follow up of diabetes.  He is checking home blood glucose two times daily.   He checks blood glucose before meals.  Blood glucose is never over 200 and never under 70. He is aware of hypoglycemia symptoms including shakiness.       He is not experiencing numbness or burning in feet, excessive thirst, blurry vision, weight changes or redness, sores or blisters on feet. The patient has not had a diabetic eye exam in the last 12 months.          Hyperlipidemia:  He presents for follow up of hyperlipidemia.   He is taking medication to  "lower cholesterol. He is not having myalgia or other side effects to statin medications.    Hypertension: He presents for follow up of hypertension.  He does not check blood pressure  regularly outside of the clinic. Outpatient blood pressures have not been over 140/90. He follows a low salt diet.         Review of Systems   Constitutional, HEENT, cardiovascular, pulmonary, GI, , musculoskeletal, neuro, skin, endocrine and psych systems are negative, except as otherwise noted.      Objective    /70 (Cuff Size: Adult Regular)   Pulse 76   Temp 97  F (36.1  C) (Tympanic)   Resp 18   Ht 1.676 m (5' 6\")   Wt 60.8 kg (134 lb)   SpO2 97%   BMI 21.63 kg/m    Body mass index is 21.63 kg/m .  Physical Exam   GENERAL: healthy, alert and no distress  NECK: no adenopathy, no asymmetry, masses, or scars and thyroid normal to palpation  RESP: lungs clear to auscultation - no rales, rhonchi or wheezes  CV: regular rate and rhythm, normal S1 S2, no S3 or S4, no murmur, click or rub  ABDOMEN: soft, nontender, no hepatosplenomegaly  MS: no gross musculoskeletal defects noted, no edema  NEURO: Normal strength and tone, mentation intact and speech normal  PSYCH: mentation appears normal, affect normal/bright      Wt Readings from Last 10 Encounters:   10/30/23 60.8 kg (134 lb)   04/26/23 63 kg (138 lb 12.8 oz)   12/08/22 61.7 kg (136 lb)   11/15/22 61.2 kg (135 lb)   09/07/22 60.3 kg (133 lb)   05/24/22 61.2 kg (135 lb)   03/30/22 61.3 kg (135 lb 3.2 oz)   03/07/22 60.3 kg (133 lb)   11/10/21 64.4 kg (142 lb)   09/07/21 61.7 kg (136 lb)                  "

## 2023-10-30 NOTE — NURSING NOTE
"Chief Complaint   Patient presents with    Diabetes     /70 (Cuff Size: Adult Regular)   Pulse 76   Temp 97  F (36.1  C) (Tympanic)   Resp 18   Ht 1.676 m (5' 6\")   Wt 60.8 kg (134 lb)   SpO2 97%   BMI 21.63 kg/m   Estimated body mass index is 21.63 kg/m  as calculated from the following:    Height as of this encounter: 1.676 m (5' 6\").    Weight as of this encounter: 60.8 kg (134 lb).  Patient presents to the clinic using No DME      Health Maintenance that is potentially due pending provider review:    Health Maintenance Due   Topic Date Due    HEPATITIS B IMMUNIZATION (1 of 3 - Risk 3-dose series) Never done    RSV VACCINE 60+ (1 - 1-dose 60+ series) Never done    COVID-19 Vaccine (5 - 2023-24 season) 09/01/2023    LIPID  09/07/2023    MICROALBUMIN  09/07/2023    DIABETIC FOOT EXAM  09/07/2023    A1C  10/26/2023                  "

## 2023-11-25 DIAGNOSIS — E11.69 TYPE 2 DIABETES MELLITUS WITH OTHER SPECIFIED COMPLICATION, WITH LONG-TERM CURRENT USE OF INSULIN (H): ICD-10-CM

## 2023-11-25 DIAGNOSIS — Z79.4 TYPE 2 DIABETES MELLITUS WITH OTHER SPECIFIED COMPLICATION, WITH LONG-TERM CURRENT USE OF INSULIN (H): ICD-10-CM

## 2023-11-27 RX ORDER — LIRAGLUTIDE 6 MG/ML
INJECTION SUBCUTANEOUS
Qty: 9 ML | Refills: 0 | Status: SHIPPED | OUTPATIENT
Start: 2023-11-27 | End: 2024-01-17

## 2023-12-17 DIAGNOSIS — I10 BENIGN ESSENTIAL HYPERTENSION: ICD-10-CM

## 2023-12-18 RX ORDER — CARVEDILOL 12.5 MG/1
TABLET ORAL
Qty: 90 TABLET | Refills: 0 | OUTPATIENT
Start: 2023-12-18

## 2023-12-29 DIAGNOSIS — I10 BENIGN ESSENTIAL HYPERTENSION: ICD-10-CM

## 2023-12-29 RX ORDER — CARVEDILOL 12.5 MG/1
12.5 TABLET ORAL 2 TIMES DAILY WITH MEALS
Qty: 180 TABLET | Refills: 1 | Status: SHIPPED | OUTPATIENT
Start: 2023-12-29 | End: 2024-06-17

## 2023-12-29 NOTE — TELEPHONE ENCOUNTER
Pt says he has been out of his Carvedilol for a week. He takes this 2x a day. The last Rx was written for #90  on 10/23/23 so that is only 45 day supply.   He would like this today.

## 2024-01-10 ENCOUNTER — TELEPHONE (OUTPATIENT)
Dept: FAMILY MEDICINE | Facility: CLINIC | Age: 69
End: 2024-01-10
Payer: COMMERCIAL

## 2024-01-10 NOTE — TELEPHONE ENCOUNTER
Reason for Call:  Medication or medication refill:    Do you use a [facility]?  Name of the pharmacy and phone number for the current request:  [facility]    Name of the medication requested: liraglutide (VICTOZA PEN) 18 MG/3ML solution     Other request: Insurance will not cover Victoza, pt stated he dropped of sheet that had a list of meds that they will cover? Please send something to the pharmacy that his insurance will cover .    Can we leave a detailed message on this number? YES    Phone number patient can be reached at: Cell number on file:    Telephone Information:   Mobile [phone]       Best Time: any    Call taken on 1/10/2024 at 4:11 PM by Mylene Cruz

## 2024-01-11 NOTE — TELEPHONE ENCOUNTER
LMTRC -   If he has a copy of the form that was dropped off. If he knows what medication were covered.  Claudette [place] Sec

## 2024-01-17 ENCOUNTER — TELEPHONE (OUTPATIENT)
Dept: FAMILY MEDICINE | Facility: CLINIC | Age: 69
End: 2024-01-17

## 2024-01-17 ENCOUNTER — ALLIED HEALTH/NURSE VISIT (OUTPATIENT)
Dept: FAMILY MEDICINE | Facility: CLINIC | Age: 69
End: 2024-01-17
Payer: COMMERCIAL

## 2024-01-17 DIAGNOSIS — E11.59 TYPE 2 DIABETES MELLITUS WITH OTHER CIRCULATORY COMPLICATION, WITHOUT LONG-TERM CURRENT USE OF INSULIN (H): Primary | ICD-10-CM

## 2024-01-17 DIAGNOSIS — Z79.4 TYPE 2 DIABETES MELLITUS WITH OTHER SPECIFIED COMPLICATION, WITH LONG-TERM CURRENT USE OF INSULIN (H): ICD-10-CM

## 2024-01-17 DIAGNOSIS — E11.69 TYPE 2 DIABETES MELLITUS WITH OTHER SPECIFIED COMPLICATION, WITH LONG-TERM CURRENT USE OF INSULIN (H): ICD-10-CM

## 2024-01-17 PROCEDURE — 99207 PR NO CHARGE NURSE ONLY: CPT

## 2024-01-17 RX ORDER — LIRAGLUTIDE 6 MG/ML
INJECTION SUBCUTANEOUS
Qty: 9 ML | Refills: 0 | OUTPATIENT
Start: 2024-01-17

## 2024-01-17 RX ORDER — DULAGLUTIDE 1.5 MG/.5ML
1.5 INJECTION, SOLUTION SUBCUTANEOUS
Qty: 6 ML | Refills: 1 | Status: SHIPPED | OUTPATIENT
Start: 2024-01-17 | End: 2024-02-19

## 2024-01-17 RX ORDER — LIRAGLUTIDE 6 MG/ML
1.2 INJECTION SUBCUTANEOUS DAILY
Qty: 9 ML | Refills: 0 | Status: CANCELLED | OUTPATIENT
Start: 2024-01-17

## 2024-01-17 NOTE — TELEPHONE ENCOUNTER
Patient presents to the clinic today to follow up on his victoza 18mg/3 ml prescription, states he dropped off a form from his healthcare insurance company with alternative medication as victoza is non formulary.    Informed patient we do not have a copy of this form, patient states he no longer has this either, I advised that he contact his health care insurance company to obtain list of formulary alternative.    Contact [facility] who state they need a new RX sent over so they can run it with his insurance to see if this will need a PA or formulary alternative.     Pharmacist states the last filled his victoza pen on 11/27/23 for a 40 day supply.    Patient reports his last dose of victoza was 1.2 mg subcutaneous daily has been out for approx 3 weeks. Patient reports his BG levels running 130-200, continues on metformin and glipizide as ordered.     RX pended and message routed to Dr. Hyman for consideration.     Date of last OV: 10/30/23  Lab Results   Component Value Date    A1C 7.1 10/30/2023    A1C 6.8 04/26/2023    A1C 7.2 09/07/2022    A1C 11.2 03/07/2022    A1C 7.1 07/14/2021    A1C 8.5 02/22/2021    A1C 8.1 01/06/2020    A1C 6.9 06/07/2019    A1C 6.8 09/15/2018    A1C 7.2 06/15/2018     Julie Behrendt RN

## 2024-01-17 NOTE — PROGRESS NOTES
Patient presents to the clinic today to follow up on his victoza 18mg/3 ml prescription, states he dropped off a form from his healthcare insurance company with alternative medication as victoza is non formulary.    Informed patient we do not have a copy of this form, patient states he no longer has this either, I advised that he contact his health care insurance company to obtain list of formulary alternative.    Contact [facility] who state they need a new RX sent over so they can run it with his insurance to see if this will need a PA or formulary alternative.     Pharmacist states the last filled his victoza pen on 11/27/23 for a 40 day supply.    Patient reports his last dose of victoza was 1.2 mg subcutaneous daily has been out for approx 3 weeks. Patient reports his BG levels running 130-200, continues on metformin and glipizide as ordered.     RX pended and message routed to Dr. Hyman for consideration.     Julie Behrendt RN

## 2024-01-23 DIAGNOSIS — I10 BENIGN ESSENTIAL HYPERTENSION: ICD-10-CM

## 2024-01-23 DIAGNOSIS — E78.5 HYPERLIPIDEMIA LDL GOAL <100: Chronic | ICD-10-CM

## 2024-01-23 RX ORDER — ATORVASTATIN CALCIUM 40 MG/1
TABLET, FILM COATED ORAL
Qty: 90 TABLET | Refills: 2 | Status: SHIPPED | OUTPATIENT
Start: 2024-01-23 | End: 2024-08-13

## 2024-01-23 RX ORDER — AMLODIPINE BESYLATE 10 MG/1
10 TABLET ORAL DAILY
Qty: 90 TABLET | Refills: 2 | Status: SHIPPED | OUTPATIENT
Start: 2024-01-23

## 2024-01-29 ENCOUNTER — TELEPHONE (OUTPATIENT)
Dept: VASCULAR SURGERY | Facility: CLINIC | Age: 69
End: 2024-01-29
Payer: COMMERCIAL

## 2024-01-29 NOTE — TELEPHONE ENCOUNTER
Patient was not sure why he needed to follow up and would like a call back to discuss with clinical team.

## 2024-02-19 ENCOUNTER — TELEPHONE (OUTPATIENT)
Dept: FAMILY MEDICINE | Facility: CLINIC | Age: 69
End: 2024-02-19
Payer: COMMERCIAL

## 2024-02-19 DIAGNOSIS — E11.69 TYPE 2 DIABETES MELLITUS WITH OTHER SPECIFIED COMPLICATION, WITH LONG-TERM CURRENT USE OF INSULIN (H): Primary | ICD-10-CM

## 2024-02-19 DIAGNOSIS — Z79.4 TYPE 2 DIABETES MELLITUS WITH OTHER SPECIFIED COMPLICATION, WITH LONG-TERM CURRENT USE OF INSULIN (H): Primary | ICD-10-CM

## 2024-02-19 NOTE — TELEPHONE ENCOUNTER
Per Dr. Hyman:          Ozempic prescription sent to patient's pharmacy.    Dr Hyman         Patient notified.    SIMÓN Kline

## 2024-02-19 NOTE — TELEPHONE ENCOUNTER
Trulicity 1.5/0.5 is not available once again.  [facility] [place] is requesting a new rx for a different strength.

## 2024-02-19 NOTE — TELEPHONE ENCOUNTER
Spoke with our pharmacy, we have none and it is on back order, their supplier is stating next supply may come in late February.     Update sent to Dr. Hyman.    Julie Behrendt RN

## 2024-03-21 ENCOUNTER — TELEPHONE (OUTPATIENT)
Dept: FAMILY MEDICINE | Facility: CLINIC | Age: 69
End: 2024-03-21
Payer: COMMERCIAL

## 2024-03-21 DIAGNOSIS — E11.69 TYPE 2 DIABETES MELLITUS WITH OTHER SPECIFIED COMPLICATION, WITH LONG-TERM CURRENT USE OF INSULIN (H): ICD-10-CM

## 2024-03-21 DIAGNOSIS — Z79.4 TYPE 2 DIABETES MELLITUS WITH OTHER SPECIFIED COMPLICATION, WITH LONG-TERM CURRENT USE OF INSULIN (H): ICD-10-CM

## 2024-03-21 RX ORDER — SEMAGLUTIDE 1.34 MG/ML
INJECTION, SOLUTION SUBCUTANEOUS
Qty: 3 ML | Refills: 0 | Status: SHIPPED | OUTPATIENT
Start: 2024-03-21 | End: 2024-03-21

## 2024-03-21 RX ORDER — SEMAGLUTIDE 1.34 MG/ML
INJECTION, SOLUTION SUBCUTANEOUS
Qty: 3 ML | Refills: 1 | Status: SHIPPED | OUTPATIENT
Start: 2024-03-21 | End: 2024-05-21

## 2024-03-21 NOTE — TELEPHONE ENCOUNTER
Medication Question or Refill    What medication are you calling about (include dose and sig)?: OZEMPIC, 1 MG/DOSE, 4 MG/3ML pen     Preferred Pharmacy:  [facility] [address] [place] [address] [address] [address]  Phone: [phone] Fax: [phone]    Per fax received from pharmacy:   Please send new Rx for adherence.     Nathalia Brandon, SARA Lindsay

## 2024-03-22 DIAGNOSIS — Z79.4 TYPE 2 DIABETES MELLITUS WITH OTHER SPECIFIED COMPLICATION, WITH LONG-TERM CURRENT USE OF INSULIN (H): ICD-10-CM

## 2024-03-22 DIAGNOSIS — E11.69 TYPE 2 DIABETES MELLITUS WITH OTHER SPECIFIED COMPLICATION, WITH LONG-TERM CURRENT USE OF INSULIN (H): ICD-10-CM

## 2024-03-22 DIAGNOSIS — I10 BENIGN ESSENTIAL HYPERTENSION: ICD-10-CM

## 2024-03-22 RX ORDER — METFORMIN HCL 500 MG
TABLET, EXTENDED RELEASE 24 HR ORAL
Qty: 270 TABLET | Refills: 0 | Status: SHIPPED | OUTPATIENT
Start: 2024-03-22 | End: 2024-06-17

## 2024-03-22 RX ORDER — GLIPIZIDE 5 MG/1
TABLET, FILM COATED, EXTENDED RELEASE ORAL
Qty: 180 TABLET | Refills: 0 | Status: SHIPPED | OUTPATIENT
Start: 2024-03-22 | End: 2024-06-17

## 2024-03-22 RX ORDER — LOSARTAN POTASSIUM 50 MG/1
50 TABLET ORAL DAILY
Qty: 90 TABLET | Refills: 0 | Status: SHIPPED | OUTPATIENT
Start: 2024-03-22 | End: 2024-08-13

## 2024-03-27 ENCOUNTER — PATIENT OUTREACH (OUTPATIENT)
Dept: CARE COORDINATION | Facility: CLINIC | Age: 69
End: 2024-03-27
Payer: COMMERCIAL

## 2024-04-29 ENCOUNTER — OFFICE VISIT (OUTPATIENT)
Dept: FAMILY MEDICINE | Facility: CLINIC | Age: 69
End: 2024-04-29
Payer: COMMERCIAL

## 2024-04-29 VITALS
WEIGHT: 131 LBS | BODY MASS INDEX: 21.05 KG/M2 | SYSTOLIC BLOOD PRESSURE: 122 MMHG | TEMPERATURE: 97.9 F | RESPIRATION RATE: 18 BRPM | DIASTOLIC BLOOD PRESSURE: 74 MMHG | HEART RATE: 84 BPM | HEIGHT: 66 IN | OXYGEN SATURATION: 97 %

## 2024-04-29 DIAGNOSIS — I77.1 ILIAC ARTERY STENOSIS, LEFT (H): ICD-10-CM

## 2024-04-29 DIAGNOSIS — E11.69 TYPE 2 DIABETES MELLITUS WITH OTHER SPECIFIED COMPLICATION, WITH LONG-TERM CURRENT USE OF INSULIN (H): ICD-10-CM

## 2024-04-29 DIAGNOSIS — I10 BENIGN ESSENTIAL HYPERTENSION: ICD-10-CM

## 2024-04-29 DIAGNOSIS — Z12.5 SCREENING FOR PROSTATE CANCER: ICD-10-CM

## 2024-04-29 DIAGNOSIS — Z00.00 ENCOUNTER FOR MEDICARE ANNUAL WELLNESS EXAM: Primary | ICD-10-CM

## 2024-04-29 DIAGNOSIS — Z79.4 TYPE 2 DIABETES MELLITUS WITH OTHER SPECIFIED COMPLICATION, WITH LONG-TERM CURRENT USE OF INSULIN (H): ICD-10-CM

## 2024-04-29 LAB
ANION GAP SERPL CALCULATED.3IONS-SCNC: 10 MMOL/L (ref 7–15)
BUN SERPL-MCNC: 17.5 MG/DL (ref 8–23)
CALCIUM SERPL-MCNC: 9.7 MG/DL (ref 8.8–10.2)
CHLORIDE SERPL-SCNC: 105 MMOL/L (ref 98–107)
CREAT SERPL-MCNC: 1 MG/DL (ref 0.67–1.17)
DEPRECATED HCO3 PLAS-SCNC: 27 MMOL/L (ref 22–29)
EGFRCR SERPLBLD CKD-EPI 2021: 80 ML/MIN/1.73M2
GLUCOSE SERPL-MCNC: 175 MG/DL (ref 70–99)
HBA1C MFR BLD: 6.4 % (ref 0–5.6)
POTASSIUM SERPL-SCNC: 4.6 MMOL/L (ref 3.4–5.3)
PSA SERPL DL<=0.01 NG/ML-MCNC: 0.79 NG/ML (ref 0–6.5)
SODIUM SERPL-SCNC: 142 MMOL/L (ref 135–145)

## 2024-04-29 PROCEDURE — G0103 PSA SCREENING: HCPCS | Performed by: FAMILY MEDICINE

## 2024-04-29 PROCEDURE — G0439 PPPS, SUBSEQ VISIT: HCPCS | Performed by: FAMILY MEDICINE

## 2024-04-29 PROCEDURE — 83036 HEMOGLOBIN GLYCOSYLATED A1C: CPT | Performed by: FAMILY MEDICINE

## 2024-04-29 PROCEDURE — 80048 BASIC METABOLIC PNL TOTAL CA: CPT | Performed by: FAMILY MEDICINE

## 2024-04-29 PROCEDURE — 99214 OFFICE O/P EST MOD 30 MIN: CPT | Mod: 25 | Performed by: FAMILY MEDICINE

## 2024-04-29 PROCEDURE — 36415 COLL VENOUS BLD VENIPUNCTURE: CPT | Performed by: FAMILY MEDICINE

## 2024-04-29 SDOH — HEALTH STABILITY: PHYSICAL HEALTH: ON AVERAGE, HOW MANY DAYS PER WEEK DO YOU ENGAGE IN MODERATE TO STRENUOUS EXERCISE (LIKE A BRISK WALK)?: 1 DAY

## 2024-04-29 ASSESSMENT — SOCIAL DETERMINANTS OF HEALTH (SDOH): HOW OFTEN DO YOU GET TOGETHER WITH FRIENDS OR RELATIVES?: PATIENT DECLINED

## 2024-04-29 ASSESSMENT — PAIN SCALES - GENERAL: PAINLEVEL: NO PAIN (0)

## 2024-04-29 NOTE — LETTER
April 29, 2024      Js ESTHER Jones  [address] [facility] [id number]        Dear ,    We are writing to inform you of your test results.    Hemoglobin A1c 6.4, consistent with well-controlled diabetes.  Other lab results unremarkable.  Will recommend to continue regular walks, healthy diet and current medications.     If you have any questions or concerns, please call the clinic at the number listed above.       Sincerely,    Fortino Hyman MD/ jose    Resulted Orders   HEMOGLOBIN A1C   Result Value Ref Range    Hemoglobin A1C 6.4 (H) 0.0 - 5.6 %      Comment:      Normal <5.7%   Prediabetes 5.7-6.4%    Diabetes 6.5% or higher     Note: Adopted from ADA consensus guidelines.   Basic metabolic panel  (Ca, Cl, CO2, Creat, Gluc, K, Na, BUN)   Result Value Ref Range    Sodium 142 135 - 145 mmol/L      Comment:      Reference intervals for this test were updated on 09/26/2023 to more accurately reflect our healthy population. There may be differences in the flagging of prior results with similar values performed with this method. Interpretation of those prior results can be made in the context of the updated reference intervals.     Potassium 4.6 3.4 - 5.3 mmol/L    Chloride 105 98 - 107 mmol/L    Carbon Dioxide (CO2) 27 22 - 29 mmol/L    Anion Gap 10 7 - 15 mmol/L    Urea Nitrogen 17.5 8.0 - 23.0 mg/dL    Creatinine 1.00 0.67 - 1.17 mg/dL    GFR Estimate 80 >60 mL/min/1.73m2    Calcium 9.7 8.8 - 10.2 mg/dL    Glucose 175 (H) 70 - 99 mg/dL   PSA, screen   Result Value Ref Range    Prostate Specific Antigen Screen 0.79 0.00 - 6.50 ng/mL    Narrative    This result is obtained using the Roche Elecsys total PSA method on the dalton e601 immunoassay analyzer. Results obtained with different assay methods or kits cannot be used interchangeably.

## 2024-04-29 NOTE — PATIENT INSTRUCTIONS
Preventive Care Advice   This is general advice given by our system to help you stay healthy. However, your care team may have specific advice just for you. Please talk to your care team about your preventive care needs.  Nutrition  Eat 5 or more servings of fruits and vegetables each day.  Try wheat bread, brown rice and whole grain pasta (instead of white bread, rice, and pasta).  Get enough calcium and vitamin D. Check the label on foods and aim for 100% of the RDA (recommended daily allowance).  Lifestyle  Exercise at least 150 minutes each week   (30 minutes a day, 5 days a week).  Do muscle strengthening activities 2 days a week. These help control your weight and prevent disease.  No smoking.  Wear sunscreen to prevent skin cancer.  Have a dental exam and cleaning every 6 months.  Yearly exams  See your health care team every year to talk about:  Any changes in your health.  Any medicines your care team has prescribed.  Preventive care, family planning, and ways to prevent chronic diseases.  Shots (vaccines)   HPV shots (up to age 26), if you've never had them before.  Hepatitis B shots (up to age 59), if you've never had them before.  COVID-19 shot: Get this shot when it's due.  Flu shot: Get a flu shot every year.  Tetanus shot: Get a tetanus shot every 10 years.  Pneumococcal, hepatitis A, and RSV shots: Ask your care team if you need these based on your risk.  Shingles shot (for age 50 and up).  General health tests  Diabetes screening:  Starting at age 35, Get screened for diabetes at least every 3 years.  If you are younger than age 35, ask your care team if you should be screened for diabetes.  Cholesterol test: At age 39, start having a cholesterol test every 5 years, or more often if advised.  Bone density scan (DEXA): At age 50, ask your care team if you should have this scan for osteoporosis (brittle bones).  Hepatitis C: Get tested at least once in your life.  STIs (sexually transmitted  infections)  Before age 24: Ask your care team if you should be screened for STIs.  After age 24: Get screened for STIs if you're at risk. You are at risk for STIs (including HIV) if:  You are sexually active with more than one person.  You don't use condoms every time.  You or a partner was diagnosed with a sexually transmitted infection.  If you are at risk for HIV, ask about PrEP medicine to prevent HIV.  Get tested for HIV at least once in your life, whether you are at risk for HIV or not.  Cancer screening tests  Cervical cancer screening: If you have a cervix, begin getting regular cervical cancer screening tests at age 21. Most people who have regular screenings with normal results can stop after age 65. Talk about this with your provider.  Breast cancer scan (mammogram): If you've ever had breasts, begin having regular mammograms starting at age 40. This is a scan to check for breast cancer.  Colon cancer screening: It is important to start screening for colon cancer at age 45.  Have a colonoscopy test every 10 years (or more often if you're at risk) Or, ask your provider about stool tests like a FIT test every year or Cologuard test every 3 years.  To learn more about your testing options, visit: https://www.[employer]uciMed/598175.pdf.  For help making a decision, visit: https://bit.ly/yn94607.  Prostate cancer screening test: If you have a prostate and are age 55 to 69, ask your provider if you would benefit from a yearly prostate cancer screening test.  Lung cancer screening: If you are a current or former smoker age 50 to 80, ask your care team if ongoing lung cancer screenings are right for you.  For informational purposes only. Not to replace the advice of your health care provider. Copyright   2023 [employer]. All rights reserved. Clinically reviewed by the [facility]. [employer] 457651 - REV 01/24.    Hearing Loss: Care Instructions  Overview     Hearing loss is a  sudden or slow decrease in how well you hear. It can range from slight to profound. Permanent hearing loss can occur with aging. It also can happen when you are exposed long-term to loud noise. Examples include listening to loud music, riding motorcycles, or being around other loud machines.  Hearing loss can affect your work and home life. It can make you feel lonely or depressed. You may feel that you have lost your independence. But hearing aids and other devices can help you hear better and feel connected to others.  Follow-up care is a key part of your treatment and safety. Be sure to make and go to all appointments, and call your doctor if you are having problems. It's also a good idea to know your test results and keep a list of the medicines you take.  How can you care for yourself at home?  Avoid loud noises whenever possible. This helps keep your hearing from getting worse.  Always wear hearing protection around loud noises.  Wear a hearing aid as directed.  A professional can help you pick a hearing aid that will work best for you.  You can also get hearing aids over the counter for mild to moderate hearing loss.  Have hearing tests as your doctor suggests. They can show whether your hearing has changed. Your hearing aid may need to be adjusted.  Use other devices as needed. These may include:  Telephone amplifiers and hearing aids that can connect to a television, stereo, radio, or microphone.  Devices that use lights or vibrations. These alert you to the doorbell, a ringing telephone, or a baby monitor.  Television closed-captioning. This shows the words at the bottom of the screen. Most new TVs can do this.  TTY (text telephone). This lets you type messages back and forth on the telephone instead of talking or listening. These devices are also called TDD. When messages are typed on the keyboard, they are sent over the phone line to a receiving TTY. The message is shown on a monitor.  Use text  "messaging, social media, and email if it is hard for you to communicate by telephone.  Try to learn a listening technique called speechreading. It is not lipreading. You pay attention to people's gestures, expressions, posture, and tone of voice. These clues can help you understand what a person is saying. Face the person you are talking to, and have them face you. Make sure the lighting is good. You need to see the other person's face clearly.  Think about counseling if you need help to adjust to your hearing loss.  When should you call for help?  Watch closely for changes in your health, and be sure to contact your doctor if:    You think your hearing is getting worse.     You have new symptoms, such as dizziness or nausea.   Where can you learn more?  Go to https://www.Super.net/patiented  Enter R798 in the search box to learn more about \"Hearing Loss: Care Instructions.\"  Current as of: September 27, 2023               Content Version: 14.0    1470-4318 "[employer].   Care instructions adapted under license by your healthcare professional. If you have questions about a medical condition or this instruction, always ask your healthcare professional. "[employer] disclaims any warranty or liability for your use of this information.      Learning About Stress  What is stress?     Stress is your body's response to a hard situation. Your body can have a physical, emotional, or mental response. Stress is a fact of life for most people, and it affects everyone differently. What causes stress for you may not be stressful for someone else.  A lot of things can cause stress. You may feel stress when you go on a job interview, take a test, or run a race. This kind of short-term stress is normal and even useful. It can help you if you need to work hard or react quickly. For example, stress can help you finish an important job on time.  Long-term stress is caused by ongoing stressful situations " or events. Examples of long-term stress include long-term health problems, ongoing problems at work, or conflicts in your family. Long-term stress can harm your health.  How does stress affect your health?  When you are stressed, your body responds as though you are in danger. It makes hormones that speed up your heart, make you breathe faster, and give you a burst of energy. This is called the fight-or-flight stress response. If the stress is over quickly, your body goes back to normal and no harm is done.  But if stress happens too often or lasts too long, it can have bad effects. Long-term stress can make you more likely to get sick, and it can make symptoms of some diseases worse. If you tense up when you are stressed, you may develop neck, shoulder, or low back pain. Stress is linked to high blood pressure and heart disease.  Stress also harms your emotional health. It can make you engel, tense, or depressed. Your relationships may suffer, and you may not do well at work or school.  What can you do to manage stress?  You can try these things to help manage stress:   Do something active. Exercise or activity can help reduce stress. Walking is a great way to get started. Even everyday activities such as housecleaning or yard work can help.  Try yoga or penny chi. These techniques combine exercise and meditation. You may need some training at first to learn them.  Do something you enjoy. For example, listen to music or go to a movie. Practice your hobby or do volunteer work.  Meditate. This can help you relax, because you are not worrying about what happened before or what may happen in the future.  Do guided imagery. Imagine yourself in any setting that helps you feel calm. You can use online videos, books, or a teacher to guide you.  Do breathing exercises. For example:  From a standing position, bend forward from the waist with your knees slightly bent. Let your arms dangle close to the floor.  Breathe in slowly  "and deeply as you return to a standing position. Roll up slowly and lift your head last.  Hold your breath for just a few seconds in the standing position.  Breathe out slowly and bend forward from the waist.  Let your feelings out. Talk, laugh, cry, and express anger when you need to. Talking with supportive friends or family, a counselor, or a nirav leader about your feelings is a healthy way to relieve stress. Avoid discussing your feelings with people who make you feel worse.  Write. It may help to write about things that are bothering you. This helps you find out how much stress you feel and what is causing it. When you know this, you can find better ways to cope.  What can you do to prevent stress?  You might try some of these things to help prevent stress:  Manage your time. This helps you find time to do the things you want and need to do.  Get enough sleep. Your body recovers from the stresses of the day while you are sleeping.  Get support. Your family, friends, and community can make a difference in how you experience stress.  Limit your news feed. Avoid or limit time on social media or news that may make you feel stressed.  Do something active. Exercise or activity can help reduce stress. Walking is a great way to get started.  Where can you learn more?  Go to https://www.[employer]net/patiented  Enter N032 in the search box to learn more about \"Learning About Stress.\"  Current as of: October 24, 2023               Content Version: 14.0    5923-2990 [employer].   Care instructions adapted under license by your healthcare professional. If you have questions about a medical condition or this instruction, always ask your healthcare professional. [employer] disclaims any warranty or liability for your use of this information.      "

## 2024-04-29 NOTE — PROGRESS NOTES
Preventive Care Visit  [facility]  Fortino Hyman MD, Family Medicine  Apr 29, 2024      Assessment & Plan     Encounter for Medicare annual wellness exam  Medically doing well.  Medications reviewed and no changes made.  Recommended regular walks, healthy diet and to continue following diabetic educator  - REVIEW OF HEALTH MAINTENANCE PROTOCOL ORDERS  - PRIMARY CARE FOLLOW-UP SCHEDULING; Future    Benign essential hypertension  Blood pressure within target goal of less than 140/90.  BMP ordered to monitor electrolytes and renal function.  Will continue losartan and amlodipine  - BASIC METABOLIC PANEL; Future  - Basic metabolic panel  (Ca, Cl, CO2, Creat, Gluc, K, Na, BUN); Future    Type 2 diabetes mellitus with other specified complication, with long-term current use of insulin (H)  Last hemoglobin A1c 7.1, within target goal of less than 8.  Suggested to continue glipizide, metformin and Ozempic  Lab Results   Component Value Date    A1C 7.1 10/30/2023    A1C 6.8 04/26/2023    A1C 7.2 09/07/2022    A1C 11.2 03/07/2022    A1C 7.1 07/14/2021    A1C 8.5 02/22/2021    A1C 8.1 01/06/2020    A1C 6.9 06/07/2019    A1C 6.8 09/15/2018    A1C 7.2 06/15/2018    - HEMOGLOBIN A1C; Future    Iliac artery stenosis, left (H24)  Following vascular surgery      Counseling  Appropriate preventive services were discussed with this patient, including applicable screening as appropriate for fall prevention, nutrition, physical activity, Tobacco-use cessation, weight loss and cognition.  Checklist reviewing preventive services available has been given to the patient.  Reviewed patient's diet, addressing concerns and/or questions.   He is at risk for lack of exercise and has been provided with information to increase physical activity for the benefit of his well-being.   The patient was provided with written information regarding signs of hearing loss.       Subjective   Js is a 69 year old, presenting for  the following:  Physical      Health Care Directive  Patient does not have a Health Care Directive or Living Will: Discussed advance care planning with patient; information given to patient to review.    HPI      4/29/2024   General Health   How would you rate your overall physical health? Good   Feel stress (tense, anxious, or unable to sleep) To some extent   (!) STRESS CONCERN      4/29/2024   Nutrition   Diet: Diabetic         4/29/2024   Exercise   Days per week of moderate/strenous exercise 1 day   (!) EXERCISE CONCERN      4/29/2024   Social Factors   Frequency of gathering with friends or relatives Patient declined   Worry food won't last until get money to buy more No   Food not last or not have enough money for food? No   Do you have housing?  Yes   Are you worried about losing your housing? No   Lack of transportation? No   Unable to get utilities (heat,electricity)? No         4/29/2024   Fall Risk   Fallen 2 or more times in the past year? No   Trouble with walking or balance? No          4/29/2024   Activities of Daily Living- Home Safety   Needs help with the following daily activites None of the above   Safety concerns in the home None of the above         4/29/2024   Dental   Dentist two times every year? Yes         4/29/2024   Hearing Screening   Hearing concerns? (!) I FEEL THAT PEOPLE ARE MUMBLING OR NOT SPEAKING CLEARLY.    (!) I NEED TO ASK PEOPLE TO SPEAK UP OR REPEAT THEMSELVES.    (!) IT'S HARDER TO UNDERSTAND WOMEN'S VOICES THAN MEN'S VOICES.    (!) IT'S HARD TO FOLLOW A CONVERSATION IN A NOISY RESTAURANT OR CROWDED ROOM.    (!) TROUBLE UNDESTANDING A SPEAKER IN A PUBLIC MEETING OR [employer] SERVICE.    (!) TROUBLE FOLLOWING DIALOGUE IN THE THEATHER.    (!) TROUBLE UNDERSTANDING SOFT OR WHISPERED SPEECH.    (!) TROUBLE UNDERSTANDING SPEECH ON THE TELEPHONE         4/29/2024   Driving Risk Screening   Patient/family members have concerns about driving No         4/29/2024   General  Alertness/Fatigue Screening   Have you been more tired than usual lately? No         2024   Urinary Incontinence Screening   Bothered by leaking urine in past 6 months No         2024   TB Screening   Were you born outside of the [place]? No         Today's PHQ-2 Score:       2024    10:24 AM   PHQ-2 (  [employer])   Q1: Little interest or pleasure in doing things 0   Q2: Feeling down, depressed or hopeless 0   PHQ-2 Score 0   Q1: Little interest or pleasure in doing things Not at all   Q2: Feeling down, depressed or hopeless Not at all   PHQ-2 Score 0           2024   Substance Use   Alcohol more than 3/day or more than 7/wk Not Applicable   Do you have a current opioid prescription? No   How severe/bad is pain from 1 to 10? 0/10 (No Pain)   Do you use any other substances recreationally? No    (!) DECLINE     Social History     Tobacco Use    Smoking status: Former     Current packs/day: 0.00     Average packs/day: 1 pack/day for 30.0 years (30.0 ttl pk-yrs)     Types: Cigarettes     Start date: 1970     Quit date: 2000     Years since quittin.3     Passive exposure: Past    Smokeless tobacco: Never   Vaping Use    Vaping status: Never Used   Substance Use Topics    Alcohol use: Never    Drug use: No       ASCVD Risk   The ASCVD Risk score (Debra DK, et al., 2019) failed to calculate for the following reasons:    The valid total cholesterol range is 130 to 320 mg/dL          Reviewed and updated as needed this visit by Provider                    Past Medical History:   Diagnosis Date    DM type 2 (diabetes mellitus, type 2) (H)     HTN (hypertension), benign     Hyperlipidemia     Stented coronary artery      Past Surgical History:   Procedure Laterality Date    COLONOSCOPY N/A 2023    Procedure: Colonoscopy;  Surgeon: Shlomo Gan DO;  Location: [facility]    HERNIORRHAPHY INGUINAL BILATERAL       OB History   No obstetric history on file.     Lab work is in  process  Labs reviewed in EPIC  BP Readings from Last 3 Encounters:   24 122/74   10/30/23 136/70   23 (!) 156/88    Wt Readings from Last 3 Encounters:   24 59.4 kg (131 lb)   10/30/23 60.8 kg (134 lb)   23 63 kg (138 lb 12.8 oz)                  Patient Active Problem List   Diagnosis    Benign essential hypertension, goal <140/90    Hyperlipidemia LDL goal <100    Gastroesophageal reflux disease without esophagitis    LBBB (left bundle branch block)    Coronary artery disease involving native heart without angina pectoris, unspecified vessel or lesion type    Type 2 diabetes mellitus with circulatory disorder, without long-term current use of insulin (H)     Past Surgical History:   Procedure Laterality Date    COLONOSCOPY N/A 2023    Procedure: Colonoscopy;  Surgeon: Shlomo Gan DO;  Location: [facility]    HERNIORRHAPHY INGUINAL BILATERAL         Social History     Tobacco Use    Smoking status: Former     Current packs/day: 0.00     Average packs/day: 1 pack/day for 30.0 years (30.0 ttl pk-yrs)     Types: Cigarettes     Start date: 1970     Quit date: 2000     Years since quittin.3     Passive exposure: Past    Smokeless tobacco: Never   Substance Use Topics    Alcohol use: Never     Family History   Problem Relation Age of Onset    Diabetes Mother     Hypertension Mother     Cardiovascular Father 81        CHF    Heart Disease Sister 50    Cancer Sister     Heart Disease Brother 50         Current Outpatient Medications   Medication Sig Dispense Refill    amLODIPine (NORVASC) 10 MG tablet Take 1 tablet by mouth once daily 90 tablet 2    APPLE CIDER VINEGAR PO Take 450 mg by mouth 2 times daily      aspirin 81 MG tablet Take 1 tablet (81 mg) by mouth daily 90 tablet 3    atorvastatin (LIPITOR) 40 MG tablet Take 1 tablet by mouth once daily 90 tablet 2    blood glucose monitoring (NO BRAND SPECIFIED) test strip Use to test blood sugar 3 times daily or as  directed. Box of 100 each. 3 Box 3    Blood Glucose Monitoring Suppl W/DEVICE KIT 1 each daily 1 kit 0    carvedilol (COREG) 12.5 MG tablet Take 1 tablet (12.5 mg) by mouth 2 times daily (with meals) 180 tablet 1    glipiZIDE (GLUCOTROL XL) 5 MG 24 hr tablet TAKE 2 TABLETS BY MOUTH ONCE DAILY IN THE MORNING BEFORE BREAKFAST 180 tablet 0    losartan (COZAAR) 50 MG tablet Take 1 tablet by mouth once daily 90 tablet 0    Melatonin 10 MG TABS Take 10 mg by mouth nightly as needed for sleep      metFORMIN (GLUCOPHAGE XR) 500 MG 24 hr tablet TAKE 1 TABLET BY MOUTH WITH BREAKFAST AND 2 WITH SUPPER 270 tablet 0    MULTIPLE VITAMIN PO Take 1 tablet by mouth      nitroGLYcerin (NITROSTAT) 0.4 MG sublingual tablet For chest pain place 1 tablet under the tongue every 5 minutes for 3 doses. If symptoms persist 5 minutes after 1st dose call 911. 50 tablet 11    Omega-3 Fatty Acids (OMEGA-3 FISH OIL PO) Take 1,200 mg by mouth 2 times daily (with meals)      omeprazole (PRILOSEC OTC) 20 MG tablet Take 1 tablet (20 mg) by mouth daily 90 tablet 1    ONE TOUCH LANCETS MISC 1 lancet 3 times daily 200 each 5    Semaglutide, 1 MG/DOSE, (OZEMPIC, 1 MG/DOSE,) 4 MG/3ML pen INJECT 1 MG SUBCUTANEOUSLY  ONCE A WEEK 3 mL 1    sildenafil (REVATIO) 20 MG tablet Take 1-2 tabs daily as needed 1 hour before sexual activity; may be taken up to 4 hours before sexual activity. 30 tablet 1    sildenafil (VIAGRA) 25 MG tablet Take 1-2 tablets (25-50 mg) by mouth daily as needed 30 tablet 1     Allergies   Allergen Reactions    Bee Venom Unknown    Lisinopril Cough     Recent Labs   Lab Test 10/30/23  1524 04/26/23  0853 09/07/22  0944 03/07/22  0923 07/14/21  0954 03/22/21  1012 02/22/21  1017 04/07/20  1958 04/07/20  1709 01/06/20  0828 09/15/18  0546 09/14/18  1818 06/15/18  0843 03/09/18  0815   A1C 7.1* 6.8* 7.2*   < > 7.1*  --  8.5*  --   --  8.1*   < >  --    < > 6.9*   LDL 46  --  77  --  54  --   --   --   --  70   < >  --   --  72   HDL 30*  --   37*  --  43  --   --   --   --  43   < >  --   --  38*   TRIG 96  --  112  --  105  --   --   --   --  170*   < >  --   --  73   ALT  --   --   --   --   --   --   --   --  40 44  --  39  --   --    CR  --  0.89 0.85   < >  --  0.85 0.83   < > 2.36* 0.82   < > 0.92  --   --    GFRESTIMATED  --  >90 >90   < >  --  90 >90   < > 27* >90   < > 82  --   --    GFRESTBLACK  --   --   --   --   --  >90 >90   < > 32* >90   < > >90  --   --    POTASSIUM  --  4.1 4.6   < >  --  4.2 4.2   < > 3.9 4.1   < > 3.4  --   --    TSH  --   --   --   --   --   --   --   --   --   --   --   --   --  1.02    < > = values in this interval not displayed.      Current providers sharing in care for this patient include:  Patient Care Team:  Fortino Hyman MD as PCP - General (Family Medicine)  Fortino Hyman MD as Assigned PCP  Joana Blanton RD as Diabetes Educator (Dietitian, Registered)  Afshan Samuel APRN CNP as Assigned Surgical Provider    The following health maintenance items are reviewed in Epic and correct as of today:  Health Maintenance   Topic Date Due    COVID-19 Vaccine (5 - 2023-24 season) 09/01/2023    ANNUAL REVIEW OF HM ORDERS  12/08/2023    BMP  04/26/2024    MEDICARE ANNUAL WELLNESS VISIT  04/26/2024    A1C  04/30/2024    EYE EXAM  07/20/2024    LIPID  10/30/2024    MICROALBUMIN  10/30/2024    DIABETIC FOOT EXAM  10/30/2024    FALL RISK ASSESSMENT  04/29/2025    ADVANCE CARE PLANNING  04/26/2028    COLORECTAL CANCER SCREENING  08/14/2028    DTAP/TDAP/TD IMMUNIZATION (3 - Td or Tdap) 10/20/2029    HEPATITIS C SCREENING  Completed    PHQ-2 (once per calendar year)  Completed    INFLUENZA VACCINE  Completed    Pneumococcal Vaccine: 65+ Years  Completed    ZOSTER IMMUNIZATION  Completed    RSV VACCINE (Pregnancy & 60+)  Completed    AORTIC ANEURYSM SCREENING (SYSTEM ASSIGNED)  Completed    IPV IMMUNIZATION  Aged Out    HPV IMMUNIZATION  Aged Out    MENINGITIS IMMUNIZATION  Aged Out    RSV MONOCLONAL ANTIBODY  Aged Out  "        Review of Systems  Constitutional, neuro, ENT, endocrine, pulmonary, cardiac, gastrointestinal, genitourinary, musculoskeletal, integument and psychiatric systems are negative, except as otherwise noted.     Objective    Exam  /74 (Cuff Size: Adult Regular)   Pulse 84   Temp 97.9  F (36.6  C) (Tympanic)   Resp 18   Ht 1.676 m (5' 6\")   Wt 59.4 kg (131 lb)   SpO2 97%   BMI 21.14 kg/m     Estimated body mass index is 21.14 kg/m  as calculated from the following:    Height as of this encounter: 1.676 m (5' 6\").    Weight as of this encounter: 59.4 kg (131 lb).    Physical Exam  GENERAL: alert and no distress  EYES: Eyes grossly normal to inspection, PERRL and conjunctivae and sclerae normal  HENT: normal cephalic/atraumatic, nose and mouth without ulcers or lesions, oropharynx clear, and oral mucous membranes moist  NECK: no adenopathy, no asymmetry, masses, or scars  RESP: lungs clear to auscultation - no rales, rhonchi or wheezes  CV: regular rate and rhythm, normal S1 S2, no S3 or S4, no murmur, click or rub, no peripheral edema  ABDOMEN: soft, nontender, no hepatosplenomegaly, no masses and bowel sounds normal  MS: no gross musculoskeletal defects noted, no edema  SKIN: no suspicious lesions or rashes  NEURO: Normal strength and tone, mentation intact and speech normal  PSYCH: mentation appears normal, affect normal/bright         4/29/2024   Mini Cog   Clock Draw Score 2 Normal   3 Item Recall 2 objects recalled   Mini Cog Total Score 4              Signed Electronically by: Fortino Hyman MD    "

## 2024-04-29 NOTE — NURSING NOTE
"Chief Complaint   Patient presents with    Physical     /74 (Cuff Size: Adult Regular)   Pulse 84   Temp 97.9  F (36.6  C) (Tympanic)   Resp 18   Ht 1.676 m (5' 6\")   Wt 59.4 kg (131 lb)   SpO2 97%   BMI 21.14 kg/m   Estimated body mass index is 21.14 kg/m  as calculated from the following:    Height as of this encounter: 1.676 m (5' 6\").    Weight as of this encounter: 59.4 kg (131 lb).  Patient presents to the clinic using No DME      Health Maintenance that is potentially due pending provider review:    Health Maintenance Due   Topic Date Due    COVID-19 Vaccine (5 - 2023-24 season) 09/01/2023    ANNUAL REVIEW OF HM ORDERS  12/08/2023    BMP  04/26/2024    MEDICARE ANNUAL WELLNESS VISIT  04/26/2024    A1C  04/30/2024                  "

## 2024-05-21 DIAGNOSIS — Z79.4 TYPE 2 DIABETES MELLITUS WITH OTHER SPECIFIED COMPLICATION, WITH LONG-TERM CURRENT USE OF INSULIN (H): ICD-10-CM

## 2024-05-21 DIAGNOSIS — E11.69 TYPE 2 DIABETES MELLITUS WITH OTHER SPECIFIED COMPLICATION, WITH LONG-TERM CURRENT USE OF INSULIN (H): ICD-10-CM

## 2024-05-21 RX ORDER — SEMAGLUTIDE 1.34 MG/ML
INJECTION, SOLUTION SUBCUTANEOUS
Qty: 3 ML | Refills: 0 | Status: SHIPPED | OUTPATIENT
Start: 2024-05-21 | End: 2024-06-20

## 2024-06-07 ENCOUNTER — HOSPITAL ENCOUNTER (OUTPATIENT)
Dept: ULTRASOUND IMAGING | Facility: CLINIC | Age: 69
Discharge: HOME | End: 2024-06-07
Attending: NURSE PRACTITIONER | Admitting: NURSE PRACTITIONER
Payer: COMMERCIAL

## 2024-06-07 ENCOUNTER — NURSE TRIAGE (OUTPATIENT)
Dept: NURSING | Facility: CLINIC | Age: 69
End: 2024-06-07
Payer: COMMERCIAL

## 2024-06-07 DIAGNOSIS — Z48.812 ENCOUNTER FOR SURGICAL AFTERCARE FOLLOWING SURGERY ON THE CIRCULATORY SYSTEM: ICD-10-CM

## 2024-06-07 DIAGNOSIS — I77.1 ILIAC ARTERY STENOSIS, LEFT (H): ICD-10-CM

## 2024-06-07 DIAGNOSIS — I77.1 ILIAC ARTERY STENOSIS, LEFT (H): Primary | ICD-10-CM

## 2024-06-07 PROCEDURE — 93978 VASCULAR STUDY: CPT

## 2024-06-07 NOTE — TELEPHONE ENCOUNTER
Wife calling with patient on speaker.  Patient has an ultra sound tonight  at 9:15 and is NPO for the procedure. Patient blood sugar is 83. Patient is feeling fine and wife has him just resting until his appointment. They are calling asking what he should take if blood sugar drops lower before his test.   Encouraged oral glucose spray or gel that is clear and absorbed in the mouth. No food, juice or glucose gummies or chews.   Wife will pick some up to have on hand just in case.  Layla Bravo RN   06/07/24 6:04 PM  [facility] Nurse Advisor  Reason for Disposition    Caller has medicine question only, adult not sick, AND triager answers question    Protocols used: Medication Question Call-A-

## 2024-06-10 ENCOUNTER — OFFICE VISIT (OUTPATIENT)
Dept: VASCULAR SURGERY | Facility: CLINIC | Age: 69
End: 2024-06-10
Payer: COMMERCIAL

## 2024-06-10 VITALS — HEART RATE: 74 BPM | OXYGEN SATURATION: 95 % | SYSTOLIC BLOOD PRESSURE: 138 MMHG | DIASTOLIC BLOOD PRESSURE: 79 MMHG

## 2024-06-10 DIAGNOSIS — I77.1 ILIAC ARTERY STENOSIS, LEFT (H): Primary | ICD-10-CM

## 2024-06-10 DIAGNOSIS — I65.23 BILATERAL CAROTID ARTERY STENOSIS: ICD-10-CM

## 2024-06-10 DIAGNOSIS — R09.89 OTHER SPECIFIED SYMPTOMS AND SIGNS INVOLVING THE CIRCULATORY AND RESPIRATORY SYSTEMS: ICD-10-CM

## 2024-06-10 PROCEDURE — 99215 OFFICE O/P EST HI 40 MIN: CPT | Performed by: NURSE PRACTITIONER

## 2024-06-10 NOTE — PROGRESS NOTES
VASCULAR SURGERY PROGRESS NOTE    LOCATION: [facility]     Js Jones  Medical Record #:  [id number]  YOB: 1955  Age:  69 year old     Date of Service: 6/10/2024    PRIMARY CARE PROVIDER: Fortino Hyman    Reason for visit: annual surveillance, iliac stenosis     IMPRESSION / RECOMMENDATION:   Js Jones is a pleasant 69-year-old gentleman who presents to clinic for annual surveillance of left iliac stenosis. Aortoiliac duplex is unchanged compared to last year.  His left common iliac artery diameter is measured at 0.9, unchanged. Ambulates without difficulty however last year patient noted that his legs were getting tired. Former smoker. Recommend follow-up in 12 months with carotid ultrasound (last carotid ultrasound was completed in 2017), ABIs with toe pressure given previous symptoms of claudication although this appeared to have improved, and aortoiliac duplex.  Continue with aspirin and statin.    Discussed warning signs including, but not limited to, acute limb ischemia, new leg pain, motor or sensory deficits, chronic limb threatening ischemia, ischemic rest pain, and nonhealing wounds.  If he experiences any of these, he has been advised to seek treatment and contact our team, patient verbalized clear understanding of the above. Information/Education: patient able to teach back. Patient agreeable to plan of care: yes    All questions were answered and support provided. He has our contact information and knows to reach out with any additional questions or concerns.       Afshan Samuel CNP  Vascular Surgery  Pager: [phone]  [email or web address]  Send message or 10 digit call back number Securely via [employer] with the [employer] Web Console (learn more here)    45 minutes spent on the date of the encounter doing chart review, review of outside records, review of test results, interpretation of tests, patient visit, documentation and discussion with other provider(s)         HPI:  Js Jones is a 69 year old male who presents to clinic with his wife, with past medical history significant for prediabetes, HTN, HLD, CAD s/p stenting who presents for annual surveillance. Patient's imaging have remained stable, however noted that he has a family member who experienced carotid stenosis thus.  His last carotid duplex was in 2017, plan to repeat next year prior to his clinic visit. Denies abdominal pain, no back pain, denies rest pain or any other discomfort.  No other concerns.    REVIEW OF SYSTEMS:    A 12 point ROS was reviewed and is negative except for what is listed above in HPI.    PHH:    Past Medical History:   Diagnosis Date    DM type 2 (diabetes mellitus, type 2) (H)     HTN (hypertension), benign     Hyperlipidemia     Stented coronary artery           Past Surgical History:   Procedure Laterality Date    COLONOSCOPY N/A 8/14/2023    Procedure: Colonoscopy;  Surgeon: Shlomo Gan DO;  Location: [facility]    HERNIORRHAPHY INGUINAL BILATERAL         ALLERGIES:  Bee venom and Lisinopril    MEDS:    Current Outpatient Medications:     amLODIPine (NORVASC) 10 MG tablet, Take 1 tablet by mouth once daily, Disp: 90 tablet, Rfl: 2    APPLE CIDER VINEGAR PO, Take 450 mg by mouth 2 times daily, Disp: , Rfl:     aspirin 81 MG tablet, Take 1 tablet (81 mg) by mouth daily, Disp: 90 tablet, Rfl: 3    atorvastatin (LIPITOR) 40 MG tablet, Take 1 tablet by mouth once daily, Disp: 90 tablet, Rfl: 2    blood glucose monitoring (NO BRAND SPECIFIED) test strip, Use to test blood sugar 3 times daily or as directed. Box of 100 each., Disp: 3 Box, Rfl: 3    Blood Glucose Monitoring Suppl W/DEVICE KIT, 1 each daily, Disp: 1 kit, Rfl: 0    carvedilol (COREG) 12.5 MG tablet, Take 1 tablet (12.5 mg) by mouth 2 times daily (with meals), Disp: 180 tablet, Rfl: 1    glipiZIDE (GLUCOTROL XL) 5 MG 24 hr tablet, TAKE 2 TABLETS BY MOUTH ONCE DAILY IN THE MORNING BEFORE BREAKFAST, Disp: 180  tablet, Rfl: 0    losartan (COZAAR) 50 MG tablet, Take 1 tablet by mouth once daily, Disp: 90 tablet, Rfl: 0    Melatonin 10 MG TABS, Take 10 mg by mouth nightly as needed for sleep, Disp: , Rfl:     metFORMIN (GLUCOPHAGE XR) 500 MG 24 hr tablet, TAKE 1 TABLET BY MOUTH WITH BREAKFAST AND 2 WITH SUPPER, Disp: 270 tablet, Rfl: 0    MULTIPLE VITAMIN PO, Take 1 tablet by mouth, Disp: , Rfl:     Omega-3 Fatty Acids (OMEGA-3 FISH OIL PO), Take 1,200 mg by mouth 2 times daily (with meals), Disp: , Rfl:     omeprazole (PRILOSEC OTC) 20 MG tablet, Take 1 tablet (20 mg) by mouth daily, Disp: 90 tablet, Rfl: 1    ONE TOUCH LANCETS MISC, 1 lancet 3 times daily, Disp: 200 each, Rfl: 5    Semaglutide, 1 MG/DOSE, (OZEMPIC, 1 MG/DOSE,) 4 MG/3ML pen, INJECT 1MG SUBCUTANEOUSLY ONCE WEEKLY, Disp: 3 mL, Rfl: 0    sildenafil (VIAGRA) 25 MG tablet, Take 1-2 tablets (25-50 mg) by mouth daily as needed, Disp: 30 tablet, Rfl: 1    nitroGLYcerin (NITROSTAT) 0.4 MG sublingual tablet, For chest pain place 1 tablet under the tongue every 5 minutes for 3 doses. If symptoms persist 5 minutes after 1st dose call 911. (Patient not taking: Reported on 6/10/2024), Disp: 50 tablet, Rfl: 11    SOCIAL HABITS:    History   Smoking Status    Former    Types: Cigarettes   Smokeless Tobacco    Never     Social History    Substance and Sexual Activity      Alcohol use: Never      History   Drug Use No       FAMILY HISTORY:    Family History   Problem Relation Age of Onset    Diabetes Mother     Hypertension Mother     Cardiovascular Father 81        CHF    Heart Disease Sister 50    Cancer Sister     Heart Disease Brother 50       PE:  /79 (BP Location: Right arm, Patient Position: Sitting, Cuff Size: Adult Regular)   Pulse 74   SpO2 95%   Wt Readings from Last 1 Encounters:   04/29/24 131 lb     There is no height or weight on file to calculate BMI.    EXAM:  GENERAL: well-developed 69 year old male who appears his stated age  CARDIAC: normal    CHEST/LUNG: normal respiratory effort   MUSCULOSKELETAL: grossly normal and both lower extremities are intact, no lower extremity edema  NEUROLOGIC: focally intact, alert and oriented x 3  PSYCH: appropriate affect  VASCULAR: Palpable DP and PT bilaterally. Palpable femoral pulses bilaterally. Motor and sensory function intact, warm, well perfused.       DIAGNOSTIC STUDIES:     Images:  US Aorta/Ivc/Iliac Duplex Complete    Result Date: 6/7/2024  EXAM: US AORTA/IVC/ILIAC DUPLEX COMPLETE LOCATION: [facility] DATE: 6/7/2024 INDICATION: History of iliac artery stenosis left. COMPARISON: 5/30/2023 TECHNIQUE: Transverse and longitudinal images of the aorta. Color flow and spectral Doppler with waveform analysis. FINDINGS: No abdominal aortic aneurysm.  There is mild atheromatous plaque in the abdominal aorta. MEASUREMENTS: Proximal Aorta: 2.1 x 2.0 cm. Mid Aorta: 1.7 x 1.6 cm. Distal Aorta: 1.5 x 1.7 cm. Right Common Iliac Artery: 1.0 cm. Left Common Iliac Artery: 0.9 cm. Normal velocities in the bilateral common iliac arteries without evidence of any significant stenosis.     IMPRESSION: 1.  No abdominal aortic aneurysm. 2.  No significant stenosis is seen within the bilateral common iliac arteries.      LABS:      Sodium   Date Value Ref Range Status   04/29/2024 142 135 - 145 mmol/L Final     Comment:     Reference intervals for this test were updated on 09/26/2023 to more accurately reflect our healthy population. There may be differences in the flagging of prior results with similar values performed with this method. Interpretation of those prior results can be made in the context of the updated reference intervals.    04/26/2023 140 136 - 145 mmol/L Final   09/07/2022 138 136 - 145 mmol/L Final   03/22/2021 136 133 - 144 mmol/L Final   02/22/2021 135 133 - 144 mmol/L Final   04/14/2020 135 133 - 144 mmol/L Final     Urea Nitrogen   Date Value Ref Range Status   04/29/2024 17.5 8.0 -  23.0 mg/dL Final   04/26/2023 14.5 8.0 - 23.0 mg/dL Final   09/07/2022 23.5 (H) 8.0 - 23.0 mg/dL Final   03/07/2022 19 7 - 30 mg/dL Final   03/22/2021 13 7 - 30 mg/dL Final   02/22/2021 19 7 - 30 mg/dL Final   04/14/2020 20 7 - 30 mg/dL Final     Hemoglobin   Date Value Ref Range Status   04/07/2020 12.4 (L) 13.3 - 17.7 g/dL Final   01/06/2020 14.4 13.3 - 17.7 g/dL Final   09/18/2018 13.3 13.3 - 17.7 g/dL Final     Platelet Count   Date Value Ref Range Status   04/07/2020 124 (L) 150 - 450 10e9/L Final   01/06/2020 161 150 - 450 10e9/L Final   09/18/2018 154 150 - 450 10e9/L Final     INR   Date Value Ref Range Status   09/14/2018 1.16 (H) 0.86 - 1.14 Final

## 2024-06-10 NOTE — LETTER
6/10/2024       RE: Js Jones  [address] [facility] [address]     Dear Colleague,    Thank you for referring your patient, Js Jones, to the [facility] [place] [facility]. Please see a copy of my visit note below.        VASCULAR SURGERY PROGRESS NOTE    LOCATION: [facility]     Js Jones  Medical Record #:  [id number]  YOB: 1955  Age:  69 year old     Date of Service: 6/10/2024    PRIMARY CARE PROVIDER: Fortino Hyman    Reason for visit: annual surveillance, iliac stenosis     IMPRESSION / RECOMMENDATION:   Js Jones is a pleasant 69-year-old gentleman who presents to clinic for annual surveillance of left iliac stenosis. Aortoiliac duplex is unchanged compared to last year.  His left common iliac artery diameter is measured at 0.9, unchanged. Ambulates without difficulty however last year patient noted that his legs were getting tired. Former smoker. Recommend follow-up in 12 months with carotid ultrasound (last carotid ultrasound was completed in 2017), ABIs with toe pressure given previous symptoms of claudication although this appeared to have improved, and aortoiliac duplex.  Continue with aspirin and statin.    Discussed warning signs including, but not limited to, acute limb ischemia, new leg pain, motor or sensory deficits, chronic limb threatening ischemia, ischemic rest pain, and nonhealing wounds.  If he experiences any of these, he has been advised to seek treatment and contact our team, patient verbalized clear understanding of the above. Information/Education: patient able to teach back. Patient agreeable to plan of care: yes    All questions were answered and support provided. He has our contact information and knows to reach out with any additional questions or concerns.       Afshan Samuel, CNP  Vascular Surgery  Pager: [phone]  [email or web address]  Send  message or 10 digit call back number Securely via [employer] with the [employer] Web Console (learn more here)    45 minutes spent on the date of the encounter doing chart review, review of outside records, review of test results, interpretation of tests, patient visit, documentation and discussion with other provider(s)        HPI:  Js Jones is a 69 year old male who presents to clinic with his wife, with past medical history significant for prediabetes, HTN, HLD, CAD s/p stenting who presents for annual surveillance. Patient's imaging have remained stable, however noted that he has a family member who experienced carotid stenosis thus.  His last carotid duplex was in 2017, plan to repeat next year prior to his clinic visit. Denies abdominal pain, no back pain, denies rest pain or any other discomfort.  No other concerns.    REVIEW OF SYSTEMS:    A 12 point ROS was reviewed and is negative except for what is listed above in HPI.    PHH:    Past Medical History:   Diagnosis Date    DM type 2 (diabetes mellitus, type 2) (H)     HTN (hypertension), benign     Hyperlipidemia     Stented coronary artery           Past Surgical History:   Procedure Laterality Date    COLONOSCOPY N/A 8/14/2023    Procedure: Colonoscopy;  Surgeon: Shlomo Gan DO;  Location: [facility]    HERNIORRHAPHY INGUINAL BILATERAL         ALLERGIES:  Bee venom and Lisinopril    MEDS:    Current Outpatient Medications:     amLODIPine (NORVASC) 10 MG tablet, Take 1 tablet by mouth once daily, Disp: 90 tablet, Rfl: 2    APPLE CIDER VINEGAR PO, Take 450 mg by mouth 2 times daily, Disp: , Rfl:     aspirin 81 MG tablet, Take 1 tablet (81 mg) by mouth daily, Disp: 90 tablet, Rfl: 3    atorvastatin (LIPITOR) 40 MG tablet, Take 1 tablet by mouth once daily, Disp: 90 tablet, Rfl: 2    blood glucose monitoring (NO BRAND SPECIFIED) test strip, Use to test blood sugar 3 times daily or as directed. Box of 100 each., Disp: 3 Box, Rfl: 3    Blood Glucose  Monitoring Suppl W/DEVICE KIT, 1 each daily, Disp: 1 kit, Rfl: 0    carvedilol (COREG) 12.5 MG tablet, Take 1 tablet (12.5 mg) by mouth 2 times daily (with meals), Disp: 180 tablet, Rfl: 1    glipiZIDE (GLUCOTROL XL) 5 MG 24 hr tablet, TAKE 2 TABLETS BY MOUTH ONCE DAILY IN THE MORNING BEFORE BREAKFAST, Disp: 180 tablet, Rfl: 0    losartan (COZAAR) 50 MG tablet, Take 1 tablet by mouth once daily, Disp: 90 tablet, Rfl: 0    Melatonin 10 MG TABS, Take 10 mg by mouth nightly as needed for sleep, Disp: , Rfl:     metFORMIN (GLUCOPHAGE XR) 500 MG 24 hr tablet, TAKE 1 TABLET BY MOUTH WITH BREAKFAST AND 2 WITH SUPPER, Disp: 270 tablet, Rfl: 0    MULTIPLE VITAMIN PO, Take 1 tablet by mouth, Disp: , Rfl:     Omega-3 Fatty Acids (OMEGA-3 FISH OIL PO), Take 1,200 mg by mouth 2 times daily (with meals), Disp: , Rfl:     omeprazole (PRILOSEC OTC) 20 MG tablet, Take 1 tablet (20 mg) by mouth daily, Disp: 90 tablet, Rfl: 1    ONE TOUCH LANCETS MISC, 1 lancet 3 times daily, Disp: 200 each, Rfl: 5    Semaglutide, 1 MG/DOSE, (OZEMPIC, 1 MG/DOSE,) 4 MG/3ML pen, INJECT 1MG SUBCUTANEOUSLY ONCE WEEKLY, Disp: 3 mL, Rfl: 0    sildenafil (VIAGRA) 25 MG tablet, Take 1-2 tablets (25-50 mg) by mouth daily as needed, Disp: 30 tablet, Rfl: 1    nitroGLYcerin (NITROSTAT) 0.4 MG sublingual tablet, For chest pain place 1 tablet under the tongue every 5 minutes for 3 doses. If symptoms persist 5 minutes after 1st dose call 911. (Patient not taking: Reported on 6/10/2024), Disp: 50 tablet, Rfl: 11    SOCIAL HABITS:    History   Smoking Status    Former    Types: Cigarettes   Smokeless Tobacco    Never     Social History    Substance and Sexual Activity      Alcohol use: Never      History   Drug Use No       FAMILY HISTORY:    Family History   Problem Relation Age of Onset    Diabetes Mother     Hypertension Mother     Cardiovascular Father 81        CHF    Heart Disease Sister 50    Cancer Sister     Heart Disease Brother 50       PE:  /79 (BP  Location: Right arm, Patient Position: Sitting, Cuff Size: Adult Regular)   Pulse 74   SpO2 95%   Wt Readings from Last 1 Encounters:   04/29/24 131 lb     There is no height or weight on file to calculate BMI.    EXAM:  GENERAL: well-developed 69 year old male who appears his stated age  CARDIAC: normal   CHEST/LUNG: normal respiratory effort   MUSCULOSKELETAL: grossly normal and both lower extremities are intact, no lower extremity edema  NEUROLOGIC: focally intact, alert and oriented x 3  PSYCH: appropriate affect  VASCULAR: Palpable DP and PT bilaterally. Palpable femoral pulses bilaterally. Motor and sensory function intact, warm, well perfused.       DIAGNOSTIC STUDIES:     Images:  US Aorta/Ivc/Iliac Duplex Complete    Result Date: 6/7/2024  EXAM: US AORTA/IVC/ILIAC DUPLEX COMPLETE LOCATION: [facility] DATE: 6/7/2024 INDICATION: History of iliac artery stenosis left. COMPARISON: 5/30/2023 TECHNIQUE: Transverse and longitudinal images of the aorta. Color flow and spectral Doppler with waveform analysis. FINDINGS: No abdominal aortic aneurysm.  There is mild atheromatous plaque in the abdominal aorta. MEASUREMENTS: Proximal Aorta: 2.1 x 2.0 cm. Mid Aorta: 1.7 x 1.6 cm. Distal Aorta: 1.5 x 1.7 cm. Right Common Iliac Artery: 1.0 cm. Left Common Iliac Artery: 0.9 cm. Normal velocities in the bilateral common iliac arteries without evidence of any significant stenosis.     IMPRESSION: 1.  No abdominal aortic aneurysm. 2.  No significant stenosis is seen within the bilateral common iliac arteries.      LABS:      Sodium   Date Value Ref Range Status   04/29/2024 142 135 - 145 mmol/L Final     Comment:     Reference intervals for this test were updated on 09/26/2023 to more accurately reflect our healthy population. There may be differences in the flagging of prior results with similar values performed with this method. Interpretation of those prior results can be made in the context of  the updated reference intervals.    04/26/2023 140 136 - 145 mmol/L Final   09/07/2022 138 136 - 145 mmol/L Final   03/22/2021 136 133 - 144 mmol/L Final   02/22/2021 135 133 - 144 mmol/L Final   04/14/2020 135 133 - 144 mmol/L Final     Urea Nitrogen   Date Value Ref Range Status   04/29/2024 17.5 8.0 - 23.0 mg/dL Final   04/26/2023 14.5 8.0 - 23.0 mg/dL Final   09/07/2022 23.5 (H) 8.0 - 23.0 mg/dL Final   03/07/2022 19 7 - 30 mg/dL Final   03/22/2021 13 7 - 30 mg/dL Final   02/22/2021 19 7 - 30 mg/dL Final   04/14/2020 20 7 - 30 mg/dL Final     Hemoglobin   Date Value Ref Range Status   04/07/2020 12.4 (L) 13.3 - 17.7 g/dL Final   01/06/2020 14.4 13.3 - 17.7 g/dL Final   09/18/2018 13.3 13.3 - 17.7 g/dL Final     Platelet Count   Date Value Ref Range Status   04/07/2020 124 (L) 150 - 450 10e9/L Final   01/06/2020 161 150 - 450 10e9/L Final   09/18/2018 154 150 - 450 10e9/L Final     INR   Date Value Ref Range Status   09/14/2018 1.16 (H) 0.86 - 1.14 Final         Again, thank you for allowing me to participate in the care of your patient.      Sincerely,    ABILIO Tello CNP

## 2024-06-10 NOTE — NURSING NOTE
Chief Complaint   Patient presents with    Follow Up     F/U aortic ectasia. US 06/07.        Medications and allergies reconciled.  Vitals collected.    Patricia Horan LPN

## 2024-06-10 NOTE — PATIENT INSTRUCTIONS
Thank you so much for choosing us for your care. It was a pleasure to see you at the vascular clinic today.     Follow-up recommendations: We will see you back in 1 year. Please do not hesitate to reach out to us in the meantime with any concerns. Our schedulers will get in touch with you to set up your follow-up visit.     Additional testing/imaging ordered today: Repeat aortoiliac ultrasound in 12 months. At that time, we will also do ABIs because your numbers were slightly lower with exercise on Left lower extremity. We will complete a carotid US for surveillance.     Our scheduling team will get in touch with you to set up any follow-up testing/imaging and/or appointments. Please be aware that any testing/imaging recommended today will need to completed prior to your next visit with the provider. If testing/imaging is not completed prior to your next visit, your visit may be rescheduled.        If you have any questions, please contact our clinic directly at [phone] and ask for the nurse. We also encourage the use of [employer] to communicate with your HealthCare Provider.        If you have an urgent need after business hours (8:00 am to 4:30 pm) please call [phone], option 4, and ask for the vascular attending on call. For non-urgent after hours needs, please call the vascular nurse at [phone] and leave a detailed voicemail. For scheduling needs, please call our clinic directly at [phone].

## 2024-06-15 DIAGNOSIS — I10 BENIGN ESSENTIAL HYPERTENSION: ICD-10-CM

## 2024-06-15 DIAGNOSIS — E11.69 TYPE 2 DIABETES MELLITUS WITH OTHER SPECIFIED COMPLICATION, WITH LONG-TERM CURRENT USE OF INSULIN (H): ICD-10-CM

## 2024-06-15 DIAGNOSIS — Z79.4 TYPE 2 DIABETES MELLITUS WITH OTHER SPECIFIED COMPLICATION, WITH LONG-TERM CURRENT USE OF INSULIN (H): ICD-10-CM

## 2024-06-17 RX ORDER — CARVEDILOL 12.5 MG/1
12.5 TABLET ORAL 2 TIMES DAILY WITH MEALS
Qty: 180 TABLET | Refills: 0 | Status: SHIPPED | OUTPATIENT
Start: 2024-06-17 | End: 2024-09-10

## 2024-06-17 RX ORDER — GLIPIZIDE 5 MG/1
TABLET, FILM COATED, EXTENDED RELEASE ORAL
Qty: 180 TABLET | Refills: 0 | Status: SHIPPED | OUTPATIENT
Start: 2024-06-17 | End: 2024-09-10

## 2024-06-17 RX ORDER — METFORMIN HCL 500 MG
TABLET, EXTENDED RELEASE 24 HR ORAL
Qty: 270 TABLET | Refills: 0 | Status: SHIPPED | OUTPATIENT
Start: 2024-06-17 | End: 2024-09-10

## 2024-06-20 DIAGNOSIS — Z79.4 TYPE 2 DIABETES MELLITUS WITH OTHER SPECIFIED COMPLICATION, WITH LONG-TERM CURRENT USE OF INSULIN (H): ICD-10-CM

## 2024-06-20 DIAGNOSIS — E11.69 TYPE 2 DIABETES MELLITUS WITH OTHER SPECIFIED COMPLICATION, WITH LONG-TERM CURRENT USE OF INSULIN (H): ICD-10-CM

## 2024-06-20 RX ORDER — SEMAGLUTIDE 1.34 MG/ML
INJECTION, SOLUTION SUBCUTANEOUS
Qty: 3 ML | Refills: 0 | Status: SHIPPED | OUTPATIENT
Start: 2024-06-20 | End: 2024-08-14

## 2024-07-09 ENCOUNTER — TRANSFERRED RECORDS (OUTPATIENT)
Dept: HEALTH INFORMATION MANAGEMENT | Facility: CLINIC | Age: 69
End: 2024-07-09
Payer: COMMERCIAL

## 2024-08-13 DIAGNOSIS — E78.5 HYPERLIPIDEMIA LDL GOAL <100: Chronic | ICD-10-CM

## 2024-08-13 DIAGNOSIS — I10 BENIGN ESSENTIAL HYPERTENSION: ICD-10-CM

## 2024-08-13 RX ORDER — ATORVASTATIN CALCIUM 40 MG/1
40 TABLET, FILM COATED ORAL DAILY
Qty: 100 TABLET | Refills: 0 | Status: SHIPPED | OUTPATIENT
Start: 2024-08-13

## 2024-08-13 RX ORDER — LOSARTAN POTASSIUM 50 MG/1
50 TABLET ORAL DAILY
Qty: 100 TABLET | Refills: 0 | Status: SHIPPED | OUTPATIENT
Start: 2024-08-13

## 2024-08-14 ENCOUNTER — TELEPHONE (OUTPATIENT)
Dept: FAMILY MEDICINE | Facility: CLINIC | Age: 69
End: 2024-08-14
Payer: COMMERCIAL

## 2024-08-14 DIAGNOSIS — E11.69 TYPE 2 DIABETES MELLITUS WITH OTHER SPECIFIED COMPLICATION, WITH LONG-TERM CURRENT USE OF INSULIN (H): ICD-10-CM

## 2024-08-14 DIAGNOSIS — Z79.4 TYPE 2 DIABETES MELLITUS WITH OTHER SPECIFIED COMPLICATION, WITH LONG-TERM CURRENT USE OF INSULIN (H): ICD-10-CM

## 2024-08-14 RX ORDER — SEMAGLUTIDE 1.34 MG/ML
INJECTION, SOLUTION SUBCUTANEOUS
Qty: 3 ML | Refills: 1 | Status: SHIPPED | OUTPATIENT
Start: 2024-08-14

## 2024-09-10 DIAGNOSIS — Z79.4 TYPE 2 DIABETES MELLITUS WITH OTHER SPECIFIED COMPLICATION, WITH LONG-TERM CURRENT USE OF INSULIN (H): ICD-10-CM

## 2024-09-10 DIAGNOSIS — E11.69 TYPE 2 DIABETES MELLITUS WITH OTHER SPECIFIED COMPLICATION, WITH LONG-TERM CURRENT USE OF INSULIN (H): ICD-10-CM

## 2024-09-10 DIAGNOSIS — I10 BENIGN ESSENTIAL HYPERTENSION: ICD-10-CM

## 2024-09-10 RX ORDER — CARVEDILOL 12.5 MG/1
12.5 TABLET ORAL 2 TIMES DAILY WITH MEALS
Qty: 180 TABLET | Refills: 0 | Status: SHIPPED | OUTPATIENT
Start: 2024-09-10

## 2024-09-10 RX ORDER — METFORMIN HCL 500 MG
TABLET, EXTENDED RELEASE 24 HR ORAL
Qty: 270 TABLET | Refills: 0 | Status: SHIPPED | OUTPATIENT
Start: 2024-09-10

## 2024-09-10 RX ORDER — GLIPIZIDE 5 MG/1
TABLET, FILM COATED, EXTENDED RELEASE ORAL
Qty: 180 TABLET | Refills: 0 | Status: SHIPPED | OUTPATIENT
Start: 2024-09-10

## 2024-09-10 NOTE — TELEPHONE ENCOUNTER
Needs clinic appointment for further refills.   Prescription approved per [facility] Refill Protocol.  Julie Behrendt RN

## 2024-09-16 DIAGNOSIS — E11.69 TYPE 2 DIABETES MELLITUS WITH OTHER SPECIFIED COMPLICATION, WITH LONG-TERM CURRENT USE OF INSULIN (H): ICD-10-CM

## 2024-09-16 DIAGNOSIS — Z79.4 TYPE 2 DIABETES MELLITUS WITH OTHER SPECIFIED COMPLICATION, WITH LONG-TERM CURRENT USE OF INSULIN (H): ICD-10-CM

## 2024-09-17 RX ORDER — GLIPIZIDE 5 MG/1
TABLET, FILM COATED, EXTENDED RELEASE ORAL
Qty: 180 TABLET | Refills: 0 | OUTPATIENT
Start: 2024-09-17

## 2024-09-17 RX ORDER — METFORMIN HCL 500 MG
TABLET, EXTENDED RELEASE 24 HR ORAL
Qty: 270 TABLET | Refills: 0 | OUTPATIENT
Start: 2024-09-17

## 2024-10-22 DIAGNOSIS — I10 BENIGN ESSENTIAL HYPERTENSION: ICD-10-CM

## 2024-10-22 RX ORDER — AMLODIPINE BESYLATE 10 MG/1
10 TABLET ORAL DAILY
Qty: 90 TABLET | Refills: 1 | Status: SHIPPED | OUTPATIENT
Start: 2024-10-22

## 2024-10-29 DIAGNOSIS — E11.69 TYPE 2 DIABETES MELLITUS WITH OTHER SPECIFIED COMPLICATION, WITH LONG-TERM CURRENT USE OF INSULIN (H): ICD-10-CM

## 2024-10-29 DIAGNOSIS — Z79.4 TYPE 2 DIABETES MELLITUS WITH OTHER SPECIFIED COMPLICATION, WITH LONG-TERM CURRENT USE OF INSULIN (H): ICD-10-CM

## 2024-10-30 DIAGNOSIS — Z79.4 TYPE 2 DIABETES MELLITUS WITH OTHER SPECIFIED COMPLICATION, WITH LONG-TERM CURRENT USE OF INSULIN (H): ICD-10-CM

## 2024-10-30 DIAGNOSIS — E11.69 TYPE 2 DIABETES MELLITUS WITH OTHER SPECIFIED COMPLICATION, WITH LONG-TERM CURRENT USE OF INSULIN (H): ICD-10-CM

## 2024-10-30 RX ORDER — SEMAGLUTIDE 1.34 MG/ML
INJECTION, SOLUTION SUBCUTANEOUS
Qty: 3 ML | Refills: 0 | Status: SHIPPED | OUTPATIENT
Start: 2024-10-30

## 2024-10-30 RX ORDER — SEMAGLUTIDE 1.34 MG/ML
INJECTION, SOLUTION SUBCUTANEOUS
Qty: 3 ML | Refills: 0 | OUTPATIENT
Start: 2024-10-30

## 2024-10-30 NOTE — TELEPHONE ENCOUNTER
Overdue for needed care. Please call to schedule in person clinic visit for diabetic follow up . Once appt is scheduled, route back to the pool.    Julie Behrendt RN

## 2024-10-30 NOTE — TELEPHONE ENCOUNTER
Called and spoke with pt. Pt was driving at time of call, stated he will call back to get appt scheduled with Dr. Hyman.    Lali Lowry Patient

## 2024-10-30 NOTE — TELEPHONE ENCOUNTER
Medication Question or Refill        What medication are you calling about (include dose and sig)?: Pending Prescriptions:                       Disp   Refills    Semaglutide (1 MG/DOSE) (OZEMPIC (1 MG/DO*3 mL   1            Sig: INJECT 1 MG SUBCUTANEOUSLY ONCE A WEEK        Preferred Pharmacy:   [facility] [address] [place] [address] [address]  Phone: [phone] Fax: [phone]      Controlled Substance Agreement on file:   CSA -- Patient Level:    CSA: None found at the patient level.       Who prescribed the medication?: Boogie    Do you need a refill? Yes      Do you have any questions or concerns?  Yes: pt states he is almost out of medication and requesting refill. Annual sched with PCP 11/12      Okay to leave a detailed message?: Yes at Home number on file [phone] (home)

## 2024-11-12 ENCOUNTER — OFFICE VISIT (OUTPATIENT)
Dept: FAMILY MEDICINE | Facility: CLINIC | Age: 69
End: 2024-11-12
Payer: COMMERCIAL

## 2024-11-12 VITALS
TEMPERATURE: 97 F | OXYGEN SATURATION: 98 % | SYSTOLIC BLOOD PRESSURE: 150 MMHG | HEIGHT: 66 IN | WEIGHT: 136.8 LBS | DIASTOLIC BLOOD PRESSURE: 90 MMHG | HEART RATE: 80 BPM | BODY MASS INDEX: 21.98 KG/M2 | RESPIRATION RATE: 20 BRPM

## 2024-11-12 DIAGNOSIS — N52.9 ERECTILE DYSFUNCTION, UNSPECIFIED ERECTILE DYSFUNCTION TYPE: ICD-10-CM

## 2024-11-12 DIAGNOSIS — E11.59 TYPE 2 DIABETES MELLITUS WITH OTHER CIRCULATORY COMPLICATION, WITHOUT LONG-TERM CURRENT USE OF INSULIN (H): ICD-10-CM

## 2024-11-12 DIAGNOSIS — I10 BENIGN ESSENTIAL HYPERTENSION: ICD-10-CM

## 2024-11-12 DIAGNOSIS — Z00.00 ENCOUNTER FOR MEDICARE ANNUAL WELLNESS EXAM: Primary | ICD-10-CM

## 2024-11-12 DIAGNOSIS — I25.10 CORONARY ARTERY DISEASE INVOLVING NATIVE HEART WITHOUT ANGINA PECTORIS, UNSPECIFIED VESSEL OR LESION TYPE: ICD-10-CM

## 2024-11-12 DIAGNOSIS — H91.93 BILATERAL HEARING LOSS, UNSPECIFIED HEARING LOSS TYPE: ICD-10-CM

## 2024-11-12 LAB
ANION GAP SERPL CALCULATED.3IONS-SCNC: 9 MMOL/L (ref 7–15)
BUN SERPL-MCNC: 14.9 MG/DL (ref 8–23)
CALCIUM SERPL-MCNC: 9.7 MG/DL (ref 8.8–10.4)
CHLORIDE SERPL-SCNC: 103 MMOL/L (ref 98–107)
CHOLEST SERPL-MCNC: 101 MG/DL
CREAT SERPL-MCNC: 0.88 MG/DL (ref 0.67–1.17)
EGFRCR SERPLBLD CKD-EPI 2021: >90 ML/MIN/1.73M2
EST. AVERAGE GLUCOSE BLD GHB EST-MCNC: 134 MG/DL
FASTING STATUS PATIENT QL REPORTED: NO
FASTING STATUS PATIENT QL REPORTED: NO
GLUCOSE SERPL-MCNC: 223 MG/DL (ref 70–99)
HBA1C MFR BLD: 6.3 % (ref 0–5.6)
HCO3 SERPL-SCNC: 28 MMOL/L (ref 22–29)
HDLC SERPL-MCNC: 34 MG/DL
LDLC SERPL CALC-MCNC: 51 MG/DL
NONHDLC SERPL-MCNC: 67 MG/DL
POTASSIUM SERPL-SCNC: 4.4 MMOL/L (ref 3.4–5.3)
SODIUM SERPL-SCNC: 140 MMOL/L (ref 135–145)
TRIGL SERPL-MCNC: 79 MG/DL

## 2024-11-12 PROCEDURE — 80048 BASIC METABOLIC PNL TOTAL CA: CPT | Performed by: FAMILY MEDICINE

## 2024-11-12 PROCEDURE — 99214 OFFICE O/P EST MOD 30 MIN: CPT | Mod: 25 | Performed by: FAMILY MEDICINE

## 2024-11-12 PROCEDURE — 83036 HEMOGLOBIN GLYCOSYLATED A1C: CPT | Performed by: FAMILY MEDICINE

## 2024-11-12 PROCEDURE — G0439 PPPS, SUBSEQ VISIT: HCPCS | Performed by: FAMILY MEDICINE

## 2024-11-12 PROCEDURE — 80061 LIPID PANEL: CPT | Performed by: FAMILY MEDICINE

## 2024-11-12 PROCEDURE — 36415 COLL VENOUS BLD VENIPUNCTURE: CPT | Performed by: FAMILY MEDICINE

## 2024-11-12 RX ORDER — LOSARTAN POTASSIUM 100 MG/1
100 TABLET ORAL DAILY
Qty: 90 TABLET | Refills: 1 | Status: SHIPPED | OUTPATIENT
Start: 2024-11-12

## 2024-11-12 SDOH — HEALTH STABILITY: PHYSICAL HEALTH: ON AVERAGE, HOW MANY DAYS PER WEEK DO YOU ENGAGE IN MODERATE TO STRENUOUS EXERCISE (LIKE A BRISK WALK)?: 1 DAY

## 2024-11-12 ASSESSMENT — SOCIAL DETERMINANTS OF HEALTH (SDOH): HOW OFTEN DO YOU GET TOGETHER WITH FRIENDS OR RELATIVES?: ONCE A WEEK

## 2024-11-12 ASSESSMENT — PAIN SCALES - GENERAL: PAINLEVEL_OUTOF10: NO PAIN (0)

## 2024-11-12 NOTE — PATIENT INSTRUCTIONS
Patient Education   Preventive Care Advice   This is general advice given by our system to help you stay healthy. However, your care team may have specific advice just for you. Please talk to your care team about your preventive care needs.  Nutrition  Eat 5 or more servings of fruits and vegetables each day.  Try wheat bread, brown rice and whole grain pasta (instead of white bread, rice, and pasta).  Get enough calcium and vitamin D. Check the label on foods and aim for 100% of the RDA (recommended daily allowance).  Lifestyle  Exercise at least 150 minutes each week  (30 minutes a day, 5 days a week).  Do muscle strengthening activities 2 days a week. These help control your weight and prevent disease.  No smoking.  Wear sunscreen to prevent skin cancer.  Have a dental exam and cleaning every 6 months.  Yearly exams  See your health care team every year to talk about:  Any changes in your health.  Any medicines your care team has prescribed.  Preventive care, family planning, and ways to prevent chronic diseases.  Shots (vaccines)   HPV shots (up to age 26), if you've never had them before.  Hepatitis B shots (up to age 59), if you've never had them before.  COVID-19 shot: Get this shot when it's due.  Flu shot: Get a flu shot every year.  Tetanus shot: Get a tetanus shot every 10 years.  Pneumococcal, hepatitis A, and RSV shots: Ask your care team if you need these based on your risk.  Shingles shot (for age 50 and up)  General health tests  Diabetes screening:  Starting at age 35, Get screened for diabetes at least every 3 years.  If you are younger than age 35, ask your care team if you should be screened for diabetes.  Cholesterol test: At age 39, start having a cholesterol test every 5 years, or more often if advised.  Bone density scan (DEXA): At age 50, ask your care team if you should have this scan for osteoporosis (brittle bones).  Hepatitis C: Get tested at least once in your life.  STIs (sexually  transmitted infections)  Before age 24: Ask your care team if you should be screened for STIs.  After age 24: Get screened for STIs if you're at risk. You are at risk for STIs (including HIV) if:  You are sexually active with more than one person.  You don't use condoms every time.  You or a partner was diagnosed with a sexually transmitted infection.  If you are at risk for HIV, ask about PrEP medicine to prevent HIV.  Get tested for HIV at least once in your life, whether you are at risk for HIV or not.  Cancer screening tests  Cervical cancer screening: If you have a cervix, begin getting regular cervical cancer screening tests starting at age 21.  Breast cancer scan (mammogram): If you've ever had breasts, begin having regular mammograms starting at age 40. This is a scan to check for breast cancer.  Colon cancer screening: It is important to start screening for colon cancer at age 45.  Have a colonoscopy test every 10 years (or more often if you're at risk) Or, ask your provider about stool tests like a FIT test every year or Cologuard test every 3 years.  To learn more about your testing options, visit:   .  For help making a decision, visit:   https://bit.ly/ef93249.  Prostate cancer screening test: If you have a prostate, ask your care team if a prostate cancer screening test (PSA) at age 55 is right for you.  Lung cancer screening: If you are a current or former smoker ages 50 to 80, ask your care team if ongoing lung cancer screenings are right for you.  For informational purposes only. Not to replace the advice of your health care provider. Copyright   2023 [place] [email or web address]. All rights reserved. Clinically reviewed by the [facility] Transitions Program. [employer] 626798 - REV 01/24.

## 2024-11-12 NOTE — LETTER
November 14, 2024      Js ESTHER Jones  [address] [facility] [address]        Dear ,    We are writing to inform you of your test results.    Hemoglobin A1c 6.3, consistent with well-controlled diabetes.  Random blood glucose 223, elevated.  Other lab results unremarkable.      Will recommend to continue regular walks, healthy diet and current medications.  Follow-up with RN in 2 weeks for repeat blood pressure check. (Which has been scheduled already for 11/26/24)     Resulted Orders   HEMOGLOBIN A1C   Result Value Ref Range    Estimated Average Glucose 134 (H) <117 mg/dL    Hemoglobin A1C 6.3 (H) 0.0 - 5.6 %      Comment:      Normal <5.7%   Prediabetes 5.7-6.4%    Diabetes 6.5% or higher     Note: Adopted from ADA consensus guidelines.   Lipid panel reflex to direct LDL Non-fasting   Result Value Ref Range    Cholesterol 101 <200 mg/dL    Triglycerides 79 <150 mg/dL    Direct Measure HDL 34 (L) >=40 mg/dL    LDL Cholesterol Calculated 51 <100 mg/dL    Non HDL Cholesterol 67 <130 mg/dL    Patient Fasting > 8hrs? No     Narrative    Cholesterol  Desirable: < 200 mg/dL  Borderline High: 200 - 239 mg/dL  High: >= 240 mg/dL    Triglycerides  Normal: < 150 mg/dL  Borderline High: 150 - 199 mg/dL  High: 200-499 mg/dL  Very High: >= 500 mg/dL    Direct Measure HDL  Female: >= 50 mg/dL   Male: >= 40 mg/dL    LDL Cholesterol  Desirable: < 100 mg/dL  Above Desirable: 100 - 129 mg/dL   Borderline High: 130 - 159 mg/dL   High:  160 - 189 mg/dL   Very High: >= 190 mg/dL    Non HDL Cholesterol  Desirable: < 130 mg/dL  Above Desirable: 130 - 159 mg/dL  Borderline High: 160 - 189 mg/dL  High: 190 - 219 mg/dL  Very High: >= 220 mg/dL   Basic metabolic panel  (Ca, Cl, CO2, Creat, Gluc, K, Na, BUN)   Result Value Ref Range    Sodium 140 135 - 145 mmol/L    Potassium 4.4 3.4 - 5.3 mmol/L    Chloride 103 98 - 107 mmol/L    Carbon Dioxide (CO2) 28 22 - 29 mmol/L    Anion Gap 9 7 - 15 mmol/L    Urea Nitrogen 14.9  8.0 - 23.0 mg/dL    Creatinine 0.88 0.67 - 1.17 mg/dL    GFR Estimate >90 >60 mL/min/1.73m2      Comment:      eGFR calculated using 2021 CKD-EPI equation.    Calcium 9.7 8.8 - 10.4 mg/dL      Comment:      Reference intervals for this test were updated on 7/16/2024 to reflect our healthy population more accurately. There may be differences in the flagging of prior results with similar values performed with this method. Those prior results can be interpreted in the context of the updated reference intervals.    Glucose 223 (H) 70 - 99 mg/dL    Patient Fasting > 8hrs? No        If you have any questions or concerns, please call the clinic at the number listed above.       Sincerely,      Fortino Hyman MD

## 2024-11-12 NOTE — PROGRESS NOTES
Preventive Care Visit  [facility]  Fortino Hyman MD, Family Medicine  Nov 12, 2024      Assessment & Plan     Encounter for Medicare annual wellness exam  Medically doing well.  Blood pressure above target goal of less than 140/90.  Management option discussed.  Losartan increased to 100 mg and suggested to continue amlodipine, carvedilol.  Follow-up with RN in 2 weeks for repeat blood pressure check and labs.    Coronary artery disease involving native heart without angina pectoris, unspecified vessel or lesion type  Suggested to continue aspirin, Lipitor and carvedilol  - Lipid panel reflex to direct LDL Non-fasting; Future  - Albumin Random Urine Quantitative with Creat Ratio; Future  - Basic metabolic panel  (Ca, Cl, CO2, Creat, Gluc, K, Na, BUN); Future    Type 2 diabetes mellitus with other circulatory complication, without long-term current use of insulin (H)  Last hemoglobin A1c 6.4, consistent with well-controlled diabetes.  Will continue Ozempic, metformin and glipizide  Lab Results   Component Value Date    A1C 6.4 04/29/2024    A1C 7.1 10/30/2023    A1C 6.8 04/26/2023    A1C 7.2 09/07/2022    A1C 11.2 03/07/2022    A1C 8.5 02/22/2021    A1C 8.1 01/06/2020    A1C 6.9 06/07/2019    A1C 6.8 09/15/2018    A1C 7.2 06/15/2018    - HEMOGLOBIN A1C; Future    Erectile dysfunction, unspecified erectile dysfunction type  Known to have erectile dysfunction, tried sildenafil without any significant benefit.  Urology referral placed for further evaluation.  - Adult Urology  Referral; Future    Benign essential hypertension  As above  - losartan (COZAAR) 100 MG tablet; Take 1 tablet (100 mg) by mouth daily.  - **Basic metabolic panel FUTURE 2mo; Future    Bilateral hearing loss, unspecified hearing loss type  Audiology referral placed  - Adult Audiology  Referral; Future      Counseling  Appropriate preventive services were addressed with this patient via screening,  questionnaire, or discussion as appropriate for fall prevention, nutrition, physical activity, Tobacco-use cessation, social engagement, weight loss and cognition.  Checklist reviewing preventive services available has been given to the patient.  Reviewed patient's diet, addressing concerns and/or questions.   He is at risk for lack of exercise and has been provided with information to increase physical activity for the benefit of his well-being.   Patient reported safety concerns were addressed today.The patient was provided with written information regarding signs of hearing loss.       Subjective   Js is a 69 year old, presenting for the following:  Physical        11/12/2024     9:31 AM   Additional Questions   Roomed by Kyra ROB LPN   Accompanied by Self           HPI    Health Care Directive  Patient does not have a Health Care Directive: Patient states has Advance Directive and will bring in a copy to clinic.      11/12/2024   General Health   How would you rate your overall physical health? Good   Feel stress (tense, anxious, or unable to sleep) Not at all            11/12/2024   Nutrition   Diet: Diabetic            11/12/2024   Exercise   Days per week of moderate/strenous exercise 1 day      (!) EXERCISE CONCERN      11/12/2024   Social Factors   Frequency of gathering with friends or relatives Once a week   Worry food won't last until get money to buy more No   Food not last or not have enough money for food? No   Do you have housing? (Housing is defined as stable permanent housing and does not include staying ouside in a car, in a tent, in an abandoned building, in an overnight shelter, or couch-surfing.) Yes   Are you worried about losing your housing? No   Lack of transportation? No   Unable to get utilities (heat,electricity)? No            11/12/2024   Fall Risk   Fallen 2 or more times in the past year? No    Trouble with walking or balance? No        Patient-reported          11/12/2024    Activities of Daily Living- Home Safety   Needs help with the following daily activites None of the above   Safety concerns in the home Throw rugs in the hallway            11/12/2024   Dental   Dentist two times every year? Yes            11/12/2024   Hearing Screening   Hearing concerns? (!) I FEEL THAT PEOPLE ARE MUMBLING OR NOT SPEAKING CLEARLY.    (!) I NEED TO ASK PEOPLE TO SPEAK UP OR REPEAT THEMSELVES.    (!) IT'S HARDER TO UNDERSTAND WOMEN'S VOICES THAN MEN'S VOICES.    (!) IT'S HARD TO FOLLOW A CONVERSATION IN A NOISY RESTAURANT OR CROWDED ROOM.    (!) TROUBLE UNDESTANDING A SPEAKER IN A PUBLIC MEETING OR [employer] SERVICE.    (!) TROUBLE FOLLOWING DIALOGUE IN THE THEATHER.    (!) TROUBLE UNDERSTANDING SOFT OR WHISPERED SPEECH.       Multiple values from one day are sorted in reverse-chronological order         11/12/2024   Driving Risk Screening   Patient/family members have concerns about driving No            11/12/2024   General Alertness/Fatigue Screening   Have you been more tired than usual lately? No            11/12/2024   Urinary Incontinence Screening   Bothered by leaking urine in past 6 months No            4/29/2024   TB Screening   Were you born outside of the [place]? No            Today's PHQ-2 Score:       11/12/2024     9:22 AM   PHQ-2 ( 1999 [employer])   Q1: Little interest or pleasure in doing things 0    Q2: Feeling down, depressed or hopeless 0    PHQ-2 Score 0    Q1: Little interest or pleasure in doing things Not at all   Q2: Feeling down, depressed or hopeless Not at all   PHQ-2 Score 0       Patient-reported           11/12/2024   Substance Use   Alcohol more than 3/day or more than 7/wk Not Applicable   Do you have a current opioid prescription? No   How severe/bad is pain from 1 to 10? 0/10 (No Pain)   Do you use any other substances recreationally? No        Social History     Tobacco Use    Smoking status: Former     Current packs/day: 0.00     Average packs/day: 1 pack/day for  30.0 years (30.0 ttl pk-yrs)     Types: Cigarettes     Start date: 1970     Quit date: 2000     Years since quittin.8     Passive exposure: Past    Smokeless tobacco: Never   Vaping Use    Vaping status: Never Used   Substance Use Topics    Alcohol use: Never    Drug use: No       ASCVD Risk   The ASCVD Risk score (Debra DK, et al., 2019) failed to calculate for the following reasons:    The valid total cholesterol range is 130 to 320 mg/dL          Reviewed and updated as needed this visit by Provider                    Past Medical History:   Diagnosis Date    DM type 2 (diabetes mellitus, type 2) (H)     HTN (hypertension), benign     Hyperlipidemia     Stented coronary artery      Past Surgical History:   Procedure Laterality Date    COLONOSCOPY N/A 2023    Procedure: Colonoscopy;  Surgeon: Shlomo Gan DO;  Location: [facility]    HERNIORRHAPHY INGUINAL BILATERAL       OB History   No obstetric history on file.     Lab work is in process  Labs reviewed in EPIC  BP Readings from Last 3 Encounters:   24 (!) 142/84   06/10/24 138/79   24 122/74    Wt Readings from Last 3 Encounters:   24 62.1 kg (136 lb 12.8 oz)   24 59.4 kg (131 lb)   10/30/23 60.8 kg (134 lb)                  Patient Active Problem List   Diagnosis    Benign essential hypertension, goal <140/90    Hyperlipidemia LDL goal <100    Gastroesophageal reflux disease without esophagitis    LBBB (left bundle branch block)    Coronary artery disease involving native heart without angina pectoris, unspecified vessel or lesion type    Type 2 diabetes mellitus with circulatory disorder, without long-term current use of insulin (H)    Iliac artery stenosis, left (H)     Past Surgical History:   Procedure Laterality Date    COLONOSCOPY N/A 2023    Procedure: Colonoscopy;  Surgeon: Shlomo Gan DO;  Location: [facility]    HERNIORRHAPHY INGUINAL BILATERAL         Social History     Tobacco  Use    Smoking status: Former     Current packs/day: 0.00     Average packs/day: 1 pack/day for 30.0 years (30.0 ttl pk-yrs)     Types: Cigarettes     Start date: 1970     Quit date: 2000     Years since quittin.8     Passive exposure: Past    Smokeless tobacco: Never   Substance Use Topics    Alcohol use: Never     Family History   Problem Relation Age of Onset    Diabetes Mother     Hypertension Mother     Cardiovascular Father 81        CHF    Heart Disease Sister 50    Cancer Sister     Heart Disease Brother 50         Current Outpatient Medications   Medication Sig Dispense Refill    amLODIPine (NORVASC) 10 MG tablet Take 1 tablet by mouth once daily 90 tablet 1    APPLE CIDER VINEGAR PO Take 450 mg by mouth 2 times daily      aspirin 81 MG tablet Take 1 tablet (81 mg) by mouth daily 90 tablet 3    atorvastatin (LIPITOR) 40 MG tablet Take 1 tablet (40 mg) by mouth daily 100 tablet 0    blood glucose monitoring (NO BRAND SPECIFIED) test strip Use to test blood sugar 3 times daily or as directed. Box of 100 each. 3 Box 3    Blood Glucose Monitoring Suppl W/DEVICE KIT 1 each daily 1 kit 0    carvedilol (COREG) 12.5 MG tablet TAKE 1 TABLET BY MOUTH TWICE DAILY WITH MEALS 180 tablet 0    glipiZIDE (GLUCOTROL XL) 5 MG 24 hr tablet TAKE 2 TABLETS BY MOUTH ONCE DAILY IN THE MORNING BEFORE BREAKFAST 180 tablet 0    losartan (COZAAR) 50 MG tablet Take 1 tablet (50 mg) by mouth daily 100 tablet 0    Melatonin 10 MG TABS Take 10 mg by mouth nightly as needed for sleep      metFORMIN (GLUCOPHAGE XR) 500 MG 24 hr tablet TAKE 1 TABLET BY MOUTH WITH BREAKFAST AND 2 WITH SUPPER 270 tablet 0    MULTIPLE VITAMIN PO Take 1 tablet by mouth      Omega-3 Fatty Acids (OMEGA-3 FISH OIL PO) Take 1,200 mg by mouth 2 times daily (with meals)      omeprazole (PRILOSEC OTC) 20 MG tablet Take 1 tablet (20 mg) by mouth daily 90 tablet 1    ONE TOUCH LANCETS MISC 1 lancet 3 times daily 200 each 5    Semaglutide, 1 MG/DOSE,  (OZEMPIC, 1 MG/DOSE,) 4 MG/3ML pen INJECT 1 MG SUBCUTANEOUSLY ONCE A WEEK 3 mL 0    sildenafil (VIAGRA) 25 MG tablet Take 1-2 tablets (25-50 mg) by mouth daily as needed 30 tablet 1    nitroGLYcerin (NITROSTAT) 0.4 MG sublingual tablet For chest pain place 1 tablet under the tongue every 5 minutes for 3 doses. If symptoms persist 5 minutes after 1st dose call 911. (Patient not taking: Reported on 11/12/2024) 50 tablet 11     Allergies   Allergen Reactions    Bee Venom Unknown    Lisinopril Cough     Recent Labs   Lab Test 04/29/24  1051 10/30/23  1524 04/26/23  0853 09/07/22  0944 03/07/22  0923 07/14/21  0954 03/22/21  1012 02/22/21  1017 04/07/20  1958 04/07/20  1709 01/06/20  0828 09/15/18  0546 09/14/18  1818 06/15/18  0843 03/09/18  0815   A1C 6.4* 7.1* 6.8* 7.2*   < > 7.1*  --  8.5*  --   --  8.1*   < >  --    < > 6.9*   LDL  --  46  --  77  --  54  --   --   --   --  70   < >  --   --  72   HDL  --  30*  --  37*  --  43  --   --   --   --  43   < >  --   --  38*   TRIG  --  96  --  112  --  105  --   --   --   --  170*   < >  --   --  73   ALT  --   --   --   --   --   --   --   --   --  40 44  --  39  --   --    CR 1.00  --  0.89 0.85   < >  --  0.85 0.83   < > 2.36* 0.82   < > 0.92  --   --    GFRESTIMATED 80  --  >90 >90   < >  --  90 >90   < > 27* >90   < > 82  --   --    GFRESTBLACK  --   --   --   --   --   --  >90 >90   < > 32* >90   < > >90  --   --    POTASSIUM 4.6  --  4.1 4.6   < >  --  4.2 4.2   < > 3.9 4.1   < > 3.4  --   --    TSH  --   --   --   --   --   --   --   --   --   --   --   --   --   --  1.02    < > = values in this interval not displayed.      Current providers sharing in care for this patient include:  Patient Care Team:  Fortino Hyman MD as PCP - General (Family Medicine)  Fortino Hyman MD as Assigned PCP  Joana Blanton RD as Diabetes Educator (Dietitian, Registered)  Afshan Samuel APRN CNP as Assigned Surgical Provider    The following health maintenance items are reviewed  "in Epic and correct as of today:  Health Maintenance   Topic Date Due    A1C  10/29/2024    LIPID  10/30/2024    MICROALBUMIN  10/30/2024    DIABETIC FOOT EXAM  10/30/2024    COVID-19 Vaccine (6 - 2024-25 season) 12/12/2024    BMP  04/29/2025    ANNUAL REVIEW OF HM ORDERS  04/29/2025    EYE EXAM  07/09/2025    MEDICARE ANNUAL WELLNESS VISIT  11/12/2025    FALL RISK ASSESSMENT  11/12/2025    COLORECTAL CANCER SCREENING  08/14/2028    ADVANCE CARE PLANNING  04/29/2029    DTAP/TDAP/TD IMMUNIZATION (3 - Td or Tdap) 10/20/2029    HEPATITIS C SCREENING  Completed    PHQ-2 (once per calendar year)  Completed    INFLUENZA VACCINE  Completed    Pneumococcal Vaccine: 65+ Years  Completed    ZOSTER IMMUNIZATION  Completed    RSV VACCINE  Completed    AORTIC ANEURYSM SCREENING (SYSTEM ASSIGNED)  Completed    HPV IMMUNIZATION  Aged Out    MENINGITIS IMMUNIZATION  Aged Out    RSV MONOCLONAL ANTIBODY  Aged Out         Review of Systems  Constitutional, neuro, ENT, endocrine, pulmonary, cardiac, gastrointestinal, genitourinary, musculoskeletal, integument and psychiatric systems are negative, except as otherwise noted.     Objective    Exam  BP (!) 142/84   Pulse 80   Temp 97  F (36.1  C) (Tympanic)   Resp 20   Ht 1.676 m (5' 6\")   Wt 62.1 kg (136 lb 12.8 oz)   SpO2 98%   BMI 22.08 kg/m     Estimated body mass index is 22.08 kg/m  as calculated from the following:    Height as of this encounter: 1.676 m (5' 6\").    Weight as of this encounter: 62.1 kg (136 lb 12.8 oz).    Physical Exam  GENERAL: alert and no distress  EYES: Eyes grossly normal to inspection, PERRL and conjunctivae and sclerae normal  HENT: normal cephalic/atraumatic, ear canals and TM's normal, nose and mouth without ulcers or lesions, oropharynx clear, and oral mucous membranes moist  NECK: no adenopathy, no asymmetry, masses, or scars  RESP: lungs clear to auscultation - no rales, rhonchi or wheezes  CV: regular rate and rhythm, normal S1 S2, no S3 or S4, " no murmur, click or rub, no peripheral edema  ABDOMEN: soft, nontender, no hepatosplenomegaly, no masses   MS: no gross musculoskeletal defects noted, no edema  SKIN: no suspicious lesions or rashes  NEURO: Normal strength and tone, mentation intact and speech normal  PSYCH: mentation appears normal, affect normal/bright         11/12/2024   Mini Cog   Clock Draw Score 2 Normal   3 Item Recall 2 objects recalled   Mini Cog Total Score 4                 Signed Electronically by: Fortino Hyman MD

## 2024-11-13 ENCOUNTER — TELEPHONE (OUTPATIENT)
Dept: FAMILY MEDICINE | Facility: CLINIC | Age: 69
End: 2024-11-13
Payer: COMMERCIAL

## 2024-11-13 NOTE — TELEPHONE ENCOUNTER
Patient returned call, reviewed lab results and Dr. Hyman's detailed message dated 11/12/24 and patient verbalized good understanding. Has appointments on 11/26/24 for lab and RN only for BP check.    Patient forgot to mention at his last appointment he has a provider from [facility] that comes out annually for a check up and the provider has recommended patient have a CT chest low dose scan as patient is a former smoker.     Patient states he used to smoke 2 PPD x 40+ years, states he stopped smoking in 2000.    Order pended, message routed to Dr. Hyman for consideration.     Julie Behrendt RN

## 2024-11-15 ENCOUNTER — DOCUMENTATION ONLY (OUTPATIENT)
Dept: FAMILY MEDICINE | Facility: CLINIC | Age: 69
End: 2024-11-15
Payer: COMMERCIAL

## 2024-11-15 DIAGNOSIS — I10 BENIGN ESSENTIAL HYPERTENSION: Primary | ICD-10-CM

## 2024-11-15 NOTE — PROGRESS NOTES
Patient is coming for labs per Dr. Hyman, there is a BMP ordered but it isn't due until January.  If he needs this at two weeks please change the expected date or place a new order.  Thanks!

## 2024-11-15 NOTE — PROGRESS NOTES
Patient has scheduled RN visit on 11/26/24 for repeat BP check. Does he need labs at that time?  Thanks, Maria Teresa QUINN RN

## 2024-11-19 NOTE — TELEPHONE ENCOUNTER
11/13/24  8:35 AM  Low-dose CT chest is not indicated as patient has stopped smoking more than 15 years ago.    Dr Hyman     Patient was notified on 11/14/24 of the above, TE closed.    Julie Behrendt RN

## 2024-11-26 ENCOUNTER — LAB (OUTPATIENT)
Dept: LAB | Facility: CLINIC | Age: 69
End: 2024-11-26
Payer: COMMERCIAL

## 2024-11-26 DIAGNOSIS — E11.59 TYPE 2 DIABETES MELLITUS WITH CIRCULATORY DISORDER, WITHOUT LONG-TERM CURRENT USE OF INSULIN (H): Primary | ICD-10-CM

## 2024-12-21 DIAGNOSIS — E11.69 TYPE 2 DIABETES MELLITUS WITH OTHER SPECIFIED COMPLICATION, WITH LONG-TERM CURRENT USE OF INSULIN (H): ICD-10-CM

## 2024-12-21 DIAGNOSIS — Z79.4 TYPE 2 DIABETES MELLITUS WITH OTHER SPECIFIED COMPLICATION, WITH LONG-TERM CURRENT USE OF INSULIN (H): ICD-10-CM

## 2024-12-23 DIAGNOSIS — E11.69 TYPE 2 DIABETES MELLITUS WITH OTHER SPECIFIED COMPLICATION, WITH LONG-TERM CURRENT USE OF INSULIN (H): ICD-10-CM

## 2024-12-23 DIAGNOSIS — Z79.4 TYPE 2 DIABETES MELLITUS WITH OTHER SPECIFIED COMPLICATION, WITH LONG-TERM CURRENT USE OF INSULIN (H): ICD-10-CM

## 2024-12-23 RX ORDER — SEMAGLUTIDE 1.34 MG/ML
INJECTION, SOLUTION SUBCUTANEOUS
Qty: 9 ML | Refills: 0 | Status: SHIPPED | OUTPATIENT
Start: 2024-12-23

## 2024-12-23 RX ORDER — METFORMIN HYDROCHLORIDE 500 MG/1
TABLET, EXTENDED RELEASE ORAL
Qty: 270 TABLET | Refills: 1 | Status: SHIPPED | OUTPATIENT
Start: 2024-12-23

## 2024-12-23 RX ORDER — GLIPIZIDE 5 MG/1
TABLET, FILM COATED, EXTENDED RELEASE ORAL
Qty: 180 TABLET | Refills: 1 | Status: SHIPPED | OUTPATIENT
Start: 2024-12-23

## 2024-12-23 NOTE — TELEPHONE ENCOUNTER
GFR Estimate   Date Value Ref Range Status   11/29/2024 86 >60 mL/min/1.73m2 Final     Comment:     eGFR calculated using 2021 CKD-EPI equation.   03/22/2021 90 >60 mL/min/[1.73_m2] Final     Comment:     Non  GFR Calc  Starting 12/18/2018, serum creatinine based estimated GFR (eGFR) will be   calculated using the Chronic Kidney Disease Epidemiology Collaboration   (CKD-EPI) equation.

## 2025-01-14 DIAGNOSIS — I10 BENIGN ESSENTIAL HYPERTENSION: ICD-10-CM

## 2025-01-15 RX ORDER — CARVEDILOL 12.5 MG/1
TABLET ORAL
Qty: 180 TABLET | Refills: 1 | Status: SHIPPED | OUTPATIENT
Start: 2025-01-15

## 2025-03-24 ENCOUNTER — OFFICE VISIT (OUTPATIENT)
Dept: FAMILY MEDICINE | Facility: CLINIC | Age: 70
End: 2025-03-24
Payer: COMMERCIAL

## 2025-03-24 VITALS
DIASTOLIC BLOOD PRESSURE: 70 MMHG | OXYGEN SATURATION: 97 % | RESPIRATION RATE: 18 BRPM | HEIGHT: 66 IN | WEIGHT: 134 LBS | TEMPERATURE: 98.1 F | BODY MASS INDEX: 21.53 KG/M2 | HEART RATE: 74 BPM | SYSTOLIC BLOOD PRESSURE: 120 MMHG

## 2025-03-24 DIAGNOSIS — I65.23 CAROTID STENOSIS, ASYMPTOMATIC, BILATERAL: Primary | ICD-10-CM

## 2025-03-24 DIAGNOSIS — I25.10 CORONARY ARTERY DISEASE INVOLVING NATIVE HEART WITHOUT ANGINA PECTORIS, UNSPECIFIED VESSEL OR LESION TYPE: ICD-10-CM

## 2025-03-24 DIAGNOSIS — I10 BENIGN ESSENTIAL HYPERTENSION: ICD-10-CM

## 2025-03-24 DIAGNOSIS — Z87.891 FORMER TOBACCO USE: ICD-10-CM

## 2025-03-24 DIAGNOSIS — E78.5 DYSLIPIDEMIA: ICD-10-CM

## 2025-03-24 DIAGNOSIS — E11.59 TYPE 2 DIABETES MELLITUS WITH OTHER CIRCULATORY COMPLICATION, WITHOUT LONG-TERM CURRENT USE OF INSULIN (H): ICD-10-CM

## 2025-03-24 PROCEDURE — 1126F AMNT PAIN NOTED NONE PRSNT: CPT | Performed by: PHYSICIAN ASSISTANT

## 2025-03-24 PROCEDURE — 91320 SARSCV2 VAC 30MCG TRS-SUC IM: CPT | Performed by: PHYSICIAN ASSISTANT

## 2025-03-24 PROCEDURE — 90480 ADMN SARSCOV2 VAC 1/ONLY CMP: CPT | Performed by: PHYSICIAN ASSISTANT

## 2025-03-24 PROCEDURE — 99213 OFFICE O/P EST LOW 20 MIN: CPT | Mod: 25 | Performed by: PHYSICIAN ASSISTANT

## 2025-03-24 PROCEDURE — 3078F DIAST BP <80 MM HG: CPT | Performed by: PHYSICIAN ASSISTANT

## 2025-03-24 PROCEDURE — 3074F SYST BP LT 130 MM HG: CPT | Performed by: PHYSICIAN ASSISTANT

## 2025-03-24 ASSESSMENT — PAIN SCALES - GENERAL: PAINLEVEL_OUTOF10: NO PAIN (0)

## 2025-03-24 NOTE — NURSING NOTE
"Chief Complaint   Patient presents with    Neck Pain       Initial There were no vitals taken for this visit. Estimated body mass index is 20.98 kg/m  as calculated from the following:    Height as of 12/6/24: 1.676 m (5' 6\").    Weight as of 12/6/24: 59 kg (130 lb).    Patient presents to the clinic using No DME    Is there anyone who you would like to be able to receive your results? No  If yes have patient fill out SHAKIRA      "

## 2025-03-24 NOTE — PROGRESS NOTES
Assessment & Plan     Carotid stenosis, asymptomatic, bilateral  Asymptomatic.  Has upcoming recheck imaging ordered by other provider.  If wants sooner, he will let me know.    Type 2 diabetes mellitus with other circulatory complication, without long-term current use of insulin (H)  Controlled per recent A1c on home visit  - FOOT EXAM    Coronary artery disease involving native heart without angina pectoris, unspecified vessel or lesion type  History of heart attack, lost to follow up.  Refer back to cardiology.  - Adult Cardiology Eval  Referral; Future    Benign essential hypertension  Controlled, no med change    Dyslipidemia  Controlled, no med change    Former tobacco use  Continues to avoid tobacco, monitor    Patient Instructions   Upcoming carotid ultrasound in June.  If you decide that you want it sooner, let me know and I can order it as sooner.    Will be called to set up with cardiology to follow up on history of heart attack.    Decided wart wasn't bothering you.    Suggest calling your insurance to check 2 things:    1) If insurance covers hearing aids.  If so, see audiology wherever insurance covers (such as [place]).  If you do not have insurance coverage for hearing aids, then [employer] is recommended as cheaper option that also has a 6 month money-back trial.    2)  if Dr Hyman is still in network.  If insurance says Dr Hyman is not in network, then call to speak with Nicole Gabriel, clinic manager.  (I believe she can fix this as long as insurance says that me or other providers from this clinic are covered.)  I am always willing to see you, but I want you to know your options.    Happy birthday!        Subjective   Js is a 69 year old, presenting for the following health issues:  Results        3/24/2025    10:00 AM   Additional Questions   Roomed by Chela POOL CMA     History of Present Illness       Reason for visit:  Office visit He is missing 1 dose(s) of medications per week.     "  Pt would like to discuss ultrasound   Home insurance visit recently found carotid bruits  He is asymptomatic - no lightheadedness, TIA, strokes.    He follows with vascular every summer regarding iliac artery stenosis and has both imaging and provider appt scheduled already.  This yr carotid artery imaging is also scheduled.  Had last carotid imaging in 2017.    A1c test 6.0 a month ago - with insurance visit to home.      Former smoker.  HTN.  Dyslipidemia.          Objective    /70 (BP Location: Right arm, Patient Position: Sitting, Cuff Size: Adult Regular)   Pulse 74   Temp 98.1  F (36.7  C)   Resp 18   Ht 1.676 m (5' 5.98\")   Wt 60.8 kg (134 lb)   SpO2 97%   BMI 21.64 kg/m    Body mass index is 21.64 kg/m .      Signed Electronically by: Haylee Romano PA-C    "

## 2025-03-24 NOTE — PATIENT INSTRUCTIONS
Upcoming carotid ultrasound in June.  If you decide that you want it sooner, let me know and I can order it as sooner.    Will be called to set up with cardiology to follow up on history of heart attack.    Decided wart wasn't bothering you.    Suggest calling your insurance to check 2 things:    1) If insurance covers hearing aids.  If so, see audiology wherever insurance covers (such as [place]).  If you do not have insurance coverage for hearing aids, then [employer] is recommended as cheaper option that also has a 6 month money-back trial.    2)  if Dr Hyman is still in network.  If insurance says Dr Hyman is not in network, then call to speak with Nicole Gabriel, clinic manager.  (I believe she can fix this as long as insurance says that me or other providers from this clinic are covered.)  I am always willing to see you, but I want you to know your options.    Happy birthday!

## 2025-04-19 DIAGNOSIS — I10 BENIGN ESSENTIAL HYPERTENSION: ICD-10-CM

## 2025-04-21 RX ORDER — AMLODIPINE BESYLATE 10 MG/1
10 TABLET ORAL DAILY
Qty: 90 TABLET | Refills: 3 | Status: SHIPPED | OUTPATIENT
Start: 2025-04-21

## 2025-05-11 DIAGNOSIS — I10 BENIGN ESSENTIAL HYPERTENSION: ICD-10-CM

## 2025-05-12 DIAGNOSIS — E11.69 TYPE 2 DIABETES MELLITUS WITH OTHER SPECIFIED COMPLICATION, WITH LONG-TERM CURRENT USE OF INSULIN (H): ICD-10-CM

## 2025-05-12 DIAGNOSIS — Z79.4 TYPE 2 DIABETES MELLITUS WITH OTHER SPECIFIED COMPLICATION, WITH LONG-TERM CURRENT USE OF INSULIN (H): ICD-10-CM

## 2025-05-12 RX ORDER — SEMAGLUTIDE 1.34 MG/ML
INJECTION, SOLUTION SUBCUTANEOUS
Qty: 9 ML | Refills: 1 | Status: SHIPPED | OUTPATIENT
Start: 2025-05-12

## 2025-05-12 RX ORDER — LOSARTAN POTASSIUM 100 MG/1
100 TABLET ORAL DAILY
Qty: 90 TABLET | Refills: 1 | Status: SHIPPED | OUTPATIENT
Start: 2025-05-12

## 2025-06-10 ENCOUNTER — ANCILLARY PROCEDURE (OUTPATIENT)
Dept: ULTRASOUND IMAGING | Facility: CLINIC | Age: 70
End: 2025-06-10
Attending: NURSE PRACTITIONER
Payer: COMMERCIAL

## 2025-06-10 ENCOUNTER — OFFICE VISIT (OUTPATIENT)
Dept: VASCULAR SURGERY | Facility: CLINIC | Age: 70
End: 2025-06-10
Payer: COMMERCIAL

## 2025-06-10 VITALS
HEART RATE: 71 BPM | OXYGEN SATURATION: 97 % | DIASTOLIC BLOOD PRESSURE: 81 MMHG | BODY MASS INDEX: 23.64 KG/M2 | WEIGHT: 146.4 LBS | SYSTOLIC BLOOD PRESSURE: 135 MMHG

## 2025-06-10 DIAGNOSIS — I77.1 ILIAC ARTERY STENOSIS, LEFT: ICD-10-CM

## 2025-06-10 DIAGNOSIS — Z95.5 H/O RIGHT CORONARY ARTERY STENT PLACEMENT: ICD-10-CM

## 2025-06-10 DIAGNOSIS — I65.23 BILATERAL CAROTID ARTERY STENOSIS: ICD-10-CM

## 2025-06-10 DIAGNOSIS — Z09 ENCOUNTER FOR FOLLOW-UP EXAMINATION AFTER COMPLETED TREATMENT FOR CONDITIONS OTHER THAN MALIGNANT NEOPLASM: ICD-10-CM

## 2025-06-10 DIAGNOSIS — I70.202 FEMORAL ARTERY STENOSIS, LEFT: ICD-10-CM

## 2025-06-10 DIAGNOSIS — E11.9 TYPE 2 DIABETES, HBA1C GOAL < 7% (H): ICD-10-CM

## 2025-06-10 DIAGNOSIS — R09.89 OTHER SPECIFIED SYMPTOMS AND SIGNS INVOLVING THE CIRCULATORY AND RESPIRATORY SYSTEMS: ICD-10-CM

## 2025-06-10 DIAGNOSIS — I25.10 CORONARY ARTERY DISEASE INVOLVING NATIVE HEART WITHOUT ANGINA PECTORIS, UNSPECIFIED VESSEL OR LESION TYPE: ICD-10-CM

## 2025-06-10 DIAGNOSIS — I73.9 PAD (PERIPHERAL ARTERY DISEASE): ICD-10-CM

## 2025-06-10 DIAGNOSIS — I77.1 ILIAC ARTERY STENOSIS, LEFT: Primary | ICD-10-CM

## 2025-06-10 DIAGNOSIS — I70.201 FEMORAL ARTERY STENOSIS, RIGHT: ICD-10-CM

## 2025-06-10 DIAGNOSIS — Z87.891 FORMER SMOKER: ICD-10-CM

## 2025-06-10 PROCEDURE — 3075F SYST BP GE 130 - 139MM HG: CPT | Performed by: NURSE PRACTITIONER

## 2025-06-10 PROCEDURE — 93880 EXTRACRANIAL BILAT STUDY: CPT | Performed by: RADIOLOGY

## 2025-06-10 PROCEDURE — G2211 COMPLEX E/M VISIT ADD ON: HCPCS | Performed by: NURSE PRACTITIONER

## 2025-06-10 PROCEDURE — 1126F AMNT PAIN NOTED NONE PRSNT: CPT | Performed by: NURSE PRACTITIONER

## 2025-06-10 PROCEDURE — 99214 OFFICE O/P EST MOD 30 MIN: CPT | Performed by: NURSE PRACTITIONER

## 2025-06-10 PROCEDURE — 93978 VASCULAR STUDY: CPT | Mod: GC | Performed by: RADIOLOGY

## 2025-06-10 PROCEDURE — 3079F DIAST BP 80-89 MM HG: CPT | Performed by: NURSE PRACTITIONER

## 2025-06-10 ASSESSMENT — PAIN SCALES - GENERAL: PAINLEVEL_OUTOF10: NO PAIN (0)

## 2025-06-10 NOTE — NURSING NOTE
Chief Complaint   Patient presents with    Follow Up     6/10/2025 visit with Afshan Samuel APRN CNP for RETURN VASCULAR PATIENT - Annual follow up abdominal aorta ectasia       Vitals were taken and medications reconciled.    Dread Sibley, Visit Facilitator  12:57 PM

## 2025-06-10 NOTE — PROGRESS NOTES
VASCULAR SURGERY PROGRESS NOTE    LOCATION:  [facility]     Js Jones  Medical Record #:  [id number]  YOB: 1955  Age:  70 year old     Date of Service: 6/10/2025    PRIMARY CARE PROVIDER: Fortino Hyman    Reason for visit:  Aortic ectasia, iliac ectasia, PAD    Impression/Shared Medical Decision Making:     PAD  Iliac ectasia  Aortic ectasia  Former smoker   Hypertension  Type 2 diabetes   Hyperlipidemia.   CAD, s/p stents   Former smoker, quit in 2000    Js Jones is a pleasant 70-year-old gentleman who continues to work full-time and presents to vascular surgery for annual surveillance of his aortic and iliac ectasia along with PAD. Aortoiliac duplex demonstrates greater than 75% stenosis in right CFA, 50 to 75% stenosis in the common iliac artery on the left and greater than 75% stenosis in CFA on left. ENRIKE on right is normal, on left is 0.86, with positive exercise study. However patient denies rest pain, denies claudication, he has not had any previous vascular surgery interventions. No open wounds on lower extremities.  Carotid duplex is normal, with less than 50% diameter stenosis bilaterally.    During this clinic visit we discussed the natural progression of peripheral arterial disease, modifiable and nonmodifiable risk factors, criteria for intervention and warning signs to monitor.      Patient was educated on the importance of regular, moderate walking despite experiencing leg discomfort due to claudication. Explained that walking-induced pain is a sign of reduced blood flow to the muscles; however, continued walking helps stimulate the formation of collateral blood vessels--natural bypasses that improve circulation over time. Advised that walking to the point of mild-to-moderate discomfort, then resting, and repeating the process can support vascular adaptation and symptom improvement. Reinforced the role of walking as a cornerstone in PAD management,  and recommended consistency to promote long-term benefit.    Recommendations/Plan:   Optimal medical therapy with aspirin and statin  Follow-up in 1 year with repeat aortoiliac duplex and ABIs  Self-directed walking as above.  Should he develop rest pain or lifestyle limiting claudication, we would be happy to see him sooner    Discussed warning signs including, but not limited to, acute limb ischemia, new leg pain, motor or sensory deficits, chronic limb threatening ischemia, ischemic rest pain, and nonhealing wounds.  If he experiences any of these, he has been advised to seek treatment and contact our team, patient verbalized clear understanding of the above.    Information/Education: patient able to teach back. Patient agreeable to plan of care: yes    All questions were answered and support provided. He has our contact information and knows to reach out with any additional questions or concerns.     It was a pleasure seeing Mr. Jones in clinic today.    [facility]sh[facility]  Vascular Surgery  Pager: [phone]  [email or web address]  Send message or 10 digit call back number Securely via [employer] with the [employer] Web Console (learn more here)    30 minutes spent on the date of the encounter doing chart review, review of outside records, review of test results, interpretation of tests, patient visit, documentation and discussion with other provider(s).    The longitudinal plan of care for the diagnosis(es)/condition(s) as documented were addressed during this visit. Due to the added complexity in care, I will continue to support Js in the subsequent management and with ongoing continuity of care.      HPI:  Js Jones is a 70 year old male presents to vascular surgery clinic for longitudinal follow-up. Denies rest pain despite bilateral femoral artery stenosis, ABIs on right are normal and on left are moderately abnormal, bilateral lower extremities are well-perfused, warm. He has 2+ palpable DP, I am  unable to palpate left foot PT. Suspect the left foot PT has high-grade stenosis with potential collateralization, patient is completely asymptomatic. No open wounds on lower extremities.  Patient is compliant with aspirin and atorvastatin. No other concerns.    REVIEW OF SYSTEMS:    A 12 point ROS was reviewed and is negative except for what is listed above in HPI.    PHH:    Past Medical History:   Diagnosis Date    DM type 2 (diabetes mellitus, type 2) (H)     HTN (hypertension), benign     Hyperlipidemia     Stented coronary artery           Past Surgical History:   Procedure Laterality Date    COLONOSCOPY N/A 8/14/2023    Procedure: Colonoscopy;  Surgeon: Shlomo Gan DO;  Location: [facility]    HERNIORRHAPHY INGUINAL BILATERAL         ALLERGIES:  Bee venom and Lisinopril    MEDS:    Current Outpatient Medications:     amLODIPine (NORVASC) 10 MG tablet, Take 1 tablet by mouth once daily, Disp: 90 tablet, Rfl: 3    APPLE CIDER VINEGAR PO, Take 450 mg by mouth 2 times daily, Disp: , Rfl:     aspirin 81 MG tablet, Take 1 tablet (81 mg) by mouth daily, Disp: 90 tablet, Rfl: 3    atorvastatin (LIPITOR) 40 MG tablet, Take 1 tablet by mouth once daily, Disp: 100 tablet, Rfl: 2    blood glucose monitoring (NO BRAND SPECIFIED) test strip, Use to test blood sugar 3 times daily or as directed. Box of 100 each., Disp: 3 Box, Rfl: 3    Blood Glucose Monitoring Suppl W/DEVICE KIT, 1 each daily, Disp: 1 kit, Rfl: 0    carvedilol (COREG) 12.5 MG tablet, TAKE 1 TABLET BY MOUTH TWICE DAILY WITH MEALS . APPOINTMENT REQUIRED FOR FUTURE REFILLS, Disp: 180 tablet, Rfl: 1    glipiZIDE (GLUCOTROL XL) 5 MG 24 hr tablet, TAKE 2 TABLETS BY MOUTH ONCE DAILY IN THE MORNING BEFORE BREAKFAST . APPOINTMENT REQUIRED FOR FUTURE REFILLS, Disp: 180 tablet, Rfl: 1    losartan (COZAAR) 100 MG tablet, Take 1 tablet by mouth once daily, Disp: 90 tablet, Rfl: 1    Melatonin 10 MG TABS, Take 10 mg by mouth nightly as needed for sleep, Disp: ,  Rfl:     metFORMIN (GLUCOPHAGE XR) 500 MG 24 hr tablet, TAKE 1 TABLET BY MOUTH WITH BREAKFAST AND  2  WITH  SUPPER, Disp: 270 tablet, Rfl: 1    MULTIPLE VITAMIN PO, Take 1 tablet by mouth, Disp: , Rfl:     Omega-3 Fatty Acids (OMEGA-3 FISH OIL PO), Take 1,200 mg by mouth 2 times daily (with meals), Disp: , Rfl:     omeprazole (PRILOSEC OTC) 20 MG tablet, Take 1 tablet (20 mg) by mouth daily, Disp: 90 tablet, Rfl: 1    ONE TOUCH LANCETS MISC, 1 lancet 3 times daily, Disp: 200 each, Rfl: 5    Semaglutide, 1 MG/DOSE, (OZEMPIC, 1 MG/DOSE,) 4 MG/3ML pen, INJECT 1 MG SUBCUTANEOUSLY ONCE A WEEK, Disp: 9 mL, Rfl: 1    tadalafil (CIALIS) 10 MG tablet, Take 1-2 tablets daily as needed, 30-45 minutes prior to intended sexual activity, Disp: 30 tablet, Rfl: 3    SOCIAL HABITS:    History   Smoking Status    Former    Types: Cigarettes   Smokeless Tobacco    Never     Social History    Substance and Sexual Activity      Alcohol use: Never      History   Drug Use No       FAMILY HISTORY:    Family History   Problem Relation Age of Onset    Diabetes Mother     Hypertension Mother     Cardiovascular Father 81        CHF    Heart Disease Sister 50    Cancer Sister     Heart Disease Brother 50       PE:  /81 (BP Location: Left arm, Patient Position: Sitting, Cuff Size: Adult Regular)   Pulse 71   Wt 146 lb 6.4 oz   SpO2 97%   BMI 23.64 kg/m    Wt Readings from Last 1 Encounters:   06/10/25 146 lb 6.4 oz     Body mass index is 23.64 kg/m .    EXAM:  General: No apparent distress. Answers questions appropriately.   Neurologic: Focally intact, Alert and oriented x 3.   Eyes: Grossly normal.   Cardiac:  RRR  Pulmonary: Non labored breathing with normal respiratory effort  Abdominal: Soft, non distended, non tender to palpation. No pulsatile mass.   Musculoskeletal/Extremity: Palpable 2+ DP bilaterally, palpable 2+ PT on RLE.  Multiphasic Doppler PT on LLE. , dorsiflexion and plantarflexion is intact bilaterally, sensory  function is intact bilaterally. No edema. No open wounds or abrasions.     DIAGNOSTIC STUDIES:     Images:  US Aorta/Ivc/Iliac Duplex Complete  Result Date: 6/10/2025  ULTRASOUND AORTA BILATERAL COMMON FEMORAL DUPLEX ARTERIAL 6/10/2025 10:11 AM CLINICAL HISTORY: iliac artery stenosis; Iliac artery stenosis, left; Bilateral carotid artery stenosis; Other specified symptoms and signs involving the circulatory and respiratory systems. COMPARISONS: Ultrasound 6/7/2024 REFERRING PROVIDER: KACIE PEREZ TECHNIQUE: Aorta through common femoral arteries evaluated with grayscale, color Doppler, and spectral pulsed wave Doppler ultrasound. FINDINGS: AORTA:      Supraceliac: 72/11 cm/s, multiphasic      Infrarenal, proximal: 63/0 cm/s, multiphasic      Infrarenal, mid: 95/0 cm/s, multiphasic      Infrarenal, distal: 123/0 cm/s, multiphasic RIGHT:      Common iliac artery, proximal: 179/0 cm/s, multiphasic      Common iliac artery, distal: 142/0 cm/s, multiphasic      External iliac artery, proximal: 108/0 cm/s, multiphasic      External iliac artery, mid: 119/0 cm/s, multiphasic      External iliac artery, distal: 131/0 cm/s, multiphasic      Common femoral artery: 375/0 cm/s, multiphasic. 2.86 velocity ratio. Greater than 75% diameter stenosis suggested in grayscale but signal bleeds over [id number] in color Doppler. LEFT:      Common iliac artery, proximal: 350/0 cm/s, multiphasic, 2.85 velocity ratio. Shadow from echogenic plaque obscures the lumen.      Common iliac artery, distal: 134/0 cm/s, multiphasic      External iliac artery, proximal: 151/0 cm/s, multiphasic      External iliac artery, mid: 76/0 cm/s, multiphasic      External iliac artery, distal: 95/0 cm/s, multiphasic      Common femoral artery: 88/0 cm/s, multiphasic. Grayscale image suggests greater than 75% diameter stenosis but color fills [id number] in color Doppler.     IMPRESSION: 1. RIGHT: A. Common femoral artery greater than 75% diameter stenosis  suggested by grayscale image. 375 cm/s and 2.86 velocity ratio suggests 50-75% diameter stenosis. 2. LEFT: A. Common iliac artery 350 cm/s and 2.85 velocity ratio suggests 50-75% diameter stenosis. Shadows from echogenic plaque obscure the lumen in grayscale. B. Common femoral artery greater than 75% diameter stenosis suggested in grayscale image but velocity and velocity ratio suggest less than 50% diameter stenosis. I have personally reviewed the examination and initial interpretation and I agree with the findings. POOJA CARDOZA MD   SYSTEM ID:  [id number]    US ENRIKE Doppler with Exercise Bilateral  Result Date: 6/10/2025  BILATERAL LOWER EXTREMITY ANKLE-BRACHIAL INDICES (ABIS) WITH EXERCISE 6/10/2025 10:47 AM CLINICAL HISTORY: Aseess for toe pressure, exersice ENRIKE on L 0.89 in 2022, claudication; Iliac artery stenosis, left; Bilateral carotid artery stenosis . COMPARISON: [facility] ENRIKE and exercise study 3/24/2022 REFERRING PROVIDER: KACIE PEREZ TECHNIQUE: Baseline ENRIKE, TBI, PVR, and 1st digit PPG, followed by exercise ankle brachial index using Zamora-Wilde Exercise Protocol at treadmill speed 2 mph, beginning at 0 percent grade increased to 12 percent for a total of 14 minutes. FINDINGS: RIGHT:      Brachial: 142 mmHg      Ankle (PT): 155 mmHg      Ankle (DP): 156 mmHg      1st Digit: 86 mmHg      ENRIKE: 1.06 - previously 1.09      TBI: 0.59      PVR:           High thigh: Normal           Low thigh: Normal           Calf: Normal           Ankle: Normal      1st Digit PPG: Normal LEFT:      Brachial: 147 mmHg      Ankle (PT): 126 mmHg      Ankle (DP): 127 mmHg      1st Digit: 131 mmHg      ENRIKE: 0.86 - previously 0.98      TBI: 0.89      PVR:           High thigh: Normal           Low thigh: Normal           Calf: Normal           Ankle: Normal      1st Digit PPG: Normal EXERCISE STUDY: Baseline severity of pain in legs prior to exercise on a scale of 1-10: 0/10 Severity of pain during exercise:      Right:  0/10, no symptoms elicited.      Left: 0/10, no symptoms elicited. Stopped due to general fatigue and shortness of breath. Post exercise ENRIKE:      Right leg: Pressure decreased 12 mmHg to 144 mmHg ENRIKE decreased 0.11 to 0.95 = 10% decrease.      Left leg: Pressure decreased 25 mmHg to 102 mmHg. ENRIKE decreased 0.18 to 0.68 = 21% decrease. Post exercise recovery time:      Right leg: Pressure failed to return to baseline by 3 minutes. No symptoms elicited.      Left leg: Pressure failed to return to baseline by 3 minutes. No symptoms elicited.     IMPRESSION: 1. RIGHT LEG:      A. Resting ENRIKE is normal, 1.06, previously 1.09.      B. Resting TBI is ABNORMAL, 0.59.      C. Exercise study: Equivocal. Insufficient pressure and ENRIKE decrease but pressure failed to return to baseline by 3 minutes. 2. LEFT LEG:      A. Resting ENRIKE is ABNORMAL, 0.86, previously 0.98.      B. Resting TBI is normal, 0.89.      C. Exercise study: POSITIVE ------------- ENRIKE Interpretation:    Normal: 1.00 - 1.40    Borderline: 0.91 - 0.99    Abnormal: ? 0.90    Noncompressible: > 1.40 TBI Interpretation:    Normal: > 0.70    Abnormal: ? 0.70 Hazel HL, Gilmar HD, Rome PP, Alex S, et al.; Peer Review Committee Members. 2024 ACC/AHA/AACVPR/APMA/ABC/SCAI/SVM/SVN/SVS/SIR/VESS Guideline for the Management of Lower Extremity Peripheral Artery Disease: A Report of the American College of Cardiology/American Heart Association Joint Committee on Clinical Practice Guidelines. Circulation. 2024 Jun 11;149(24):l3965-w0638. doi: 10.1161/CIR.[id number]. Epub 2024 May 14. PMID: [id number]. ------------- Guidelines (Circulation 2012; 126: 2890) 1. Decrease in ankle pressure of 30 mmHg or a decrease of 20 percent or greater from baseline ENRIKE is diagnostic of PAD (Class IIa; level of evidence A) 2. Decrease in ENRIKE of 0.2 or greater, failure to return ankle pressure to baseline in 3 minutes. POOJA CARDOZA MD   SYSTEM ID:  [id number]    US Carotid Bilateral  Result  Date: 6/10/2025  ULTRASOUND CAROTID BILATERAL 6/10/2025 10:11 AM CLINICAL HISTORY: with PAD, assess for carotid stenosis; Iliac artery stenosis, left; Bilateral carotid artery stenosis COMPARISON: None available. REFERRING PROVIDER: KACIE PEREZ TECHNIQUE: Grayscale (B-mode) and duplex and spectral Doppler ultrasound of the common carotid, extracranial internal carotid, external carotid, and vertebral artery origins. Velocity measurements obtained with angle correction at or less than 60 degrees. FINDINGS: Pulsus bisferiens in some but not all waveforms. RIGHT SIDE: Plaque Morphology: Echogenic plaque causes less than 50% diameter stenosis.    Proximal CCA: 88/9 cm/s    Mid CCA: 91/11 cm/s    Distal CCA: 74/12 cm/s    Carotid bifurcation: 69/13 cm/s    External CA: 72/6 cm/s    Proximal ICA: 88/18 cm/s    Mid ICA: 78/15 cm/s    Distal ICA: 79/16 cm/s    Vertebral artery: Antegrade: 45/8 cm/s     Proximal subclavian: 138/0 cm/s ICA/CCA ratio: 1.19 LEFT SIDE: Plaque Morphology: Echogenic plaque causes less than 50% diameter stenosis.    Proximal CCA: 108/11 cm/s    Mid CCA: 104/10 cm/s    Distal CCA: 88/14 cm/s    Carotid bifurcation: 81/12 cm/s    External CA: 68/0 cm/s    Proximal ICA: 83/12 cm/s    Mid ICA: 80/19 cm/s    Distal ICA: 84/20 cm/s    Vertebral artery: Antegrade: 72/13 cm/s     Proximal subclavian: 134/0 cm/s ICA/CCA ratio: 0.95     IMPRESSION: 1. RIGHT ICA: Less than 50% diameter stenosis by grayscale imaging and sonographic velocity criteria. 2. LEFT ICA: Less than 50% diameter stenosis by grayscale imaging and sonographic velocity criteria. 3. Pulsus bisferiens in some but not all waveforms may suggest aortic valvular pathology. [employer] Updated Recommendation for Carotid Stenosis Interpretation Criteria - November 2023. [email or web address]-[email or web address] endations-[email or web address]      Normal            ICA PSV: < 180 cm/s           Plaque Estimate: None           ICA/CCA PSV Ratio: < 2.0           ICA EDV: < 40 cm/s      < 50%           ICA PSV: < 180 cm/s           Plaque Estimate: < 50%           ICA/CCA PSV Ratio: < 2.0           ICA EDV: < 40 cm/s      50 - 69%           ICA PSV: 180 - 230 cm/s           Plaque Estimate: > 50%           ICA/CCA PSV Ratio: 2.0 - 4.0           ICA EDV: 40 - 100 cm/s   50 - 69%           ICA PSV: 125-180 cm/s           AND           ICA/CCA PSV Ratio: > or = 2.0      > 70% but less than near occlusion           ICA PSV: > 230 cm/s           Plaque Estimate: > 50%           AND either           ICA EDV: > 100 cm/s           or           ICA/CCA PSV Ratio: > 4.0      Near occlusion           ICA PSV: High, low, or undetectable           Plaque Estimate: Visible           ICA/CCA PSV Ratio: Variable           ICA EDV: Variable      Total occlusion           ICA PSV: Undetectable           Plaque Estimate: Visible, no detectable lumen           ICA/CCA PSV Ratio: N/A           ICA EDV: N/A                                       Additional criteria from vascular surgery    > 80%      EDV > 120 cm/s POOJA CARDOZA MD   SYSTEM ID:  [id number]      LABS:      Sodium   Date Value Ref Range Status   11/29/2024 140 135 - 145 mmol/L Final   11/12/2024 140 135 - 145 mmol/L Final   04/29/2024 142 135 - 145 mmol/L Final     Comment:     Reference intervals for this test were updated on 09/26/2023 to more accurately reflect our healthy population. There may be differences in the flagging of prior results with similar values performed with this method. Interpretation of those prior results can be made in the context of the updated reference intervals.    03/22/2021 136 133 - 144 mmol/L Final   02/22/2021 135 133 - 144 mmol/L Final   04/14/2020 135 133 - 144 mmol/L Final     Urea Nitrogen   Date Value Ref Range Status   11/29/2024 17.6 8.0 - 23.0 mg/dL Final   11/12/2024 14.9 8.0 - 23.0 mg/dL Final    04/29/2024 17.5 8.0 - 23.0 mg/dL Final   03/07/2022 19 7 - 30 mg/dL Final   03/22/2021 13 7 - 30 mg/dL Final   02/22/2021 19 7 - 30 mg/dL Final   04/14/2020 20 7 - 30 mg/dL Final     Hemoglobin   Date Value Ref Range Status   04/07/2020 12.4 (L) 13.3 - 17.7 g/dL Final   01/06/2020 14.4 13.3 - 17.7 g/dL Final   09/18/2018 13.3 13.3 - 17.7 g/dL Final     Platelet Count   Date Value Ref Range Status   04/07/2020 124 (L) 150 - 450 10e9/L Final   01/06/2020 161 150 - 450 10e9/L Final   09/18/2018 154 150 - 450 10e9/L Final     INR   Date Value Ref Range Status   09/14/2018 1.16 (H) 0.86 - 1.14 Final

## 2025-06-10 NOTE — LETTER
6/10/2025       RE: Js Jones  [address] [facility] [address]     Dear Colleague,    Thank you for referring your patient, Js Jones, to the [facility] [place] [facility]. Please see a copy of my visit note below.        VASCULAR SURGERY PROGRESS NOTE    LOCATION:  [facility]     Js Jones  Medical Record #:  [id number]  YOB: 1955  Age:  70 year old     Date of Service: 6/10/2025    PRIMARY CARE PROVIDER: Fortino Hyman    Reason for visit:  Aortic ectasia, iliac ectasia, PAD    Impression/Shared Medical Decision Making:     PAD  Iliac ectasia  Aortic ectasia  Former smoker   Hypertension  Type 2 diabetes   Hyperlipidemia.   CAD, s/p stents   Former smoker, quit in 2000    Js Jones is a pleasant 70-year-old gentleman who continues to work full-time and presents to vascular surgery for annual surveillance of his aortic and iliac ectasia along with PAD. Aortoiliac duplex demonstrates greater than 75% stenosis in right CFA, 50 to 75% stenosis in the common iliac artery on the left and greater than 75% stenosis in CFA on left. ENRIKE on right is normal, on left is 0.86, with positive exercise study. However patient denies rest pain, denies claudication, he has not had any previous vascular surgery interventions. No open wounds on lower extremities.  Carotid duplex is normal, with less than 50% diameter stenosis bilaterally.    During this clinic visit we discussed the natural progression of peripheral arterial disease, modifiable and nonmodifiable risk factors, criteria for intervention and warning signs to monitor.      Patient was educated on the importance of regular, moderate walking despite experiencing leg discomfort due to claudication. Explained that walking-induced pain is a sign of reduced blood flow to the muscles; however, continued walking helps stimulate the formation  of collateral blood vessels--natural bypasses that improve circulation over time. Advised that walking to the point of mild-to-moderate discomfort, then resting, and repeating the process can support vascular adaptation and symptom improvement. Reinforced the role of walking as a cornerstone in PAD management, and recommended consistency to promote long-term benefit.    Recommendations/Plan:   Optimal medical therapy with aspirin and statin  Follow-up in 1 year with repeat aortoiliac duplex and ABIs  Self-directed walking as above.  Should he develop rest pain or lifestyle limiting claudication, we would be happy to see him sooner    Discussed warning signs including, but not limited to, acute limb ischemia, new leg pain, motor or sensory deficits, chronic limb threatening ischemia, ischemic rest pain, and nonhealing wounds.  If he experiences any of these, he has been advised to seek treatment and contact our team, patient verbalized clear understanding of the above.    Information/Education: patient able to teach back. Patient agreeable to plan of care: yes    All questions were answered and support provided. He has our contact information and knows to reach out with any additional questions or concerns.     It was a pleasure seeing Mr. Jones in clinic today.    Afshan Samuel, [facility]  Vascular Surgery  Pager: [phone]  [email or web address]  Send message or 10 digit call back number Securely via [employer] with the [employer] Web Console (learn more here)    30 minutes spent on the date of the encounter doing chart review, review of outside records, review of test results, interpretation of tests, patient visit, documentation and discussion with other provider(s).    The longitudinal plan of care for the diagnosis(es)/condition(s) as documented were addressed during this visit. Due to the added complexity in care, I will continue to support Js in the subsequent management and with ongoing continuity of  care.      HPI:  Js Jones is a 70 year old male presents to vascular surgery clinic for longitudinal follow-up. Denies rest pain despite bilateral femoral artery stenosis, ABIs on right are normal and on left are moderately abnormal, bilateral lower extremities are well-perfused, warm. He has 2+ palpable DP, I am unable to palpate left foot PT. Suspect the left foot PT has high-grade stenosis with potential collateralization, patient is completely asymptomatic. No open wounds on lower extremities.  Patient is compliant with aspirin and atorvastatin. No other concerns.    REVIEW OF SYSTEMS:    A 12 point ROS was reviewed and is negative except for what is listed above in HPI.    PHH:    Past Medical History:   Diagnosis Date     DM type 2 (diabetes mellitus, type 2) (H)      HTN (hypertension), benign      Hyperlipidemia      Stented coronary artery           Past Surgical History:   Procedure Laterality Date     COLONOSCOPY N/A 8/14/2023    Procedure: Colonoscopy;  Surgeon: Shlomo Gan DO;  Location: [facility]     HERNIORRHAPHY INGUINAL BILATERAL         ALLERGIES:  Bee venom and Lisinopril    MEDS:    Current Outpatient Medications:      amLODIPine (NORVASC) 10 MG tablet, Take 1 tablet by mouth once daily, Disp: 90 tablet, Rfl: 3     APPLE CIDER VINEGAR PO, Take 450 mg by mouth 2 times daily, Disp: , Rfl:      aspirin 81 MG tablet, Take 1 tablet (81 mg) by mouth daily, Disp: 90 tablet, Rfl: 3     atorvastatin (LIPITOR) 40 MG tablet, Take 1 tablet by mouth once daily, Disp: 100 tablet, Rfl: 2     blood glucose monitoring (NO BRAND SPECIFIED) test strip, Use to test blood sugar 3 times daily or as directed. Box of 100 each., Disp: 3 Box, Rfl: 3     Blood Glucose Monitoring Suppl W/DEVICE KIT, 1 each daily, Disp: 1 kit, Rfl: 0     carvedilol (COREG) 12.5 MG tablet, TAKE 1 TABLET BY MOUTH TWICE DAILY WITH MEALS . APPOINTMENT REQUIRED FOR FUTURE REFILLS, Disp: 180 tablet, Rfl: 1     glipiZIDE (GLUCOTROL  XL) 5 MG 24 hr tablet, TAKE 2 TABLETS BY MOUTH ONCE DAILY IN THE MORNING BEFORE BREAKFAST . APPOINTMENT REQUIRED FOR FUTURE REFILLS, Disp: 180 tablet, Rfl: 1     losartan (COZAAR) 100 MG tablet, Take 1 tablet by mouth once daily, Disp: 90 tablet, Rfl: 1     Melatonin 10 MG TABS, Take 10 mg by mouth nightly as needed for sleep, Disp: , Rfl:      metFORMIN (GLUCOPHAGE XR) 500 MG 24 hr tablet, TAKE 1 TABLET BY MOUTH WITH BREAKFAST AND  2  WITH  SUPPER, Disp: 270 tablet, Rfl: 1     MULTIPLE VITAMIN PO, Take 1 tablet by mouth, Disp: , Rfl:      Omega-3 Fatty Acids (OMEGA-3 FISH OIL PO), Take 1,200 mg by mouth 2 times daily (with meals), Disp: , Rfl:      omeprazole (PRILOSEC OTC) 20 MG tablet, Take 1 tablet (20 mg) by mouth daily, Disp: 90 tablet, Rfl: 1     ONE TOUCH LANCETS MISC, 1 lancet 3 times daily, Disp: 200 each, Rfl: 5     Semaglutide, 1 MG/DOSE, (OZEMPIC, 1 MG/DOSE,) 4 MG/3ML pen, INJECT 1 MG SUBCUTANEOUSLY ONCE A WEEK, Disp: 9 mL, Rfl: 1     tadalafil (CIALIS) 10 MG tablet, Take 1-2 tablets daily as needed, 30-45 minutes prior to intended sexual activity, Disp: 30 tablet, Rfl: 3    SOCIAL HABITS:    History   Smoking Status     Former     Types: Cigarettes   Smokeless Tobacco     Never     Social History    Substance and Sexual Activity      Alcohol use: Never      History   Drug Use No       FAMILY HISTORY:    Family History   Problem Relation Age of Onset     Diabetes Mother      Hypertension Mother      Cardiovascular Father 81        CHF     Heart Disease Sister 50     Cancer Sister      Heart Disease Brother 50       PE:  /81 (BP Location: Left arm, Patient Position: Sitting, Cuff Size: Adult Regular)   Pulse 71   Wt 146 lb 6.4 oz   SpO2 97%   BMI 23.64 kg/m    Wt Readings from Last 1 Encounters:   06/10/25 146 lb 6.4 oz     Body mass index is 23.64 kg/m .    EXAM:  General: No apparent distress. Answers questions appropriately.   Neurologic: Focally intact, Alert and oriented x 3.   Eyes:  Grossly normal.   Cardiac:  RRR  Pulmonary: Non labored breathing with normal respiratory effort  Abdominal: Soft, non distended, non tender to palpation. No pulsatile mass.   Musculoskeletal/Extremity: Palpable 2+ DP bilaterally, palpable 2+ PT on RLE.  Multiphasic Doppler PT on LLE. , dorsiflexion and plantarflexion is intact bilaterally, sensory function is intact bilaterally. No edema. No open wounds or abrasions.     DIAGNOSTIC STUDIES:     Images:  US Aorta/Ivc/Iliac Duplex Complete  Result Date: 6/10/2025  ULTRASOUND AORTA BILATERAL COMMON FEMORAL DUPLEX ARTERIAL 6/10/2025 10:11 AM CLINICAL HISTORY: iliac artery stenosis; Iliac artery stenosis, left; Bilateral carotid artery stenosis; Other specified symptoms and signs involving the circulatory and respiratory systems. COMPARISONS: Ultrasound 6/7/2024 REFERRING PROVIDER: KACIE PEREZ TECHNIQUE: Aorta through common femoral arteries evaluated with grayscale, color Doppler, and spectral pulsed wave Doppler ultrasound. FINDINGS: AORTA:      Supraceliac: 72/11 cm/s, multiphasic      Infrarenal, proximal: 63/0 cm/s, multiphasic      Infrarenal, mid: 95/0 cm/s, multiphasic      Infrarenal, distal: 123/0 cm/s, multiphasic RIGHT:      Common iliac artery, proximal: 179/0 cm/s, multiphasic      Common iliac artery, distal: 142/0 cm/s, multiphasic      External iliac artery, proximal: 108/0 cm/s, multiphasic      External iliac artery, mid: 119/0 cm/s, multiphasic      External iliac artery, distal: 131/0 cm/s, multiphasic      Common femoral artery: 375/0 cm/s, multiphasic. 2.86 velocity ratio. Greater than 75% diameter stenosis suggested in grayscale but signal bleeds over [id number] in color Doppler. LEFT:      Common iliac artery, proximal: 350/0 cm/s, multiphasic, 2.85 velocity ratio. Shadow from echogenic plaque obscures the lumen.      Common iliac artery, distal: 134/0 cm/s, multiphasic      External iliac artery, proximal: 151/0 cm/s, multiphasic      External  iliac artery, mid: 76/0 cm/s, multiphasic      External iliac artery, distal: 95/0 cm/s, multiphasic      Common femoral artery: 88/0 cm/s, multiphasic. Grayscale image suggests greater than 75% diameter stenosis but color fills [id number] in color Doppler.     IMPRESSION: 1. RIGHT: A. Common femoral artery greater than 75% diameter stenosis suggested by grayscale image. 375 cm/s and 2.86 velocity ratio suggests 50-75% diameter stenosis. 2. LEFT: A. Common iliac artery 350 cm/s and 2.85 velocity ratio suggests 50-75% diameter stenosis. Shadows from echogenic plaque obscure the lumen in grayscale. B. Common femoral artery greater than 75% diameter stenosis suggested in grayscale image but velocity and velocity ratio suggest less than 50% diameter stenosis. I have personally reviewed the examination and initial interpretation and I agree with the findings. POOJA CARDOZA MD   SYSTEM ID:  [id number]    US ENRIKE Doppler with Exercise Bilateral  Result Date: 6/10/2025  BILATERAL LOWER EXTREMITY ANKLE-BRACHIAL INDICES (ABIS) WITH EXERCISE 6/10/2025 10:47 AM CLINICAL HISTORY: Aseess for toe pressure, exersice ENRIKE on L 0.89 in 2022, claudication; Iliac artery stenosis, left; Bilateral carotid artery stenosis . COMPARISON: [facility] ENRIKE and exercise study 3/24/2022 REFERRING PROVIDER: KACIE PEREZ TECHNIQUE: Baseline ENRIKE, TBI, PVR, and 1st digit PPG, followed by exercise ankle brachial index using Zamora-Wilde Exercise Protocol at treadmill speed 2 mph, beginning at 0 percent grade increased to 12 percent for a total of 14 minutes. FINDINGS: RIGHT:      Brachial: 142 mmHg      Ankle (PT): 155 mmHg      Ankle (DP): 156 mmHg      1st Digit: 86 mmHg      ENRIKE: 1.06 - previously 1.09      TBI: 0.59      PVR:           High thigh: Normal           Low thigh: Normal           Calf: Normal           Ankle: Normal      1st Digit PPG: Normal LEFT:      Brachial: 147 mmHg      Ankle (PT): 126 mmHg      Ankle (DP): 127 mmHg      1st  Digit: 131 mmHg      ENRIKE: 0.86 - previously 0.98      TBI: 0.89      PVR:           High thigh: Normal           Low thigh: Normal           Calf: Normal           Ankle: Normal      1st Digit PPG: Normal EXERCISE STUDY: Baseline severity of pain in legs prior to exercise on a scale of 1-10: 0/10 Severity of pain during exercise:      Right: 0/10, no symptoms elicited.      Left: 0/10, no symptoms elicited. Stopped due to general fatigue and shortness of breath. Post exercise ENRIKE:      Right leg: Pressure decreased 12 mmHg to 144 mmHg ENRIKE decreased 0.11 to 0.95 = 10% decrease.      Left leg: Pressure decreased 25 mmHg to 102 mmHg. ENRIKE decreased 0.18 to 0.68 = 21% decrease. Post exercise recovery time:      Right leg: Pressure failed to return to baseline by 3 minutes. No symptoms elicited.      Left leg: Pressure failed to return to baseline by 3 minutes. No symptoms elicited.     IMPRESSION: 1. RIGHT LEG:      A. Resting ENRIKE is normal, 1.06, previously 1.09.      B. Resting TBI is ABNORMAL, 0.59.      C. Exercise study: Equivocal. Insufficient pressure and ENRIKE decrease but pressure failed to return to baseline by 3 minutes. 2. LEFT LEG:      A. Resting ENRIKE is ABNORMAL, 0.86, previously 0.98.      B. Resting TBI is normal, 0.89.      C. Exercise study: POSITIVE ------------- ENRIKE Interpretation:    Normal: 1.00 - 1.40    Borderline: 0.91 - 0.99    Abnormal: ? 0.90    Noncompressible: > 1.40 TBI Interpretation:    Normal: > 0.70    Abnormal: ? 0.70 Hazel HL, Gilmar HD, Rome PP, Alex S, et al.; Peer Review Committee Members. 2024 ACC/AHA/AACVPR/APMA/ABC/SCAI/SVM/SVN/SVS/SIR/VESS Guideline for the Management of Lower Extremity Peripheral Artery Disease: A Report of the American College of Cardiology/American Heart Association Joint Committee on Clinical Practice Guidelines. Circulation. 2024 Jun 11;149(24):n1975-h8698. doi: 10.1161/CIR.5093337222495325. Epub 2024 May 14. PMID: 71992744. ------------- Guidelines  (Circulation 2012; 126: 2890) 1. Decrease in ankle pressure of 30 mmHg or a decrease of 20 percent or greater from baseline ENRIKE is diagnostic of PAD (Class IIa; level of evidence A) 2. Decrease in ENRIKE of 0.2 or greater, failure to return ankle pressure to baseline in 3 minutes. POOJA CARDOZA MD   SYSTEM ID:  [id number]    US Carotid Bilateral  Result Date: 6/10/2025  ULTRASOUND CAROTID BILATERAL 6/10/2025 10:11 AM CLINICAL HISTORY: with PAD, assess for carotid stenosis; Iliac artery stenosis, left; Bilateral carotid artery stenosis COMPARISON: None available. REFERRING PROVIDER: KACIE PEREZ TECHNIQUE: Grayscale (B-mode) and duplex and spectral Doppler ultrasound of the common carotid, extracranial internal carotid, external carotid, and vertebral artery origins. Velocity measurements obtained with angle correction at or less than 60 degrees. FINDINGS: Pulsus bisferiens in some but not all waveforms. RIGHT SIDE: Plaque Morphology: Echogenic plaque causes less than 50% diameter stenosis.    Proximal CCA: 88/9 cm/s    Mid CCA: 91/11 cm/s    Distal CCA: 74/12 cm/s    Carotid bifurcation: 69/13 cm/s    External CA: 72/6 cm/s    Proximal ICA: 88/18 cm/s    Mid ICA: 78/15 cm/s    Distal ICA: 79/16 cm/s    Vertebral artery: Antegrade: 45/8 cm/s     Proximal subclavian: 138/0 cm/s ICA/CCA ratio: 1.19 LEFT SIDE: Plaque Morphology: Echogenic plaque causes less than 50% diameter stenosis.    Proximal CCA: 108/11 cm/s    Mid CCA: 104/10 cm/s    Distal CCA: 88/14 cm/s    Carotid bifurcation: 81/12 cm/s    External CA: 68/0 cm/s    Proximal ICA: 83/12 cm/s    Mid ICA: 80/19 cm/s    Distal ICA: 84/20 cm/s    Vertebral artery: Antegrade: 72/13 cm/s     Proximal subclavian: 134/0 cm/s ICA/CCA ratio: 0.95     IMPRESSION: 1. RIGHT ICA: Less than 50% diameter stenosis by grayscale imaging and sonographic velocity criteria. 2. LEFT ICA: Less than 50% diameter stenosis by grayscale imaging and sonographic velocity criteria. 3. Pulsus  bisferiens in some but not all waveforms may suggest aortic valvular pathology. [facility] Accreditation Commission Updated Recommendation for Carotid Stenosis Interpretation Criteria - November 2023. [email or web address]      Normal           ICA PSV: < 180 cm/s           Plaque Estimate: None           ICA/CCA PSV Ratio: < 2.0           ICA EDV: < 40 cm/s      < 50%           ICA PSV: < 180 cm/s           Plaque Estimate: < 50%           ICA/CCA PSV Ratio: < 2.0           ICA EDV: < 40 cm/s      50 - 69%           ICA PSV: 180 - 230 cm/s           Plaque Estimate: > 50%           ICA/CCA PSV Ratio: 2.0 - 4.0           ICA EDV: 40 - 100 cm/s   50 - 69%           ICA PSV: 125-180 cm/s           AND           ICA/CCA PSV Ratio: > or = 2.0      > 70% but less than near occlusion           ICA PSV: > 230 cm/s           Plaque Estimate: > 50%           AND either           ICA EDV: > 100 cm/s           or           ICA/CCA PSV Ratio: > 4.0      Near occlusion           ICA PSV: High, low, or undetectable           Plaque Estimate: Visible           ICA/CCA PSV Ratio: Variable           ICA EDV: Variable      Total occlusion           ICA PSV: Undetectable           Plaque Estimate: Visible, no detectable lumen           ICA/CCA PSV Ratio: N/A           ICA EDV: N/A                                       Additional criteria from vascular surgery    > 80%      EDV > 120 cm/s POOJA CARDOZA MD   SYSTEM ID:  [id number]      LABS:      Sodium   Date Value Ref Range Status   11/29/2024 140 135 - 145 mmol/L Final   11/12/2024 140 135 - 145 mmol/L Final   04/29/2024 142 135 - 145 mmol/L Final     Comment:     Reference intervals for this test were updated on 09/26/2023 to more accurately reflect our healthy population. There may be differences in the flagging of prior results with similar values performed with this  method. Interpretation of those prior results can be made in the context of the updated reference intervals.    03/22/2021 136 133 - 144 mmol/L Final   02/22/2021 135 133 - 144 mmol/L Final   04/14/2020 135 133 - 144 mmol/L Final     Urea Nitrogen   Date Value Ref Range Status   11/29/2024 17.6 8.0 - 23.0 mg/dL Final   11/12/2024 14.9 8.0 - 23.0 mg/dL Final   04/29/2024 17.5 8.0 - 23.0 mg/dL Final   03/07/2022 19 7 - 30 mg/dL Final   03/22/2021 13 7 - 30 mg/dL Final   02/22/2021 19 7 - 30 mg/dL Final   04/14/2020 20 7 - 30 mg/dL Final     Hemoglobin   Date Value Ref Range Status   04/07/2020 12.4 (L) 13.3 - 17.7 g/dL Final   01/06/2020 14.4 13.3 - 17.7 g/dL Final   09/18/2018 13.3 13.3 - 17.7 g/dL Final     Platelet Count   Date Value Ref Range Status   04/07/2020 124 (L) 150 - 450 10e9/L Final   01/06/2020 161 150 - 450 10e9/L Final   09/18/2018 154 150 - 450 10e9/L Final     INR   Date Value Ref Range Status   09/14/2018 1.16 (H) 0.86 - 1.14 Final          Again, thank you for allowing me to participate in the care of your patient.      Sincerely,    ABILIO Tello CNP

## 2025-06-10 NOTE — PATIENT INSTRUCTIONS
Thank you so much for choosing us for your care. It was a pleasure to see you at the vascular clinic today.     Follow-up recommendations: We will see you back in 1 year. Please do not hesitate to reach out to us in the meantime with any concerns. Our schedulers will get in touch with you to set up your follow-up visit.     - Intentional walking, at least 30 minutes per day, at least 3 days per week  - Continue taking aspirin and atorvastatin   - Follow up in 1 year    Additional testing/imaging ordered today: Repeat Aorto iliac duplex, ABIs in 1 year.        Our scheduling team will get in touch with you to set up any follow-up testing/imaging and/or appointments. Please be aware that any testing/imaging recommended today will need to completed prior to your next visit with the provider. If testing/imaging is not completed prior to your next visit, your visit may be rescheduled.        If you have any questions, please contact our clinic directly at [phone] and ask for the nurse. We also encourage the use of [employer] to communicate with your HealthCare Provider.        If you have an urgent need after business hours (8:00 am to 4:30 pm) please call [phone], option 4, and ask for the vascular attending on call. For non-urgent after hours needs, please call the vascular nurse at [phone] and leave a detailed voicemail. For scheduling needs, please call our clinic directly at [phone].

## 2025-06-11 ENCOUNTER — TELEPHONE (OUTPATIENT)
Dept: FAMILY MEDICINE | Facility: CLINIC | Age: 70
End: 2025-06-11
Payer: COMMERCIAL

## 2025-06-11 NOTE — TELEPHONE ENCOUNTER
Fortino Hyman MD  [facility] Pool  Please recommend patient to schedule appointment for further evaluation, regarding possibility of cardiac valve problem.    Dr Hyman      Called and spoke with pt - appt scheduled with Dr. Hyman for 6/17 for this issue. Okay to wait until then? Or should he be seen sooner?    Lali Lowry Patient

## 2025-06-16 DIAGNOSIS — Z79.4 TYPE 2 DIABETES MELLITUS WITH OTHER SPECIFIED COMPLICATION, WITH LONG-TERM CURRENT USE OF INSULIN (H): ICD-10-CM

## 2025-06-16 DIAGNOSIS — E11.69 TYPE 2 DIABETES MELLITUS WITH OTHER SPECIFIED COMPLICATION, WITH LONG-TERM CURRENT USE OF INSULIN (H): ICD-10-CM

## 2025-06-17 ENCOUNTER — OFFICE VISIT (OUTPATIENT)
Dept: FAMILY MEDICINE | Facility: CLINIC | Age: 70
End: 2025-06-17
Payer: COMMERCIAL

## 2025-06-17 VITALS
RESPIRATION RATE: 20 BRPM | SYSTOLIC BLOOD PRESSURE: 126 MMHG | HEART RATE: 86 BPM | OXYGEN SATURATION: 97 % | BODY MASS INDEX: 22.11 KG/M2 | WEIGHT: 137.6 LBS | HEIGHT: 66 IN | TEMPERATURE: 98 F | DIASTOLIC BLOOD PRESSURE: 60 MMHG

## 2025-06-17 DIAGNOSIS — Z79.4 TYPE 2 DIABETES MELLITUS WITH OTHER SPECIFIED COMPLICATION, WITH LONG-TERM CURRENT USE OF INSULIN (H): ICD-10-CM

## 2025-06-17 DIAGNOSIS — E11.69 TYPE 2 DIABETES MELLITUS WITH OTHER SPECIFIED COMPLICATION, WITH LONG-TERM CURRENT USE OF INSULIN (H): ICD-10-CM

## 2025-06-17 DIAGNOSIS — I25.9 IHD (ISCHEMIC HEART DISEASE): Primary | ICD-10-CM

## 2025-06-17 LAB
EST. AVERAGE GLUCOSE BLD GHB EST-MCNC: 148 MG/DL
HBA1C MFR BLD: 6.8 % (ref 0–5.6)

## 2025-06-17 PROCEDURE — 3074F SYST BP LT 130 MM HG: CPT | Performed by: FAMILY MEDICINE

## 2025-06-17 PROCEDURE — 36415 COLL VENOUS BLD VENIPUNCTURE: CPT | Performed by: FAMILY MEDICINE

## 2025-06-17 PROCEDURE — 83036 HEMOGLOBIN GLYCOSYLATED A1C: CPT | Performed by: FAMILY MEDICINE

## 2025-06-17 PROCEDURE — 99214 OFFICE O/P EST MOD 30 MIN: CPT | Performed by: FAMILY MEDICINE

## 2025-06-17 PROCEDURE — 3044F HG A1C LEVEL LT 7.0%: CPT | Performed by: FAMILY MEDICINE

## 2025-06-17 PROCEDURE — 3078F DIAST BP <80 MM HG: CPT | Performed by: FAMILY MEDICINE

## 2025-06-17 PROCEDURE — 1126F AMNT PAIN NOTED NONE PRSNT: CPT | Performed by: FAMILY MEDICINE

## 2025-06-17 RX ORDER — GLIPIZIDE 5 MG/1
TABLET, FILM COATED, EXTENDED RELEASE ORAL
Qty: 180 TABLET | Refills: 1 | Status: SHIPPED | OUTPATIENT
Start: 2025-06-17

## 2025-06-17 RX ORDER — METFORMIN HYDROCHLORIDE 500 MG/1
TABLET, EXTENDED RELEASE ORAL
Qty: 270 TABLET | Refills: 1 | Status: SHIPPED | OUTPATIENT
Start: 2025-06-17

## 2025-06-17 ASSESSMENT — PAIN SCALES - GENERAL: PAINLEVEL_OUTOF10: NO PAIN (0)

## 2025-06-17 NOTE — PROGRESS NOTES
Assessment & Plan     IHD (ischemic heart disease)  Known to have ischemic heart disease.  Patient was seen by vascular surgery, concerned about valvular pathology.  Echocardiogram ordered for further evaluation  - Echocardiogram Complete; Future    Type 2 diabetes mellitus with other specified complication, with long-term current use of insulin (H)  Last hemoglobin A1c 6.3, consistent with well-controlled diabetes.  Metformin and glipizide refilled.  Recommended to continue regular walks, healthy diet  Lab Results   Component Value Date    A1C 6.3 11/12/2024    A1C 6.4 04/29/2024    A1C 7.1 10/30/2023    A1C 6.8 04/26/2023    A1C 7.2 09/07/2022    A1C 8.5 02/22/2021    A1C 8.1 01/06/2020    A1C 6.9 06/07/2019    A1C 6.8 09/15/2018    A1C 7.2 06/15/2018    - HEMOGLOBIN A1C; Future  - Adult Eye  Referral; Future  - metFORMIN (GLUCOPHAGE XR) 500 MG 24 hr tablet; TAKE 1 TABLET BY MOUTH WITH BREAKFAST AND 2 WITH SUPPER  - HEMOGLOBIN A1C      Subjective   Js is a 70 year old, presenting for the following health issues:  Heart Problem        6/17/2025     3:48 PM   Additional Questions   Roomed by Ina MORALEZ CMA     History of Present Illness       Reason for visit:  Check up He is missing 1 dose(s) of medications per week.        Vascular Disease Follow-up    How often do you take nitroglycerin? Never  Do you take an aspirin every day? Yes  How many servings of fruits and vegetables do you eat daily?  0-1  On average, how many sweetened beverages do you drink each day (Examples: soda, juice, sweet tea, etc.  Do NOT count diet or artificially sweetened beverages)?   0  How many days per week do you exercise enough to make your heart beat faster? 3 or less  How many minutes a day do you exercise enough to make your heart beat faster? 9 or less  How many days per week do you miss taking your medication? 1  What makes it hard for you to take your medications?  remembering to take      Review of  "Systems  Constitutional, neuro, ENT, endocrine, pulmonary, cardiac, gastrointestinal, genitourinary, musculoskeletal, integument and psychiatric systems are negative, except as otherwise noted.      Objective    /60   Pulse 86   Temp 98  F (36.7  C) (Tympanic)   Resp 20   Ht 1.676 m (5' 6\")   Wt 62.4 kg (137 lb 9.6 oz)   SpO2 97%   BMI 22.21 kg/m    Body mass index is 22.21 kg/m .  Physical Exam   GENERAL: alert and no distress  EYES: Eyes grossly normal to inspection, PERRL and conjunctivae and sclerae normal  HENT: normal cephalic/atraumatic, nose and mouth without ulcers or lesions, oropharynx clear, and oral mucous membranes moist  NECK: no adenopathy, no asymmetry, masses, or scars  RESP: lungs clear to auscultation - no rales, rhonchi or wheezes  CV: regular rates and rhythm, normal S1 S2, no S3 or S4, and no murmur, click or rub  MS: no gross musculoskeletal defects noted, no edema  SKIN: no suspicious lesions or rashes  NEURO: Normal strength and tone, mentation intact and speech normal  PSYCH: mentation appears normal, affect normal/bright          Signed Electronically by: Fortino Hyman MD    "

## 2025-06-18 ENCOUNTER — PATIENT OUTREACH (OUTPATIENT)
Dept: CARE COORDINATION | Facility: CLINIC | Age: 70
End: 2025-06-18
Payer: COMMERCIAL

## 2025-06-18 RX ORDER — METFORMIN HYDROCHLORIDE 500 MG/1
TABLET, EXTENDED RELEASE ORAL
Qty: 270 TABLET | Refills: 0 | OUTPATIENT
Start: 2025-06-18

## 2025-06-30 ENCOUNTER — TRANSFERRED RECORDS (OUTPATIENT)
Dept: HEALTH INFORMATION MANAGEMENT | Facility: CLINIC | Age: 70
End: 2025-06-30
Payer: COMMERCIAL

## 2025-07-25 ENCOUNTER — HOSPITAL ENCOUNTER (OUTPATIENT)
Dept: CARDIOLOGY | Facility: CLINIC | Age: 70
Discharge: HOME | End: 2025-07-25
Attending: FAMILY MEDICINE | Admitting: FAMILY MEDICINE
Payer: COMMERCIAL

## 2025-07-25 DIAGNOSIS — I25.9 IHD (ISCHEMIC HEART DISEASE): ICD-10-CM

## 2025-07-25 LAB — LVEF ECHO: NORMAL

## 2025-07-25 PROCEDURE — 93306 TTE W/DOPPLER COMPLETE: CPT | Mod: 26 | Performed by: INTERNAL MEDICINE

## 2025-07-25 PROCEDURE — 93306 TTE W/DOPPLER COMPLETE: CPT

## 2025-07-27 DIAGNOSIS — I10 BENIGN ESSENTIAL HYPERTENSION: ICD-10-CM

## 2025-07-28 RX ORDER — CARVEDILOL 12.5 MG/1
TABLET ORAL
Qty: 180 TABLET | Refills: 1 | Status: SHIPPED | OUTPATIENT
Start: 2025-07-28

## (undated) RX ORDER — LIDOCAINE HYDROCHLORIDE 10 MG/ML
INJECTION, SOLUTION EPIDURAL; INFILTRATION; INTRACAUDAL; PERINEURAL
Status: DISPENSED
Start: 2018-09-17

## (undated) RX ORDER — NITROGLYCERIN 5 MG/ML
VIAL (ML) INTRAVENOUS
Status: DISPENSED
Start: 2018-09-17

## (undated) RX ORDER — HEPARIN SODIUM 1000 [USP'U]/ML
INJECTION, SOLUTION INTRAVENOUS; SUBCUTANEOUS
Status: DISPENSED
Start: 2018-09-17

## (undated) RX ORDER — VERAPAMIL HYDROCHLORIDE 2.5 MG/ML
INJECTION, SOLUTION INTRAVENOUS
Status: DISPENSED
Start: 2018-09-17

## (undated) RX ORDER — FENTANYL CITRATE 50 UG/ML
INJECTION, SOLUTION INTRAMUSCULAR; INTRAVENOUS
Status: DISPENSED
Start: 2018-09-17